# Patient Record
Sex: MALE | Race: BLACK OR AFRICAN AMERICAN | NOT HISPANIC OR LATINO | Employment: OTHER | URBAN - NONMETROPOLITAN AREA
[De-identification: names, ages, dates, MRNs, and addresses within clinical notes are randomized per-mention and may not be internally consistent; named-entity substitution may affect disease eponyms.]

---

## 2017-05-15 ENCOUNTER — OFFICE VISIT (OUTPATIENT)
Dept: CARDIAC SURGERY | Facility: CLINIC | Age: 42
End: 2017-05-15

## 2017-05-15 VITALS
HEIGHT: 73 IN | BODY MASS INDEX: 33.27 KG/M2 | WEIGHT: 251 LBS | SYSTOLIC BLOOD PRESSURE: 140 MMHG | DIASTOLIC BLOOD PRESSURE: 110 MMHG | TEMPERATURE: 98.2 F | HEART RATE: 88 BPM | OXYGEN SATURATION: 97 %

## 2017-05-15 DIAGNOSIS — J43.1 PANLOBULAR EMPHYSEMA (HCC): ICD-10-CM

## 2017-05-15 DIAGNOSIS — I73.9 PVD (PERIPHERAL VASCULAR DISEASE) (HCC): ICD-10-CM

## 2017-05-15 DIAGNOSIS — F17.218 NICOTINE DEPENDENCE, CIGARETTES, WITH OTHER NICOTINE-INDUCED DISORDERS: ICD-10-CM

## 2017-05-15 DIAGNOSIS — I71.40 ABDOMINAL AORTIC ANEURYSM (AAA) WITHOUT RUPTURE (HCC): Primary | ICD-10-CM

## 2017-05-15 DIAGNOSIS — E78.2 MIXED HYPERLIPIDEMIA: ICD-10-CM

## 2017-05-15 DIAGNOSIS — I10 BENIGN ESSENTIAL HTN: ICD-10-CM

## 2017-05-15 PROCEDURE — 99406 BEHAV CHNG SMOKING 3-10 MIN: CPT | Performed by: THORACIC SURGERY (CARDIOTHORACIC VASCULAR SURGERY)

## 2017-05-15 PROCEDURE — 99205 OFFICE O/P NEW HI 60 MIN: CPT | Performed by: THORACIC SURGERY (CARDIOTHORACIC VASCULAR SURGERY)

## 2017-05-15 RX ORDER — CYCLOBENZAPRINE HCL 5 MG
5 TABLET ORAL NIGHTLY
COMMUNITY
End: 2020-02-13

## 2017-05-15 RX ORDER — MINOXIDIL 2.5 MG/1
2.5 TABLET ORAL DAILY
COMMUNITY
End: 2017-08-07 | Stop reason: DRUGHIGH

## 2017-05-15 RX ORDER — CHLORTHALIDONE 25 MG/1
25 TABLET ORAL DAILY
COMMUNITY
End: 2020-02-13

## 2017-05-15 RX ORDER — LOSARTAN POTASSIUM 25 MG/1
100 TABLET ORAL DAILY
COMMUNITY
End: 2020-02-24 | Stop reason: SDUPTHER

## 2017-05-15 RX ORDER — ATORVASTATIN CALCIUM 20 MG/1
20 TABLET, FILM COATED ORAL NIGHTLY
COMMUNITY
End: 2020-02-24 | Stop reason: SDUPTHER

## 2017-05-15 RX ORDER — GUANFACINE 1 MG/1
1 TABLET ORAL NIGHTLY
COMMUNITY
End: 2020-05-12 | Stop reason: SDUPTHER

## 2017-05-15 RX ORDER — WARFARIN SODIUM 5 MG/1
5 TABLET ORAL
COMMUNITY
End: 2017-07-17 | Stop reason: ALTCHOICE

## 2017-05-15 RX ORDER — GABAPENTIN 600 MG/1
600 TABLET ORAL 3 TIMES DAILY
COMMUNITY
End: 2020-02-27 | Stop reason: SDUPTHER

## 2017-05-18 LAB
BH CV LOWER ARTERIAL LEFT ABI RATIO: 1.06
BH CV LOWER ARTERIAL LEFT DORSALIS PEDIS SYS MAX: 165 MMHG
BH CV LOWER ARTERIAL LEFT POST TIBIAL SYS MAX: 160 MMHG
BH CV LOWER ARTERIAL RIGHT ABI RATIO: 1.15
BH CV LOWER ARTERIAL RIGHT DORSALIS PEDIS SYS MAX: 174 MMHG
BH CV LOWER ARTERIAL RIGHT POST TIBIAL SYS MAX: 180 MMHG
UPPER ARTERIAL LEFT ARM BRACHIAL SYS MAX: 147 MMHG
UPPER ARTERIAL RIGHT ARM BRACHIAL SYS MAX: 156 MMHG

## 2017-05-31 DIAGNOSIS — I71.40 ABDOMINAL AORTIC ANEURYSM (AAA) WITHOUT RUPTURE (HCC): Primary | ICD-10-CM

## 2017-06-07 ENCOUNTER — HOSPITAL ENCOUNTER (OUTPATIENT)
Dept: CT IMAGING | Facility: HOSPITAL | Age: 42
Discharge: HOME | End: 2017-06-07
Admitting: NURSE PRACTITIONER

## 2017-06-07 DIAGNOSIS — I71.40 ABDOMINAL AORTIC ANEURYSM (AAA) WITHOUT RUPTURE (HCC): ICD-10-CM

## 2017-06-07 PROCEDURE — 74176 CT ABD & PELVIS W/O CONTRAST: CPT

## 2017-07-17 ENCOUNTER — OFFICE VISIT (OUTPATIENT)
Dept: CARDIAC SURGERY | Facility: CLINIC | Age: 42
End: 2017-07-17

## 2017-07-17 VITALS
OXYGEN SATURATION: 98 % | DIASTOLIC BLOOD PRESSURE: 82 MMHG | HEART RATE: 87 BPM | BODY MASS INDEX: 33.49 KG/M2 | HEIGHT: 73 IN | WEIGHT: 252.7 LBS | SYSTOLIC BLOOD PRESSURE: 122 MMHG

## 2017-07-17 DIAGNOSIS — E78.2 MIXED HYPERLIPIDEMIA: ICD-10-CM

## 2017-07-17 DIAGNOSIS — I73.9 PVD (PERIPHERAL VASCULAR DISEASE) (HCC): ICD-10-CM

## 2017-07-17 DIAGNOSIS — I25.10 CORONARY ARTERY DISEASE INVOLVING NATIVE CORONARY ARTERY OF NATIVE HEART WITHOUT ANGINA PECTORIS: ICD-10-CM

## 2017-07-17 DIAGNOSIS — I10 BENIGN ESSENTIAL HTN: ICD-10-CM

## 2017-07-17 DIAGNOSIS — J43.1 PANLOBULAR EMPHYSEMA (HCC): ICD-10-CM

## 2017-07-17 DIAGNOSIS — I71.40 ABDOMINAL AORTIC ANEURYSM (AAA) WITHOUT RUPTURE (HCC): Primary | ICD-10-CM

## 2017-07-17 DIAGNOSIS — F17.218 NICOTINE DEPENDENCE, CIGARETTES, WITH OTHER NICOTINE-INDUCED DISORDERS: ICD-10-CM

## 2017-07-17 PROCEDURE — 99214 OFFICE O/P EST MOD 30 MIN: CPT | Performed by: THORACIC SURGERY (CARDIOTHORACIC VASCULAR SURGERY)

## 2017-07-17 PROCEDURE — 99406 BEHAV CHNG SMOKING 3-10 MIN: CPT | Performed by: THORACIC SURGERY (CARDIOTHORACIC VASCULAR SURGERY)

## 2017-07-17 RX ORDER — CLONIDINE HYDROCHLORIDE 0.3 MG/1
TABLET ORAL
COMMUNITY
Start: 2017-06-01 | End: 2017-08-07 | Stop reason: ALTCHOICE

## 2017-07-17 RX ORDER — LABETALOL 200 MG/1
200 TABLET, FILM COATED ORAL 3 TIMES DAILY
COMMUNITY
Start: 2017-06-13 | End: 2020-04-23

## 2017-07-17 RX ORDER — SODIUM CHLORIDE 0.9 % (FLUSH) 0.9 %
1-10 SYRINGE (ML) INJECTION AS NEEDED
Status: CANCELLED | OUTPATIENT
Start: 2017-08-11

## 2017-07-17 RX ORDER — CALCITRIOL 0.25 UG/1
0.25 CAPSULE, LIQUID FILLED ORAL DAILY
COMMUNITY
Start: 2017-06-03 | End: 2020-02-17 | Stop reason: SDUPTHER

## 2017-07-17 RX ORDER — ALLOPURINOL 100 MG/1
100 TABLET ORAL DAILY
COMMUNITY
Start: 2017-06-03 | End: 2020-04-09 | Stop reason: SDUPTHER

## 2017-07-17 RX ORDER — SODIUM CHLORIDE 9 MG/ML
50 INJECTION, SOLUTION INTRAVENOUS CONTINUOUS
Status: CANCELLED | OUTPATIENT
Start: 2017-08-11

## 2017-07-17 NOTE — PROGRESS NOTES
7/17/2017    Gregg Randle  1975      Chief Complaint:    Chief Complaint   Patient presents with   • Aortic Aneurysm     ct results       HPI:      PCP:  Dr Jiménez  Nephrology:  Dr Jones    42 y.o. male with HTN(stable), hyperlipidemia(stable), stroke(stable), COPD(stable), AAA.  smokes 1 PPD.  Mild to moderate numbness tingling LEFT arm, leg.  LEFT leg occasionally gives out.  Abdominal ultrasound done for kidney evaluation shows AAA.  Was on coumadin until recently, has been stopped.  No other associated signs, symptoms or modifying factors.    3/21/2017 US Retroperitoneal, Renal  ([facility]):  Medical renal disease.  52mm infrarenal AAA, iliac R 27mm, L 19mm.  5/15/2017 VIKASH:  RIGHT 1.1 triphasic.  LEFT 1.1 triphasic.  6/7/2017 CT Abdomen Pelvis without contrast:  Descending thoracic aorta 27mm, renals 23mm, infrarenal 52mm, iliacs R 29mm, L 21mm, femorals R 12mm, L 13mm.    The following portions of the patient's history were reviewed and updated as appropriate: allergies, current medications, past family history, past medical history, past social history, past surgical history and problem list.  Recent images independently reviewed.  Available laboratory values reviewed.    PMH:  Past Medical History:   Diagnosis Date   • AAA (abdominal aortic aneurysm)    • CAD (coronary artery disease)    • COPD (chronic obstructive pulmonary disease)    • HTN (hypertension)    • Hyperlipidemia    • Stroke        ALLERGIES:  No Known Allergies      MEDICATIONS:    Current Outpatient Prescriptions:   •  atorvastatin (LIPITOR) 20 MG tablet, Take 20 mg by mouth Daily., Disp: , Rfl:   •  chlorthalidone (HYGROTON) 25 MG tablet, Take 25 mg by mouth Daily., Disp: , Rfl:   •  cyclobenzaprine (FLEXERIL) 5 MG tablet, Take 5 mg by mouth 3 (Three) Times a Day As Needed for Muscle Spasms., Disp: , Rfl:   •  gabapentin (NEURONTIN) 600 MG tablet, Take 600 mg by mouth 3 (Three) Times a Day., Disp: , Rfl:   •  guanFACINE (TENEX) 1 MG  tablet, Take 1 mg by mouth Every Night., Disp: , Rfl:   •  losartan (COZAAR) 25 MG tablet, Take 25 mg by mouth Daily., Disp: , Rfl:   •  minoxidil (LONITEN) 2.5 MG tablet, Take 2.5 mg by mouth Daily., Disp: , Rfl:   •  allopurinol (ZYLOPRIM) 100 MG tablet, , Disp: , Rfl:   •  calcitriol (ROCALTROL) 0.25 MCG capsule, , Disp: , Rfl:   •  CloNIDine (CATAPRES) 0.3 MG tablet, , Disp: , Rfl:   •  labetalol (NORMODYNE) 200 MG tablet, , Disp: , Rfl:     Review of Systems   Review of Systems   Constitution: Positive for malaise/fatigue, night sweats and weight gain. Negative for weight loss.   HENT: Positive for hearing loss. Negative for hoarse voice and stridor.    Eyes: Positive for blurred vision. Negative for vision loss in left eye, vision loss in right eye and visual disturbance.   Cardiovascular: Negative for chest pain, leg swelling and palpitations.   Respiratory: Negative for cough, hemoptysis and shortness of breath.    Hematologic/Lymphatic: Negative for adenopathy and bleeding problem. Does not bruise/bleed easily.   Skin: Negative for color change, poor wound healing and rash.   Musculoskeletal: Positive for arthritis, back pain and neck pain. Negative for muscle weakness.   Gastrointestinal: Negative for abdominal pain, dysphagia and heartburn.   Neurological: Positive for dizziness, focal weakness, numbness and paresthesias. Negative for seizures.   Psychiatric/Behavioral: Negative for altered mental status, depression and memory loss. The patient is not nervous/anxious.        Physical Exam   Physical Exam   Constitutional: He is oriented to person, place, and time. He is active and cooperative. He does not appear ill. No distress.   HENT:   Head: Atraumatic.   Right Ear: Hearing normal.   Left Ear: Hearing normal.   Nose: No nasal deformity. No epistaxis.   Mouth/Throat: He does not have dentures. Normal dentition.   Eyes: Conjunctivae and lids are normal. Right pupil is round and reactive. Left pupil is  round and reactive.   Neck: No JVD present. Carotid bruit is not present. No tracheal deviation present. No thyroid mass and no thyromegaly present.   Cardiovascular: Normal rate and regular rhythm.    No murmur heard.  Pulses:       Carotid pulses are 2+ on the right side, and 2+ on the left side.       Radial pulses are 2+ on the right side, and 2+ on the left side.        Femoral pulses are 2+ on the right side, and 2+ on the left side.       Dorsalis pedis pulses are 1+ on the right side, and 1+ on the left side.        Posterior tibial pulses are 2+ on the right side, and 2+ on the left side.   Pulmonary/Chest: Effort normal and breath sounds normal.   Abdominal: Soft. He exhibits no distension, no pulsatile midline mass and no mass. There is no splenomegaly or hepatomegaly. There is no tenderness.   Musculoskeletal: He exhibits no deformity.   Gait normal.    Lymphadenopathy:     He has no cervical adenopathy.        Right: No supraclavicular adenopathy present.        Left: No supraclavicular adenopathy present.   Neurological: He is alert and oriented to person, place, and time. He has normal strength.   Skin: Skin is warm and dry. No cyanosis or erythema. No pallor.   No venous staining   Psychiatric: He has a normal mood and affect. His speech is normal. Judgment and thought content normal.      BUN 25 Creat 2.1    ASSESSMENT:  Gregg was seen today for aortic aneurysm.    Diagnoses and all orders for this visit:    Abdominal aortic aneurysm (AAA) without rupture  -     Case Request; Standing  -     sodium chloride 0.9 % flush 1-10 mL; Infuse 1-10 mL into a venous catheter As Needed for Line Care.  -     ceFAZolin (ANCEF) 2 g in sodium chloride 0.9 % 100 mL IVPB; Infuse 2 g into a venous catheter 1 (One) Time.  -     mupirocin (BACTROBAN) 2 % nasal ointment 1 application; 1 application by Each Nare route 1 (One) Time.  -     sterile water (preservative free) injection 1,000 mL; Inject 1,000 mL as directed  1 (One) Time.  -     sodium chloride 0.9 % infusion; Infuse 50 mL/hr into a venous catheter Continuous.  -     Case Request    Benign essential HTN    Mixed hyperlipidemia    PVD (peripheral vascular disease)    Panlobular emphysema    Nicotine dependence, cigarettes, with other nicotine-induced disorders    Coronary artery disease involving native coronary artery of native heart without angina pectoris    Other orders  -     Inpatient Admission; Standing  -     Provide NPO Instructions to Patient  -     Clorhexidine Skin Prep  -     Obtain informed consent; Standing  -     Chlorhexidine 4%/Hibiclens Skin Prep; Standing  -     Clip bilateral groins; Standing  -     Insert Arterial Line; Standing  -     Basic Metabolic Panel; Standing  -     Prepare RBC, 2 Units; Standing  -     Insert Peripheral IV; Standing  -     Saline Lock & Maintain IV Access; Standing    PLAN:  Detailed discussion with Mr Randle regarding situation, options and plans.  AAA, 54mm by US, 52mm by CT with 29mm RIGHT iliac artery aneurysm, asymptomatic.  Repair of Abdominal Aortic Aneurysm is advisable.      Risks including but not limited to Mortality, Major Morbidity 2-3%, bleeding, transfusion, infection, pulmonary (prolonged mechanical ventilation, tracheostomy), renal dysfunction (dialysis), blood vessel injury, nerve injury, possible open procedure.  Understands and wishes to proceed.    Endovascular repair Abdominal Aortic Aneurysm, RIGHT iliac artery aneurysm, possible open, radial arterial line, C-Arm fluoroscopy, C-Arm table, Ancef, visipaque, limited contrast.  GEN.  SDS. 8/11/2017    Smoking cessation advised and assistance options offered including behavioral counseling ([facility]), Nicotine replacement therapy (patches or gum), pharmacologic therapy (Chantix, Wellbutrin). patient understands that continued use of tobacco products increases her risk of MI, CVA, PAD, cancer; counseling for  3-5min.  Recommended regular physical activity, progressive walking program.  Continue current medications as directed.  Will Obtain relevant old records.    Thank you for the opportunity to participate in this patient's care.    Copy to primary care provider.    EMR Dragon/Transcription disclaimer:   Much of this encounter note is an electronic transcription/translation of spoken language to printed text. The electronic translation of spoken language may permit erroneous, or at times, nonsensical words or phrases to be inadvertently transcribed; Although I have reviewed the note for such errors, some may still exist.

## 2017-08-07 ENCOUNTER — APPOINTMENT (OUTPATIENT)
Dept: PREADMISSION TESTING | Facility: HOSPITAL | Age: 42
End: 2017-08-07

## 2017-08-07 VITALS
BODY MASS INDEX: 33.13 KG/M2 | DIASTOLIC BLOOD PRESSURE: 70 MMHG | HEIGHT: 73 IN | OXYGEN SATURATION: 96 % | RESPIRATION RATE: 18 BRPM | WEIGHT: 250 LBS | SYSTOLIC BLOOD PRESSURE: 130 MMHG | HEART RATE: 86 BPM

## 2017-08-07 LAB
ABO GROUP BLD: NORMAL
ANION GAP SERPL CALCULATED.3IONS-SCNC: 11 MMOL/L (ref 5–15)
BLD GP AB SCN SERPL QL: NEGATIVE
BUN BLD-MCNC: 40 MG/DL (ref 7–21)
BUN/CREAT SERPL: 14.7 (ref 7–25)
CALCIUM SPEC-SCNC: 9.8 MG/DL (ref 8.4–10.2)
CHLORIDE SERPL-SCNC: 97 MMOL/L (ref 95–110)
CO2 SERPL-SCNC: 30 MMOL/L (ref 22–31)
CREAT BLD-MCNC: 2.73 MG/DL (ref 0.7–1.3)
DEPRECATED RDW RBC AUTO: 42.2 FL (ref 35.1–43.9)
ERYTHROCYTE [DISTWIDTH] IN BLOOD BY AUTOMATED COUNT: 13.8 % (ref 11.5–14.5)
GFR SERPL CREATININE-BSD FRML MDRD: 31 ML/MIN/1.73 (ref 63–147)
GLUCOSE BLD-MCNC: 85 MG/DL (ref 60–100)
HCT VFR BLD AUTO: 45.7 % (ref 39–49)
HGB BLD-MCNC: 15.3 G/DL (ref 13.7–17.3)
Lab: NORMAL
MCH RBC QN AUTO: 27.9 PG (ref 26.5–34)
MCHC RBC AUTO-ENTMCNC: 33.5 G/DL (ref 31.5–36.3)
MCV RBC AUTO: 83.4 FL (ref 80–98)
PLATELET # BLD AUTO: 240 10*3/MM3 (ref 150–450)
PMV BLD AUTO: 10.7 FL (ref 8–12)
POTASSIUM BLD-SCNC: 4.4 MMOL/L (ref 3.5–5.1)
RBC # BLD AUTO: 5.48 10*6/MM3 (ref 4.37–5.74)
RH BLD: POSITIVE
SODIUM BLD-SCNC: 138 MMOL/L (ref 137–145)
WBC NRBC COR # BLD: 5.9 10*3/MM3 (ref 3.2–9.8)

## 2017-08-07 PROCEDURE — 86850 RBC ANTIBODY SCREEN: CPT | Performed by: ANESTHESIOLOGY

## 2017-08-07 PROCEDURE — 93010 ELECTROCARDIOGRAM REPORT: CPT | Performed by: INTERNAL MEDICINE

## 2017-08-07 PROCEDURE — 85027 COMPLETE CBC AUTOMATED: CPT | Performed by: ANESTHESIOLOGY

## 2017-08-07 PROCEDURE — 93005 ELECTROCARDIOGRAM TRACING: CPT

## 2017-08-07 PROCEDURE — 86901 BLOOD TYPING SEROLOGIC RH(D): CPT | Performed by: ANESTHESIOLOGY

## 2017-08-07 PROCEDURE — 80048 BASIC METABOLIC PNL TOTAL CA: CPT | Performed by: ANESTHESIOLOGY

## 2017-08-07 PROCEDURE — 36415 COLL VENOUS BLD VENIPUNCTURE: CPT

## 2017-08-07 PROCEDURE — 86900 BLOOD TYPING SEROLOGIC ABO: CPT | Performed by: ANESTHESIOLOGY

## 2017-08-07 RX ORDER — WARFARIN SODIUM 1 MG/1
1 TABLET ORAL
COMMUNITY
End: 2017-08-07

## 2017-08-07 RX ORDER — MINOXIDIL 10 MG/1
10 TABLET ORAL 2 TIMES DAILY
COMMUNITY
End: 2019-10-07

## 2017-08-07 RX ORDER — WARFARIN SODIUM 1 MG/1
1.5 TABLET ORAL
COMMUNITY
End: 2020-01-15

## 2017-08-07 RX ORDER — WARFARIN SODIUM 5 MG/1
5 TABLET ORAL
COMMUNITY
End: 2020-01-15

## 2017-08-07 RX ORDER — ALBUTEROL SULFATE 90 UG/1
2 AEROSOL, METERED RESPIRATORY (INHALATION) EVERY 4 HOURS PRN
COMMUNITY

## 2017-08-07 RX ORDER — ESCITALOPRAM OXALATE 20 MG/1
20 TABLET ORAL DAILY
COMMUNITY
End: 2020-02-13

## 2017-08-07 NOTE — DISCHARGE INSTRUCTIONS
[facility]  Pre-op Information and Guidelines    You will be called after 2 p.m. the day before your surgery (Friday for Monday surgery) and notified of your time for arrival and approximate surgery time.  If you have not received a call by 4P.M., please contact Same Day Surgery at [phone] of if outside [facility] call [phone].    Please Follow these Important Safety Guidelines:    • The morning of your procedure, take only the medications listed below with   A sip of water:____INHALER, LEXAPRO, GABAPENTIN,_________________       ___LABETALOL_____________________________________    • DO NOT eat or drink anything after 12:00 midnight the night before surgery  Specific instructions concerning drinking clear liquids will be discussed during  the pre-surgery instruction call the day before your surgery.    • If you take a blood thinner (ex. Plavix, Coumadin, aspirin), ask your doctor when to stop it before surgery  STOP DATE: _________________    • Only 2 visitors are allowed in patient rooms at a time  Your visitors will be asked to wait in the lobby until the admission process is complete with the exception of a parent with a child and patients in need of special assistance.    • YOU CANNOT DRIVE YOURSELF HOME  You must be accompanied by someone who will be responsible for driving you home after surgery and for your care at home.    • DO NOT chew gum, use breath mints, hard candy, or smoke the day of surgery  • DO NOT drink alcohol for at least 24 hours before your surgery  • DO NOT wear any jewelry and remove all body piercing before coming to the hospital  • DO NOT wear make-up to the hospital  • If you are having surgery on an extremity (arm/leg/foot) remove nail polish/artificial nails on the surgical side  • Clothing, glasses, contacts, dentures, and hairpieces must be removed before surgery  • Bathe the night before or the morning of your surgery and do not use  powders/lotions on skin.

## 2017-08-07 NOTE — PAT
Spoke with Roel , with Dr. Arshad, r/t coumadin, states for patient to hold until after surgery. Patient voiced understanding.

## 2017-08-11 ENCOUNTER — ANESTHESIA EVENT (OUTPATIENT)
Dept: PERIOP | Facility: HOSPITAL | Age: 42
End: 2017-08-11

## 2017-08-11 ENCOUNTER — ANESTHESIA (OUTPATIENT)
Dept: PERIOP | Facility: HOSPITAL | Age: 42
End: 2017-08-11

## 2017-08-11 ENCOUNTER — APPOINTMENT (OUTPATIENT)
Dept: GENERAL RADIOLOGY | Facility: HOSPITAL | Age: 42
End: 2017-08-11

## 2017-08-11 ENCOUNTER — HOSPITAL ENCOUNTER (INPATIENT)
Facility: HOSPITAL | Age: 42
LOS: 2 days | Discharge: HOME | End: 2017-08-13
Attending: THORACIC SURGERY (CARDIOTHORACIC VASCULAR SURGERY) | Admitting: THORACIC SURGERY (CARDIOTHORACIC VASCULAR SURGERY)

## 2017-08-11 DIAGNOSIS — I71.40 ABDOMINAL AORTIC ANEURYSM (AAA) WITHOUT RUPTURE (HCC): ICD-10-CM

## 2017-08-11 LAB
ABO GROUP BLD: NORMAL
ANION GAP SERPL CALCULATED.3IONS-SCNC: 12 MMOL/L (ref 5–15)
BLD GP AB SCN SERPL QL: NEGATIVE
BUN BLD-MCNC: 37 MG/DL (ref 7–21)
BUN/CREAT SERPL: 13.5 (ref 7–25)
CALCIUM SPEC-SCNC: 9.1 MG/DL (ref 8.4–10.2)
CHLORIDE SERPL-SCNC: 101 MMOL/L (ref 95–110)
CO2 SERPL-SCNC: 27 MMOL/L (ref 22–31)
CREAT BLD-MCNC: 2.74 MG/DL (ref 0.7–1.3)
GFR SERPL CREATININE-BSD FRML MDRD: 31 ML/MIN/1.73 (ref 63–147)
GLUCOSE BLD-MCNC: 97 MG/DL (ref 60–100)
HCT VFR BLD AUTO: 44.4 % (ref 39–49)
HGB BLD-MCNC: 14.5 G/DL (ref 13.7–17.3)
INR PPP: 1.18 (ref 0.8–1.2)
Lab: NORMAL
POTASSIUM BLD-SCNC: 4.3 MMOL/L (ref 3.5–5.1)
PROTHROMBIN TIME: 15 SECONDS (ref 11.1–15.3)
RH BLD: POSITIVE
SODIUM BLD-SCNC: 140 MMOL/L (ref 137–145)

## 2017-08-11 PROCEDURE — 86923 COMPATIBILITY TEST ELECTRIC: CPT

## 2017-08-11 PROCEDURE — 25010000002 SUCCINYLCHOLINE PER 20 MG: Performed by: NURSE ANESTHETIST, CERTIFIED REGISTERED

## 2017-08-11 PROCEDURE — 86901 BLOOD TYPING SEROLOGIC RH(D): CPT | Performed by: THORACIC SURGERY (CARDIOTHORACIC VASCULAR SURGERY)

## 2017-08-11 PROCEDURE — 94799 UNLISTED PULMONARY SVC/PX: CPT

## 2017-08-11 PROCEDURE — 85610 PROTHROMBIN TIME: CPT | Performed by: ANESTHESIOLOGY

## 2017-08-11 PROCEDURE — 34826: CPT | Performed by: THORACIC SURGERY (CARDIOTHORACIC VASCULAR SURGERY)

## 2017-08-11 PROCEDURE — C1769 GUIDE WIRE: HCPCS | Performed by: THORACIC SURGERY (CARDIOTHORACIC VASCULAR SURGERY)

## 2017-08-11 PROCEDURE — C1894 INTRO/SHEATH, NON-LASER: HCPCS | Performed by: THORACIC SURGERY (CARDIOTHORACIC VASCULAR SURGERY)

## 2017-08-11 PROCEDURE — 36200 PLACE CATHETER IN AORTA: CPT | Performed by: THORACIC SURGERY (CARDIOTHORACIC VASCULAR SURGERY)

## 2017-08-11 PROCEDURE — 75952: CPT | Performed by: THORACIC SURGERY (CARDIOTHORACIC VASCULAR SURGERY)

## 2017-08-11 PROCEDURE — 25010000002 ONDANSETRON PER 1 MG: Performed by: NURSE ANESTHETIST, CERTIFIED REGISTERED

## 2017-08-11 PROCEDURE — 25010000002 HEPARIN (PORCINE) PER 1000 UNITS: Performed by: THORACIC SURGERY (CARDIOTHORACIC VASCULAR SURGERY)

## 2017-08-11 PROCEDURE — 25010000002 NALOXONE PER 1 MG: Performed by: THORACIC SURGERY (CARDIOTHORACIC VASCULAR SURGERY)

## 2017-08-11 PROCEDURE — 04V03DZ RESTRICTION OF ABDOMINAL AORTA WITH INTRALUMINAL DEVICE, PERCUTANEOUS APPROACH: ICD-10-PCS | Performed by: THORACIC SURGERY (CARDIOTHORACIC VASCULAR SURGERY)

## 2017-08-11 PROCEDURE — C2628 CATHETER, OCCLUSION: HCPCS | Performed by: THORACIC SURGERY (CARDIOTHORACIC VASCULAR SURGERY)

## 2017-08-11 PROCEDURE — 25010000002 HEPARIN (PORCINE) PER 1000 UNITS: Performed by: NURSE ANESTHETIST, CERTIFIED REGISTERED

## 2017-08-11 PROCEDURE — 76000 FLUOROSCOPY <1 HR PHYS/QHP: CPT

## 2017-08-11 PROCEDURE — 85018 HEMOGLOBIN: CPT | Performed by: THORACIC SURGERY (CARDIOTHORACIC VASCULAR SURGERY)

## 2017-08-11 PROCEDURE — C1773 RET DEV, INSERTABLE: HCPCS | Performed by: THORACIC SURGERY (CARDIOTHORACIC VASCULAR SURGERY)

## 2017-08-11 PROCEDURE — 25010000002 PROTAMINE SULFATE PER 10 MG: Performed by: NURSE ANESTHETIST, CERTIFIED REGISTERED

## 2017-08-11 PROCEDURE — 25010000002 ONDANSETRON PER 1 MG: Performed by: THORACIC SURGERY (CARDIOTHORACIC VASCULAR SURGERY)

## 2017-08-11 PROCEDURE — 34825 PR AAA REPR,1ST VESSEL,EXTENSION PROSTH: CPT | Performed by: THORACIC SURGERY (CARDIOTHORACIC VASCULAR SURGERY)

## 2017-08-11 PROCEDURE — 25010000003 CEFAZOLIN PER 500 MG: Performed by: THORACIC SURGERY (CARDIOTHORACIC VASCULAR SURGERY)

## 2017-08-11 PROCEDURE — 25010000002 DEXAMETHASONE PER 1 MG: Performed by: NURSE ANESTHETIST, CERTIFIED REGISTERED

## 2017-08-11 PROCEDURE — C1768 GRAFT, VASCULAR: HCPCS | Performed by: THORACIC SURGERY (CARDIOTHORACIC VASCULAR SURGERY)

## 2017-08-11 PROCEDURE — 75953: CPT

## 2017-08-11 PROCEDURE — 85014 HEMATOCRIT: CPT | Performed by: THORACIC SURGERY (CARDIOTHORACIC VASCULAR SURGERY)

## 2017-08-11 PROCEDURE — 34804: CPT | Performed by: THORACIC SURGERY (CARDIOTHORACIC VASCULAR SURGERY)

## 2017-08-11 PROCEDURE — 94760 N-INVAS EAR/PLS OXIMETRY 1: CPT

## 2017-08-11 PROCEDURE — 80048 BASIC METABOLIC PNL TOTAL CA: CPT | Performed by: THORACIC SURGERY (CARDIOTHORACIC VASCULAR SURGERY)

## 2017-08-11 PROCEDURE — 86850 RBC ANTIBODY SCREEN: CPT | Performed by: THORACIC SURGERY (CARDIOTHORACIC VASCULAR SURGERY)

## 2017-08-11 PROCEDURE — 0 IOPAMIDOL 61 % SOLUTION: Performed by: THORACIC SURGERY (CARDIOTHORACIC VASCULAR SURGERY)

## 2017-08-11 PROCEDURE — 86900 BLOOD TYPING SEROLOGIC ABO: CPT | Performed by: THORACIC SURGERY (CARDIOTHORACIC VASCULAR SURGERY)

## 2017-08-11 PROCEDURE — 75953: CPT | Performed by: THORACIC SURGERY (CARDIOTHORACIC VASCULAR SURGERY)

## 2017-08-11 PROCEDURE — 25010000002 MIDAZOLAM PER 1 MG: Performed by: NURSE ANESTHETIST, CERTIFIED REGISTERED

## 2017-08-11 PROCEDURE — 25010000002 NEOSTIGMINE 10 MG/10ML SOLUTION

## 2017-08-11 PROCEDURE — 75952 HC ENDOVASC REPAIR INFRARENAL AAA S AND I: CPT

## 2017-08-11 PROCEDURE — C1760 CLOSURE DEV, VASC: HCPCS | Performed by: THORACIC SURGERY (CARDIOTHORACIC VASCULAR SURGERY)

## 2017-08-11 PROCEDURE — C1887 CATHETER, GUIDING: HCPCS | Performed by: THORACIC SURGERY (CARDIOTHORACIC VASCULAR SURGERY)

## 2017-08-11 PROCEDURE — 94640 AIRWAY INHALATION TREATMENT: CPT

## 2017-08-11 PROCEDURE — 25010000002 PROPOFOL 10 MG/ML EMULSION: Performed by: NURSE ANESTHETIST, CERTIFIED REGISTERED

## 2017-08-11 PROCEDURE — 25010000002 FENTANYL CITRATE (PF) 100 MCG/2ML SOLUTION: Performed by: NURSE ANESTHETIST, CERTIFIED REGISTERED

## 2017-08-11 PROCEDURE — 94002 VENT MGMT INPAT INIT DAY: CPT

## 2017-08-11 DEVICE — GRFT AAA AFX EXT VELA SUPR 17F 28X28X95X20: Type: IMPLANTABLE DEVICE | Site: AORTA | Status: FUNCTIONAL

## 2017-08-11 DEVICE — STENTGR 3 OR MORE DEV ENDOLOGIX: Type: IMPLANTABLE DEVICE | Status: FUNCTIONAL

## 2017-08-11 DEVICE — IMPLANTABLE DEVICE: Type: IMPLANTABLE DEVICE | Site: AORTA | Status: FUNCTIONAL

## 2017-08-11 DEVICE — IMPLANTABLE DEVICE: Type: IMPLANTABLE DEVICE | Status: FUNCTIONAL

## 2017-08-11 RX ORDER — ALLOPURINOL 100 MG/1
100 TABLET ORAL DAILY
Status: DISCONTINUED | OUTPATIENT
Start: 2017-08-11 | End: 2017-08-13 | Stop reason: HOSPADM

## 2017-08-11 RX ORDER — FLUMAZENIL 0.1 MG/ML
0.2 INJECTION INTRAVENOUS AS NEEDED
Status: DISCONTINUED | OUTPATIENT
Start: 2017-08-11 | End: 2017-08-11 | Stop reason: HOSPADM

## 2017-08-11 RX ORDER — CHLORTHALIDONE 25 MG/1
25 TABLET ORAL DAILY
Status: DISCONTINUED | OUTPATIENT
Start: 2017-08-11 | End: 2017-08-13 | Stop reason: HOSPADM

## 2017-08-11 RX ORDER — ALBUTEROL SULFATE 2.5 MG/3ML
2.5 SOLUTION RESPIRATORY (INHALATION)
Status: COMPLETED | OUTPATIENT
Start: 2017-08-11 | End: 2017-08-13

## 2017-08-11 RX ORDER — DEXAMETHASONE SODIUM PHOSPHATE 4 MG/ML
INJECTION, SOLUTION INTRA-ARTICULAR; INTRALESIONAL; INTRAMUSCULAR; INTRAVENOUS; SOFT TISSUE AS NEEDED
Status: DISCONTINUED | OUTPATIENT
Start: 2017-08-11 | End: 2017-08-11 | Stop reason: SURG

## 2017-08-11 RX ORDER — LIDOCAINE HYDROCHLORIDE 20 MG/ML
INJECTION, SOLUTION INFILTRATION; PERINEURAL AS NEEDED
Status: DISCONTINUED | OUTPATIENT
Start: 2017-08-11 | End: 2017-08-11 | Stop reason: SURG

## 2017-08-11 RX ORDER — CALCITRIOL 0.25 UG/1
0.25 CAPSULE, LIQUID FILLED ORAL DAILY
Status: DISCONTINUED | OUTPATIENT
Start: 2017-08-11 | End: 2017-08-13 | Stop reason: HOSPADM

## 2017-08-11 RX ORDER — PROPOFOL 10 MG/ML
VIAL (ML) INTRAVENOUS AS NEEDED
Status: DISCONTINUED | OUTPATIENT
Start: 2017-08-11 | End: 2017-08-11 | Stop reason: SURG

## 2017-08-11 RX ORDER — BISACODYL 10 MG
10 SUPPOSITORY, RECTAL RECTAL DAILY PRN
Status: DISCONTINUED | OUTPATIENT
Start: 2017-08-11 | End: 2017-08-13 | Stop reason: HOSPADM

## 2017-08-11 RX ORDER — ONDANSETRON 2 MG/ML
4 INJECTION INTRAMUSCULAR; INTRAVENOUS EVERY 6 HOURS PRN
Status: DISCONTINUED | OUTPATIENT
Start: 2017-08-11 | End: 2017-08-13 | Stop reason: HOSPADM

## 2017-08-11 RX ORDER — NITROGLYCERIN 40 MG/100ML
5-200 INJECTION INTRAVENOUS
Status: DISCONTINUED | OUTPATIENT
Start: 2017-08-11 | End: 2017-08-12

## 2017-08-11 RX ORDER — ONDANSETRON 4 MG/1
4 TABLET, FILM COATED ORAL EVERY 6 HOURS PRN
Status: DISCONTINUED | OUTPATIENT
Start: 2017-08-11 | End: 2017-08-13 | Stop reason: HOSPADM

## 2017-08-11 RX ORDER — FAMOTIDINE 10 MG/ML
10 INJECTION, SOLUTION INTRAVENOUS EVERY 12 HOURS SCHEDULED
Status: DISCONTINUED | OUTPATIENT
Start: 2017-08-11 | End: 2017-08-13 | Stop reason: HOSPADM

## 2017-08-11 RX ORDER — SODIUM CHLORIDE 0.9 % (FLUSH) 0.9 %
SYRINGE (ML) INJECTION
Status: COMPLETED
Start: 2017-08-11 | End: 2017-08-11

## 2017-08-11 RX ORDER — ACETAMINOPHEN 325 MG/1
650 TABLET ORAL ONCE AS NEEDED
Status: DISCONTINUED | OUTPATIENT
Start: 2017-08-11 | End: 2017-08-11 | Stop reason: HOSPADM

## 2017-08-11 RX ORDER — HYDROCODONE BITARTRATE AND ACETAMINOPHEN 5; 325 MG/1; MG/1
1 TABLET ORAL EVERY 4 HOURS PRN
Status: DISCONTINUED | OUTPATIENT
Start: 2017-08-11 | End: 2017-08-13 | Stop reason: HOSPADM

## 2017-08-11 RX ORDER — NALOXONE HCL 0.4 MG/ML
0.2 VIAL (ML) INJECTION AS NEEDED
Status: DISCONTINUED | OUTPATIENT
Start: 2017-08-11 | End: 2017-08-11 | Stop reason: HOSPADM

## 2017-08-11 RX ORDER — SUCCINYLCHOLINE CHLORIDE 20 MG/ML
INJECTION INTRAMUSCULAR; INTRAVENOUS AS NEEDED
Status: DISCONTINUED | OUTPATIENT
Start: 2017-08-11 | End: 2017-08-11 | Stop reason: SURG

## 2017-08-11 RX ORDER — ONDANSETRON 2 MG/ML
4 INJECTION INTRAMUSCULAR; INTRAVENOUS ONCE AS NEEDED
Status: DISCONTINUED | OUTPATIENT
Start: 2017-08-11 | End: 2017-08-11 | Stop reason: HOSPADM

## 2017-08-11 RX ORDER — FAMOTIDINE 20 MG/1
20 TABLET, FILM COATED ORAL EVERY 12 HOURS SCHEDULED
Status: DISCONTINUED | OUTPATIENT
Start: 2017-08-11 | End: 2017-08-13 | Stop reason: HOSPADM

## 2017-08-11 RX ORDER — LOSARTAN POTASSIUM 25 MG/1
25 TABLET ORAL DAILY
Status: DISCONTINUED | OUTPATIENT
Start: 2017-08-11 | End: 2017-08-13 | Stop reason: HOSPADM

## 2017-08-11 RX ORDER — HEPARIN SODIUM 5000 [USP'U]/ML
INJECTION, SOLUTION INTRAVENOUS; SUBCUTANEOUS AS NEEDED
Status: DISCONTINUED | OUTPATIENT
Start: 2017-08-11 | End: 2017-08-13 | Stop reason: HOSPADM

## 2017-08-11 RX ORDER — LABETALOL HYDROCHLORIDE 5 MG/ML
5 INJECTION, SOLUTION INTRAVENOUS
Status: DISCONTINUED | OUTPATIENT
Start: 2017-08-11 | End: 2017-08-11 | Stop reason: HOSPADM

## 2017-08-11 RX ORDER — ATORVASTATIN CALCIUM 20 MG/1
20 TABLET, FILM COATED ORAL NIGHTLY
Status: DISCONTINUED | OUTPATIENT
Start: 2017-08-11 | End: 2017-08-13 | Stop reason: HOSPADM

## 2017-08-11 RX ORDER — SODIUM CHLORIDE, SODIUM GLUCONATE, SODIUM ACETATE, POTASSIUM CHLORIDE, AND MAGNESIUM CHLORIDE 526; 502; 368; 37; 30 MG/100ML; MG/100ML; MG/100ML; MG/100ML; MG/100ML
INJECTION, SOLUTION INTRAVENOUS CONTINUOUS PRN
Status: DISCONTINUED | OUTPATIENT
Start: 2017-08-11 | End: 2017-08-11 | Stop reason: SURG

## 2017-08-11 RX ORDER — ESCITALOPRAM OXALATE 10 MG/1
20 TABLET ORAL DAILY
Status: DISCONTINUED | OUTPATIENT
Start: 2017-08-11 | End: 2017-08-13 | Stop reason: HOSPADM

## 2017-08-11 RX ORDER — DIPHENHYDRAMINE HYDROCHLORIDE 50 MG/ML
12.5 INJECTION INTRAMUSCULAR; INTRAVENOUS
Status: DISCONTINUED | OUTPATIENT
Start: 2017-08-11 | End: 2017-08-11 | Stop reason: HOSPADM

## 2017-08-11 RX ORDER — SODIUM CHLORIDE 9 MG/ML
50 INJECTION, SOLUTION INTRAVENOUS CONTINUOUS
Status: DISCONTINUED | OUTPATIENT
Start: 2017-08-11 | End: 2017-08-12

## 2017-08-11 RX ORDER — GABAPENTIN 300 MG/1
600 CAPSULE ORAL EVERY 8 HOURS SCHEDULED
Status: DISCONTINUED | OUTPATIENT
Start: 2017-08-11 | End: 2017-08-13 | Stop reason: HOSPADM

## 2017-08-11 RX ORDER — ALBUTEROL SULFATE 90 UG/1
2 AEROSOL, METERED RESPIRATORY (INHALATION) EVERY 4 HOURS PRN
Status: DISCONTINUED | OUTPATIENT
Start: 2017-08-11 | End: 2017-08-13 | Stop reason: HOSPADM

## 2017-08-11 RX ORDER — EPHEDRINE SULFATE 50 MG/ML
5 INJECTION, SOLUTION INTRAVENOUS ONCE AS NEEDED
Status: DISCONTINUED | OUTPATIENT
Start: 2017-08-11 | End: 2017-08-11 | Stop reason: HOSPADM

## 2017-08-11 RX ORDER — ASPIRIN 81 MG/1
81 TABLET, CHEWABLE ORAL DAILY
Status: DISCONTINUED | OUTPATIENT
Start: 2017-08-11 | End: 2017-08-13 | Stop reason: HOSPADM

## 2017-08-11 RX ORDER — PROTAMINE SULFATE 10 MG/ML
INJECTION, SOLUTION INTRAVENOUS AS NEEDED
Status: DISCONTINUED | OUTPATIENT
Start: 2017-08-11 | End: 2017-08-11 | Stop reason: SURG

## 2017-08-11 RX ORDER — SODIUM CHLORIDE 0.9 % (FLUSH) 0.9 %
1-10 SYRINGE (ML) INJECTION AS NEEDED
Status: DISCONTINUED | OUTPATIENT
Start: 2017-08-11 | End: 2017-08-11 | Stop reason: HOSPADM

## 2017-08-11 RX ORDER — ROCURONIUM BROMIDE 10 MG/ML
INJECTION, SOLUTION INTRAVENOUS AS NEEDED
Status: DISCONTINUED | OUTPATIENT
Start: 2017-08-11 | End: 2017-08-11 | Stop reason: SURG

## 2017-08-11 RX ORDER — ONDANSETRON 4 MG/1
4 TABLET, ORALLY DISINTEGRATING ORAL EVERY 6 HOURS PRN
Status: DISCONTINUED | OUTPATIENT
Start: 2017-08-11 | End: 2017-08-13 | Stop reason: HOSPADM

## 2017-08-11 RX ORDER — LABETALOL 200 MG/1
200 TABLET, FILM COATED ORAL 3 TIMES DAILY
Status: DISCONTINUED | OUTPATIENT
Start: 2017-08-11 | End: 2017-08-13 | Stop reason: HOSPADM

## 2017-08-11 RX ORDER — GLYCOPYRROLATE 0.2 MG/ML
INJECTION INTRAMUSCULAR; INTRAVENOUS AS NEEDED
Status: DISCONTINUED | OUTPATIENT
Start: 2017-08-11 | End: 2017-08-11 | Stop reason: SURG

## 2017-08-11 RX ORDER — MORPHINE SULFATE 2 MG/ML
2 INJECTION, SOLUTION INTRAMUSCULAR; INTRAVENOUS EVERY 4 HOURS PRN
Status: DISCONTINUED | OUTPATIENT
Start: 2017-08-11 | End: 2017-08-13 | Stop reason: HOSPADM

## 2017-08-11 RX ORDER — MINOXIDIL 10 MG/1
10 TABLET ORAL 2 TIMES DAILY
Status: DISCONTINUED | OUTPATIENT
Start: 2017-08-11 | End: 2017-08-13 | Stop reason: HOSPADM

## 2017-08-11 RX ORDER — ACETAMINOPHEN 650 MG/1
650 SUPPOSITORY RECTAL ONCE AS NEEDED
Status: DISCONTINUED | OUTPATIENT
Start: 2017-08-11 | End: 2017-08-11 | Stop reason: HOSPADM

## 2017-08-11 RX ORDER — ONDANSETRON 2 MG/ML
INJECTION INTRAMUSCULAR; INTRAVENOUS AS NEEDED
Status: DISCONTINUED | OUTPATIENT
Start: 2017-08-11 | End: 2017-08-11 | Stop reason: SURG

## 2017-08-11 RX ORDER — SENNA AND DOCUSATE SODIUM 50; 8.6 MG/1; MG/1
2 TABLET, FILM COATED ORAL 2 TIMES DAILY PRN
Status: DISCONTINUED | OUTPATIENT
Start: 2017-08-11 | End: 2017-08-13 | Stop reason: HOSPADM

## 2017-08-11 RX ORDER — MIDAZOLAM HYDROCHLORIDE 1 MG/ML
INJECTION INTRAMUSCULAR; INTRAVENOUS AS NEEDED
Status: DISCONTINUED | OUTPATIENT
Start: 2017-08-11 | End: 2017-08-11 | Stop reason: SURG

## 2017-08-11 RX ORDER — NITROGLYCERIN 20 MG/100ML
5-200 INJECTION INTRAVENOUS
Status: DISCONTINUED | OUTPATIENT
Start: 2017-08-11 | End: 2017-08-11 | Stop reason: HOSPADM

## 2017-08-11 RX ORDER — ACETAMINOPHEN 325 MG/1
650 TABLET ORAL EVERY 4 HOURS PRN
Status: DISCONTINUED | OUTPATIENT
Start: 2017-08-11 | End: 2017-08-13 | Stop reason: HOSPADM

## 2017-08-11 RX ORDER — NALOXONE HCL 0.4 MG/ML
0.4 VIAL (ML) INJECTION
Status: DISCONTINUED | OUTPATIENT
Start: 2017-08-11 | End: 2017-08-13 | Stop reason: HOSPADM

## 2017-08-11 RX ORDER — FENTANYL CITRATE 50 UG/ML
INJECTION, SOLUTION INTRAMUSCULAR; INTRAVENOUS AS NEEDED
Status: DISCONTINUED | OUTPATIENT
Start: 2017-08-11 | End: 2017-08-11 | Stop reason: SURG

## 2017-08-11 RX ORDER — SODIUM CHLORIDE 9 MG/ML
50 INJECTION, SOLUTION INTRAVENOUS CONTINUOUS
Status: DISCONTINUED | OUTPATIENT
Start: 2017-08-11 | End: 2017-08-11 | Stop reason: HOSPADM

## 2017-08-11 RX ORDER — HEPARIN SODIUM 1000 [USP'U]/ML
INJECTION, SOLUTION INTRAVENOUS; SUBCUTANEOUS AS NEEDED
Status: DISCONTINUED | OUTPATIENT
Start: 2017-08-11 | End: 2017-08-11 | Stop reason: SURG

## 2017-08-11 RX ORDER — CYCLOBENZAPRINE HCL 5 MG
5 TABLET ORAL NIGHTLY
Status: DISCONTINUED | OUTPATIENT
Start: 2017-08-11 | End: 2017-08-13 | Stop reason: HOSPADM

## 2017-08-11 RX ADMIN — ASPIRIN 81 MG 81 MG: 81 TABLET ORAL at 15:24

## 2017-08-11 RX ADMIN — ROCURONIUM BROMIDE 20 MG: 10 INJECTION INTRAVENOUS at 09:41

## 2017-08-11 RX ADMIN — CYCLOBENZAPRINE HYDROCHLORIDE 5 MG: 5 TABLET, FILM COATED ORAL at 21:30

## 2017-08-11 RX ADMIN — LABETALOL HYDROCHLORIDE 200 MG: 200 TABLET, FILM COATED ORAL at 17:04

## 2017-08-11 RX ADMIN — SODIUM BICARBONATE 100 ML/HR: 84 INJECTION, SOLUTION INTRAVENOUS at 13:05

## 2017-08-11 RX ADMIN — CEFAZOLIN SODIUM 2 G: 1 INJECTION, POWDER, FOR SOLUTION INTRAMUSCULAR; INTRAVENOUS at 20:45

## 2017-08-11 RX ADMIN — PROPOFOL 175 MG: 10 INJECTION, EMULSION INTRAVENOUS at 07:41

## 2017-08-11 RX ADMIN — HYDROCODONE BITARTRATE AND ACETAMINOPHEN 1 TABLET: 5; 325 TABLET ORAL at 15:34

## 2017-08-11 RX ADMIN — MINOXIDIL 10 MG: 10 TABLET ORAL at 15:29

## 2017-08-11 RX ADMIN — ROCURONIUM BROMIDE 5 MG: 10 INJECTION INTRAVENOUS at 07:40

## 2017-08-11 RX ADMIN — CEFAZOLIN SODIUM 2 G: 1 INJECTION, POWDER, FOR SOLUTION INTRAMUSCULAR; INTRAVENOUS at 13:05

## 2017-08-11 RX ADMIN — ATORVASTATIN CALCIUM 20 MG: 20 TABLET, FILM COATED ORAL at 21:30

## 2017-08-11 RX ADMIN — NALOXONE HYDROCHLORIDE 0.4 MG: 0.4 INJECTION, SOLUTION INTRAMUSCULAR; INTRAVENOUS; SUBCUTANEOUS at 12:49

## 2017-08-11 RX ADMIN — ONDANSETRON 4 MG: 2 INJECTION INTRAMUSCULAR; INTRAVENOUS at 09:49

## 2017-08-11 RX ADMIN — CALCITRIOL 0.25 MCG: 0.25 CAPSULE, LIQUID FILLED ORAL at 15:24

## 2017-08-11 RX ADMIN — PROTAMINE SULFATE 20 MG: 10 INJECTION, SOLUTION INTRAVENOUS at 10:03

## 2017-08-11 RX ADMIN — ROCURONIUM BROMIDE 20 MG: 10 INJECTION INTRAVENOUS at 08:23

## 2017-08-11 RX ADMIN — MAGNESIUM HYDROXIDE 10 ML: 2400 SUSPENSION ORAL at 15:24

## 2017-08-11 RX ADMIN — FAMOTIDINE 20 MG: 20 TABLET ORAL at 21:31

## 2017-08-11 RX ADMIN — PROPOFOL 100 MG: 10 INJECTION, EMULSION INTRAVENOUS at 10:26

## 2017-08-11 RX ADMIN — ESCITALOPRAM OXALATE 20 MG: 10 TABLET ORAL at 15:23

## 2017-08-11 RX ADMIN — DEXAMETHASONE SODIUM PHOSPHATE 4 MG: 4 INJECTION, SOLUTION INTRAMUSCULAR; INTRAVENOUS at 09:44

## 2017-08-11 RX ADMIN — SUCCINYLCHOLINE CHLORIDE 100 MG: 20 INJECTION, SOLUTION INTRAMUSCULAR; INTRAVENOUS at 10:26

## 2017-08-11 RX ADMIN — ALLOPURINOL 100 MG: 100 TABLET ORAL at 15:24

## 2017-08-11 RX ADMIN — GABAPENTIN 600 MG: 300 CAPSULE ORAL at 15:23

## 2017-08-11 RX ADMIN — GABAPENTIN 600 MG: 300 CAPSULE ORAL at 21:30

## 2017-08-11 RX ADMIN — CEFAZOLIN SODIUM 2 G: 1 INJECTION, POWDER, FOR SOLUTION INTRAMUSCULAR; INTRAVENOUS at 08:06

## 2017-08-11 RX ADMIN — FENTANYL CITRATE 100 MCG: 50 INJECTION, SOLUTION INTRAMUSCULAR; INTRAVENOUS at 07:41

## 2017-08-11 RX ADMIN — HEPARIN SODIUM 2000 UNITS: 1000 INJECTION, SOLUTION INTRAVENOUS; SUBCUTANEOUS at 09:05

## 2017-08-11 RX ADMIN — SODIUM CHLORIDE 50 ML/HR: 900 INJECTION, SOLUTION INTRAVENOUS at 18:00

## 2017-08-11 RX ADMIN — ALBUTEROL SULFATE 2.5 MG: 2.5 SOLUTION RESPIRATORY (INHALATION) at 17:14

## 2017-08-11 RX ADMIN — ONDANSETRON 4 MG: 2 INJECTION INTRAMUSCULAR; INTRAVENOUS at 14:18

## 2017-08-11 RX ADMIN — PROTAMINE SULFATE 20 MG: 10 INJECTION, SOLUTION INTRAVENOUS at 09:51

## 2017-08-11 RX ADMIN — FAMOTIDINE 10 MG: 10 INJECTION, SOLUTION INTRAVENOUS at 15:23

## 2017-08-11 RX ADMIN — MIDAZOLAM 1 MG: 1 INJECTION INTRAMUSCULAR; INTRAVENOUS at 07:34

## 2017-08-11 RX ADMIN — HEPARIN SODIUM 10000 UNITS: 1000 INJECTION, SOLUTION INTRAVENOUS; SUBCUTANEOUS at 08:55

## 2017-08-11 RX ADMIN — LOSARTAN POTASSIUM 25 MG: 25 TABLET ORAL at 15:26

## 2017-08-11 RX ADMIN — SODIUM CHLORIDE, SODIUM GLUCONATE, SODIUM ACETATE, POTASSIUM CHLORIDE, AND MAGNESIUM CHLORIDE: 526; 502; 368; 37; 30 INJECTION, SOLUTION INTRAVENOUS at 07:51

## 2017-08-11 RX ADMIN — CHLORTHALIDONE 25 MG: 25 TABLET ORAL at 15:26

## 2017-08-11 RX ADMIN — SUCCINYLCHOLINE CHLORIDE 140 MG: 20 INJECTION, SOLUTION INTRAMUSCULAR; INTRAVENOUS at 07:41

## 2017-08-11 RX ADMIN — SODIUM CHLORIDE 50 ML/HR: 9 INJECTION, SOLUTION INTRAVENOUS at 06:12

## 2017-08-11 RX ADMIN — Medication 10 ML: at 18:45

## 2017-08-11 RX ADMIN — ROCURONIUM BROMIDE 45 MG: 10 INJECTION INTRAVENOUS at 07:51

## 2017-08-11 RX ADMIN — LIDOCAINE HYDROCHLORIDE 50 MG: 20 INJECTION, SOLUTION INFILTRATION; PERINEURAL at 07:41

## 2017-08-11 RX ADMIN — MUPIROCIN: 20 OINTMENT TOPICAL at 18:44

## 2017-08-11 RX ADMIN — GLYCOPYRROLATE 0.6 MG: 0.2 INJECTION, SOLUTION INTRAMUSCULAR; INTRAVENOUS at 10:04

## 2017-08-11 NOTE — OP NOTE
OPERATIVE NOTE    Gregg Randle  1975 8/11/2017    PREOP DIAGNOSES:  Abdominal aortic aneurysm (AAA) without rupture [I71.4]    POSTOP DIAGNOSES: same    PROCEDURE:   1. Percutaneous cannulation of the LEFT common femoral artery with a 7Fr sheath and placement of catheter abdominal aorta.  2. Percutaneous cannulation of the RIGHT common femoral artery with a 17Fr sheath and placement of catheter abdominal aorta.  3. Endovascular repair of the abdominal aortic aneurysm, unibody bifurcating prosthesis (Endologix AFX 25x120 Body, [id number] limbs)  4. Placement of proximal extension prosthesis for endovascular repair of abdominal aortic aneurysm (Endologix Best 28mm suprarenal, 95/20)  5. Placement of proximal extension prosthesis for endovascular repair of abdominal aortic aneurysm (Endologix Best 28mm infrarenal)  6. Abdominal aortogram with a bilateral iliofemoral arteriograms  7. Completion aortofemoral arteriogram  8. Repair of LEFT common femoral artery (preclose technique with perclose device)  9. Repair of RIGHT common femorall artery (preclose technique with perclose device x2)  10. Vascular ultrasound guidance (LEFT and RIGHT femoral artery cannulations)  11. Complete radiologic supervision and interpretation.    SURGEON: RENEE Arshad MD FACS RPVI    ASSISTANT: Roel Mejia CFA    ANESTHESIA: GEN                                             ESTIMATED BLOOD LOSS: minimal    COMPLICATIONS: none    CONTRAST:   60ml isovue     FLUORO TIME:  13.7min    DESCRIPTION OF OPERATION: Patient taken to the operating room, placed in supine position. Radial arterial line placed by anesthesia.  General endotracheal anesthesia was induced.  Prepped and draped in sterile fashion. Vascular ultrasound was used to identify the right common femoral artery which was 10mm in diameter with mild posterior plaque.  Cannulated via modified seldinger technique with mini-stick under ultrasound guidance. Wire and  catheter exchanged for 6Fr sheath. perclose devices with pre-close technique at the 10 and 2 o'clock positions were deployed and left untied.  Vascular ultrasound was used to identify the LEFT common femoral artery which was 10mm in diameter with mild posterior plaque.  Cannulated via modified seldinger technique with mini-stick under ultrasound guidance. Wire and catheter exchanged for 7Fr sheath. perclose device with pre-close technique at the 12 o'clock position was deployed and left untied.     .035 Glidewire was placed up the right side to the ascending thoracic aorta. 4Fr glidecath placed over the wire and exchanged for .035 lunderquist guidewire with tip placed in the acending thoracic aorta. RIGHT sheath exchanged for a 17Fr sheath, advanced up the iliac artery without difficulty just above the aortic bifurcation.  The yellow wirecath was advanced to the end of the sheath. Snare brought up through the LEFT sheath.  Yellow wirecath captured and brought out through LEFT sheath.  Endoluminal graft was advanced completely out of the sheath with no wire wrap and brought down snug against the bifurcation and deployed. Yellow cath removed.  omniflush catheter fed over wire, wire disconnected from graft and removed.  omniflush advanced above the renals.  Proximal cuff brought up through the RIGHT sheath into the suprarenal aorta.  Adjusted for parallax.  Abdominal aortogram performed, renal arteries identified and marked.  Proximal cuff deployed with uncovered portion deployed in the suprarenal position, covered portion deployed just below the lowest renal artery.  Balloon dilatation of the iliac limb with [id number] conquest balloon.  aortofemoral arteriogram performed demonstrating good flow through the graft no evidence of endoleak dissection or extravasation.  Good distal flow beyond the graft.  There was limited overlap between proximal and distal segment and additional proximal extension device was brought up and  deployed to add additional support to the midportion of the graft.      Left common femoral artery was repaired using preclosed technique with Perclose device with good hemostasis.  The right common femoral artery was repaired using Precose technique with Perclose device with good hemostasis and good distal flow beyond the repair. Triphasic flow in the feet at the completion of the procedure.  20 mg protamine was given.  Radiologic supervision and interpretation provided throughout the case.  Patient tolerated procedure well transferred to PACU stable condition               Aren Arshad MD     Date: 8/11/2017  Time: 10:36 AM

## 2017-08-11 NOTE — ANESTHESIA POSTPROCEDURE EVALUATION
Patient: Gregg Randle    Procedure Summary     Date Anesthesia Start Anesthesia Stop Room / Location    08/11/17 0736 1045  [facility] OR 05 / [facility]       Procedure Diagnosis Surgeon Provider    ABDOMINAL AORTIC ANEURYSM REPAIR WITH ENDOGRAFT, REPAIR ILIAC ARTERY ANEURYSM, POSSIBLE OPEN ABDOMINAL AORTOGRAM    (CELL SAVER STAND-BY) (N/A Abdomen) Abdominal aortic aneurysm (AAA) without rupture  (Abdominal aortic aneurysm (AAA) without rupture [I71.4]) MD Pro Mckeon MD          Anesthesia Type: general  Last vitals  BP   111/63 (08/11/17 1122)    Temp   97.2 °F (36.2 °C) (08/11/17 1122)    Pulse   72 (08/11/17 1122)   Resp   14 (08/11/17 1122)    SpO2   95 % (08/11/17 1122)      Post Anesthesia Care and Evaluation    Patient location during evaluation: PACU  Patient participation: complete - patient participated  Level of consciousness: awake  Pain score: 2  Pain management: adequate  Airway patency: patent  Anesthetic complications: No anesthetic complications  PONV Status: none  Cardiovascular status: acceptable  Respiratory status: acceptable  Hydration status: acceptable

## 2017-08-11 NOTE — ANESTHESIA PROCEDURE NOTES
Peripheral IV    Patient location during procedure: OR  Line placed for Fluids/Medication Admin.  Performed By   CRNA: RANI SCHNEIDER  Peripheral IV Prep   Patient position: supine   Prep: alcohol swabs  Patient monitoring: heart rate, cardiac monitor and continuous pulse ox  Peripheral IV Procedure   Laterality:left  Location:  Arm  Catheter size: 16 G         Post Assessment   Dressing Type: transparent.    IV Dressing/Site: clean, dry and intact

## 2017-08-11 NOTE — NURSING NOTE
Medication marked and sent to pharmacy valuables to security request not to give code since opt out

## 2017-08-11 NOTE — PLAN OF CARE
Problem: Patient Care Overview (Adult)  Goal: Plan of Care Review  Outcome: Ongoing (interventions implemented as appropriate)    08/11/17 1154   Coping/Psychosocial Response Interventions   Plan Of Care Reviewed With patient   Patient Care Overview   Progress improving   Outcome Evaluation   Outcome Summary/Follow up Plan VSS, pt meets pacu d/c criteria       Goal: Adult Individualization and Mutuality  Outcome: Ongoing (interventions implemented as appropriate)    Problem: Perioperative Period (Adult)  Goal: Signs and Symptoms of Listed Potential Problems Will be Absent or Manageable (Perioperative Period)  Outcome: Ongoing (interventions implemented as appropriate)

## 2017-08-11 NOTE — ANESTHESIA PROCEDURE NOTES
Arterial Line    Patient location during procedure: OR   Line placed for hemodynamic monitoring.  Performed By   CRNA: RANI SCHNEIDER  Preanesthetic Checklist  Completed: patient identified, surgical consent, pre-op evaluation, IV checked, risks and benefits discussed and monitors and equipment checked  Arterial Line Prep   Sterile Tech: cap, gloves, mask and sterile barriers  Prep: ChloraPrep  Patient monitoring: blood pressure monitoring, continuous pulse oximetry and EKG  Arterial Line Procedure   Laterality:right  Location:  radial artery  Catheter size: 20 G   Guidance: ultrasound guided  PROCEDURE NOTE/ULTRASOUND INTERPRETATION.  Using ultrasound guidance the potential vascular sites for insertion of the catheter were visualized to determine the patency of the vessel to be used for vascular access.  After selecting the appropriate site for insertion, the needle was visualized under ultrasound being inserted into the radial artery, followed by ultrasound confirmation of wire and catheter placement. There were no abnormalities seen on ultrasound; an image was taken; and the patient tolerated the procedure with no complications.   Number of attempts: 1  Successful placement: yes          Post Assessment   Dressing Type: occlusive dressing applied and secured with tape.   Complications no  Circ/Move/Sens Assessment: unchanged.   Patient Tolerance: patient tolerated the procedure well with no apparent complications

## 2017-08-11 NOTE — ANESTHESIA POSTPROCEDURE EVALUATION
Patient: Gregg Randle    Procedure Summary     Date Anesthesia Start Anesthesia Stop Room / Location    08/11/17 0736 1045  [facility] OR 05 / [facility]       Procedure Diagnosis Surgeon Provider    ABDOMINAL AORTIC ANEURYSM REPAIR WITH ENDOGRAFT, REPAIR ILIAC ARTERY ANEURYSM, POSSIBLE OPEN ABDOMINAL AORTOGRAM    (CELL SAVER STAND-BY) (N/A Abdomen) Abdominal aortic aneurysm (AAA) without rupture  (Abdominal aortic aneurysm (AAA) without rupture [I71.4]) MD Pro Mckeon MD          Anesthesia Type: general  Last vitals  BP        Temp        Pulse       Resp        SpO2          Post Anesthesia Care and Evaluation    Patient location during evaluation: PACU  Patient participation: complete - patient participated  Level of consciousness: awake and alert  Pain management: adequate  Airway patency: patent  Anesthetic complications: No anesthetic complications    Cardiovascular status: acceptable  Respiratory status: acceptable  Hydration status: acceptable

## 2017-08-11 NOTE — ANESTHESIA PROCEDURE NOTES
Airway  Airway not difficult    General Information and Staff    Patient location during procedure: OR  CRNA: RANI SCHNEIDER    Indications and Patient Condition  Indications for airway management: airway protection    Preoxygenated: yes  Mask difficulty assessment: 2 - vent by mask + OA or adjuvant +/- NMBA    Final Airway Details  Final airway type: endotracheal airway      Successful airway: ETT  Cuffed: yes   Successful intubation technique: video laryngoscopy  Facilitating devices/methods: intubating stylet  Endotracheal tube insertion site: oral  Blade: Segun  Blade size: #3  ETT size: 7.5 mm  Cormack-Lehane Classification: grade I - full view of glottis  Placement verified by: chest auscultation and capnometry   Cuff volume (mL): 7  Measured from: lips  Number of attempts at approach: 1

## 2017-08-11 NOTE — ANESTHESIA PREPROCEDURE EVALUATION
Anesthesia Evaluation     NPO Solid Status: > 8 hours       Airway   Mallampati: II  TM distance: >3 FB  no difficulty expected  Dental    (+) poor dentition    Pulmonary    (+) a smoker Current, COPD mild, sleep apnea, decreased breath sounds,   Cardiovascular     ECG reviewed  Rhythm: regular  Rate: normal    (+) hypertension poorly controlled, past MI  >12 months, CAD, PVD, hyperlipidemia    ROS comment: EKG reveals NSR with q waves.    Neuro/Psych  (+) CVA residual symptoms, psychiatric history Anxiety,    GI/Hepatic/Renal/Endo      Musculoskeletal     Abdominal     Abdomen: soft.   Substance History      OB/GYN          Other   (+) arthritis                                     Anesthesia Plan    ASA 4     general     intravenous induction   Anesthetic plan and risks discussed with patient.

## 2017-08-11 NOTE — NURSING NOTE
Dr Gallardo at bedside to evaluate pt for extubation.  Pt meets extubation criteria.  Pt placed on simple mask et O2 sats 99% post-extubation. Pt tolerated well.

## 2017-08-12 PROCEDURE — 99024 POSTOP FOLLOW-UP VISIT: CPT | Performed by: NURSE PRACTITIONER

## 2017-08-12 PROCEDURE — 94799 UNLISTED PULMONARY SVC/PX: CPT

## 2017-08-12 PROCEDURE — 94760 N-INVAS EAR/PLS OXIMETRY 1: CPT

## 2017-08-12 PROCEDURE — 94660 CPAP INITIATION&MGMT: CPT

## 2017-08-12 RX ORDER — HYDROCODONE BITARTRATE AND ACETAMINOPHEN 5; 325 MG/1; MG/1
1 TABLET ORAL EVERY 4 HOURS PRN
Qty: 40 TABLET | Refills: 0 | Status: SHIPPED | OUTPATIENT
Start: 2017-08-12 | End: 2017-08-21

## 2017-08-12 RX ORDER — ASPIRIN 81 MG/1
81 TABLET, CHEWABLE ORAL DAILY
Start: 2017-08-12 | End: 2020-02-13

## 2017-08-12 RX ADMIN — SODIUM CHLORIDE 125 ML/HR: 900 INJECTION, SOLUTION INTRAVENOUS at 06:16

## 2017-08-12 RX ADMIN — ALBUTEROL SULFATE 2.5 MG: 2.5 SOLUTION RESPIRATORY (INHALATION) at 00:07

## 2017-08-12 RX ADMIN — CALCITRIOL 0.25 MCG: 0.25 CAPSULE, LIQUID FILLED ORAL at 08:43

## 2017-08-12 RX ADMIN — ASPIRIN 81 MG 81 MG: 81 TABLET ORAL at 08:42

## 2017-08-12 RX ADMIN — GABAPENTIN 600 MG: 300 CAPSULE ORAL at 20:43

## 2017-08-12 RX ADMIN — GABAPENTIN 600 MG: 300 CAPSULE ORAL at 16:34

## 2017-08-12 RX ADMIN — GABAPENTIN 600 MG: 300 CAPSULE ORAL at 22:00

## 2017-08-12 RX ADMIN — FAMOTIDINE 20 MG: 20 TABLET ORAL at 20:43

## 2017-08-12 RX ADMIN — ALBUTEROL SULFATE 2.5 MG: 2.5 SOLUTION RESPIRATORY (INHALATION) at 23:05

## 2017-08-12 RX ADMIN — GABAPENTIN 600 MG: 300 CAPSULE ORAL at 05:52

## 2017-08-12 RX ADMIN — MINOXIDIL 10 MG: 10 TABLET ORAL at 17:42

## 2017-08-12 RX ADMIN — LABETALOL HYDROCHLORIDE 200 MG: 200 TABLET, FILM COATED ORAL at 08:43

## 2017-08-12 RX ADMIN — LABETALOL HYDROCHLORIDE 200 MG: 200 TABLET, FILM COATED ORAL at 20:43

## 2017-08-12 RX ADMIN — ALBUTEROL SULFATE 2.5 MG: 2.5 SOLUTION RESPIRATORY (INHALATION) at 07:34

## 2017-08-12 RX ADMIN — LOSARTAN POTASSIUM 25 MG: 25 TABLET ORAL at 08:42

## 2017-08-12 RX ADMIN — MINOXIDIL 10 MG: 10 TABLET ORAL at 05:52

## 2017-08-12 RX ADMIN — CYCLOBENZAPRINE HYDROCHLORIDE 5 MG: 5 TABLET, FILM COATED ORAL at 20:43

## 2017-08-12 RX ADMIN — ALLOPURINOL 100 MG: 100 TABLET ORAL at 08:42

## 2017-08-12 RX ADMIN — FAMOTIDINE 20 MG: 20 TABLET ORAL at 08:43

## 2017-08-12 RX ADMIN — CHLORTHALIDONE 25 MG: 25 TABLET ORAL at 08:43

## 2017-08-12 RX ADMIN — ALBUTEROL SULFATE 2.5 MG: 2.5 SOLUTION RESPIRATORY (INHALATION) at 14:15

## 2017-08-12 RX ADMIN — ATORVASTATIN CALCIUM 20 MG: 20 TABLET, FILM COATED ORAL at 20:43

## 2017-08-12 RX ADMIN — ESCITALOPRAM OXALATE 20 MG: 10 TABLET ORAL at 08:42

## 2017-08-12 NOTE — PLAN OF CARE
Problem: Patient Care Overview (Adult)  Goal: Plan of Care Review  Outcome: Ongoing (interventions implemented as appropriate)    08/12/17 1828   Coping/Psychosocial Response Interventions   Plan Of Care Reviewed With patient   Patient Care Overview   Progress no change   Outcome Evaluation   Outcome Summary/Follow up Plan pt arrived from ICU this afternoon, pt had no complaints       Goal: Adult Individualization and Mutuality  Outcome: Ongoing (interventions implemented as appropriate)  Goal: Discharge Needs Assessment  Outcome: Ongoing (interventions implemented as appropriate)    Problem: Perioperative Period (Adult)  Goal: Signs and Symptoms of Listed Potential Problems Will be Absent or Manageable (Perioperative Period)  Outcome: Ongoing (interventions implemented as appropriate)

## 2017-08-12 NOTE — DISCHARGE SUMMARY
CVTS DISCHARGE SUMMARY  Date of Admission: 8/11/2017  5:07 AM  Date of Discharge:  8/12/2017    Admission Diagnosis:   Abdominal aortic aneurysm (AAA) without rupture [I71.4]    Discharge Diagnosis:   Post-Op Diagnosis Codes:     * Abdominal aortic aneurysm (AAA) without rupture [I71.4]    Consults:   Consults     No orders found for last 30 day(s).          Procedures Performed  Procedure(s):  ABDOMINAL AORTIC ANEURYSM REPAIR WITH ENDOGRAFT, REPAIR ILIAC ARTERY ANEURYSM, POSSIBLE OPEN ABDOMINAL AORTOGRAM    (CELL SAVER STAND-BY)       Discharge Medications   Gregg Randle   Home Medication Instructions WILLIS:[id number]    Printed on:08/12/17 0881   Medication Information                      albuterol (PROVENTIL HFA;VENTOLIN HFA) 108 (90 BASE) MCG/ACT inhaler  Inhale 2 puffs Every 4 (Four) Hours As Needed for Wheezing.             allopurinol (ZYLOPRIM) 100 MG tablet  Take 100 mg by mouth Daily.             aspirin 81 MG chewable tablet  Chew 1 tablet Daily.             atorvastatin (LIPITOR) 20 MG tablet  Take 20 mg by mouth Every Night.             calcitriol (ROCALTROL) 0.25 MCG capsule  Take 0.25 mcg by mouth Daily.             chlorthalidone (HYGROTON) 25 MG tablet  Take 25 mg by mouth Daily.             cyclobenzaprine (FLEXERIL) 5 MG tablet  Take 5 mg by mouth Every Night.             escitalopram (LEXAPRO) 20 MG tablet  Take 20 mg by mouth Daily.             gabapentin (NEURONTIN) 600 MG tablet  Take 600 mg by mouth 3 (Three) Times a Day.             guanFACINE (TENEX) 1 MG tablet  Take 1 mg by mouth Every Night.             HYDROcodone-acetaminophen (NORCO) 5-325 MG per tablet  Take 1 tablet by mouth Every 4 (Four) Hours As Needed for Moderate Pain (4-6) (Surgical Pain) for up to 9 days.             labetalol (NORMODYNE) 200 MG tablet  Take 200 mg by mouth 3 (Three) Times a Day.             losartan (COZAAR) 25 MG tablet  Take 25 mg by mouth Daily.             minoxidil (LONITEN) 10 MG  tablet  Take 10 mg by mouth 2 (Two) Times a Day.             warfarin (COUMADIN) 1 MG tablet  Take 1.5 mg by mouth Daily.             warfarin (COUMADIN) 5 MG tablet  Take 5 mg by mouth Daily.               Medical Management: ASA,STATIN Reviewed risks, benefits, and habit forming potential and weaning from narcotic medication. Patient understands and wishes to receive prescription.  Prescription written for NORCO #40 for post-surgical pain after Wiley placed on file.    Discharge Diet:   Diet Instructions     Diet: Cardiac; Thin       Discharge Diet:  Cardiac   Fluid Consistency:  Thin                 Discharge Disposition: Home in stable condition with follow up appointments with CVTS Nurse Practitioner 1 week, Dr. Arshad/Linda 6 weeks, Cardiology/PCP as scheduled.     History of Present Illness/Hospital Course:   42 y.o. male with HTN(stable), hyperlipidemia(stable), stroke(stable), COPD(stable), AAA.  smokes 1 PPD.  Mild to moderate numbness tingling LEFT arm, leg.  LEFT leg occasionally gives out.  Abdominal ultrasound done for kidney evaluation shows AAA.  Was on coumadin until recently, has been stopped.  No other associated signs, symptoms or modifying factors.     3/21/2017 US Retroperitoneal, Renal  ([facility]):  Medical renal disease.  52mm infrarenal AAA, iliac R 27mm, L 19mm.  5/15/2017 VIKASH:  RIGHT 1.1 triphasic.  LEFT 1.1 triphasic.  6/7/2017 CT Abdomen Pelvis without contrast:  Descending thoracic aorta 27mm, renals 23mm, infrarenal 52mm, iliacs R 29mm, L 21mm, femorals R 12mm, L 13mm.    Adequate preop studies were completed and the patient was scheduled electively. Operative day Gregg Randle admitted through [facility], taken to operating suite and placed under general anesthesia.  Underwent said procedure without complications or difficulty. They were weaned easily from mechanical ventilation and extubated in the recovery room placed on oxygen per nasal cannula and further transferred to CCU for  "further care and monitoring. Gregg Randle remained hemodynamically and neurovascularly stable immediately postop.  POD1 they were out of the bed to the chair tolerating breakfast pain controlled with NORCO and stable for transfer to 3W telemetry.  Gregg Randle  Ambulated progressing well.  Operative incisions clean, dry, intact. Neurovascular status remained stable for dc home on POD 1.       Vital Signs  Temp:  [96.8 °F (36 °C)-98.2 °F (36.8 °C)] 98.2 °F (36.8 °C)  Heart Rate:  [63-86] 84  Resp:  [6-20] 12  BP: ()/() 131/73  Arterial Line BP: ()/() 138/66  Last 3 weights    08/11/17  0603 08/11/17  1150 08/12/17  0600   Weight: 169 lb 12.1 oz (77 kg) 256 lb 9.9 oz (116 kg) 258 lb 13.1 oz (117 kg)       Pertinent Test Results:  Lab Results   Component Value Date    WBC 5.90 08/07/2017    HGB 14.5 08/11/2017    HCT 44.4 08/11/2017    MCV 83.4 08/07/2017     08/07/2017     Lab Results   Component Value Date    GLUCOSE 97 08/11/2017    BUN 37 (H) 08/11/2017    CREATININE 2.74 (H) 08/11/2017    EGFRIFAFRI 31 (L) 08/11/2017    BCR 13.5 08/11/2017    K 4.3 08/11/2017    CO2 27.0 08/11/2017    CALCIUM 9.1 08/11/2017       Physical Exam:  Physical Exam   General Appearance:  Comfortable and well-appearing.    Vital signs: (most recent): Blood pressure 131/73, pulse 84, temperature 98.2 °F (36.8 °C), temperature source Oral, resp. rate 12, height 73\" (185.4 cm), weight 258 lb 13.1 oz (117 kg), SpO2 100 %.  Vital signs are normal.  No fever.    Output: Producing urine and no stool output.    HEENT: Normal HEENT exam.    Lungs:  Normal respiratory rate and normal effort.  Breath sounds clear to auscultation.    Heart: Normal rate.    Abdomen: Abdomen is soft and non-distended.  Hypoactive bowel sounds.   There is no abdominal tenderness.   There is no mass.   Extremities: Normal range of motion.    Pulses: Distal pulses are intact.    Neurological: Patient is alert and oriented " to person, place and time.  Normal strength.    Pupils:  Pupils are equal, round, and reactive to light.    Skin:  Warm and dry.  (R/L Groin INC: CDI, Steri strips. Soft, no hematoma)        Additional Instructions for the Follow-ups that You Need to Schedule     Discharge Follow-up with Specialty    As directed    Specialty:  Vascular Surgery   Follow Up:  1 Week   Follow Up Details:  Sophie AGUILAR Thursday 8/17/17 1:20                 Discharge Instructions: Discharge instructions include no heavy lifting anything greater than 10lbs for approximately 4 weeks.  No sex or driving for 2-4 weeks. Printed information given to the patient with advancement of activities weekly.  Risks and benefits of narcotic medications and weaning postoperatively have been discussed. Clean operative site with antibacterial soap/water, pat dry. Keep open to air unless draining, then may apply dry dressing.  No ointments or creams unless prescribed by provider. Signs and symptoms of infection including drainage from operative site, redness, swelling, with associated fever and/or chills notify Heart and Vascular center immediately for wound check. If signs and symptoms of ischemia should occur including but not limited to pale/blue discoloration of limb, increasing pain with ambulation or at rest, or a non-healing wound. Patient is to notify Heart and Vascular center for immediate evaluation.  Patient verbalizes understanding of discharge instructions, all questions are answered, follow up appointments have been made, they are discharged home in stable condition.       Follow-up Appointments  No future appointments.    Test Results Pending at Discharge             This document has been electronically signed by JEFF Elmore on August 12, 2017 8:41 AM      Time: Discharge 60 min

## 2017-08-12 NOTE — PROGRESS NOTES
"  Cardiothoracic - Vascular Surgery Daily Note        LOS: 1 day   Patient Care Team:  Stephy Jiménez MD as PCP - General (Internal Medicine)    POD1 Endo AAA    Chief Complaint: LEFT groin pain      Subjective         Subjective:  Symptoms:  Stable.    Diet:  Adequate intake.  No nausea.    Activity level: Normal.    Pain:  He complains of pain that is moderate.  He reports pain is unchanged.          Objective     Vital Signs  Temp:  [96.8 °F (36 °C)-98.2 °F (36.8 °C)] 98.2 °F (36.8 °C)  Heart Rate:  [63-86] 84  Resp:  [6-20] 12  BP: ()/() 131/73  Arterial Line BP: ()/() 138/66  Body mass index is 34.15 kg/(m^2).    Intake/Output Summary (Last 24 hours) at 08/12/17 0828  Last data filed at 08/12/17 0557   Gross per 24 hour   Intake             2549 ml   Output             1915 ml   Net              634 ml          Wt Readings from Last 3 Encounters:   08/12/17 258 lb 13.1 oz (117 kg)   08/07/17 250 lb (113 kg)   07/17/17 252 lb 11.2 oz (115 kg)         Physical Exam   Objective:  General Appearance:  Comfortable and well-appearing.    Vital signs: (most recent): Blood pressure 131/73, pulse 84, temperature 98.2 °F (36.8 °C), temperature source Oral, resp. rate 12, height 73\" (185.4 cm), weight 258 lb 13.1 oz (117 kg), SpO2 100 %.  Vital signs are normal.  No fever.    Output: Producing urine and no stool output.    HEENT: Normal HEENT exam.    Lungs:  Normal respiratory rate and normal effort.  Breath sounds clear to auscultation.    Heart: Normal rate.    Abdomen: Abdomen is soft and non-distended.  Hypoactive bowel sounds.   There is no abdominal tenderness.   There is no mass.   Extremities: Normal range of motion.    Pulses: Distal pulses are intact.    Neurological: Patient is alert and oriented to person, place and time.  Normal strength.    Pupils:  Pupils are equal, round, and reactive to light.    Skin:  Warm and dry.  (R/L Groin INC: CDI, Steri strips. Soft, no " hematoma)              Results Review:      Results from last 7 days  Lab Units 08/11/17  0547 08/07/17  1135   SODIUM mmol/L 140 138   POTASSIUM mmol/L 4.3 4.4   CHLORIDE mmol/L 101 97   CO2 mmol/L 27.0 30.0   BUN mg/dL 37* 40*   CREATININE mg/dL 2.74* 2.73*   CALCIUM mg/dL 9.1 9.8   GLUCOSE mg/dL 97 85       Estimated Creatinine Clearance: 46.6 mL/min (by C-G formula based on Cr of 2.74).                  Results from last 7 days  Lab Units 08/11/17  1815 08/07/17  1135   WBC 10*3/mm3  --  5.90   HEMOGLOBIN g/dL 14.5 15.3   PLATELETS 10*3/mm3  --  240         Results from last 7 days  Lab Units 08/11/17  0547   INR  1.18       Imaging Results (last 24 hours)     Procedure Component Value Units Date/Time    FL C Arm During Surgery [[id number]] Resulted:  08/11/17 1523     Updated:  08/11/17 1523                                albuterol 2.5 mg Nebulization Q8H - RT   allopurinol 100 mg Oral Daily   aspirin 81 mg Oral Daily   atorvastatin 20 mg Oral Nightly   calcitriol 0.25 mcg Oral Daily   chlorthalidone 25 mg Oral Daily   cyclobenzaprine 5 mg Oral Nightly   escitalopram 20 mg Oral Daily   famotidine 10 mg Intravenous Q12H   Or      famotidine 20 mg Oral Q12H   gabapentin 600 mg Oral Q8H   labetalol 200 mg Oral TID   losartan 25 mg Oral Daily   minoxidil 10 mg Oral BID       nitroglycerin 5-200 mcg/min    sodium chloride 50 mL/hr Last Rate: 125 mL/hr (08/12/17 0616)             ASSESSMENT/PLAN     Active Problems:    AAA (abdominal aortic aneurysm)      Assessment:    Condition: In stable condition.   (Stable Post Op AAA: Progressing well. Palpable pulses.  ASA/STATIN    Resp: Incentive. Wean o2    Pain: [place]    Rehab: Ambulate.    ).     Plan:   Discharge home.  Encourage ambulation.  Discontinue oxygen.  Regular diet.  (Stable dc home later this am after ambulation.).                 This document has been electronically signed by JEFF Elmore on August 12, 2017 8:28 AM

## 2017-08-12 NOTE — NURSING NOTE
While patient is receiving discharge instructions, pt began to complain of dizziness, blurry vision, and left groin pain. Pt verbalized concerns about returning home in this condition. RN relayed concerns to Dr. Arshad. Dr. Arshad agreed to observe patient overnight on medical/surgical floor to ensure no complications related to procedure. Transfer orders to be placed per Dr. Arshad.

## 2017-08-12 NOTE — PLAN OF CARE
Problem: Patient Care Overview (Adult)  Goal: Plan of Care Review    08/12/17 0634   Coping/Psychosocial Response Interventions   Plan Of Care Reviewed With patient   Patient Care Overview   Progress improving       Goal: Discharge Needs Assessment    08/12/17 0634   Discharge Needs Assessment   Concerns To Be Addressed other (see comments)  (non-compliance with Bipap)   Concerns Comments non-compliance with bipap   Readmission Within The Last 30 Days no previous admission in last 30 days         Problem: Perioperative Period (Adult)  Goal: Signs and Symptoms of Listed Potential Problems Will be Absent or Manageable (Perioperative Period)    08/12/17 0634   Perioperative Period   Problems Assessed (Perioperative Period) all   Problems Present (Perioperative Period) hypoxia/hypoxemia

## 2017-08-13 VITALS
SYSTOLIC BLOOD PRESSURE: 143 MMHG | BODY MASS INDEX: 34.06 KG/M2 | RESPIRATION RATE: 18 BRPM | WEIGHT: 257 LBS | DIASTOLIC BLOOD PRESSURE: 90 MMHG | HEART RATE: 101 BPM | OXYGEN SATURATION: 92 % | TEMPERATURE: 98.1 F | HEIGHT: 73 IN

## 2017-08-13 PROCEDURE — 94799 UNLISTED PULMONARY SVC/PX: CPT

## 2017-08-13 PROCEDURE — 94760 N-INVAS EAR/PLS OXIMETRY 1: CPT

## 2017-08-13 PROCEDURE — 94660 CPAP INITIATION&MGMT: CPT

## 2017-08-13 RX ADMIN — ASPIRIN 81 MG 81 MG: 81 TABLET ORAL at 09:22

## 2017-08-13 RX ADMIN — CHLORTHALIDONE 25 MG: 25 TABLET ORAL at 09:20

## 2017-08-13 RX ADMIN — ALBUTEROL SULFATE 2.5 MG: 2.5 SOLUTION RESPIRATORY (INHALATION) at 06:47

## 2017-08-13 RX ADMIN — MINOXIDIL 10 MG: 10 TABLET ORAL at 05:14

## 2017-08-13 RX ADMIN — FAMOTIDINE 20 MG: 20 TABLET ORAL at 09:20

## 2017-08-13 RX ADMIN — GABAPENTIN 600 MG: 300 CAPSULE ORAL at 05:14

## 2017-08-13 RX ADMIN — ALLOPURINOL 100 MG: 100 TABLET ORAL at 09:20

## 2017-08-13 RX ADMIN — LABETALOL HYDROCHLORIDE 200 MG: 200 TABLET, FILM COATED ORAL at 09:20

## 2017-08-13 RX ADMIN — LOSARTAN POTASSIUM 25 MG: 25 TABLET ORAL at 09:20

## 2017-08-13 RX ADMIN — ESCITALOPRAM OXALATE 20 MG: 10 TABLET ORAL at 09:20

## 2017-08-13 RX ADMIN — CALCITRIOL 0.25 MCG: 0.25 CAPSULE, LIQUID FILLED ORAL at 09:20

## 2017-08-13 NOTE — PLAN OF CARE
Problem: Perioperative Period (Adult)  Goal: Signs and Symptoms of Listed Potential Problems Will be Absent or Manageable (Perioperative Period)  Outcome: Ongoing (interventions implemented as appropriate)    08/12/17 4129   Perioperative Period   Problems Assessed (Perioperative Period) all   Problems Present (Perioperative Period) none

## 2017-08-13 NOTE — PLAN OF CARE
Problem: Patient Care Overview (Adult)  Goal: Plan of Care Review  Outcome: Ongoing (interventions implemented as appropriate)  Goal: Adult Individualization and Mutuality  Outcome: Ongoing (interventions implemented as appropriate)    08/13/17 0542   Individualization   Patient Specific Goals avaps at night to aid in oxygenation and co2 reduction.   Mutuality/Individual Preferences   What Anxieties, Fears or Concerns Do You Have About Your Health or Care? Can i take mask off to wipe my nose or get a drink, help to put back on?         Problem: NPPV/CPAP (Adult)  Goal: Signs and Symptoms of Listed Potential Problems Will be Absent or Manageable (NPPV/CPAP)  Outcome: Ongoing (interventions implemented as appropriate)    08/13/17 0542   NPPV/CPAP   Problems Assessed (NPPV/CPAP) conjunctivitis;dry mucous membranes;hypoxia/hypoxemia;skin breakdown   Problems Present (NPPV/CPAP) dry mucous membranes;hypoxia/hypoxemia

## 2017-08-16 LAB
ABO + RH BLD: NORMAL
ABO + RH BLD: NORMAL
BH BB BLOOD EXPIRATION DATE: NORMAL
BH BB BLOOD EXPIRATION DATE: NORMAL
BH BB BLOOD TYPE BARCODE: 6200
BH BB BLOOD TYPE BARCODE: 6200
BH BB DISPENSE STATUS: NORMAL
BH BB DISPENSE STATUS: NORMAL
BH BB PRODUCT CODE: NORMAL
BH BB PRODUCT CODE: NORMAL
BH BB UNIT NUMBER: NORMAL
BH BB UNIT NUMBER: NORMAL
UNIT  ABO: NORMAL
UNIT  ABO: NORMAL
UNIT  RH: NORMAL
UNIT  RH: NORMAL

## 2017-08-17 ENCOUNTER — OFFICE VISIT (OUTPATIENT)
Dept: CARDIAC SURGERY | Facility: CLINIC | Age: 42
End: 2017-08-17

## 2017-08-17 VITALS
OXYGEN SATURATION: 97 % | TEMPERATURE: 97.6 F | HEART RATE: 86 BPM | SYSTOLIC BLOOD PRESSURE: 90 MMHG | WEIGHT: 245 LBS | HEIGHT: 73 IN | DIASTOLIC BLOOD PRESSURE: 68 MMHG | BODY MASS INDEX: 32.47 KG/M2

## 2017-08-17 DIAGNOSIS — I71.40 ABDOMINAL AORTIC ANEURYSM (AAA) WITHOUT RUPTURE (HCC): ICD-10-CM

## 2017-08-17 DIAGNOSIS — Z09 FOLLOW-UP SURGERY CARE: Primary | ICD-10-CM

## 2017-08-17 PROCEDURE — 99024 POSTOP FOLLOW-UP VISIT: CPT | Performed by: NURSE PRACTITIONER

## 2017-08-17 NOTE — PATIENT INSTRUCTIONS
If symptoms of uncontrolled abdominal pain or sudden onset of abdominal, back pain occur present to the nearest Emergency Department for evaluation.       Return 6 weeks - CT Abdomen

## 2017-08-19 NOTE — PROGRESS NOTES
Gregg Randle  1975      Chief Complaint:    Chief Complaint   Patient presents with   • Aortic Aneurysm     follow up (8/11/17) AAA repair       HPI:      PCP:  Dr Jiménez  Nephrology:  Dr Jones    42 y.o. male with HTN(stable), hyperlipidemia(stable), stroke(stable), COPD(stable), AAA.  smokes 1 PPD.  Mild to moderate numbness tingling LEFT arm, leg.  LEFT leg occasionally gives out.  Abdominal ultrasound done for kidney evaluation shows AAA.  On Coumadin   No other associated signs, symptoms or modifying factors. INR 1.4    3/21/2017 US Retroperitoneal, Renal  ([facility]):  Medical renal disease.  52mm infrarenal AAA, iliac R 27mm, L 19mm.  5/15/2017 VIKASH:  RIGHT 1.1 triphasic.  LEFT 1.1 triphasic.  6/7/2017 CT Abdomen Pelvis without contrast:  Descending thoracic aorta 27mm, renals 23mm, infrarenal 52mm, iliacs R 29mm, L 21mm, femorals R 12mm, L 13mm.  8/11/17: Endo AAA    The following portions of the patient's history were reviewed and updated as appropriate: allergies, current medications, past family history, past medical history, past social history, past surgical history and problem list.  Recent images independently reviewed.  Available laboratory values reviewed.    PMH:  Past Medical History:   Diagnosis Date   • AAA (abdominal aortic aneurysm)    • Arthritis    • CAD (coronary artery disease)    • COPD (chronic obstructive pulmonary disease)    • Depression    • Depression    • HTN (hypertension)    • Hyperlipidemia    • MI (myocardial infarction)     x 2   • Renal insufficiency    • Sleep apnea     using c-pap   • Sleep apnea    • Stroke     no residual except some intermittent vision changes   • Vision changes     post stroke, takes a minute to focus       ALLERGIES:  No Known Allergies      MEDICATIONS:    Current Outpatient Prescriptions:   •  albuterol (PROVENTIL HFA;VENTOLIN HFA) 108 (90 BASE) MCG/ACT inhaler, Inhale 2 puffs Every 4 (Four) Hours As Needed for Wheezing., Disp: , Rfl:    •  allopurinol (ZYLOPRIM) 100 MG tablet, Take 100 mg by mouth Daily., Disp: , Rfl:   •  aspirin 81 MG chewable tablet, Chew 1 tablet Daily., Disp: , Rfl:   •  atorvastatin (LIPITOR) 20 MG tablet, Take 20 mg by mouth Every Night., Disp: , Rfl:   •  calcitriol (ROCALTROL) 0.25 MCG capsule, Take 0.25 mcg by mouth Daily., Disp: , Rfl:   •  chlorthalidone (HYGROTON) 25 MG tablet, Take 25 mg by mouth Daily., Disp: , Rfl:   •  cyclobenzaprine (FLEXERIL) 5 MG tablet, Take 5 mg by mouth Every Night., Disp: , Rfl:   •  escitalopram (LEXAPRO) 20 MG tablet, Take 20 mg by mouth Daily., Disp: , Rfl:   •  gabapentin (NEURONTIN) 600 MG tablet, Take 600 mg by mouth 3 (Three) Times a Day., Disp: , Rfl:   •  guanFACINE (TENEX) 1 MG tablet, Take 1 mg by mouth Every Night., Disp: , Rfl:   •  HYDROcodone-acetaminophen (NORCO) 5-325 MG per tablet, Take 1 tablet by mouth Every 4 (Four) Hours As Needed for Moderate Pain (4-6) (Surgical Pain) for up to 9 days., Disp: 40 tablet, Rfl: 0  •  labetalol (NORMODYNE) 200 MG tablet, Take 200 mg by mouth 3 (Three) Times a Day., Disp: , Rfl:   •  losartan (COZAAR) 25 MG tablet, Take 25 mg by mouth Daily., Disp: , Rfl:   •  minoxidil (LONITEN) 10 MG tablet, Take 10 mg by mouth 2 (Two) Times a Day., Disp: , Rfl:   •  warfarin (COUMADIN) 1 MG tablet, Take 1.5 mg by mouth Daily., Disp: , Rfl:   •  warfarin (COUMADIN) 5 MG tablet, Take 5 mg by mouth Daily., Disp: , Rfl:     Review of Systems   Review of Systems   Constitution: Positive for malaise/fatigue, night sweats and weight gain. Negative for weight loss.   HENT: Positive for hearing loss. Negative for hoarse voice and stridor.    Eyes: Positive for blurred vision. Negative for vision loss in left eye, vision loss in right eye and visual disturbance.   Cardiovascular: Negative for chest pain, leg swelling and palpitations.   Respiratory: Negative for cough, hemoptysis and shortness of breath.    Hematologic/Lymphatic: Negative for adenopathy and  bleeding problem. Does not bruise/bleed easily.   Skin: Negative for color change, poor wound healing and rash.   Musculoskeletal: Positive for arthritis, back pain and neck pain. Negative for muscle weakness.   Gastrointestinal: Negative for abdominal pain, dysphagia and heartburn.   Neurological: Positive for dizziness, focal weakness, numbness and paresthesias. Negative for seizures.   Psychiatric/Behavioral: Negative for altered mental status, depression and memory loss. The patient is not nervous/anxious.        Physical Exam   Physical Exam   Constitutional: He is oriented to person, place, and time. He is active and cooperative. He does not appear ill. No distress.   HENT:   Head: Atraumatic.   Right Ear: Hearing normal.   Left Ear: Hearing normal.   Nose: No nasal deformity. No epistaxis.   Mouth/Throat: He does not have dentures. Normal dentition.   Eyes: Conjunctivae and lids are normal. Right pupil is round and reactive. Left pupil is round and reactive.   Neck: No JVD present. Carotid bruit is not present. No tracheal deviation present. No thyroid mass and no thyromegaly present.   Cardiovascular: Normal rate and regular rhythm.    No murmur heard.  Pulses:       Carotid pulses are 2+ on the right side, and 2+ on the left side.       Radial pulses are 2+ on the right side, and 2+ on the left side.        Femoral pulses are 2+ on the right side, and 2+ on the left side.       Dorsalis pedis pulses are 1+ on the right side, and 1+ on the left side.        Posterior tibial pulses are 2+ on the right side, and 2+ on the left side.   Pulmonary/Chest: Effort normal and breath sounds normal.   Abdominal: Soft. He exhibits no distension, no pulsatile midline mass and no mass. There is no splenomegaly or hepatomegaly. There is no tenderness.   Musculoskeletal: He exhibits no deformity.   Gait normal.    Lymphadenopathy:     He has no cervical adenopathy.        Right: No supraclavicular adenopathy present.         Left: No supraclavicular adenopathy present.   Neurological: He is alert and oriented to person, place, and time. He has normal strength.   Skin: Skin is warm and dry. No cyanosis or erythema. No pallor.   Bilateral groins soft : CDI   Psychiatric: He has a normal mood and affect. His speech is normal. Judgment and thought content normal.      Lab Results   Component Value Date    WBC 5.90 08/07/2017    HGB 14.5 08/11/2017    HCT 44.4 08/11/2017    MCV 83.4 08/07/2017     08/07/2017     Lab Results   Component Value Date    GLUCOSE 97 08/11/2017    BUN 37 (H) 08/11/2017    CREATININE 2.74 (H) 08/11/2017    EGFRIFAFRI 31 (L) 08/11/2017    BCR 13.5 08/11/2017    K 4.3 08/11/2017    CO2 27.0 08/11/2017    CALCIUM 9.1 08/11/2017         ASSESSMENT:JOSEPH Escobedo was seen today for aortic aneurysm.    Diagnoses and all orders for this visit:    Follow-up surgery care  If symptoms of uncontrolled abdominal pain or sudden onset of abdominal, back pain occur present to the nearest Emergency Department for evaluation.  Clean operative site with antibacterial soap/water, pat dry. Keep open to air unless draining, then may apply dry dressing.  No ointments or creams unless prescribed by provider.  Released to drive    Abdominal aortic aneurysm (AAA) without rupture  Medical Management: ASA,STATIN   -     CT Abdomen Pelvis With & Without Contrast; Future (6 wks)

## 2017-08-22 DIAGNOSIS — R79.89 ELEVATED SERUM CREATININE: Primary | ICD-10-CM

## 2017-08-23 ENCOUNTER — TELEPHONE (OUTPATIENT)
Dept: CARDIAC SURGERY | Facility: CLINIC | Age: 42
End: 2017-08-23

## 2017-08-23 NOTE — TELEPHONE ENCOUNTER
Attempted to contact pt multiple times to inform him of his CT appointment and f/u apt with Dr Arshad. I called all numbers listed in his chart and confirmed his phone number with his relative Miss Bonilla. Due to being unable to contact him, I mailed him his appointment information.

## 2017-09-26 ENCOUNTER — HOSPITAL ENCOUNTER (OUTPATIENT)
Dept: CT IMAGING | Facility: HOSPITAL | Age: 42
Discharge: HOME | End: 2017-09-26

## 2017-09-28 ENCOUNTER — APPOINTMENT (OUTPATIENT)
Dept: CT IMAGING | Facility: HOSPITAL | Age: 42
End: 2017-09-28

## 2018-07-30 DIAGNOSIS — I71.40 ABDOMINAL AORTIC ANEURYSM (AAA) WITHOUT RUPTURE (HCC): Primary | ICD-10-CM

## 2018-08-27 ENCOUNTER — HOSPITAL ENCOUNTER (OUTPATIENT)
Dept: CT IMAGING | Facility: HOSPITAL | Age: 43
Discharge: HOME | End: 2018-08-27
Attending: THORACIC SURGERY (CARDIOTHORACIC VASCULAR SURGERY) | Admitting: THORACIC SURGERY (CARDIOTHORACIC VASCULAR SURGERY)

## 2018-08-27 DIAGNOSIS — I71.40 ABDOMINAL AORTIC ANEURYSM (AAA) WITHOUT RUPTURE (HCC): ICD-10-CM

## 2018-08-27 PROCEDURE — 74176 CT ABD & PELVIS W/O CONTRAST: CPT

## 2018-10-30 DIAGNOSIS — I71.40 ABDOMINAL AORTIC ANEURYSM (AAA) WITHOUT RUPTURE (HCC): Primary | ICD-10-CM

## 2018-10-30 NOTE — PROGRESS NOTES
Called results CT abdomen pelvis.  endoAAA stent graft in place, no endoleak, dissection, obstruction.  Aneurysm size stable.    Return 8/2019 with CT Abdomen Pelvis without contrast (GFR 31)          This document has been electronically signed by Aren Arshad MD on October 30, 2018 4:13 PM

## 2019-08-06 ENCOUNTER — TELEPHONE (OUTPATIENT)
Dept: GENERAL RADIOLOGY | Facility: HOSPITAL | Age: 44
End: 2019-08-06

## 2019-08-19 ENCOUNTER — APPOINTMENT (OUTPATIENT)
Dept: CT IMAGING | Facility: HOSPITAL | Age: 44
End: 2019-08-19

## 2019-09-10 ENCOUNTER — HOSPITAL ENCOUNTER (OUTPATIENT)
Dept: CT IMAGING | Facility: HOSPITAL | Age: 44
Discharge: HOME | End: 2019-09-10
Admitting: THORACIC SURGERY (CARDIOTHORACIC VASCULAR SURGERY)

## 2019-09-10 DIAGNOSIS — I71.40 ABDOMINAL AORTIC ANEURYSM (AAA) WITHOUT RUPTURE (HCC): ICD-10-CM

## 2019-09-10 PROCEDURE — 74176 CT ABD & PELVIS W/O CONTRAST: CPT

## 2019-10-07 ENCOUNTER — OFFICE VISIT (OUTPATIENT)
Dept: CARDIAC SURGERY | Facility: CLINIC | Age: 44
End: 2019-10-07

## 2019-10-07 VITALS
OXYGEN SATURATION: 98 % | BODY MASS INDEX: 31.89 KG/M2 | WEIGHT: 240.6 LBS | HEART RATE: 78 BPM | TEMPERATURE: 97.8 F | HEIGHT: 73 IN | DIASTOLIC BLOOD PRESSURE: 84 MMHG | SYSTOLIC BLOOD PRESSURE: 122 MMHG

## 2019-10-07 DIAGNOSIS — I73.9 PVD (PERIPHERAL VASCULAR DISEASE) (HCC): ICD-10-CM

## 2019-10-07 DIAGNOSIS — F17.218 NICOTINE DEPENDENCE, CIGARETTES, WITH OTHER NICOTINE-INDUCED DISORDERS: ICD-10-CM

## 2019-10-07 DIAGNOSIS — E78.2 MIXED HYPERLIPIDEMIA: ICD-10-CM

## 2019-10-07 DIAGNOSIS — I71.40 ABDOMINAL AORTIC ANEURYSM (AAA) WITHOUT RUPTURE (HCC): Primary | ICD-10-CM

## 2019-10-07 DIAGNOSIS — J43.1 PANLOBULAR EMPHYSEMA (HCC): ICD-10-CM

## 2019-10-07 DIAGNOSIS — I25.10 CORONARY ARTERY DISEASE INVOLVING NATIVE CORONARY ARTERY OF NATIVE HEART WITHOUT ANGINA PECTORIS: ICD-10-CM

## 2019-10-07 DIAGNOSIS — I10 BENIGN ESSENTIAL HTN: ICD-10-CM

## 2019-10-07 PROCEDURE — 99214 OFFICE O/P EST MOD 30 MIN: CPT | Performed by: THORACIC SURGERY (CARDIOTHORACIC VASCULAR SURGERY)

## 2019-10-07 RX ORDER — DILTIAZEM HYDROCHLORIDE 180 MG/1
180 CAPSULE, COATED, EXTENDED RELEASE ORAL DAILY
COMMUNITY
End: 2020-04-09

## 2019-10-07 RX ORDER — CLOPIDOGREL BISULFATE 75 MG/1
75 TABLET ORAL DAILY
COMMUNITY
End: 2020-05-21 | Stop reason: SDUPTHER

## 2019-10-07 RX ORDER — BRIMONIDINE TARTRATE 2 MG/ML
1 SOLUTION/ DROPS OPHTHALMIC 3 TIMES DAILY
COMMUNITY
End: 2023-02-15

## 2019-10-07 RX ORDER — CLONIDINE HYDROCHLORIDE 0.1 MG/1
0.1 TABLET ORAL 3 TIMES DAILY
COMMUNITY
End: 2020-02-13

## 2019-10-13 NOTE — PROGRESS NOTES
10/7/2019    Gregg Randle  1975    Chief Complaint:  AAA    HPI:    PCP:  Dr Jiménez  Nephrology:  Dr Jones    44 y.o. male with HTN(stable, increased risk stroke, rupture), Hyperlipidemia(stable, increased risk cardiovascular events), Obesity(uncontrolled, increased risk cardiovascular events), COPD(stable, increased risk pulmonary complications) and Chronic Kidney Disease(stable, increased risk renal failure) AAA(stable, increase risk rupture),   former smoker.  Asymptomatic AAA.  endoAAA repair 2017.Mild to moderate numbness tingling LEFT arm, leg.  LEFT leg occasionally gives out.  Abdominal ultrasound done for kidney evaluation shows AAA.  On Coumadin  .  No other associated signs, symptoms or modifying factors.     3/2017 US Retroperitoneal, Renal  ([facility]):  Medical renal disease.  52mm infrarenal AAA, iliac R 27mm, L 19mm.  6/2017 CT Abdomen Pelvis without contrast:  Descending thoracic aorta 27mm, renals 23mm, infrarenal 52mm, iliacs R 29mm, L 21mm, femorals R 12mm, L 13mm.  8/2017: Endo AAA  9/2019 CT Abdomen Pelvis:  Descending thoracic aorta 25mm, abdominal 45mm, iliac R 33mm, L 20mm, femoral R 11mm, L 10mm.  endoAAA stent graft in place, no endoleak, dissection, obstruction.    5/2017 VIKASH:  RIGHT 1.1 triphasic.  LEFT 1.1 triphasic.    The following portions of the patient's history were reviewed and updated as appropriate: allergies, current medications, past family history, past medical history, past social history, past surgical history and problem list.  Recent images independently reviewed.  Available laboratory values reviewed.    PMH:  Past Medical History:   Diagnosis Date   • AAA (abdominal aortic aneurysm) (CMS/[facility])    • Arthritis    • CAD (coronary artery disease)    • COPD (chronic obstructive pulmonary disease) (CMS/[facility])    • Depression    • Depression    • HTN (hypertension)    • Hyperlipidemia    • MI (myocardial infarction) (CMS/[facility])     x 2   • Renal insufficiency    •  Sleep apnea     using c-pap   • Sleep apnea    • Stroke (CMS/HCC)     no residual except some intermittent vision changes   • Vision changes     post stroke, takes a minute to focus     Past Surgical History:   Procedure Laterality Date   • CORONARY ANGIOPLASTY WITH STENT PLACEMENT     • EAR TUBES Left    • CT AAA REPAIR,AORTO-AORTIC TUBE PROSTH N/A 8/11/2017    Procedure: ABDOMINAL AORTIC ANEURYSM REPAIR WITH ENDOGRAFT, REPAIR ILIAC ARTERY ANEURYSM, POSSIBLE OPEN ABDOMINAL AORTOGRAM    (CELL SAVER STAND-BY);  Surgeon: Aren Arshad MD;  Location: [facility] OR;  Service: Vascular     Family History   Problem Relation Age of Onset   • Stroke Mother    • Diabetes Mother    • Heart disease Father      Social History     Tobacco Use   • Smoking status: Former Smoker     Packs/day: 1.00     Years: 20.00     Pack years: 20.00   • Smokeless tobacco: Never Used   Substance Use Topics   • Alcohol use: No   • Drug use: No       ALLERGIES:  No Known Allergies      MEDICATIONS:    Current Outpatient Medications:   •  albuterol (PROVENTIL HFA;VENTOLIN HFA) 108 (90 BASE) MCG/ACT inhaler, Inhale 2 puffs Every 4 (Four) Hours As Needed for Wheezing., Disp: , Rfl:   •  allopurinol (ZYLOPRIM) 100 MG tablet, Take 100 mg by mouth Daily., Disp: , Rfl:   •  aspirin 81 MG chewable tablet, Chew 1 tablet Daily., Disp: , Rfl:   •  atorvastatin (LIPITOR) 20 MG tablet, Take 20 mg by mouth Every Night., Disp: , Rfl:   •  brimonidine (ALPHAGAN) 0.2 % ophthalmic solution, Administer 1 drop to the right eye 3 (Three) Times a Day., Disp: , Rfl:   •  calcitriol (ROCALTROL) 0.25 MCG capsule, Take 0.25 mcg by mouth Daily., Disp: , Rfl:   •  chlorthalidone (HYGROTON) 25 MG tablet, Take 25 mg by mouth Daily., Disp: , Rfl:   •  cloNIDine (CATAPRES) 0.1 MG tablet, Take 0.1 mg by mouth 3 (Three) Times a Day., Disp: , Rfl:   •  clopidogrel (PLAVIX) 75 MG tablet, Take 75 mg by mouth Daily., Disp: , Rfl:   •  cyclobenzaprine (FLEXERIL) 5 MG tablet, Take  "5 mg by mouth Every Night., Disp: , Rfl:   •  dilTIAZem CD (CARDIZEM CD) 180 MG 24 hr capsule, Take 180 mg by mouth Daily., Disp: , Rfl:   •  escitalopram (LEXAPRO) 20 MG tablet, Take 20 mg by mouth Daily., Disp: , Rfl:   •  gabapentin (NEURONTIN) 600 MG tablet, Take 600 mg by mouth 3 (Three) Times a Day., Disp: , Rfl:   •  guanFACINE (TENEX) 1 MG tablet, Take 1 mg by mouth Every Night., Disp: , Rfl:   •  labetalol (NORMODYNE) 200 MG tablet, Take 200 mg by mouth 3 (Three) Times a Day., Disp: , Rfl:   •  losartan (COZAAR) 25 MG tablet, Take 100 mg by mouth Daily., Disp: , Rfl:   •  warfarin (COUMADIN) 1 MG tablet, Take 1.5 mg by mouth Daily., Disp: , Rfl:   •  warfarin (COUMADIN) 5 MG tablet, Take 5 mg by mouth Daily., Disp: , Rfl:     Review of Systems   Review of Systems   Constitution: Positive for malaise/fatigue, night sweats and weight gain. Negative for weakness and weight loss.   HENT: Positive for hearing loss.    Eyes: Positive for blurred vision.   Cardiovascular: Negative for chest pain, claudication and dyspnea on exertion.   Respiratory: Negative for cough and shortness of breath.    Skin: Negative for color change and poor wound healing.   Musculoskeletal: Positive for arthritis, back pain and neck pain.   Neurological: Positive for dizziness, focal weakness, numbness and paresthesias.       Physical Exam   Vitals:    10/07/19 0803   BP: 122/84   BP Location: Left arm   Pulse: 78   Temp: 97.8 °F (36.6 °C)   TempSrc: Temporal   SpO2: 98%   Weight: 109 kg (240 lb 9.6 oz)   Height: 185.4 cm (73\")     Physical Exam   Constitutional: He is oriented to person, place, and time. He is active and cooperative. He does not appear ill. No distress.   HENT:   Right Ear: Hearing normal.   Left Ear: Hearing normal.   Nose: No nasal deformity. No epistaxis.   Mouth/Throat: He does not have dentures. Normal dentition.   Cardiovascular: Normal rate and regular rhythm.   No murmur heard.  Pulses:       Carotid pulses are " 2+ on the right side, and 2+ on the left side.       Radial pulses are 2+ on the right side, and 2+ on the left side.        Dorsalis pedis pulses are 1+ on the right side, and 1+ on the left side.        Posterior tibial pulses are 2+ on the right side, and 2+ on the left side.   Pulmonary/Chest: Effort normal and breath sounds normal.   Abdominal: Soft. He exhibits no distension and no mass. There is no tenderness.   Musculoskeletal: He exhibits no deformity.   Gait normal.    Neurological: He is alert and oriented to person, place, and time. He has normal strength.   Skin: Skin is warm and dry. No cyanosis or erythema. No pallor.   No venous staining   Psychiatric: He has a normal mood and affect. His speech is normal. Judgment and thought content normal.     Results for GREGG RANDLE (MRN [id number]) as of 10/13/2019 12:16  GFR 31 Ref. Range 8/11/2017 05:47   Creatinine Latest Ref Range: 0.70 - 1.30 mg/dL 2.74 (H)   BUN Latest Ref Range: 7 - 21 mg/dL 37 (H)     ASSESSMENT:  Gregg was seen today for aortic aneurysm.    Diagnoses and all orders for this visit:    Abdominal aortic aneurysm (AAA) without rupture (CMS/HCC)  -     CT Abdomen Pelvis Without Contrast; Future    PVD (peripheral vascular disease) (CMS/HCC)    Mixed hyperlipidemia    Benign essential HTN    Coronary artery disease involving native coronary artery of native heart without angina pectoris    Panlobular emphysema (CMS/HCC)    Nicotine dependence, cigarettes, with other nicotine-induced disorders    PLAN:  Detailed discussion with Gregg Randle regarding situation and options.  Aneurysm abdominal aorta.  Surgical intervention not indicated at this time.  Will plan to follow with interval imaging.  Smoking cessation, lipid management, BP control in 120 range advised.  Discussed symptoms of rupture, details of surgical repair including endovascular and open repair.  Risks, benefits discussed.  Understands and wishes to proceed  with plan    Return in 1 year with US Aorta and CT Chest without contrast    Return after above studies complete  Obesity Class  1. Increased risk cardiovascular events, sleep and breathing disorders, joint issues, type 2 diabetes mellitus. Options for weight management, heart healthy diet, exercise programs, and associated health risks of obesity discussed.  Recommended regular physical activity, progressive walking program.  Continue current medications as directed.    Thank you for the opportunity to participate in this patient's care.    Copy to primary care provider.    EMR Dragon/Transcription disclaimer:   Much of this encounter note is an electronic transcription/translation of spoken language to printed text. The electronic translation of spoken language may permit erroneous, or at times, nonsensical words or phrases to be inadvertently transcribed; Although I have reviewed the note for such errors, some may still exist.

## 2019-11-01 ENCOUNTER — HOSPITAL ENCOUNTER (OUTPATIENT)
Dept: CT IMAGING | Facility: HOSPITAL | Age: 44
Discharge: HOME | End: 2019-11-01
Admitting: THORACIC SURGERY (CARDIOTHORACIC VASCULAR SURGERY)

## 2019-11-01 DIAGNOSIS — I71.40 ABDOMINAL AORTIC ANEURYSM (AAA) WITHOUT RUPTURE (HCC): ICD-10-CM

## 2019-11-01 PROCEDURE — 74176 CT ABD & PELVIS W/O CONTRAST: CPT

## 2019-11-25 DIAGNOSIS — I25.10 CORONARY ARTERY DISEASE INVOLVING NATIVE CORONARY ARTERY OF NATIVE HEART WITHOUT ANGINA PECTORIS: Primary | ICD-10-CM

## 2019-11-26 ENCOUNTER — OFFICE VISIT (OUTPATIENT)
Dept: CARDIOLOGY | Facility: CLINIC | Age: 44
End: 2019-11-26

## 2019-11-26 VITALS
HEIGHT: 73 IN | DIASTOLIC BLOOD PRESSURE: 82 MMHG | HEART RATE: 78 BPM | OXYGEN SATURATION: 99 % | SYSTOLIC BLOOD PRESSURE: 130 MMHG | BODY MASS INDEX: 31.85 KG/M2 | WEIGHT: 240.3 LBS

## 2019-11-26 DIAGNOSIS — I25.10 CORONARY ARTERY DISEASE INVOLVING NATIVE CORONARY ARTERY OF NATIVE HEART WITHOUT ANGINA PECTORIS: Primary | ICD-10-CM

## 2019-11-26 PROCEDURE — 93000 ELECTROCARDIOGRAM COMPLETE: CPT | Performed by: INTERNAL MEDICINE

## 2019-11-26 PROCEDURE — 99204 OFFICE O/P NEW MOD 45 MIN: CPT | Performed by: INTERNAL MEDICINE

## 2019-11-26 RX ORDER — MULTIPLE VITAMINS W/ MINERALS TAB 9MG-400MCG
1 TAB ORAL DAILY
COMMUNITY
End: 2020-02-13

## 2019-11-26 RX ORDER — MECLIZINE HYDROCHLORIDE 25 MG/1
25 TABLET ORAL 3 TIMES DAILY PRN
COMMUNITY
End: 2021-10-28 | Stop reason: SDUPTHER

## 2019-11-26 RX ORDER — CHLORAL HYDRATE 500 MG
1000 CAPSULE ORAL
COMMUNITY
End: 2020-02-13

## 2019-11-26 NOTE — PROGRESS NOTES
[facility] Cardiology  OFFICE NOTE    Cardiovascular Medicine  Elsi Adams M.D., RPVI         No referring provider defined for this encounter.    Thank you for asking me to see Gregg Randle for CAD.    History of Present Illness  This is a 44 y.o. male with:    1.  Coronary artery disease status post PCI at [place], PCI of mid LAD in the setting of an NSTEMI in 2005.  2.  Hypertension  3.  Hyperlipidemia  4.  Peripheral vascular disease  5.  Abdominal aortic aneurysm.  6.  CKD    Gregg Randle is a 44 y.o. male who presents for consultation today.  Patient is here for establishment of care.  He is complaining of shortness of breath on exertion however denying any chest pain.  Complaining of occasional numbness on the left side of his body.  Patient is denying any claudications.  Patient is not aware why he was on warfarin at some point however he stopped taking that.  Follow with nephrology for CKD.  Dr. Arshad for his AAA status post repair.  No other acute problems.    Review of Systems - ROS  Constitution: Negative for weakness, weight gain and weight loss.   HENT: Negative for congestion.    Eyes: Negative for blurred vision.   Cardiovascular: As mentioned above  Respiratory: Negative for cough and hemoptysis.    Endocrine: Negative for polydipsia and polyuria.   Hematologic/Lymphatic: Negative for bleeding problem. Does not bruise/bleed easily.   Skin: Negative for flushing.   Musculoskeletal: Negative for neck pain and stiffness.   Gastrointestinal: Negative for abdominal pain, diarrhea, jaundice, melena, nausea and vomiting.   Genitourinary: Negative for dysuria and hematuria.   Neurological: Negative for dizziness, focal weakness and numbness.   Psychiatric/Behavioral: Negative for altered mental status and depression.          All other systems were reviewed and were negative.    family history includes Diabetes in his mother; Heart disease in his father; Stroke  in his mother.     reports that he has quit smoking. He has a 20.00 pack-year smoking history. He has never used smokeless tobacco. He reports that he does not drink alcohol or use drugs.    No Known Allergies      Current Outpatient Medications:   •  albuterol (PROVENTIL HFA;VENTOLIN HFA) 108 (90 BASE) MCG/ACT inhaler, Inhale 2 puffs Every 4 (Four) Hours As Needed for Wheezing., Disp: , Rfl:   •  allopurinol (ZYLOPRIM) 100 MG tablet, Take 100 mg by mouth Daily., Disp: , Rfl:   •  atorvastatin (LIPITOR) 20 MG tablet, Take 20 mg by mouth Every Night., Disp: , Rfl:   •  brimonidine (ALPHAGAN) 0.2 % ophthalmic solution, Administer 1 drop to the right eye 3 (Three) Times a Day., Disp: , Rfl:   •  calcitriol (ROCALTROL) 0.25 MCG capsule, Take 0.25 mcg by mouth Daily., Disp: , Rfl:   •  clopidogrel (PLAVIX) 75 MG tablet, Take 75 mg by mouth Daily., Disp: , Rfl:   •  dilTIAZem CD (CARDIZEM CD) 180 MG 24 hr capsule, Take 180 mg by mouth Daily., Disp: , Rfl:   •  gabapentin (NEURONTIN) 600 MG tablet, Take 600 mg by mouth 3 (Three) Times a Day., Disp: , Rfl:   •  guanFACINE (TENEX) 1 MG tablet, Take 1 mg by mouth Every Night., Disp: , Rfl:   •  labetalol (NORMODYNE) 200 MG tablet, Take 200 mg by mouth 3 (Three) Times a Day., Disp: , Rfl:   •  losartan (COZAAR) 25 MG tablet, Take 100 mg by mouth Daily., Disp: , Rfl:   •  meclizine (ANTIVERT) 25 MG tablet, Take 25 mg by mouth 3 (Three) Times a Day As Needed for Dizziness., Disp: , Rfl:   •  Multiple Vitamins-Minerals (MULTIVITAMIN WITH MINERALS) tablet tablet, Take 1 tablet by mouth Daily., Disp: , Rfl:   •  Omega-3 Fatty Acids (FISH OIL) 1000 MG capsule capsule, Take 1,000 mg by mouth Daily With Breakfast., Disp: , Rfl:   •  aspirin 81 MG chewable tablet, Chew 1 tablet Daily., Disp: , Rfl:   •  chlorthalidone (HYGROTON) 25 MG tablet, Take 25 mg by mouth Daily., Disp: , Rfl:   •  cloNIDine (CATAPRES) 0.1 MG tablet, Take 0.1 mg by mouth 3 (Three) Times a Day., Disp: , Rfl:   •   "cyclobenzaprine (FLEXERIL) 5 MG tablet, Take 5 mg by mouth Every Night., Disp: , Rfl:   •  escitalopram (LEXAPRO) 20 MG tablet, Take 20 mg by mouth Daily., Disp: , Rfl:   •  warfarin (COUMADIN) 1 MG tablet, Take 1.5 mg by mouth Daily., Disp: , Rfl:   •  warfarin (COUMADIN) 5 MG tablet, Take 5 mg by mouth Daily., Disp: , Rfl:     Physical Exam:  Vitals:    11/26/19 1052 11/26/19 1057   BP: 128/64 130/82   BP Location: Left arm Right arm   Patient Position: Sitting Sitting   Cuff Size: Adult Adult   Pulse: 78    SpO2: 99%    Weight: 109 kg (240 lb 4.8 oz)    Height: 185.4 cm (72.99\")    PainSc:   5    PainLoc: Arm  Comment: left arm      Current Pain Level: none  Pulse Ox: Normal  on room air  General: alert, appears stated age and cooperative     Body Habitus: well-nourished    HEENT: Head: Normocephalic, no lesions, without obvious abnormality. No arcus senilis, xanthelasma or xanthomas.    Neuro: alert, oriented x3  Pulses: 2+ and symmetric  JVP: Volume/Pulsation: Normal.  Normal waveforms.   Appropriate inspiratory decrease.  No Kussmaul's. No Renae's.   Carotid Exam: no bruit normal pulsation bilaterally   Carotid Volume: normal.     Respirations: no increased work of breathing   Chest:  Normal    Pulmonary:Normal   Precordium: Normal impulses. P2 is not palpable.  RV Heave: absent  LV Heave: absent  [place]:  normal size and placement  Palpable S4: absent.  Heart rate: normal    Heart Rhythm: regular     Heart Sounds: S1: normal  S2: normal  S3: absent   S4: absent  Opening Snap: absent    Pericardial Rub:  Absent: .    Abdomen:   Appearance: normal .  Palpation: Soft, non-tender to palpation, bowel sounds positive in all four quadrants; no guarding or rebound tenderness  Extremity: no edema.   LE Skin: no rashes  LE Hair:  normal  LE Pulses: well perfused with normal pulses in the distal extremities  Pallor on elevation: Absent. Rubor on dependency: None      DATA REVIEWED: "       --------------------------------------------------------------------------------------------------  LABS:     The CVD Risk score (Amber et al., 2008) failed to calculate for the following reasons:    The patient has a prior MI, stroke, CHF, or peripheral vascular disease diagnosis         Lab Results   Component Value Date    GLUCOSE 97 08/11/2017    BUN 37 (H) 08/11/2017    CREATININE 2.74 (H) 08/11/2017    EGFRIFAFRI 31 (L) 08/11/2017    BCR 13.5 08/11/2017    K 4.3 08/11/2017    CO2 27.0 08/11/2017    CALCIUM 9.1 08/11/2017     Lab Results   Component Value Date    WBC 5.90 08/07/2017    HGB 14.5 08/11/2017    HCT 44.4 08/11/2017    MCV 83.4 08/07/2017     08/07/2017     No results found for: CHOL, CHLPL, TRIG, HDL, LDL, LDLDIRECT  No results found for: TSH, X5WSTST, J5FQVAT, THYROIDAB  No results found for: CKTOTAL, CKMB, CKMBINDEX, TROPONINI, TROPONINT  No results found for: HGBA1C  No results found for: DDIMER  No results found for: ALT  No results found for: HGBA1C  Lab Results   Component Value Date    CREATININE 2.74 (H) 08/11/2017     No results found for: IRON, TIBC, FERRITIN  Lab Results   Component Value Date    INR 1.18 08/11/2017    PROTIME 15.0 08/11/2017       Assessment/Plan     1. Coronary artery disease involving native coronary artery of native heart without angina pectoris  Was able to find records from [place] in 2005, he had presented with anterior STEMI and underwent PCI.  We will try to get more records from his primary cardiologist  Continue aspirin Plavix  Continue high intensity statin.  We will get an echocardiogram to assess his LV function.  Currently denying any chest pain we will hold off on ischemic evaluation.    2.  Hypertension:  Blood pressure is well controlled on current regimen of losartan 25 mg, Cardizem 180 mg, he is on labetalol 200 mg as well.  On clonidine and chlorthalidone.  He is following with nephrology for CKD.    3.   Hyperlipidemia:  Continue Lipitor 20 mg  We will check lipid panel    4.  Peripheral vascular disease:  Status post abdominal aortic aneurysm repair, follows with vascular.        Prevention:  Patient's Body mass index is 31.71 kg/m². BMI is above normal parameters. Recommendations include: exercise counseling and nutrition counseling.      Gregg Randle is a former smoker      Return in about 3 months (around 2/26/2020).          This document has been electronically signed by Elsi Adams MD on November 26, 2019 4:26 PM

## 2020-01-09 LAB
BH CV ECHO MEAS - ACS: 2.7 CM
BH CV ECHO MEAS - AO ISTHMUS: 3.6 CM
BH CV ECHO MEAS - AO MAX PG (FULL): 0.8 MMHG
BH CV ECHO MEAS - AO MAX PG: 4.7 MMHG
BH CV ECHO MEAS - AO MEAN PG (FULL): 0 MMHG
BH CV ECHO MEAS - AO MEAN PG: 2 MMHG
BH CV ECHO MEAS - AO ROOT AREA (BSA CORRECTED): 1.6
BH CV ECHO MEAS - AO ROOT AREA: 10.8 CM^2
BH CV ECHO MEAS - AO ROOT DIAM: 3.7 CM
BH CV ECHO MEAS - AO V2 MAX: 108 CM/SEC
BH CV ECHO MEAS - AO V2 MEAN: 69.8 CM/SEC
BH CV ECHO MEAS - AO V2 VTI: 20.3 CM
BH CV ECHO MEAS - ASC AORTA: 3.7 CM
BH CV ECHO MEAS - AVA(I,A): 4.9 CM^2
BH CV ECHO MEAS - AVA(I,D): 4.9 CM^2
BH CV ECHO MEAS - AVA(V,A): 4.5 CM^2
BH CV ECHO MEAS - AVA(V,D): 4.5 CM^2
BH CV ECHO MEAS - BSA(HAYCOCK): 2.4 M^2
BH CV ECHO MEAS - BSA: 2.3 M^2
BH CV ECHO MEAS - BZI_BMI: 32.6 KILOGRAMS/M^2
BH CV ECHO MEAS - BZI_METRIC_HEIGHT: 182.9 CM
BH CV ECHO MEAS - BZI_METRIC_WEIGHT: 108.9 KG
BH CV ECHO MEAS - EDV(CUBED): 96.7 ML
BH CV ECHO MEAS - EDV(MOD-SP2): 75.9 ML
BH CV ECHO MEAS - EDV(MOD-SP4): 86.9 ML
BH CV ECHO MEAS - EDV(TEICH): 96.8 ML
BH CV ECHO MEAS - EF(CUBED): 80.8 %
BH CV ECHO MEAS - EF(MOD-SP2): 46.6 %
BH CV ECHO MEAS - EF(MOD-SP4): 46.7 %
BH CV ECHO MEAS - EF(TEICH): 73.4 %
BH CV ECHO MEAS - EPSS: 0.6 CM
BH CV ECHO MEAS - ESV(CUBED): 18.6 ML
BH CV ECHO MEAS - ESV(MOD-SP2): 40.5 ML
BH CV ECHO MEAS - ESV(MOD-SP4): 46.3 ML
BH CV ECHO MEAS - ESV(TEICH): 25.8 ML
BH CV ECHO MEAS - FS: 42.3 %
BH CV ECHO MEAS - IVS/LVPW: 0.89
BH CV ECHO MEAS - IVSD: 1.6 CM
BH CV ECHO MEAS - LA DIMENSION: 3.8 CM
BH CV ECHO MEAS - LA/AO: 1
BH CV ECHO MEAS - LV DIASTOLIC VOL/BSA (35-75): 37.7 ML/M^2
BH CV ECHO MEAS - LV MASS(C)D: 327.2 GRAMS
BH CV ECHO MEAS - LV MASS(C)DI: 142.2 GRAMS/M^2
BH CV ECHO MEAS - LV MAX PG: 3.9 MMHG
BH CV ECHO MEAS - LV MEAN PG: 2 MMHG
BH CV ECHO MEAS - LV SYSTOLIC VOL/BSA (12-30): 20.1 ML/M^2
BH CV ECHO MEAS - LV V1 MAX: 98.3 CM/SEC
BH CV ECHO MEAS - LV V1 MEAN: 71.4 CM/SEC
BH CV ECHO MEAS - LV V1 VTI: 20.3 CM
BH CV ECHO MEAS - LVIDD: 4.6 CM
BH CV ECHO MEAS - LVIDS: 2.7 CM
BH CV ECHO MEAS - LVLD AP2: 8.1 CM
BH CV ECHO MEAS - LVLD AP4: 8.1 CM
BH CV ECHO MEAS - LVLS AP2: 7.2 CM
BH CV ECHO MEAS - LVLS AP4: 7.4 CM
BH CV ECHO MEAS - LVOT AREA (M): 4.9 CM^2
BH CV ECHO MEAS - LVOT AREA: 4.9 CM^2
BH CV ECHO MEAS - LVOT DIAM: 2.5 CM
BH CV ECHO MEAS - LVPWD: 1.7 CM
BH CV ECHO MEAS - MV A MAX VEL: 58.3 CM/SEC
BH CV ECHO MEAS - MV DEC SLOPE: 304 CM/SEC^2
BH CV ECHO MEAS - MV E MAX VEL: 49.7 CM/SEC
BH CV ECHO MEAS - MV E/A: 0.85
BH CV ECHO MEAS - MV MAX PG: 1.1 MMHG
BH CV ECHO MEAS - MV MEAN PG: 1 MMHG
BH CV ECHO MEAS - MV P1/2T MAX VEL: 56 CM/SEC
BH CV ECHO MEAS - MV P1/2T: 54 MSEC
BH CV ECHO MEAS - MV V2 MAX: 53.4 CM/SEC
BH CV ECHO MEAS - MV V2 MEAN: 35.6 CM/SEC
BH CV ECHO MEAS - MV V2 VTI: 14.3 CM
BH CV ECHO MEAS - MVA P1/2T LCG: 3.9 CM^2
BH CV ECHO MEAS - MVA(P1/2T): 4.1 CM^2
BH CV ECHO MEAS - MVA(VTI): 7 CM^2
BH CV ECHO MEAS - PA MAX PG: 3.7 MMHG
BH CV ECHO MEAS - PA MEAN PG: 2 MMHG
BH CV ECHO MEAS - PA V2 MAX: 96.1 CM/SEC
BH CV ECHO MEAS - PA V2 MEAN: 63 CM/SEC
BH CV ECHO MEAS - PA V2 VTI: 19.5 CM
BH CV ECHO MEAS - RAP SYSTOLE: 3 MMHG
BH CV ECHO MEAS - RVDD: 3.6 CM
BH CV ECHO MEAS - RVSP: 15.8 MMHG
BH CV ECHO MEAS - SI(AO): 94.8 ML/M^2
BH CV ECHO MEAS - SI(CUBED): 33.9 ML/M^2
BH CV ECHO MEAS - SI(LVOT): 43.3 ML/M^2
BH CV ECHO MEAS - SI(MOD-SP2): 15.4 ML/M^2
BH CV ECHO MEAS - SI(MOD-SP4): 17.6 ML/M^2
BH CV ECHO MEAS - SI(TEICH): 30.9 ML/M^2
BH CV ECHO MEAS - SV(AO): 218.3 ML
BH CV ECHO MEAS - SV(CUBED): 78.1 ML
BH CV ECHO MEAS - SV(LVOT): 99.6 ML
BH CV ECHO MEAS - SV(MOD-SP2): 35.4 ML
BH CV ECHO MEAS - SV(MOD-SP4): 40.6 ML
BH CV ECHO MEAS - SV(TEICH): 71 ML
BH CV ECHO MEAS - TR MAX VEL: 179 CM/SEC
BH CV VAS BP RIGHT ARM: NORMAL MMHG

## 2020-01-14 NOTE — PROGRESS NOTES
Subjective:  Gregg Randle is a 44 y.o. male who presents for       Patient Active Problem List   Diagnosis   • Abdominal aortic aneurysm (AAA) without rupture (CMS/HCC)   • PVD (peripheral vascular disease) (CMS/HCC)   • Nicotine dependence, cigarettes, with other nicotine-induced disorders   • Mixed hyperlipidemia   • Panlobular emphysema (CMS/HCC)   • Benign essential HTN   • CAD (coronary artery disease)   • Class 1 obesity with body mass index (BMI) of 31.0 to 31.9 in adult   • Tobacco user   • Vitamin D deficiency, unspecified    • History of MI (myocardial infarction)   • History of CVA (cerebrovascular accident)   • Abnormal finding of blood chemistry, unspecified    • Stage 3 chronic kidney disease (CMS/HCC)   • Former smoker   • Left hemiparesis (CMS/HCC)           Current Outpatient Medications:   •  albuterol (PROVENTIL HFA;VENTOLIN HFA) 108 (90 BASE) MCG/ACT inhaler, Inhale 2 puffs Every 4 (Four) Hours As Needed for Wheezing., Disp: , Rfl:   •  allopurinol (ZYLOPRIM) 100 MG tablet, Take 100 mg by mouth Daily., Disp: , Rfl:   •  atorvastatin (LIPITOR) 20 MG tablet, Take 20 mg by mouth Every Night., Disp: , Rfl:   •  calcitriol (ROCALTROL) 0.25 MCG capsule, Take 0.25 mcg by mouth Daily., Disp: , Rfl:   •  clopidogrel (PLAVIX) 75 MG tablet, Take 75 mg by mouth Daily., Disp: , Rfl:   •  dilTIAZem CD (CARDIZEM CD) 180 MG 24 hr capsule, Take 180 mg by mouth Daily., Disp: , Rfl:   •  gabapentin (NEURONTIN) 600 MG tablet, Take 600 mg by mouth 3 (Three) Times a Day., Disp: , Rfl:   •  guanFACINE (TENEX) 1 MG tablet, Take 1 mg by mouth Every Night., Disp: , Rfl:   •  labetalol (NORMODYNE) 200 MG tablet, Take 200 mg by mouth 3 (Three) Times a Day., Disp: , Rfl:   •  losartan (COZAAR) 25 MG tablet, Take 100 mg by mouth Daily., Disp: , Rfl:   •  meclizine (ANTIVERT) 25 MG tablet, Take 25 mg by mouth 3 (Three) Times a Day As Needed for Dizziness., Disp: , Rfl:   •  aspirin 81 MG chewable tablet, Chew 1  tablet Daily., Disp: , Rfl:   •  brimonidine (ALPHAGAN) 0.2 % ophthalmic solution, Administer 1 drop to the right eye 3 (Three) Times a Day., Disp: , Rfl:   •  chlorthalidone (HYGROTON) 25 MG tablet, Take 25 mg by mouth Daily., Disp: , Rfl:   •  cloNIDine (CATAPRES) 0.1 MG tablet, Take 0.1 mg by mouth 3 (Three) Times a Day., Disp: , Rfl:   •  cyclobenzaprine (FLEXERIL) 5 MG tablet, Take 5 mg by mouth Every Night., Disp: , Rfl:   •  escitalopram (LEXAPRO) 20 MG tablet, Take 20 mg by mouth Daily., Disp: , Rfl:   •  Multiple Vitamins-Minerals (MULTIVITAMIN WITH MINERALS) tablet tablet, Take 1 tablet by mouth Daily., Disp: , Rfl:   •  Omega-3 Fatty Acids (FISH OIL) 1000 MG capsule capsule, Take 1,000 mg by mouth Daily With Breakfast., Disp: , Rfl:     Pt is 45 yo female with management of obesity, CAD sp PCI , HTN, HLP, AAA  Sp repair , PVD, COPD (panlobar emphysema) tobacco user, dizziness, CKD, claudication. Major depression, cholelithiasis, abnormal echocardiogram ( LVH grade 1 diastolic dysfunction)  History of CVA, former tobacco user , history of MI, left hemiparesis, DDD in lower back.     1/15/20 pt is here to establish.  He is from [place] and was seeing  NP in [place] since 2011 after his CVA.  He has CMH of CAD sp PCI at [place] in 2005. He has history of MI.  , LVH and grade 1 diastolic dysfunction.  He sees Cardiology Dr. Adams. He is currenlty taking chlortrhalidone 25 mg PO q daily, cardizem 180 mg PO q daily, labetalol 200 mg PO TID, losartan 25 mg daily. For HTN. For HLP pt is taking lipitor 20 mg PO qhs. For PVD pt is seeing Vascular Surgery and takes aspirin 81 mg daily, lipitor 20 mg PO qhs and plavix 75 mg PO q daily  he had AAA repair in 2017. . For dizziness pt takes meclizine. He also has history of aortic aneurysm repair surgery. He sees Nephrology for CKD.  He also takes lexapro 20 mg daily.  for depression. Pt is also taking gabapentin 600 mg PO TID.   He sees a Nephrologist   Jackie.  His Pulmonologist was Dr. Mar And his last appt was 2 years ago. For COPD he takes albuterol PRN. He does get short of breath easily   He quit smoking about 2-3 months ago.  He used to smoke 1 ppd He smoked for >25 years.  He does not drink alcohol currently. No illicit drugs. He is single with 2 children. He is currently disabled  And worked in factory.  Major surgeries include Sp AAA repair. He also has chronic back pain. He is with pain Management. He also has intermittent dizziness and takes meclizine as needed. He was seeing another Cardiologist Dr. Lai and has a loop recorder that was inserted about a year ago. He has not had this removed. HE also used to see a Neurologist about 2 months ago with Dr. Zamora            Coronary Artery Disease   Presents for initial visit. Symptoms include chest pain, chest tightness and shortness of breath. Pertinent negatives include no chest pressure, dizziness, leg swelling, muscle weakness, palpitations or weight gain. Risk factors include hyperlipidemia, hypertension, obesity and tobacco use. Risk factors do not include stress. Past treatments include beta blockers, ACE inhibitors and antiplatelet agents. The treatment provided no relief. Compliance with prior treatments has been variable. Past surgical history does not include angioplasty, CABG or cardiac stents.   Obesity   This is a chronic problem. The current episode started more than 1 year ago. The problem occurs constantly. The problem has been unchanged. Associated symptoms include arthralgias, chest pain, coughing, fatigue, numbness and weakness. Pertinent negatives include no abdominal pain, anorexia, change in bowel habit, chills, congestion, diaphoresis, fever, headaches, joint swelling, myalgias, nausea, neck pain, rash, sore throat, swollen glands, urinary symptoms, vertigo, visual change or vomiting. Nothing aggravates the symptoms. He has tried nothing for the symptoms. The treatment  provided no relief.   Leg Pain    The incident occurred more than 1 week ago. The injury mechanism is unknown. The pain is at a severity of 4/10. The pain is mild. The pain has been constant since onset. Associated symptoms include numbness. Pertinent negatives include no inability to bear weight, loss of motion, loss of sensation, muscle weakness or tingling. Nothing aggravates the symptoms. He has tried nothing for the symptoms. The treatment provided no relief.   COPD   He complains of cough and shortness of breath. There is no chest tightness, difficulty breathing, frequent throat clearing, hemoptysis, hoarse voice, sputum production or wheezing. This is a new problem. The current episode started today. The problem occurs constantly. The problem has been unchanged. Associated symptoms include chest pain. Pertinent negatives include no appetite change, fever, headaches, myalgias, postnasal drip, rhinorrhea, sneezing, sore throat or trouble swallowing. His symptoms are aggravated by nothing. His symptoms are alleviated by nothing. He reports no improvement on treatment. His symptoms are not alleviated by beta-agonist. His past medical history is significant for COPD and emphysema. There is no history of asthma, bronchiectasis, bronchitis or pneumonia.          Review of Systems  Review of Systems   Constitutional: Positive for activity change and fatigue. Negative for appetite change, chills, diaphoresis, fever and weight gain.   HENT: Negative for congestion, hoarse voice, postnasal drip, rhinorrhea, sinus pressure, sinus pain, sneezing, sore throat, trouble swallowing and voice change.    Respiratory: Positive for cough, chest tightness and shortness of breath. Negative for hemoptysis, sputum production, choking, wheezing and stridor.    Cardiovascular: Positive for chest pain. Negative for palpitations and leg swelling.   Gastrointestinal: Negative for abdominal pain, anorexia, change in bowel habit, diarrhea,  nausea and vomiting.   Musculoskeletal: Positive for arthralgias, back pain and gait problem. Negative for joint swelling, myalgias, muscle weakness and neck pain.   Skin: Negative for rash.   Neurological: Positive for weakness and numbness. Negative for dizziness, vertigo, tingling and headaches.       Patient Active Problem List   Diagnosis   • Abdominal aortic aneurysm (AAA) without rupture (CMS/HCC)   • PVD (peripheral vascular disease) (CMS/HCC)   • Nicotine dependence, cigarettes, with other nicotine-induced disorders   • Mixed hyperlipidemia   • Panlobular emphysema (CMS/HCC)   • Benign essential HTN   • CAD (coronary artery disease)   • Class 1 obesity with body mass index (BMI) of 31.0 to 31.9 in adult   • Tobacco user   • Vitamin D deficiency, unspecified    • History of MI (myocardial infarction)   • History of CVA (cerebrovascular accident)   • Abnormal finding of blood chemistry, unspecified    • Stage 3 chronic kidney disease (CMS/HCC)   • Former smoker   • Left hemiparesis (CMS/HCC)     Past Surgical History:   Procedure Laterality Date   • CORONARY ANGIOPLASTY WITH STENT PLACEMENT     • EAR TUBES Left    • VT AAA REPAIR,AORTO-AORTIC TUBE PROSTH N/A 8/11/2017    Procedure: ABDOMINAL AORTIC ANEURYSM REPAIR WITH ENDOGRAFT, REPAIR ILIAC ARTERY ANEURYSM, POSSIBLE OPEN ABDOMINAL AORTOGRAM    (CELL SAVER STAND-BY);  Surgeon: Arne Arshad MD;  Location: [facility];  Service: Vascular     Social History     Socioeconomic History   • Marital status: Single     Spouse name: Not on file   • Number of children: Not on file   • Years of education: Not on file   • Highest education level: Not on file   Tobacco Use   • Smoking status: Former Smoker     Packs/day: 1.00     Years: 20.00     Pack years: 20.00   • Smokeless tobacco: Never Used   Substance and Sexual Activity   • Alcohol use: No   • Drug use: No   • Sexual activity: Defer     Family History   Problem Relation Age of Onset   • Stroke Mother    •  Diabetes Mother    • Heart disease Father      Office Visit on 11/26/2019   Component Date Value Ref Range Status   • BSA 01/08/2020 2.3  m^2 Final   • BH CV ECHO CHARLEY - RVDD 01/08/2020 3.6  cm Final   • IVSd 01/08/2020 1.6  cm Final   • LVIDd 01/08/2020 4.6  cm Final   • LVIDs 01/08/2020 2.7  cm Final   • LVPWd 01/08/2020 1.7  cm Final   • IVS/LVPW 01/08/2020 0.89   Final   • FS 01/08/2020 42.3  % Final   • EDV(Teich) 01/08/2020 96.8  ml Final   • ESV(Teich) 01/08/2020 25.8  ml Final   • EF(Teich) 01/08/2020 73.4  % Final   • EDV(cubed) 01/08/2020 96.7  ml Final   • ESV(cubed) 01/08/2020 18.6  ml Final   • EF(cubed) 01/08/2020 80.8  % Final   • LV mass(C)d 01/08/2020 327.2  grams Final   • LV mass(C)dI 01/08/2020 142.2  grams/m^2 Final   • SV(Teich) 01/08/2020 71.0  ml Final   • SI(Teich) 01/08/2020 30.9  ml/m^2 Final   • SV(cubed) 01/08/2020 78.1  ml Final   • SI(cubed) 01/08/2020 33.9  ml/m^2 Final   • EPSS 01/08/2020 0.6  cm Final   • Ao root diam 01/08/2020 3.7  cm Final   • Ao root area 01/08/2020 10.8  cm^2 Final   • ACS 01/08/2020 2.7  cm Final   • LA dimension 01/08/2020 3.8  cm Final   • asc Aorta Diam 01/08/2020 3.7  cm Final   • Ao Isth Diam 01/08/2020 3.6  cm Final   • LA/Ao 01/08/2020 1.0   Final   • LVOT diam 01/08/2020 2.5  cm Final   • LVOT area 01/08/2020 4.9  cm^2 Final   • LVOT area(traced) 01/08/2020 4.9  cm^2 Final   • LVLd ap4 01/08/2020 8.1  cm Final   • EDV(MOD-sp4) 01/08/2020 86.9  ml Final   • LVLs ap4 01/08/2020 7.4  cm Final   • ESV(MOD-sp4) 01/08/2020 46.3  ml Final   • EF(MOD-sp4) 01/08/2020 46.7  % Final   • LVLd ap2 01/08/2020 8.1  cm Final   • EDV(MOD-sp2) 01/08/2020 75.9  ml Final   • LVLs ap2 01/08/2020 7.2  cm Final   • ESV(MOD-sp2) 01/08/2020 40.5  ml Final   • EF(MOD-sp2) 01/08/2020 46.6  % Final   • SV(MOD-sp4) 01/08/2020 40.6  ml Final   • SI(MOD-sp4) 01/08/2020 17.6  ml/m^2 Final   • SV(MOD-sp2) 01/08/2020 35.4  ml Final   • SI(MOD-sp2) 01/08/2020 15.4  ml/m^2 Final   • Ao  root area (BSA corrected) 01/08/2020 1.6   Final   • LV [place] Vol (BSA corrected) 01/08/2020 37.7  ml/m^2 Final   • LV Sys Vol (BSA corrected) 01/08/2020 20.1  ml/m^2 Final   • MV E max dena 01/08/2020 49.7  cm/sec Final   • MV A max dena 01/08/2020 58.3  cm/sec Final   • MV E/A 01/08/2020 0.85   Final   • MV V2 max 01/08/2020 53.4  cm/sec Final   • MV max PG 01/08/2020 1.1  mmHg Final   • MV V2 mean 01/08/2020 35.6  cm/sec Final   • MV mean PG 01/08/2020 1.0  mmHg Final   • MV V2 VTI 01/08/2020 14.3  cm Final   • MVA(VTI) 01/08/2020 7.0  cm^2 Final   • MV P1/2t max dena 01/08/2020 56.0  cm/sec Final   • MV P1/2t 01/08/2020 54.0  msec Final   • MVA(P1/2t) 01/08/2020 4.1  cm^2 Final   • MV dec slope 01/08/2020 304.0  cm/sec^2 Final   • Ao pk dena 01/08/2020 108.0  cm/sec Final   • Ao max PG 01/08/2020 4.7  mmHg Final   • Ao max PG (full) 01/08/2020 0.8  mmHg Final   • Ao V2 mean 01/08/2020 69.8  cm/sec Final   • Ao mean PG 01/08/2020 2.0  mmHg Final   • Ao mean PG (full) 01/08/2020 0  mmHg Final   • Ao V2 VTI 01/08/2020 20.3  cm Final   • PEARL(I,A) 01/08/2020 4.9  cm^2 Final   • PEARL(I,D) 01/08/2020 4.9  cm^2 Final   • PEARL(V,A) 01/08/2020 4.5  cm^2 Final   • PEARL(V,D) 01/08/2020 4.5  cm^2 Final   • LV V1 max PG 01/08/2020 3.9  mmHg Final   • LV V1 mean PG 01/08/2020 2.0  mmHg Final   • LV V1 max 01/08/2020 98.3  cm/sec Final   • LV V1 mean 01/08/2020 71.4  cm/sec Final   • LV V1 VTI 01/08/2020 20.3  cm Final   • SV(Ao) 01/08/2020 218.3  ml Final   • SI(Ao) 01/08/2020 94.8  ml/m^2 Final   • SV(LVOT) 01/08/2020 99.6  ml Final   • SI(LVOT) 01/08/2020 43.3  ml/m^2 Final   • PA V2 max 01/08/2020 96.1  cm/sec Final   • PA max PG 01/08/2020 3.7  mmHg Final   • PA V2 mean 01/08/2020 63.0  cm/sec Final   • PA mean PG 01/08/2020 2.0  mmHg Final   • PA V2 VTI 01/08/2020 19.5  cm Final   • TR max dena 01/08/2020 179.0  cm/sec Final   • RVSP(TR) 01/08/2020 15.8  mmHg Final   • RAP systole 01/08/2020 3.0  mmHg Final   • MVA P1/2T LCG  "01/08/2020 3.9  cm^2 Final   • BH CV ECHO CHARLEY - BZI_BMI 01/08/2020 32.6  kilograms/m^2 Final   • BH CV ECHO CHARLEY - BSA([place]) 01/08/2020 2.4  m^2 Final   • BH CV ECHO CHARLEY - BZI_METRIC_WEIGHT 01/08/2020 108.9  kg Final   • BH CV ECHO CHARLEY - BZI_METRIC_HEIGHT 01/08/2020 182.9  cm Final   • BH CV VAS BP RIGHT ARM 01/08/2020 122/82  mmHg Final      Adult Transthoracic Echo Complete W/ Cont if Necessary Per Protocol  · Estimated EF appears to be in the range of 46 - 50%.  · Left ventricular systolic function is mildly decreased.  · Left ventricular wall thickness is consistent with moderate concentric   hypertrophy.  · Left ventricular diastolic dysfunction (grade I a) consistent with   impaired relaxation.       [unfilled]    There is no immunization history on file for this patient.    The following portions of the patient's history were reviewed and updated as appropriate: allergies, current medications, past family history, past medical history, past social history, past surgical history and problem list.        Physical Exam  /84 (BP Location: Right arm, Patient Position: Sitting, Cuff Size: Adult)   Pulse 70   Temp 98.7 °F (37.1 °C) (Oral)   Ht 185.4 cm (73\")   Wt 106 kg (232 lb 12.8 oz)   SpO2 98%   BMI 30.71 kg/m²     Physical Exam   Constitutional: He is oriented to person, place, and time. He appears well-developed and well-nourished.   HENT:   Head: Normocephalic and atraumatic.   Right Ear: External ear normal.   Eyes: Pupils are equal, round, and reactive to light. Conjunctivae and EOM are normal.   Neck: Normal range of motion. Neck supple.   Cardiovascular: Normal rate, regular rhythm and normal heart sounds.   No murmur heard.  Pulmonary/Chest: Effort normal and breath sounds normal. No respiratory distress.   Decreased breath sounds    Abdominal: Soft. Bowel sounds are normal. He exhibits no distension. There is no tenderness.   Obese abdomen    Musculoskeletal: He exhibits tenderness. " He exhibits no edema or deformity.        Lumbar back: He exhibits decreased range of motion, tenderness, bony tenderness, pain and spasm.   Neurological: He is alert and oriented to person, place, and time. A cranial nerve deficit is present.   CN 2-12 intact    Skin: Skin is warm. No rash noted. He is not diaphoretic. No erythema. No pallor.   Psychiatric: He has a normal mood and affect. His behavior is normal.   Nursing note and vitals reviewed.      Assessment/Plan    Diagnosis Plan   1. Class 1 obesity with body mass index (BMI) of 31.0 to 31.9 in adult, unspecified obesity type, unspecified whether serious comorbidity present  CBC Auto Differential    Comprehensive Metabolic Panel    Hemoglobin A1c    Lipid Panel    TSH    T4, Free    T3, Free    Vitamin D 25 Hydroxy   2. Panlobular emphysema (CMS/HCC)     3. Mixed hyperlipidemia     4. Coronary artery disease involving native coronary artery of native heart without angina pectoris     5. Benign essential HTN  CBC Auto Differential    Comprehensive Metabolic Panel    Hemoglobin A1c    Lipid Panel    TSH    T4, Free    T3, Free    Vitamin D 25 Hydroxy   6. Abdominal aortic aneurysm (AAA) without rupture (CMS/HCC)     7. PVD (peripheral vascular disease) (CMS/HCC)     8. Abnormal finding of blood chemistry, unspecified   Hemoglobin A1c   9. Vitamin D deficiency, unspecified   Vitamin D 25 Hydroxy   10. History of MI (myocardial infarction)     11. History of CVA (cerebrovascular accident)     12. Stage 3 chronic kidney disease (CMS/HCC)     13. Former smoker     14. Left hemiparesis (CMS/HCC)          -recommend labwork  -recommend influenza vaccination  -COPD/emphysema. -referral to Dr. Medina with Pulmonlogy with PFTs. He was seeing Dr. Mar. He would like to see a new Pulmonologist   -annual physical exam today  -HTN - losartan 100 mg daily. Labetalol 200 mg PO TID Clonidine 0.1 mg PO BID cartdizem 180 mg PO q daily, chlorthalidone 25 mg PO q daily. BP  at goal   -major depression- lexapro 20 mg daily.  -CAD -Cardiology following   -PVD - aspirin plavix, lipitor - Vascular Surgery following  -obesity - counseled weight loss >5 minutes BMI at 30.71   -AAA - Vascular Surgery following   -advised pt to be safe and call with questions and concerns  -advised pt to go to ER or call 911 if symptoms worrisome or severe  -advised pt to followup with specialist and referrals  -recheck in 1 month         This document has been electronically signed by Dread Duvall MD on January 15, 2020 3:26 PM

## 2020-01-15 ENCOUNTER — OFFICE VISIT (OUTPATIENT)
Dept: FAMILY MEDICINE CLINIC | Facility: CLINIC | Age: 45
End: 2020-01-15

## 2020-01-15 ENCOUNTER — LAB (OUTPATIENT)
Dept: LAB | Facility: HOSPITAL | Age: 45
End: 2020-01-15

## 2020-01-15 VITALS
OXYGEN SATURATION: 98 % | WEIGHT: 232.8 LBS | HEART RATE: 70 BPM | HEIGHT: 73 IN | TEMPERATURE: 98.7 F | SYSTOLIC BLOOD PRESSURE: 124 MMHG | DIASTOLIC BLOOD PRESSURE: 84 MMHG | BODY MASS INDEX: 30.85 KG/M2

## 2020-01-15 DIAGNOSIS — I10 BENIGN ESSENTIAL HTN: ICD-10-CM

## 2020-01-15 DIAGNOSIS — R79.9 ABNORMAL FINDING OF BLOOD CHEMISTRY, UNSPECIFIED: ICD-10-CM

## 2020-01-15 DIAGNOSIS — E66.9 CLASS 1 OBESITY WITH BODY MASS INDEX (BMI) OF 31.0 TO 31.9 IN ADULT, UNSPECIFIED OBESITY TYPE, UNSPECIFIED WHETHER SERIOUS COMORBIDITY PRESENT: ICD-10-CM

## 2020-01-15 DIAGNOSIS — J44.9 CHRONIC OBSTRUCTIVE PULMONARY DISEASE, UNSPECIFIED COPD TYPE (HCC): ICD-10-CM

## 2020-01-15 DIAGNOSIS — G81.94 LEFT HEMIPARESIS (HCC): ICD-10-CM

## 2020-01-15 DIAGNOSIS — Z86.73 HISTORY OF CVA (CEREBROVASCULAR ACCIDENT): ICD-10-CM

## 2020-01-15 DIAGNOSIS — Z23 NEED FOR IMMUNIZATION AGAINST INFLUENZA: ICD-10-CM

## 2020-01-15 DIAGNOSIS — J43.1 PANLOBULAR EMPHYSEMA (HCC): Primary | ICD-10-CM

## 2020-01-15 DIAGNOSIS — E55.9 VITAMIN D DEFICIENCY, UNSPECIFIED: ICD-10-CM

## 2020-01-15 DIAGNOSIS — N18.30 STAGE 3 CHRONIC KIDNEY DISEASE (HCC): ICD-10-CM

## 2020-01-15 DIAGNOSIS — I25.10 CORONARY ARTERY DISEASE INVOLVING NATIVE CORONARY ARTERY OF NATIVE HEART WITHOUT ANGINA PECTORIS: ICD-10-CM

## 2020-01-15 DIAGNOSIS — E78.2 MIXED HYPERLIPIDEMIA: ICD-10-CM

## 2020-01-15 DIAGNOSIS — I71.40 ABDOMINAL AORTIC ANEURYSM (AAA) WITHOUT RUPTURE (HCC): ICD-10-CM

## 2020-01-15 DIAGNOSIS — I73.9 PVD (PERIPHERAL VASCULAR DISEASE) (HCC): ICD-10-CM

## 2020-01-15 DIAGNOSIS — I25.2 HISTORY OF MI (MYOCARDIAL INFARCTION): ICD-10-CM

## 2020-01-15 DIAGNOSIS — Z87.891 FORMER SMOKER: ICD-10-CM

## 2020-01-15 PROBLEM — Z72.0 TOBACCO USER: Status: ACTIVE | Noted: 2020-01-15

## 2020-01-15 PROCEDURE — 82306 VITAMIN D 25 HYDROXY: CPT

## 2020-01-15 PROCEDURE — 84481 FREE ASSAY (FT-3): CPT

## 2020-01-15 PROCEDURE — 90686 IIV4 VACC NO PRSV 0.5 ML IM: CPT | Performed by: FAMILY MEDICINE

## 2020-01-15 PROCEDURE — 84443 ASSAY THYROID STIM HORMONE: CPT

## 2020-01-15 PROCEDURE — 99204 OFFICE O/P NEW MOD 45 MIN: CPT | Performed by: FAMILY MEDICINE

## 2020-01-15 PROCEDURE — 83036 HEMOGLOBIN GLYCOSYLATED A1C: CPT

## 2020-01-15 PROCEDURE — 85025 COMPLETE CBC W/AUTO DIFF WBC: CPT

## 2020-01-15 PROCEDURE — 84439 ASSAY OF FREE THYROXINE: CPT

## 2020-01-15 PROCEDURE — 80053 COMPREHEN METABOLIC PANEL: CPT

## 2020-01-15 PROCEDURE — 80061 LIPID PANEL: CPT

## 2020-01-15 PROCEDURE — 90471 IMMUNIZATION ADMIN: CPT | Performed by: FAMILY MEDICINE

## 2020-01-16 LAB
25(OH)D3 SERPL-MCNC: 39.4 NG/ML (ref 30–100)
ALBUMIN SERPL-MCNC: 4.6 G/DL (ref 3.5–5.2)
ALBUMIN/GLOB SERPL: 1.3 G/DL
ALP SERPL-CCNC: 128 U/L (ref 39–117)
ALT SERPL W P-5'-P-CCNC: 62 U/L (ref 1–41)
ANION GAP SERPL CALCULATED.3IONS-SCNC: 15.5 MMOL/L (ref 5–15)
AST SERPL-CCNC: 100 U/L (ref 1–40)
BASOPHILS # BLD AUTO: 0.06 10*3/MM3 (ref 0–0.2)
BASOPHILS NFR BLD AUTO: 0.9 % (ref 0–1.5)
BILIRUB SERPL-MCNC: 0.4 MG/DL (ref 0.2–1.2)
BUN BLD-MCNC: 30 MG/DL (ref 6–20)
BUN/CREAT SERPL: 11 (ref 7–25)
CALCIUM SPEC-SCNC: 10.4 MG/DL (ref 8.6–10.5)
CHLORIDE SERPL-SCNC: 102 MMOL/L (ref 98–107)
CHOLEST SERPL-MCNC: 133 MG/DL (ref 0–200)
CO2 SERPL-SCNC: 25.5 MMOL/L (ref 22–29)
CREAT BLD-MCNC: 2.72 MG/DL (ref 0.76–1.27)
DEPRECATED RDW RBC AUTO: 35.4 FL (ref 37–54)
EOSINOPHIL # BLD AUTO: 0.26 10*3/MM3 (ref 0–0.4)
EOSINOPHIL NFR BLD AUTO: 4 % (ref 0.3–6.2)
ERYTHROCYTE [DISTWIDTH] IN BLOOD BY AUTOMATED COUNT: 12.7 % (ref 12.3–15.4)
GFR SERPL CREATININE-BSD FRML MDRD: 31 ML/MIN/1.73
GLOBULIN UR ELPH-MCNC: 3.6 GM/DL
GLUCOSE BLD-MCNC: 80 MG/DL (ref 65–99)
HBA1C MFR BLD: 5.1 % (ref 4.8–5.6)
HCT VFR BLD AUTO: 43.6 % (ref 37.5–51)
HDLC SERPL-MCNC: 37 MG/DL (ref 40–60)
HGB BLD-MCNC: 15 G/DL (ref 13–17.7)
IMM GRANULOCYTES # BLD AUTO: 0.01 10*3/MM3 (ref 0–0.05)
IMM GRANULOCYTES NFR BLD AUTO: 0.2 % (ref 0–0.5)
LDLC SERPL CALC-MCNC: 72 MG/DL (ref 0–100)
LDLC/HDLC SERPL: 1.94 {RATIO}
LYMPHOCYTES # BLD AUTO: 1.97 10*3/MM3 (ref 0.7–3.1)
LYMPHOCYTES NFR BLD AUTO: 30.1 % (ref 19.6–45.3)
MCH RBC QN AUTO: 27.4 PG (ref 26.6–33)
MCHC RBC AUTO-ENTMCNC: 34.4 G/DL (ref 31.5–35.7)
MCV RBC AUTO: 79.7 FL (ref 79–97)
MONOCYTES # BLD AUTO: 0.74 10*3/MM3 (ref 0.1–0.9)
MONOCYTES NFR BLD AUTO: 11.3 % (ref 5–12)
NEUTROPHILS # BLD AUTO: 3.51 10*3/MM3 (ref 1.7–7)
NEUTROPHILS NFR BLD AUTO: 53.5 % (ref 42.7–76)
NRBC BLD AUTO-RTO: 0 /100 WBC (ref 0–0.2)
PLATELET # BLD AUTO: 277 10*3/MM3 (ref 140–450)
PMV BLD AUTO: 11 FL (ref 6–12)
POTASSIUM BLD-SCNC: 5.5 MMOL/L (ref 3.5–5.2)
PROT SERPL-MCNC: 8.2 G/DL (ref 6–8.5)
RBC # BLD AUTO: 5.47 10*6/MM3 (ref 4.14–5.8)
SODIUM BLD-SCNC: 143 MMOL/L (ref 136–145)
T3FREE SERPL-MCNC: 3.52 PG/ML (ref 2–4.4)
T4 FREE SERPL-MCNC: 1.25 NG/DL (ref 0.93–1.7)
TRIGL SERPL-MCNC: 122 MG/DL (ref 0–150)
TSH SERPL DL<=0.05 MIU/L-ACNC: 1.56 UIU/ML (ref 0.27–4.2)
VLDLC SERPL-MCNC: 24.4 MG/DL (ref 5–40)
WBC NRBC COR # BLD: 6.55 10*3/MM3 (ref 3.4–10.8)

## 2020-01-17 ENCOUNTER — TELEPHONE (OUTPATIENT)
Dept: CARDIOLOGY | Facility: CLINIC | Age: 45
End: 2020-01-17

## 2020-01-20 ENCOUNTER — TELEPHONE (OUTPATIENT)
Dept: FAMILY MEDICINE CLINIC | Facility: CLINIC | Age: 45
End: 2020-01-20

## 2020-01-20 DIAGNOSIS — R74.8 ELEVATED LIVER ENZYMES: Primary | ICD-10-CM

## 2020-02-05 ENCOUNTER — TELEPHONE (OUTPATIENT)
Dept: FAMILY MEDICINE CLINIC | Facility: CLINIC | Age: 45
End: 2020-02-05

## 2020-02-05 NOTE — TELEPHONE ENCOUNTER
Patient called in stating that he missed his appointment for his MRI at [facility] and did not want to call them to reschedule. He would like to reschedule it for this week.     Please call at [phone]

## 2020-02-06 NOTE — TELEPHONE ENCOUNTER
Returned call but pt didn't answer and no voicemail. Pt will need to call Khadijah Carreno and schedule MRI so that he gets a appointment that will work out for him. Phone number is [phone]

## 2020-02-13 ENCOUNTER — LAB (OUTPATIENT)
Dept: LAB | Facility: HOSPITAL | Age: 45
End: 2020-02-13

## 2020-02-13 ENCOUNTER — TELEPHONE (OUTPATIENT)
Dept: FAMILY MEDICINE CLINIC | Facility: CLINIC | Age: 45
End: 2020-02-13

## 2020-02-13 ENCOUNTER — OFFICE VISIT (OUTPATIENT)
Dept: FAMILY MEDICINE CLINIC | Facility: CLINIC | Age: 45
End: 2020-02-13

## 2020-02-13 VITALS
HEART RATE: 84 BPM | TEMPERATURE: 98.9 F | BODY MASS INDEX: 30.85 KG/M2 | SYSTOLIC BLOOD PRESSURE: 130 MMHG | HEIGHT: 73 IN | WEIGHT: 232.8 LBS | DIASTOLIC BLOOD PRESSURE: 84 MMHG | OXYGEN SATURATION: 98 %

## 2020-02-13 DIAGNOSIS — R74.8 ELEVATED LIVER ENZYMES: ICD-10-CM

## 2020-02-13 DIAGNOSIS — I25.10 CORONARY ARTERY DISEASE INVOLVING NATIVE CORONARY ARTERY OF NATIVE HEART WITHOUT ANGINA PECTORIS: ICD-10-CM

## 2020-02-13 DIAGNOSIS — J43.1 PANLOBULAR EMPHYSEMA (HCC): ICD-10-CM

## 2020-02-13 DIAGNOSIS — M54.42 LEFT-SIDED LOW BACK PAIN WITH LEFT-SIDED SCIATICA, UNSPECIFIED CHRONICITY: ICD-10-CM

## 2020-02-13 DIAGNOSIS — R10.9 ABDOMINAL PAIN, UNSPECIFIED ABDOMINAL LOCATION: Primary | ICD-10-CM

## 2020-02-13 DIAGNOSIS — E78.2 MIXED HYPERLIPIDEMIA: ICD-10-CM

## 2020-02-13 DIAGNOSIS — Z87.891 FORMER SMOKER: ICD-10-CM

## 2020-02-13 DIAGNOSIS — E66.9 CLASS 1 OBESITY WITH BODY MASS INDEX (BMI) OF 31.0 TO 31.9 IN ADULT, UNSPECIFIED OBESITY TYPE, UNSPECIFIED WHETHER SERIOUS COMORBIDITY PRESENT: ICD-10-CM

## 2020-02-13 DIAGNOSIS — E55.9 VITAMIN D DEFICIENCY, UNSPECIFIED: ICD-10-CM

## 2020-02-13 DIAGNOSIS — E87.5 HYPERKALEMIA: ICD-10-CM

## 2020-02-13 DIAGNOSIS — R93.5 ABNORMAL X-RAY OF ABDOMEN: ICD-10-CM

## 2020-02-13 DIAGNOSIS — I25.2 HISTORY OF MI (MYOCARDIAL INFARCTION): ICD-10-CM

## 2020-02-13 DIAGNOSIS — I73.9 PVD (PERIPHERAL VASCULAR DISEASE) (HCC): ICD-10-CM

## 2020-02-13 DIAGNOSIS — I71.40 ABDOMINAL AORTIC ANEURYSM (AAA) WITHOUT RUPTURE (HCC): ICD-10-CM

## 2020-02-13 DIAGNOSIS — R94.5 ABNORMAL RESULTS OF LIVER FUNCTION STUDIES: ICD-10-CM

## 2020-02-13 DIAGNOSIS — Z86.73 HISTORY OF CVA (CEREBROVASCULAR ACCIDENT): ICD-10-CM

## 2020-02-13 DIAGNOSIS — G81.94 LEFT HEMIPARESIS (HCC): ICD-10-CM

## 2020-02-13 DIAGNOSIS — Z72.0 TOBACCO USER: ICD-10-CM

## 2020-02-13 DIAGNOSIS — N18.30 STAGE 3 CHRONIC KIDNEY DISEASE (HCC): ICD-10-CM

## 2020-02-13 LAB
ALBUMIN SERPL-MCNC: 4.5 G/DL (ref 3.5–5.2)
ALP SERPL-CCNC: 101 U/L (ref 39–117)
ALT SERPL W P-5'-P-CCNC: 19 U/L (ref 1–41)
AST SERPL-CCNC: 28 U/L (ref 1–40)
BILIRUB CONJ SERPL-MCNC: <0.2 MG/DL (ref 0.2–0.3)
BILIRUB INDIRECT SERPL-MCNC: ABNORMAL MG/DL
BILIRUB SERPL-MCNC: 0.2 MG/DL (ref 0.2–1.2)
HAV IGM SERPL QL IA: NORMAL
HBV CORE IGM SERPL QL IA: NORMAL
HBV SURFACE AG SERPL QL IA: NORMAL
HCV AB SER DONR QL: NORMAL
HOLD SPECIMEN: NORMAL
POTASSIUM BLD-SCNC: 4.7 MMOL/L (ref 3.5–5.2)
PROT SERPL-MCNC: 7.8 G/DL (ref 6–8.5)

## 2020-02-13 PROCEDURE — 80076 HEPATIC FUNCTION PANEL: CPT

## 2020-02-13 PROCEDURE — 84132 ASSAY OF SERUM POTASSIUM: CPT

## 2020-02-13 PROCEDURE — 99214 OFFICE O/P EST MOD 30 MIN: CPT | Performed by: FAMILY MEDICINE

## 2020-02-13 PROCEDURE — 80074 ACUTE HEPATITIS PANEL: CPT

## 2020-02-13 RX ORDER — CYCLOBENZAPRINE HYDROCHLORIDE 7.5 MG/1
7.5 TABLET, FILM COATED ORAL NIGHTLY PRN
Qty: 30 TABLET | Refills: 3 | Status: SHIPPED | OUTPATIENT
Start: 2020-02-13 | End: 2020-02-18 | Stop reason: DRUGHIGH

## 2020-02-13 NOTE — PATIENT INSTRUCTIONS
Go Monday to get US of liver     Get blood work today recheck liver enzymes      Hyperkalemia  Hyperkalemia occurs when the level of potassium in your blood is too high. Potassium is an important nutrient that helps the muscles and nerves function normally. It affects how the heart works, and it helps keep fluids and minerals balanced in the body. If there is too much potassium in your blood, it can affect your heart's ability to function normally.  Potassium is normally removed (excreted) from the body by the kidneys. Hyperkalemia can result from various conditions. It can range from mild to severe.  What are the causes?  This condition may be caused by:  · Taking in too much potassium. You can do this by:  ? Using salt substitutes. They contain large amounts of potassium.  ? Taking potassium supplements.  ? Eating foods that are high in potassium.  · Excreting too little potassium. This can happen if:  ? Your kidneys are not working properly. Kidney (renal) disease, including short-term or long-term renal failure, is a common cause of hyperkalemia.  ? You are taking medicines that lower your excretion of potassium.  ? You have [place]'s disease.  ? You have a urinary tract blockage, such as kidney stones.  ? You are on treatment to mechanically clean your blood (dialysis) and you skip a treatment.  · Releasing a high amount of potassium from your cells into your blood. This can happen with:  ? Injury to muscles (rhabdomyolysis) or other tissues. Most potassium is stored in your muscles.  ? Severe burns or infections.  ? Acidic blood plasma (acidosis). Acidosis can result from many diseases, such as uncontrolled diabetes.  What increases the risk?  The following factors may make you more likely to develop this condition:  · Kidney disease. This puts you at the highest risk.  · Andraes's disease. This is a condition where the adrenal glands do not produce enough hormones.  · Alcoholism or heavy drug use.  · Using  certain blood pressure medicines, such as ACE inhibitors, angiotensin II receptor blockers (ARBs), or potassium-sparing diuretics such as spironolactone.  · Severe injury or burn.  What are the signs or symptoms?  In many cases, there are no symptoms. However, when your potassium level becomes high enough, you may have symptoms such as:  · An irregular or very slow heartbeat.  · Nausea.  · Tiredness (fatigue).  · Confusion.  · Tingling of your skin or numbness of your hands or feet.  · Muscle cramps.  · Muscle weakness.  · Not being able to move (paralysis).  How is this diagnosed?  This condition may be diagnosed based on:  · Your symptoms and medical history. Your health care provider will ask about your use of prescription and non-prescription drugs.  · A physical exam.  · Blood tests.  · An electrocardiogram (ECG).  How is this treated?  Treatment depends on the cause and severity of your condition. Treatment may need to be done in the hospital setting. Treatment may include:  · IV glucose (sugar) along with insulin to shift potassium out of your blood and into your cells.  · A medicine called albuterol to shift potassium out of your blood and into your cells.  · Medicines to remove the potassium from your body.  · Dialysis to remove the potassium from your body.  · Calcium to protect your heart from the effects of high potassium, such as irregular rhythms (arrhythmias).  Follow these instructions at home:    · Take over-the-counter and prescription medicines only as told by your health care provider.  · Do not take any supplements, natural products, herbs, or vitamins without reviewing them with your health care provider. Certain supplements and natural food products contain high amounts of potassium.  · Limit your alcohol intake as told by your health care provider.  · Do not use drugs. If you need help quitting, ask your health care provider.  · If you have kidney disease, you may need to follow a  low-potassium diet. A dietitian can help you learn which foods have high or low amounts of potassium.  · Keep all follow-up visits as told by your health care provider. This is important.  Contact a health care provider if you:  · Have an irregular or very slow heartbeat.  · Feel light-headed.  · Feel weak.  · Are nauseous.  · Have tingling or numbness in your hands or feet.  Get help right away if you:  · Have shortness of breath.  · Have chest pain or discomfort.  · Pass out.  · Have muscle paralysis.  Summary  · Hyperkalemia occurs when the level of potassium in your blood is too high.  · This condition may be caused by taking in too much potassium, excreting too little potassium, or releasing a high amount of potassium from your cells into your blood.  · Hyperkalemia can result from many underlying conditions, especially chronic kidney disease, or from taking certain medicines.  · Treatment of hyperkalemia may include medicine to shift potassium out of your blood and into your cells or to remove the potassium from your body.  · If you have kidney disease, you may need to follow a low-potassium diet. A dietitian can help you learn which foods have high or low amounts of potassium.  This information is not intended to replace advice given to you by your health care provider. Make sure you discuss any questions you have with your health care provider.  Document Released: 12/08/2003 Document Revised: 12/03/2018 Document Reviewed: 12/03/2018  [employer] Patient Education © 2019 [employer]

## 2020-02-13 NOTE — TELEPHONE ENCOUNTER
I spoke with Radiologist on phone about pt possibly having sigmoid volvulus. I called pt today on 2/13/20 to discuss findings. He reported abdominal pain but mostly lower back pain. On left lower back. I highly recommended pt go proceed to [facility] ER for possible sigmoid volvulus which may require emergent surgical intervention.  Pt states he cannot go until tomorrow. I highly recommended also [facility] ER but pt declined going. I did recommend to get a KUB of abdomen today to see if sigmoid volvulus is still present based on back x-ray.  Pt voiced understanding and will do that today. All questions answered and will notify pt once I have results of KUB         This document has been electronically signed by Dread Duvall MD on February 13, 2020 12:54 PM

## 2020-02-14 ENCOUNTER — TELEPHONE (OUTPATIENT)
Dept: FAMILY MEDICINE CLINIC | Facility: CLINIC | Age: 45
End: 2020-02-14

## 2020-02-17 RX ORDER — CALCITRIOL 0.25 UG/1
0.25 CAPSULE, LIQUID FILLED ORAL DAILY
Qty: 30 CAPSULE | Refills: 3 | Status: SHIPPED | OUTPATIENT
Start: 2020-02-17 | End: 2020-05-21 | Stop reason: SDUPTHER

## 2020-02-18 ENCOUNTER — TELEPHONE (OUTPATIENT)
Dept: FAMILY MEDICINE CLINIC | Facility: CLINIC | Age: 45
End: 2020-02-18

## 2020-02-18 RX ORDER — CYCLOBENZAPRINE HCL 10 MG
10 TABLET ORAL
Qty: 30 TABLET | Refills: 3 | Status: SHIPPED | OUTPATIENT
Start: 2020-02-18 | End: 2020-02-18 | Stop reason: SDUPTHER

## 2020-02-18 RX ORDER — CYCLOBENZAPRINE HCL 10 MG
10 TABLET ORAL
Qty: 30 TABLET | Refills: 3 | Status: SHIPPED | OUTPATIENT
Start: 2020-02-18 | End: 2020-04-09 | Stop reason: SDUPTHER

## 2020-02-18 NOTE — TELEPHONE ENCOUNTER
Gave results. Pt states he has no pain in abdomen. Pt did not have a follow up appointment so I transferred to front to make one.

## 2020-02-18 NOTE — PROGRESS NOTES
Subjective:  Gregg Randle is a 44 y.o. male who presents for       Patient Active Problem List   Diagnosis   • Abdominal aortic aneurysm (AAA) without rupture (CMS/HCC)   • PVD (peripheral vascular disease) (CMS/HCC)   • Nicotine dependence, cigarettes, with other nicotine-induced disorders   • Mixed hyperlipidemia   • Panlobular emphysema (CMS/HCC)   • Benign essential HTN   • CAD (coronary artery disease)   • Class 1 obesity with body mass index (BMI) of 31.0 to 31.9 in adult   • Tobacco user   • Vitamin D deficiency, unspecified    • History of MI (myocardial infarction)   • History of CVA (cerebrovascular accident)   • Abnormal finding of blood chemistry, unspecified    • Stage 3 chronic kidney disease (CMS/HCC)   • Former smoker   • Left hemiparesis (CMS/HCC)   • Elevated liver enzymes   • Hyperkalemia   • Chronic idiopathic constipation           Current Outpatient Medications:   •  albuterol (PROVENTIL HFA;VENTOLIN HFA) 108 (90 BASE) MCG/ACT inhaler, Inhale 2 puffs Every 4 (Four) Hours As Needed for Wheezing., Disp: , Rfl:   •  allopurinol (ZYLOPRIM) 100 MG tablet, Take 100 mg by mouth Daily., Disp: , Rfl:   •  atorvastatin (LIPITOR) 20 MG tablet, Take 1 tablet by mouth Every Night., Disp: 90 tablet, Rfl: 3  •  brimonidine (ALPHAGAN) 0.2 % ophthalmic solution, Administer 1 drop to the right eye 3 (Three) Times a Day., Disp: , Rfl:   •  calcitriol (ROCALTROL) 0.25 MCG capsule, Take 1 capsule by mouth Daily., Disp: 30 capsule, Rfl: 3  •  clopidogrel (PLAVIX) 75 MG tablet, Take 75 mg by mouth Daily., Disp: , Rfl:   •  cyclobenzaprine (FLEXERIL) 10 MG tablet, Take 1 tablet by mouth every night at bedtime., Disp: 30 tablet, Rfl: 3  •  dilTIAZem CD (CARDIZEM CD) 180 MG 24 hr capsule, Take 180 mg by mouth Daily., Disp: , Rfl:   •  gabapentin (NEURONTIN) 600 MG tablet, Take 1 tablet by mouth 3 (Three) Times a Day., Disp: 90 tablet, Rfl: 3  •  guanFACINE (TENEX) 1 MG tablet, Take 1 mg by mouth Every  Night., Disp: , Rfl:   •  labetalol (NORMODYNE) 200 MG tablet, Take 200 mg by mouth 3 (Three) Times a Day., Disp: , Rfl:   •  losartan (COZAAR) 100 MG tablet, Take 1 tablet by mouth Daily., Disp: 90 tablet, Rfl: 1  •  meclizine (ANTIVERT) 25 MG tablet, Take 25 mg by mouth 3 (Three) Times a Day As Needed for Dizziness., Disp: , Rfl:     Pt is 43 yo female with management of obesity, CAD sp PCI , HTN, HLP, AAA  Sp repair , PVD with bilateral iliac stent , COPD (panlobar emphysema) tobacco user, dizziness, CKD stage 3 , claudication. Major depression, cholelithiasis, abnormal echocardiogram ( LVH grade 1 diastolic dysfunction)  History of CVA, former tobacco user , history of MI, left hemiparesis, DDD in lower back. elevated liver enzymes      1/15/20 pt is here to establish.  He is from [place] and was seeing  NP in [place] since 2011 after his CVA.  He has CMH of CAD sp PCI at [facility] in 2005. He has history of MI.  , LVH and grade 1 diastolic dysfunction.  He sees Cardiology Dr. Adams. He is currenlty taking chlortrhalidone 25 mg PO q daily, cardizem 180 mg PO q daily, labetalol 200 mg PO TID, losartan 25 mg daily. For HTN. For HLP pt is taking lipitor 20 mg PO qhs. For PVD pt is seeing Vascular Surgery and takes aspirin 81 mg daily, lipitor 20 mg PO qhs and plavix 75 mg PO q daily  he had AAA repair in 2017. . For dizziness pt takes meclizine. He also has history of aortic aneurysm repair surgery. He sees Nephrology for CKD.  He also takes lexapro 20 mg daily.  for depression. Pt is also taking gabapentin 600 mg PO TID.   He sees a Nephrologist Dr. Mejia.  His Pulmonologist was Dr. Mar And his last appt was 2 years ago. For COPD he takes albuterol PRN. He does get short of breath easily   He quit smoking about 2-3 months ago.  He used to smoke 1 ppd He smoked for >25 years.  He does not drink alcohol currently. No illicit drugs. He is single with 2 children. He is currently disabled  And worked  in factory.  Major surgeries include Sp AAA repair. He also has chronic back pain. He is with pain Management. He also has intermittent dizziness and takes meclizine as needed. He was seeing another Cardiologist Dr. Lai and has a loop recorder that was inserted about a year ago. He has not had this removed. HE also used to see a Neurologist about 2 months ago with Dr. Zamora     2/13/20 pt is here for recheck and followup. Had labwork done on 1/15/20 that showed vitamin D normal thyroid studies normal lipid panel  Showed LDL at goal but HDL at 37.  hga1c normal. CMP showed elevated liver enzymes along with CR at 2.72 with CR at 31.  Potassium at 5.5. No RUQ pain. His main concern is his lower back. He states it has been hurting since last week. No trauma. He does not work currently no problems with bowel or bladder function.  He used to work in factory and construction. He has not taken anything for pain other than neurontin 600 mg PO TID. He states he is with Pain Managmeent in [place] but has yet to make appt. He states he also has pain in abdomen that started last week as well.      2/27/20 pt is here for recheck on last visit pt was having lower back pain and Xr-ay of lumbar spine on 2/13/20  Showed mild disc space narrowing at L5-S1. Radiology did contact me that there was also a dilated loop of colon in mid abdomen measuring 9.5 cm in diameter. There was concern for possible volvulus. PT was notified about results and advised to get KUB of abdomen in clinic  A folllowup KUB of abdomen done on  2/13/20 that showed no acute abdominal findings and there is gas within the nondistended rectosigmoid colon and redudant sigmoid colon.there was moderate fecal material in the right transverse and descending colon. There was also stable appearance of endovascular bilateral iliac stent. No abdominal pain. He is constipated most of time.  No fever. No blood in stool he does exercise daily.           Constipation    This is a new problem. The current episode started 1 to 4 weeks ago. The problem is unchanged. The patient is on a high fiber diet. He exercises regularly. There has been adequate water intake. Associated symptoms include abdominal pain. Pertinent negatives include no anorexia, back pain, bloating, diarrhea, difficulty urinating, fecal incontinence, fever, flatus, hematochezia, hemorrhoids, melena, nausea, rectal pain, vomiting or weight loss. Treatments tried: miralax  The treatment provided no relief. There is no history of abdominal surgery, endocrine disease, inflammatory bowel disease, irritable bowel syndrome, metabolic disease, neurologic disease, neuromuscular disease, psychiatric history or radiation treatment.    GI Problem   The primary symptoms include fatigue, abdominal pain and arthralgias. Primary symptoms do not include fever, weight loss, nausea, vomiting, diarrhea, melena, hematemesis, jaundice, hematochezia, dysuria, myalgias or rash. The onset was gradual.   The illness is also significant for back pain. The illness does not include chills, anorexia, dysphagia, odynophagia, bloating, constipation, tenesmus or itching. Associated medical issues do not include inflammatory bowel disease, GERD, gallstones, liver disease, alcohol abuse, PUD, gastric bypass, bowel resection, irritable bowel syndrome, hemorrhoids or diverticulitis.   Back Pain   This is a recurrent problem. The current episode started more than 1 month ago. The problem occurs constantly. The problem has been gradually worsening since onset. The pain is present in the gluteal and lumbar spine. The quality of the pain is described as aching. The pain is at a severity of 5/10. The patient is experiencing no pain. The symptoms are aggravated by bending, position, lying down, sitting and standing. Stiffness is present all day. Associated symptoms include abdominal pain, numbness, paresis and weakness. Pertinent negatives include no  bladder incontinence, bowel incontinence, chest pain, dysuria, fever, headaches, leg pain, paresthesias, pelvic pain, perianal numbness, tingling or weight loss.   Abdominal Pain   This is a new problem. The current episode started 1 to 4 weeks ago. The onset quality is sudden. The problem has been unchanged. The pain is located in the generalized abdominal region. The pain is at a severity of 8/10. The pain is moderate. The quality of the pain is aching. The abdominal pain does not radiate. Associated symptoms include arthralgias. Pertinent negatives include no anorexia, belching, constipation, diarrhea, dysuria, fever, flatus, frequency, headaches, hematochezia, hematuria, melena, myalgias, nausea, vomiting or weight loss. Nothing aggravates the pain. The pain is relieved by nothing. He has tried nothing for the symptoms. The treatment provided no relief. There is no history of abdominal surgery, colon cancer, Crohn's disease, gallstones, GERD, irritable bowel syndrome, pancreatitis, PUD or ulcerative colitis.   Coronary Artery Disease   Presents for initial visit. Symptoms include chest pain, chest tightness and shortness of breath. Pertinent negatives include no chest pressure, dizziness, leg swelling, muscle weakness, palpitations or weight gain. Risk factors include hyperlipidemia, hypertension, obesity and tobacco use. Risk factors do not include stress. Past treatments include beta blockers, ACE inhibitors and antiplatelet agents. The treatment provided no relief. Compliance with prior treatments has been variable. Past surgical history does not include angioplasty, CABG or cardiac stents.   Obesity   This is a chronic problem. The current episode started more than 1 year ago. The problem occurs constantly. The problem has been unchanged. Associated symptoms include arthralgias, chest pain, coughing, fatigue, numbness and weakness. Pertinent negatives include no abdominal pain, anorexia, change in bowel  habit, chills, congestion, diaphoresis, fever, headaches, joint swelling, myalgias, nausea, neck pain, rash, sore throat, swollen glands, urinary symptoms, vertigo, visual change or vomiting. Nothing aggravates the symptoms. He has tried nothing for the symptoms. The treatment provided no relief.   Leg Pain    The incident occurred more than 1 week ago. The injury mechanism is unknown. The pain is at a severity of 4/10. The pain is mild. The pain has been constant since onset. Associated symptoms include numbness. Pertinent negatives include no inability to bear weight, loss of motion, loss of sensation, muscle weakness or tingling. Nothing aggravates the symptoms. He has tried nothing for the symptoms. The treatment provided no relief.   COPD   He complains of cough and shortness of breath. There is no chest tightness, difficulty breathing, frequent throat clearing, hemoptysis, hoarse voice, sputum production or wheezing. This is a new problem. The current episode started today. The problem occurs constantly. The problem has been unchanged. Associated symptoms include chest pain. Pertinent negatives include no appetite change, fever, headaches, myalgias, postnasal drip, rhinorrhea, sneezing, sore throat or trouble swallowing. His symptoms are aggravated by nothing. His symptoms are alleviated by nothing. He reports no improvement on treatment. His symptoms are not alleviated by beta-agonist. His past medical history is significant for COPD and emphysema. There is no history of asthma, bronchiectasis, bronchitis or pneumonia.     Review of Systems  Review of Systems   Constitutional: Positive for activity change and fatigue. Negative for appetite change, chills, diaphoresis, fever and weight loss.   HENT: Negative for congestion, postnasal drip, rhinorrhea, sinus pressure, sinus pain, sneezing, sore throat, trouble swallowing and voice change.    Respiratory: Positive for cough and shortness of breath. Negative for  choking, chest tightness, wheezing and stridor.    Cardiovascular: Negative for chest pain.   Gastrointestinal: Positive for abdominal pain and constipation. Negative for anorexia, bloating, diarrhea, flatus, hematochezia, hemorrhoids, melena, nausea, rectal pain and vomiting.   Genitourinary: Negative for difficulty urinating.   Musculoskeletal: Positive for arthralgias. Negative for back pain.   Neurological: Positive for weakness and numbness. Negative for headaches.       Patient Active Problem List   Diagnosis   • Abdominal aortic aneurysm (AAA) without rupture (CMS/HCC)   • PVD (peripheral vascular disease) (CMS/HCC)   • Nicotine dependence, cigarettes, with other nicotine-induced disorders   • Mixed hyperlipidemia   • Panlobular emphysema (CMS/HCC)   • Benign essential HTN   • CAD (coronary artery disease)   • Class 1 obesity with body mass index (BMI) of 31.0 to 31.9 in adult   • Tobacco user   • Vitamin D deficiency, unspecified    • History of MI (myocardial infarction)   • History of CVA (cerebrovascular accident)   • Abnormal finding of blood chemistry, unspecified    • Stage 3 chronic kidney disease (CMS/HCC)   • Former smoker   • Left hemiparesis (CMS/HCC)   • Elevated liver enzymes   • Hyperkalemia   • Chronic idiopathic constipation     Past Surgical History:   Procedure Laterality Date   • CORONARY ANGIOPLASTY WITH STENT PLACEMENT     • EAR TUBES Left    • MO AAA REPAIR,AORTO-AORTIC TUBE PROSTH N/A 8/11/2017    Procedure: ABDOMINAL AORTIC ANEURYSM REPAIR WITH ENDOGRAFT, REPAIR ILIAC ARTERY ANEURYSM, POSSIBLE OPEN ABDOMINAL AORTOGRAM    (CELL SAVER STAND-BY);  Surgeon: Aren Arshad MD;  Location: [facility] OR;  Service: Vascular     Social History     Socioeconomic History   • Marital status: Single     Spouse name: Not on file   • Number of children: Not on file   • Years of education: Not on file   • Highest education level: Not on file   Tobacco Use   • Smoking status: Former Smoker      Packs/day: 1.00     Years: 20.00     Pack years: 20.00   • Smokeless tobacco: Never Used   Substance and Sexual Activity   • Alcohol use: No   • Drug use: No   • Sexual activity: Defer     Family History   Problem Relation Age of Onset   • Stroke Mother    • Diabetes Mother    • Heart disease Father      Lab on 02/13/2020   Component Date Value Ref Range Status   • Potassium 02/13/2020 4.7  3.5 - 5.2 mmol/L Final   • Total Protein 02/13/2020 7.8  6.0 - 8.5 g/dL Final   • Albumin 02/13/2020 4.50  3.50 - 5.20 g/dL Final   • ALT (SGPT) 02/13/2020 19  1 - 41 U/L Final   • AST (SGOT) 02/13/2020 28  1 - 40 U/L Final   • Alkaline Phosphatase 02/13/2020 101  39 - 117 U/L Final   • Total Bilirubin 02/13/2020 0.2  0.2 - 1.2 mg/dL Final   • Bilirubin, Direct 02/13/2020 <0.2* 0.2 - 0.3 mg/dL Final   • Bilirubin, Indirect 02/13/2020    Final    Unable to calculate   • Hepatitis B Surface Ag 02/13/2020 Non-Reactive  Non-Reactive Final   • Hep A IgM 02/13/2020 Non-Reactive  Non-Reactive Final   • Hep B C IgM 02/13/2020 Non-Reactive  Non-Reactive Final   • Hepatitis C Ab 02/13/2020 Non-Reactive  Non-Reactive Final   • Extra Tube 02/13/2020 Hold for add-ons.   Final    Auto resulted.   Lab on 01/15/2020   Component Date Value Ref Range Status   • WBC 01/15/2020 6.55  3.40 - 10.80 10*3/mm3 Final   • RBC 01/15/2020 5.47  4.14 - 5.80 10*6/mm3 Final   • Hemoglobin 01/15/2020 15.0  13.0 - 17.7 g/dL Final   • Hematocrit 01/15/2020 43.6  37.5 - 51.0 % Final   • MCV 01/15/2020 79.7  79.0 - 97.0 fL Final   • MCH 01/15/2020 27.4  26.6 - 33.0 pg Final   • MCHC 01/15/2020 34.4  31.5 - 35.7 g/dL Final   • RDW 01/15/2020 12.7  12.3 - 15.4 % Final   • RDW-SD 01/15/2020 35.4* 37.0 - 54.0 fl Final   • MPV 01/15/2020 11.0  6.0 - 12.0 fL Final   • Platelets 01/15/2020 277  140 - 450 10*3/mm3 Final   • Neutrophil % 01/15/2020 53.5  42.7 - 76.0 % Final   • Lymphocyte % 01/15/2020 30.1  19.6 - 45.3 % Final   • Monocyte % 01/15/2020 11.3  5.0 - 12.0 %  Final   • Eosinophil % 01/15/2020 4.0  0.3 - 6.2 % Final   • Basophil % 01/15/2020 0.9  0.0 - 1.5 % Final   • Immature Grans % 01/15/2020 0.2  0.0 - 0.5 % Final   • Neutrophils, Absolute 01/15/2020 3.51  1.70 - 7.00 10*3/mm3 Final   • Lymphocytes, Absolute 01/15/2020 1.97  0.70 - 3.10 10*3/mm3 Final   • Monocytes, Absolute 01/15/2020 0.74  0.10 - 0.90 10*3/mm3 Final   • Eosinophils, Absolute 01/15/2020 0.26  0.00 - 0.40 10*3/mm3 Final   • Basophils, Absolute 01/15/2020 0.06  0.00 - 0.20 10*3/mm3 Final   • Immature Grans, Absolute 01/15/2020 0.01  0.00 - 0.05 10*3/mm3 Final   • nRBC 01/15/2020 0.0  0.0 - 0.2 /100 WBC Final   • Glucose 01/15/2020 80  65 - 99 mg/dL Final   • BUN 01/15/2020 30* 6 - 20 mg/dL Final   • Creatinine 01/15/2020 2.72* 0.76 - 1.27 mg/dL Final   • Sodium 01/15/2020 143  136 - 145 mmol/L Final   • Potassium 01/15/2020 5.5* 3.5 - 5.2 mmol/L Final   • Chloride 01/15/2020 102  98 - 107 mmol/L Final   • CO2 01/15/2020 25.5  22.0 - 29.0 mmol/L Final   • Calcium 01/15/2020 10.4  8.6 - 10.5 mg/dL Final   • Total Protein 01/15/2020 8.2  6.0 - 8.5 g/dL Final   • Albumin 01/15/2020 4.60  3.50 - 5.20 g/dL Final   • ALT (SGPT) 01/15/2020 62* 1 - 41 U/L Final   • AST (SGOT) 01/15/2020 100* 1 - 40 U/L Final   • Alkaline Phosphatase 01/15/2020 128* 39 - 117 U/L Final   • Total Bilirubin 01/15/2020 0.4  0.2 - 1.2 mg/dL Final   • eGFR   Amer 01/15/2020 31* >60 mL/min/1.73 Final   • Globulin 01/15/2020 3.6  gm/dL Final   • A/G Ratio 01/15/2020 1.3  g/dL Final   • BUN/Creatinine Ratio 01/15/2020 11.0  7.0 - 25.0 Final   • Anion Gap 01/15/2020 15.5* 5.0 - 15.0 mmol/L Final   • Hemoglobin A1C 01/15/2020 5.10  4.80 - 5.60 % Final   • Total Cholesterol 01/15/2020 133  0 - 200 mg/dL Final   • Triglycerides 01/15/2020 122  0 - 150 mg/dL Final   • HDL Cholesterol 01/15/2020 37* 40 - 60 mg/dL Final   • LDL Cholesterol  01/15/2020 72  0 - 100 mg/dL Final   • VLDL Cholesterol 01/15/2020 24.4  5 - 40 mg/dL Final   •  LDL/HDL Ratio 01/15/2020 1.94   Final   • TSH 01/15/2020 1.560  0.270 - 4.200 uIU/mL Final   • Free T4 01/15/2020 1.25  0.93 - 1.70 ng/dL Final   • T3, Free 01/15/2020 3.52  2.00 - 4.40 pg/mL Final   • 25 Hydroxy, Vitamin D 01/15/2020 39.4  30.0 - 100.0 ng/ml Final   Office Visit on 11/26/2019   Component Date Value Ref Range Status   • BSA 01/08/2020 2.3  m^2 Final   • BH CV ECHO CHARLEY - RVDD 01/08/2020 3.6  cm Final   • IVSd 01/08/2020 1.6  cm Final   • LVIDd 01/08/2020 4.6  cm Final   • LVIDs 01/08/2020 2.7  cm Final   • LVPWd 01/08/2020 1.7  cm Final   • IVS/LVPW 01/08/2020 0.89   Final   • FS 01/08/2020 42.3  % Final   • EDV(Teich) 01/08/2020 96.8  ml Final   • ESV(Teich) 01/08/2020 25.8  ml Final   • EF(Teich) 01/08/2020 73.4  % Final   • EDV(cubed) 01/08/2020 96.7  ml Final   • ESV(cubed) 01/08/2020 18.6  ml Final   • EF(cubed) 01/08/2020 80.8  % Final   • LV mass(C)d 01/08/2020 327.2  grams Final   • LV mass(C)dI 01/08/2020 142.2  grams/m^2 Final   • SV(Teich) 01/08/2020 71.0  ml Final   • SI(Teich) 01/08/2020 30.9  ml/m^2 Final   • SV(cubed) 01/08/2020 78.1  ml Final   • SI(cubed) 01/08/2020 33.9  ml/m^2 Final   • EPSS 01/08/2020 0.6  cm Final   • Ao root diam 01/08/2020 3.7  cm Final   • Ao root area 01/08/2020 10.8  cm^2 Final   • ACS 01/08/2020 2.7  cm Final   • LA dimension 01/08/2020 3.8  cm Final   • asc Aorta Diam 01/08/2020 3.7  cm Final   • Ao Isth Diam 01/08/2020 3.6  cm Final   • LA/Ao 01/08/2020 1.0   Final   • LVOT diam 01/08/2020 2.5  cm Final   • LVOT area 01/08/2020 4.9  cm^2 Final   • LVOT area(traced) 01/08/2020 4.9  cm^2 Final   • LVLd ap4 01/08/2020 8.1  cm Final   • EDV(MOD-sp4) 01/08/2020 86.9  ml Final   • LVLs ap4 01/08/2020 7.4  cm Final   • ESV(MOD-sp4) 01/08/2020 46.3  ml Final   • EF(MOD-sp4) 01/08/2020 46.7  % Final   • LVLd ap2 01/08/2020 8.1  cm Final   • EDV(MOD-sp2) 01/08/2020 75.9  ml Final   • LVLs ap2 01/08/2020 7.2  cm Final   • ESV(MOD-sp2) 01/08/2020 40.5  ml Final   •  EF(MOD-sp2) 01/08/2020 46.6  % Final   • SV(MOD-sp4) 01/08/2020 40.6  ml Final   • SI(MOD-sp4) 01/08/2020 17.6  ml/m^2 Final   • SV(MOD-sp2) 01/08/2020 35.4  ml Final   • SI(MOD-sp2) 01/08/2020 15.4  ml/m^2 Final   • Ao root area (BSA corrected) 01/08/2020 1.6   Final   • LV [place] Vol (BSA corrected) 01/08/2020 37.7  ml/m^2 Final   • LV Sys Vol (BSA corrected) 01/08/2020 20.1  ml/m^2 Final   • MV E max dena 01/08/2020 49.7  cm/sec Final   • MV A max dena 01/08/2020 58.3  cm/sec Final   • MV E/A 01/08/2020 0.85   Final   • MV V2 max 01/08/2020 53.4  cm/sec Final   • MV max PG 01/08/2020 1.1  mmHg Final   • MV V2 mean 01/08/2020 35.6  cm/sec Final   • MV mean PG 01/08/2020 1.0  mmHg Final   • MV V2 VTI 01/08/2020 14.3  cm Final   • MVA(VTI) 01/08/2020 7.0  cm^2 Final   • MV P1/2t max dena 01/08/2020 56.0  cm/sec Final   • MV P1/2t 01/08/2020 54.0  msec Final   • MVA(P1/2t) 01/08/2020 4.1  cm^2 Final   • MV dec slope 01/08/2020 304.0  cm/sec^2 Final   • Ao pk dena 01/08/2020 108.0  cm/sec Final   • Ao max PG 01/08/2020 4.7  mmHg Final   • Ao max PG (full) 01/08/2020 0.8  mmHg Final   • Ao V2 mean 01/08/2020 69.8  cm/sec Final   • Ao mean PG 01/08/2020 2.0  mmHg Final   • Ao mean PG (full) 01/08/2020 0  mmHg Final   • Ao V2 VTI 01/08/2020 20.3  cm Final   • PEARL(I,A) 01/08/2020 4.9  cm^2 Final   • PEARL(I,D) 01/08/2020 4.9  cm^2 Final   • PEARL(V,A) 01/08/2020 4.5  cm^2 Final   • PEARL(V,D) 01/08/2020 4.5  cm^2 Final   • LV V1 max PG 01/08/2020 3.9  mmHg Final   • LV V1 mean PG 01/08/2020 2.0  mmHg Final   • LV V1 max 01/08/2020 98.3  cm/sec Final   • LV V1 mean 01/08/2020 71.4  cm/sec Final   • LV V1 VTI 01/08/2020 20.3  cm Final   • SV(Ao) 01/08/2020 218.3  ml Final   • SI(Ao) 01/08/2020 94.8  ml/m^2 Final   • SV(LVOT) 01/08/2020 99.6  ml Final   • SI(LVOT) 01/08/2020 43.3  ml/m^2 Final   • PA V2 max 01/08/2020 96.1  cm/sec Final   • PA max PG 01/08/2020 3.7  mmHg Final   • PA V2 mean 01/08/2020 63.0  cm/sec Final   • PA mean PG  01/08/2020 2.0  mmHg Final   • PA V2 VTI 01/08/2020 19.5  cm Final   • TR max dena 01/08/2020 179.0  cm/sec Final   • RVSP(TR) 01/08/2020 15.8  mmHg Final   • RAP systole 01/08/2020 3.0  mmHg Final   • MVA P1/2T LCG 01/08/2020 3.9  cm^2 Final   • BH CV ECHO CHARLEY - BZI_BMI 01/08/2020 32.6  kilograms/m^2 Final   • BH CV ECHO CHARLEY - BSA([place]) 01/08/2020 2.4  m^2 Final   • BH CV ECHO CHARLEY - BZI_METRIC_WEIGHT 01/08/2020 108.9  kg Final   • BH CV ECHO CHARLEY - BZI_METRIC_HEIGHT 01/08/2020 182.9  cm Final   • BH CV VAS BP RIGHT ARM 01/08/2020 122/82  mmHg Final      XR Abdomen KUB  Narrative: PROCEDURE: AP supine abdomen.    INDICATION: abdominal pain possible volvulus, R10.9 Unspecified  abdominal pain R93.5 Abnormal findings on diagnostic imaging of  other abdominal regions, including retroperitoneum    COMPARISON: 11/1/2019 CT of the abdomen and pelvis.    Normal abdominal bowel gas pattern with no abnormally distended  bowel loops. There is gas in the colon including what appears to  be redundant sigmoid colon with no evidence of abnormal  distention or wall thickening. Moderate fecal material in the  right, transverse and descending colon.    Stable endovascular stent in the abdominal aorta extending into  the iliac arteries.    No suspicious calcifications or soft tissue densities.  Impression: No acute abdominal findings. There is gas within  nondistended rectosigmoid and redundant sigmoid colon.  Moderate fecal material in the right, transverse and descending  colon.    Stable appearance of the endovascular bilateral iliac stent.    [id number]    Electronically signed by:  Davi So MD  2/13/2020 3:41  PM CST Workstation: [id number]  XR Chest PA & Lateral  Narrative: PROCEDURE: XR CHEST PA AND LATERAL    VIEWS: PA/Lat    INDICATION: Dyspnea on exertion    COMPARISON: none    FINDINGS:       - lines/tubes: none    - cardiac: size within normal limits.    - mediastinum: contour within normal limits.     - lungs: no  evidence of a focal air space process, pulmonary  interstitial edema, nodule(s)/mass.     - pleura: no evidence of  fluid.      - osseous: unremarkable for age.    - Misc. A cardiac loop recorder projects over the left upper  chest.  Impression: No acute cardiopulmonary process identified.    Electronically signed by:  Glory Gamez MD  2/13/2020 12:40 PM  CST Workstation: [id number]  XR Spine Lumbar Complete 4+VW  Narrative: PROCEDURE: XR SPINE LUMBAR COMPLETE 4+VW    VIEWS:   5      INDICATION:  Pain    COMPARISON:  None    FINDINGS:       - Fracture(s): None    - Alignment:  Within normal limits      - Disc space heights: Mildly narrowed L5-S1    - Focal osteolytic/blastic: None      - Bone density:  Grossly within normal limits for age    - Misc.:  Minimal lower lumbar degenerative facet changes  Bifurcated aortic endograft is present  There is a dilated loop of colon in the mid abdomen, measuring up  to 9.5 cm in diameter. There is gas and stool in what appears to  be the ascending colon, and within the majority of the descending  colon. Sigmoid volvulus cannot be excluded as etiology for this  large gas-filled loop of colon  Impression: 1. No acute osseous abnormality.   2. Gas-filled loop of distended colon in the mid left abdomen,  similar spondylosis cannot be entirely excluded.    Findings were discussed with Dr. Duvall at 1:25 PM EST on  2/13/2020    If pain or symptoms persist beyond reasonable expectations,   an MRI examination is suggested as is deemed clinically  appropriate.    Electronically signed by:  Glory Gamez MD  2/13/2020 12:29 PM  CST Workstation: [id number]    [unfilled]  Immunization History   Administered Date(s) Administered   • FLUARIX/FLUZONE/AFLURIA/FLULAVAL QUAD 01/15/2020       The following portions of the patient's history were reviewed and updated as appropriate: allergies, current medications, past family history, past medical history, past social history, past surgical history  "and problem list.        Physical Exam  /62 (BP Location: Right arm, Patient Position: Sitting, Cuff Size: Large Adult)   Pulse 78   Temp 98.6 °F (37 °C) (Oral)   Ht 185.4 cm (73\")   Wt 106 kg (233 lb 9.6 oz)   SpO2 98%   BMI 30.82 kg/m²     Physical Exam   Constitutional: He is oriented to person, place, and time. He appears well-developed and well-nourished.   HENT:   Head: Normocephalic and atraumatic.   Right Ear: External ear normal.   Eyes: Pupils are equal, round, and reactive to light. Conjunctivae and EOM are normal.   Neck: Normal range of motion. Neck supple.   Cardiovascular: Normal rate, regular rhythm and normal heart sounds.   No murmur heard.  Pulmonary/Chest: No respiratory distress.   Decreased breath sounds    Abdominal: Soft. Bowel sounds are normal. He exhibits no distension. There is tenderness.   Obese abdomen   Hypoactive bowel sounds    Musculoskeletal: He exhibits tenderness. He exhibits no edema or deformity.        Lumbar back: He exhibits decreased range of motion, tenderness, bony tenderness, pain and spasm.   Neurological: He is alert and oriented to person, place, and time. No cranial nerve deficit.   Skin: Skin is warm. No rash noted. He is not diaphoretic. No erythema. No pallor.   Psychiatric: He has a normal mood and affect. His behavior is normal.   Nursing note and vitals reviewed.      Assessment/Plan    Diagnosis Plan   1. Encounter for drug screening  gabapentin (NEURONTIN) 600 MG tablet    Urine Drug Screen - Urine, Clean Catch   2. Abdominal aortic aneurysm (AAA) without rupture (CMS/HCC)     3. PVD (peripheral vascular disease) (CMS/HCC)     4. Mixed hyperlipidemia     5. Coronary artery disease involving native coronary artery of native heart without angina pectoris     6. Panlobular emphysema (CMS/HCC)     7. Class 1 obesity with body mass index (BMI) of 31.0 to 31.9 in adult, unspecified obesity type, unspecified whether serious comorbidity present     8. " Vitamin D deficiency, unspecified      9. Left hemiparesis (CMS/HCC)     10. Stage 3 chronic kidney disease (CMS/HCC)     11. History of MI (myocardial infarction)     12. History of CVA (cerebrovascular accident)     13. Former smoker     14. Other chronic pain   Urine Drug Screen - Urine, Clean Catch   15. Chronic idiopathic constipation          -went over labwork  -elevated liver enzymes -get US of liver along with hepatitis panel  -back pain -x-ray of lower back . recommendd PT/OT but pt declined. Recommend Tylenol PRN no NSAIDs.  Will refill Neurontin 600 mg PO TID until pt sees Pain Management  Advised pt to make  appt with Pain Management. And next appt is in June 2020.   on flexeril 7.5 mg at bedtime . Will get UDS. Refill neurontin will refill medication until  Seen by Pain Management.  He will sign drug contract today. [facility] ref number [id number]  -CIC - start on linzess. Pt has failed Miralax.drug information provided   -hyperkalemia - recommend limit high potassium foods. Recheck in 1 [place]  -elevated liver enzymes. Recommend hepatitis panel along with US of liver  -CKD stage 3 - Nephrology following. BP control   -recommend influenza vaccination  -COPD/emphysema. -referral to Dr. Medina with Pulmonlogy with PFTs. He was seeing Dr. Mar. He would like to see a new Pulmonologist   -annual physical exam today  -HTN - losartan 100 mg daily. Labetalol 200 mg PO TID Clonidine 0.1 mg PO BID cartdizem 180 mg PO q daily, chlorthalidone 25 mg PO q daily. BP at goal   -major depression- lexapro 20 mg daily.  -CAD -Cardiology following   -PVD - aspirin plavix, lipitor - Vascular Surgery following  -obesity - counseled weight loss >5 minutes BMI at 30.71   -AAA - Vascular Surgery following   -advised pt to be safe and call with questions and concerns  -advised pt to go to ER or call 911 if symptoms worrisome or severe  -advised pt to followup with specialist and referrals        This document has been  electronically signed by Dread Duvall MD on February 27, 2020 10:35 AM

## 2020-02-18 NOTE — TELEPHONE ENCOUNTER
----- Message from Dread Duvall MD sent at 2/18/2020  7:33 AM CST -----  Regarding: US of abdomen   Please call pt    On US of abdomen done on 2/17/20 pt has normal liver ultrasound. His pancreas was obscured by bowel gas    Pt does have gallstones and will discuss options on next visit . It pt continues to have pain in his Right upper quadrant of abdomen he may need to see General Surgeon and have possible cholecystectomy    Recheck on next visit. Thanks

## 2020-02-20 DIAGNOSIS — R74.8 ELEVATED LIVER ENZYMES: ICD-10-CM

## 2020-02-24 RX ORDER — LOSARTAN POTASSIUM 25 MG/1
100 TABLET ORAL DAILY
Qty: 90 TABLET | Refills: 3 | Status: SHIPPED | OUTPATIENT
Start: 2020-02-24 | End: 2020-02-25 | Stop reason: DRUGHIGH

## 2020-02-24 RX ORDER — ATORVASTATIN CALCIUM 20 MG/1
20 TABLET, FILM COATED ORAL NIGHTLY
Qty: 90 TABLET | Refills: 3 | Status: SHIPPED | OUTPATIENT
Start: 2020-02-24 | End: 2020-11-02 | Stop reason: SDUPTHER

## 2020-02-24 NOTE — TELEPHONE ENCOUNTER
Patient called in requesting a refill on his losartan (COZAAR) 25 MG tablet and atorvastatin (LIPITOR) 20 MG tablet medications and would like them sent to the [facility]'s pharmacy at the earliest convenience. Patient states she will be out before his appointment on 2/27/20. For any questions or issues, please call patient back at [phone]

## 2020-02-25 ENCOUNTER — TELEPHONE (OUTPATIENT)
Dept: CARDIOLOGY | Facility: CLINIC | Age: 45
End: 2020-02-25

## 2020-02-25 RX ORDER — LOSARTAN POTASSIUM 100 MG/1
100 TABLET ORAL DAILY
Qty: 90 TABLET | Refills: 1 | Status: SHIPPED | OUTPATIENT
Start: 2020-02-25 | End: 2020-04-09

## 2020-02-25 NOTE — TELEPHONE ENCOUNTER
Called patient about moving his appointment to June 8 2020 at 2:45, there was no answer, and no voice mail box set up at this time.

## 2020-02-27 ENCOUNTER — LAB (OUTPATIENT)
Dept: LAB | Facility: HOSPITAL | Age: 45
End: 2020-02-27

## 2020-02-27 ENCOUNTER — OFFICE VISIT (OUTPATIENT)
Dept: FAMILY MEDICINE CLINIC | Facility: CLINIC | Age: 45
End: 2020-02-27

## 2020-02-27 VITALS
TEMPERATURE: 98.6 F | WEIGHT: 233.6 LBS | HEART RATE: 78 BPM | OXYGEN SATURATION: 98 % | BODY MASS INDEX: 30.96 KG/M2 | SYSTOLIC BLOOD PRESSURE: 108 MMHG | HEIGHT: 73 IN | DIASTOLIC BLOOD PRESSURE: 62 MMHG

## 2020-02-27 DIAGNOSIS — N18.30 STAGE 3 CHRONIC KIDNEY DISEASE (HCC): ICD-10-CM

## 2020-02-27 DIAGNOSIS — G89.29 OTHER CHRONIC PAIN: ICD-10-CM

## 2020-02-27 DIAGNOSIS — E66.9 CLASS 1 OBESITY WITH BODY MASS INDEX (BMI) OF 31.0 TO 31.9 IN ADULT, UNSPECIFIED OBESITY TYPE, UNSPECIFIED WHETHER SERIOUS COMORBIDITY PRESENT: ICD-10-CM

## 2020-02-27 DIAGNOSIS — Z02.83 ENCOUNTER FOR DRUG SCREENING: ICD-10-CM

## 2020-02-27 DIAGNOSIS — Z02.83 ENCOUNTER FOR DRUG SCREENING: Primary | ICD-10-CM

## 2020-02-27 DIAGNOSIS — Z87.891 FORMER SMOKER: ICD-10-CM

## 2020-02-27 DIAGNOSIS — E55.9 VITAMIN D DEFICIENCY, UNSPECIFIED: ICD-10-CM

## 2020-02-27 DIAGNOSIS — I25.10 CORONARY ARTERY DISEASE INVOLVING NATIVE CORONARY ARTERY OF NATIVE HEART WITHOUT ANGINA PECTORIS: ICD-10-CM

## 2020-02-27 DIAGNOSIS — E78.2 MIXED HYPERLIPIDEMIA: ICD-10-CM

## 2020-02-27 DIAGNOSIS — I25.2 HISTORY OF MI (MYOCARDIAL INFARCTION): ICD-10-CM

## 2020-02-27 DIAGNOSIS — I71.40 ABDOMINAL AORTIC ANEURYSM (AAA) WITHOUT RUPTURE (HCC): ICD-10-CM

## 2020-02-27 DIAGNOSIS — Z86.73 HISTORY OF CVA (CEREBROVASCULAR ACCIDENT): ICD-10-CM

## 2020-02-27 DIAGNOSIS — J43.1 PANLOBULAR EMPHYSEMA (HCC): ICD-10-CM

## 2020-02-27 DIAGNOSIS — G81.94 LEFT HEMIPARESIS (HCC): ICD-10-CM

## 2020-02-27 DIAGNOSIS — K59.04 CHRONIC IDIOPATHIC CONSTIPATION: ICD-10-CM

## 2020-02-27 DIAGNOSIS — I73.9 PVD (PERIPHERAL VASCULAR DISEASE) (HCC): ICD-10-CM

## 2020-02-27 LAB
AMPHET+METHAMPHET UR QL: NEGATIVE
AMPHETAMINES UR QL: NEGATIVE
BARBITURATES UR QL SCN: NEGATIVE
BENZODIAZ UR QL SCN: NEGATIVE
BUPRENORPHINE SERPL-MCNC: NEGATIVE NG/ML
CANNABINOIDS SERPL QL: NEGATIVE
COCAINE UR QL: NEGATIVE
METHADONE UR QL SCN: NEGATIVE
OPIATES UR QL: NEGATIVE
OXYCODONE UR QL SCN: NEGATIVE
PCP UR QL SCN: NEGATIVE
PROPOXYPH UR QL: NEGATIVE
TRICYCLICS UR QL SCN: NEGATIVE

## 2020-02-27 PROCEDURE — 99214 OFFICE O/P EST MOD 30 MIN: CPT | Performed by: FAMILY MEDICINE

## 2020-02-27 PROCEDURE — 80306 DRUG TEST PRSMV INSTRMNT: CPT

## 2020-02-27 RX ORDER — GABAPENTIN 600 MG/1
600 TABLET ORAL 3 TIMES DAILY
Qty: 90 TABLET | Refills: 3 | Status: SHIPPED | OUTPATIENT
Start: 2020-02-27 | End: 2020-04-09 | Stop reason: SDUPTHER

## 2020-02-27 NOTE — PATIENT INSTRUCTIONS
Linaclotide oral capsules  What is this medicine?  LINACLOTIDE (jenny a KLOE tide) is used to treat irritable bowel syndrome (IBS) with constipation as the main problem. It may also be used for relief of chronic constipation.  This medicine may be used for other purposes; ask your health care provider or pharmacist if you have questions.  COMMON BRAND NAME(S): Linzess  What should I tell my health care provider before I take this medicine?  They need to know if you have any of these conditions:  -history of stool (fecal) impaction  -now have diarrhea or have diarrhea often  -other medical condition  -stomach or intestinal disease, including bowel obstruction or abdominal adhesions  -an unusual or allergic reaction to linaclotide, other medicines, foods, dyes, or preservatives  -pregnant or trying to get pregnant  -breast-feeding  How should I use this medicine?  Take this medicine by mouth with a glass of water. Follow the directions on the prescription label. Do not cut, crush or chew this medicine. Take on an empty stomach, at least 30 minutes before your first meal of the day. Take your medicine at regular intervals. Do not take your medicine more often than directed. Do not stop taking except on your doctor's advice.  A special MedGuide will be given to you by the pharmacist with each prescription and refill. Be sure to read this information carefully each time.  Talk to your pediatrician regarding the use of this medicine in children. This medicine is not approved for use in children.  Overdosage: If you think you have taken too much of this medicine contact a poison control center or emergency room at once.  NOTE: This medicine is only for you. Do not share this medicine with others.  What if I miss a dose?  If you miss a dose, just skip that dose. Wait until your next dose, and take only that dose. Do not take double or extra doses.  What may interact with this medicine?  -certain medicines for bowel problems  or bladder incontinence (these can cause constipation)  This list may not describe all possible interactions. Give your health care provider a list of all the medicines, herbs, non-prescription drugs, or dietary supplements you use. Also tell them if you smoke, drink alcohol, or use illegal drugs. Some items may interact with your medicine.  What should I watch for while using this medicine?  Visit your doctor for regular check ups. Tell your doctor if your symptoms do not get better or if they get worse.  Diarrhea is a common side effect of this medicine. It often begins within 2 weeks of starting this medicine. Stop taking this medicine and call your doctor if you get severe diarrhea.  Stop taking this medicine and call your doctor or go to the nearest hospital emergency room right away if you develop unusual or severe stomach-area (abdominal) pain, especially if you also have bright red, bloody stools or black stools that look like tar.  What side effects may I notice from receiving this medicine?  Side effects that you should report to your doctor or health care professional as soon as possible:  -allergic reactions like skin rash, itching or hives, swelling of the face, lips, or tongue  -black, tarry stools  -bloody or watery diarrhea  -new or worsening stomach pain  -severe or prolonged diarrhea  Side effects that usually do not require medical attention (report to your doctor or health care professional if they continue or are bothersome):  -bloating  -gas  -loose stools  This list may not describe all possible side effects. Call your doctor for medical advice about side effects. You may report side effects to FDA at 7-974-FDA-6687.  Where should I keep my medicine?  Keep out of the reach of children.  Store at room temperature between 20 and 25 degrees C (68 and 77 degrees F). Keep this medicine in the original container. Keep tightly closed in a dry place. Do not remove the desiccant packet from the bottle,  it helps to protect your medicine from moisture. Throw away any unused medicine after the expiration date.  NOTE: This sheet is a summary. It may not cover all possible information. If you have questions about this medicine, talk to your doctor, pharmacist, or health care provider.  © 2020 [employer] (2017-01-19 12:17:04)

## 2020-03-10 ENCOUNTER — TELEPHONE (OUTPATIENT)
Dept: FAMILY MEDICINE CLINIC | Facility: CLINIC | Age: 45
End: 2020-03-10

## 2020-03-10 NOTE — TELEPHONE ENCOUNTER
Spoke to pt and he is wanting his urine checked to see if he is diabetic. Made aware of A1C and that he is not diabetic or prediabetic so there was no need to have his urine tested for sugar. Pt voiced understanding.

## 2020-04-09 ENCOUNTER — OFFICE VISIT (OUTPATIENT)
Dept: FAMILY MEDICINE CLINIC | Facility: CLINIC | Age: 45
End: 2020-04-09

## 2020-04-09 VITALS
DIASTOLIC BLOOD PRESSURE: 71 MMHG | HEIGHT: 73 IN | SYSTOLIC BLOOD PRESSURE: 106 MMHG | HEART RATE: 74 BPM | BODY MASS INDEX: 30.82 KG/M2

## 2020-04-09 DIAGNOSIS — E55.9 VITAMIN D DEFICIENCY, UNSPECIFIED: ICD-10-CM

## 2020-04-09 DIAGNOSIS — Z87.891 FORMER SMOKER: ICD-10-CM

## 2020-04-09 DIAGNOSIS — E66.9 CLASS 1 OBESITY WITH SERIOUS COMORBIDITY AND BODY MASS INDEX (BMI) OF 30.0 TO 30.9 IN ADULT, UNSPECIFIED OBESITY TYPE: ICD-10-CM

## 2020-04-09 DIAGNOSIS — M54.41 CHRONIC BILATERAL LOW BACK PAIN WITH BILATERAL SCIATICA: ICD-10-CM

## 2020-04-09 DIAGNOSIS — Z86.73 HISTORY OF CVA (CEREBROVASCULAR ACCIDENT): ICD-10-CM

## 2020-04-09 DIAGNOSIS — I95.9 HYPOTENSION, UNSPECIFIED HYPOTENSION TYPE: ICD-10-CM

## 2020-04-09 DIAGNOSIS — I25.2 HISTORY OF MI (MYOCARDIAL INFARCTION): ICD-10-CM

## 2020-04-09 DIAGNOSIS — J43.1 PANLOBULAR EMPHYSEMA (HCC): ICD-10-CM

## 2020-04-09 DIAGNOSIS — Z02.83 ENCOUNTER FOR DRUG SCREENING: ICD-10-CM

## 2020-04-09 DIAGNOSIS — K59.04 CHRONIC IDIOPATHIC CONSTIPATION: Primary | ICD-10-CM

## 2020-04-09 DIAGNOSIS — R42 DIZZINESS: ICD-10-CM

## 2020-04-09 DIAGNOSIS — I25.10 CORONARY ARTERY DISEASE INVOLVING NATIVE CORONARY ARTERY OF NATIVE HEART WITHOUT ANGINA PECTORIS: ICD-10-CM

## 2020-04-09 DIAGNOSIS — N18.30 STAGE 3 CHRONIC KIDNEY DISEASE (HCC): ICD-10-CM

## 2020-04-09 DIAGNOSIS — M54.42 CHRONIC BILATERAL LOW BACK PAIN WITH BILATERAL SCIATICA: ICD-10-CM

## 2020-04-09 DIAGNOSIS — I73.9 PVD (PERIPHERAL VASCULAR DISEASE) (HCC): ICD-10-CM

## 2020-04-09 DIAGNOSIS — Z72.0 TOBACCO USER: ICD-10-CM

## 2020-04-09 DIAGNOSIS — E78.2 MIXED HYPERLIPIDEMIA: ICD-10-CM

## 2020-04-09 DIAGNOSIS — G89.29 CHRONIC BILATERAL LOW BACK PAIN WITH BILATERAL SCIATICA: ICD-10-CM

## 2020-04-09 PROCEDURE — G2025 DIS SITE TELE SVCS RHC/FQHC: HCPCS | Performed by: FAMILY MEDICINE

## 2020-04-09 RX ORDER — GABAPENTIN 600 MG/1
600 TABLET ORAL 3 TIMES DAILY
Qty: 90 TABLET | Refills: 3 | Status: SHIPPED | OUTPATIENT
Start: 2020-04-09 | End: 2020-06-29 | Stop reason: SDUPTHER

## 2020-04-09 RX ORDER — ALLOPURINOL 100 MG/1
100 TABLET ORAL DAILY
Qty: 30 TABLET | Refills: 3 | Status: SHIPPED | OUTPATIENT
Start: 2020-04-09 | End: 2020-11-02 | Stop reason: SDUPTHER

## 2020-04-09 RX ORDER — DILTIAZEM HYDROCHLORIDE 180 MG/1
CAPSULE, EXTENDED RELEASE ORAL
COMMUNITY
Start: 2020-03-23 | End: 2020-04-09

## 2020-04-09 RX ORDER — DILTIAZEM HYDROCHLORIDE 120 MG/1
120 CAPSULE, COATED, EXTENDED RELEASE ORAL DAILY
Qty: 30 CAPSULE | Refills: 3 | Status: SHIPPED | OUTPATIENT
Start: 2020-04-09 | End: 2020-05-06

## 2020-04-09 RX ORDER — LOSARTAN POTASSIUM 50 MG/1
50 TABLET ORAL DAILY
Qty: 30 TABLET | Refills: 3 | Status: SHIPPED | OUTPATIENT
Start: 2020-04-09 | End: 2020-04-23 | Stop reason: SDUPTHER

## 2020-04-09 RX ORDER — CYCLOBENZAPRINE HCL 10 MG
10 TABLET ORAL
Qty: 30 TABLET | Refills: 3 | Status: SHIPPED | OUTPATIENT
Start: 2020-04-09 | End: 2020-05-21 | Stop reason: SDUPTHER

## 2020-04-20 NOTE — PROGRESS NOTES
Subjective:  Gregg Randle is a 44 y.o. male who presents for       Patient Active Problem List   Diagnosis   • Abdominal aortic aneurysm (AAA) without rupture (CMS/HCC)   • PVD (peripheral vascular disease) (CMS/HCC)   • Nicotine dependence, cigarettes, with other nicotine-induced disorders   • Mixed hyperlipidemia   • Panlobular emphysema (CMS/HCC)   • Benign essential HTN   • CAD (coronary artery disease)   • Class 1 obesity with body mass index (BMI) of 31.0 to 31.9 in adult   • Tobacco user   • Vitamin D deficiency, unspecified    • History of MI (myocardial infarction)   • History of CVA (cerebrovascular accident)   • Abnormal finding of blood chemistry, unspecified    • Stage 3 chronic kidney disease (CMS/HCC)   • Former smoker   • Left hemiparesis (CMS/HCC)   • Elevated liver enzymes   • Hyperkalemia   • Chronic idiopathic constipation   • Class 1 obesity with serious comorbidity and body mass index (BMI) of 30.0 to 30.9 in adult   • Dizziness   • Chronic bilateral low back pain with bilateral sciatica   • Encounter for drug screening   • Hypotension           Current Outpatient Medications:   •  albuterol (PROVENTIL HFA;VENTOLIN HFA) 108 (90 BASE) MCG/ACT inhaler, Inhale 2 puffs Every 4 (Four) Hours As Needed for Wheezing., Disp: , Rfl:   •  allopurinol (ZYLOPRIM) 100 MG tablet, Take 1 tablet by mouth Daily., Disp: 30 tablet, Rfl: 3  •  atorvastatin (LIPITOR) 20 MG tablet, Take 1 tablet by mouth Every Night., Disp: 90 tablet, Rfl: 3  •  brimonidine (ALPHAGAN) 0.2 % ophthalmic solution, Administer 1 drop to the right eye 3 (Three) Times a Day., Disp: , Rfl:   •  calcitriol (ROCALTROL) 0.25 MCG capsule, Take 1 capsule by mouth Daily., Disp: 30 capsule, Rfl: 3  •  clopidogrel (PLAVIX) 75 MG tablet, Take 75 mg by mouth Daily., Disp: , Rfl:   •  cyclobenzaprine (FLEXERIL) 10 MG tablet, Take 1 tablet by mouth every night at bedtime., Disp: 30 tablet, Rfl: 3  •  dilTIAZem CD (Cardizem CD) 120 MG 24 hr  capsule, Take 1 capsule by mouth Daily., Disp: 30 capsule, Rfl: 3  •  gabapentin (NEURONTIN) 600 MG tablet, Take 1 tablet by mouth 3 (Three) Times a Day., Disp: 90 tablet, Rfl: 3  •  guanFACINE (TENEX) 1 MG tablet, Take 1 mg by mouth Every Night., Disp: , Rfl:   •  labetalol (NORMODYNE) 200 MG tablet, Take 200 mg by mouth 3 (Three) Times a Day., Disp: , Rfl:   •  linaclotide (LINZESS) 72 MCG capsule capsule, Take 1 capsule by mouth Every Morning Before Breakfast., Disp: 90 capsule, Rfl: 3  •  losartan (Cozaar) 50 MG tablet, Take 1 tablet by mouth Daily., Disp: 30 tablet, Rfl: 3  •  meclizine (ANTIVERT) 25 MG tablet, Take 25 mg by mouth 3 (Three) Times a Day As Needed for Dizziness., Disp: , Rfl:     HPI     Pt is 44 yomale with management of obesity, CAD sp PCI , HTN, HLP, AAA  Sp repair , PVD with bilateral iliac stent , COPD (panlobar emphysema) tobacco user, dizziness, CKD stage 3 , claudication. Major depression, cholelithiasis, abnormal echocardiogram ( LVH grade 1 diastolic dysfunction)  History of CVA, former tobacco user , history of MI, left hemiparesis, DDD in lower back. elevated liver enzymes        4/9/20 Telemedicine Visit . Pt on last visit was started on linzess for constpation and CIC.  BP is on lower side today at 106/71.  He has been feeling dizziness. He states he was doing housework and felt dizzy. Checked BP and was 90. No chest pain no syncopal episodes. No fever no cough. He does state linzess helps with his bowel movements and abdominal pain improevd    4/23/20 Telemedicine Visit today. On last visit pt was having dizziness and hypotension. His losartan dosage was cut back from 100 to 50 mg and cardizem was cut back form 180 to 120 mg daily. He still has some dizziness.  He has been taking meclizine 25 mg PO TID PRN.  He takes currently losartan 50 mg PO q daily. cardizem 120 mg daily and labetaolol 200 mg PO BID.  No chest pain. He now has BP >130/90.  He states when he is inactive his BP  is on lower side but when he is out and doing activities it gets high.          Dizziness   This is a recurrent problem. The current episode started 1 to 4 weeks ago. The problem occurs constantly. The problem has been waxing and waning. Associated symptoms include arthralgias, coughing, fatigue, myalgias, numbness and weakness. Pertinent negatives include no abdominal pain, anorexia, change in bowel habit, chest pain, chills, congestion, diaphoresis, fever, headaches, joint swelling, nausea, neck pain, rash, sore throat, swollen glands, urinary symptoms, vertigo, visual change or vomiting. Nothing aggravates the symptoms. He has tried nothing for the symptoms. The treatment provided no relief.   Constipation   This is a new problem. The current episode started 1 to 4 weeks ago. The problem is unchanged. The patient is on a high fiber diet. He exercises regularly. There has been adequate water intake. Associated symptoms include abdominal pain. Pertinent negatives include no anorexia, back pain, bloating, diarrhea, difficulty urinating, fecal incontinence, fever, flatus, hematochezia, hemorrhoids, melena, nausea, rectal pain, vomiting or weight loss. Treatments tried: miralax  The treatment provided no relief. There is no history of abdominal surgery, endocrine disease, inflammatory bowel disease, irritable bowel syndrome, metabolic disease, neurologic disease, neuromuscular disease, psychiatric history or radiation treatment.    GI Problem   The primary symptoms include fatigue, abdominal pain and arthralgias. Primary symptoms do not include fever, weight loss, nausea, vomiting, diarrhea, melena, hematemesis, jaundice, hematochezia, dysuria, myalgias or rash. The onset was gradual.   The illness is also significant for back pain. The illness does not include chills, anorexia, dysphagia, odynophagia, bloating, constipation, tenesmus or itching. Associated medical issues do not include inflammatory bowel  disease, GERD, gallstones, liver disease, alcohol abuse, PUD, gastric bypass, bowel resection, irritable bowel syndrome, hemorrhoids or diverticulitis.   Back Pain   This is a recurrent problem. The current episode started more than 1 month ago. The problem occurs constantly. The problem has been gradually worsening since onset. The pain is present in the gluteal and lumbar spine. The quality of the pain is described as aching. The pain is at a severity of 5/10. The patient is experiencing no pain. The symptoms are aggravated by bending, position, lying down, sitting and standing. Stiffness is present all day. Associated symptoms include abdominal pain, numbness, paresis and weakness. Pertinent negatives include no bladder incontinence, bowel incontinence, chest pain, dysuria, fever, headaches, leg pain, paresthesias, pelvic pain, perianal numbness, tingling or weight loss.   Abdominal Pain   This is a new problem. The current episode started 1 to 4 weeks ago. The onset quality is sudden. The problem has been unchanged. The pain is located in the generalized abdominal region. The pain is at a severity of 8/10. The pain is moderate. The quality of the pain is aching. The abdominal pain does not radiate. Associated symptoms include arthralgias. Pertinent negatives include no anorexia, belching, constipation, diarrhea, dysuria, fever, flatus, frequency, headaches, hematochezia, hematuria, melena, myalgias, nausea, vomiting or weight loss. Nothing aggravates the pain. The pain is relieved by nothing. He has tried nothing for the symptoms. The treatment provided no relief. There is no history of abdominal surgery, colon cancer, Crohn's disease, gallstones, GERD, irritable bowel syndrome, pancreatitis, PUD or ulcerative colitis.   Coronary Artery Disease   Presents for initial visit. Symptoms include chest pain, chest tightness and shortness of breath. Pertinent negatives include no chest pressure, dizziness, leg  swelling, muscle weakness, palpitations or weight gain. Risk factors include hyperlipidemia, hypertension, obesity and tobacco use. Risk factors do not include stress. Past treatments include beta blockers, ACE inhibitors and antiplatelet agents. The treatment provided no relief. Compliance with prior treatments has been variable. Past surgical history does not include angioplasty, CABG or cardiac stents.   Obesity   This is a chronic problem. The current episode started more than 1 year ago. The problem occurs constantly. The problem has been unchanged. Associated symptoms include arthralgias, chest pain, coughing, fatigue, numbness and weakness. Pertinent negatives include no abdominal pain, anorexia, change in bowel habit, chills, congestion, diaphoresis, fever, headaches, joint swelling, myalgias, nausea, neck pain, rash, sore throat, swollen glands, urinary symptoms, vertigo, visual change or vomiting. Nothing aggravates the symptoms. He has tried nothing for the symptoms. The treatment provided no relief.   Leg Pain    The incident occurred more than 1 week ago. The injury mechanism is unknown. The pain is at a severity of 4/10. The pain is mild. The pain has been constant since onset. Associated symptoms include numbness. Pertinent negatives include no inability to bear weight, loss of motion, loss of sensation, muscle weakness or tingling. Nothing aggravates the symptoms. He has tried nothing for the symptoms. The treatment provided no relief.   COPD   He complains of cough and shortness of breath. There is no chest tightness, difficulty breathing, frequent throat clearing, hemoptysis, hoarse voice, sputum production or wheezing. This is a new problem. The current episode started today. The problem occurs constantly. The problem has been unchanged. Associated symptoms include chest pain. Pertinent negatives include no appetite change, fever, headaches, myalgias, postnasal drip, rhinorrhea, sneezing, sore  throat or trouble swallowing. His symptoms are aggravated by nothing. His symptoms are alleviated by nothing. He reports no improvement on treatment. His symptoms are not alleviated by beta-agonist. His past medical history is significant for COPD and emphysema. There is no history of asthma, bronchiectasis, bronchitis or pneumonia.     Review of Systems  Review of Systems   Constitutional: Positive for activity change and fatigue. Negative for appetite change, chills, diaphoresis and fever.   HENT: Negative for congestion, postnasal drip, rhinorrhea, sinus pressure, sinus pain, sneezing, sore throat, trouble swallowing and voice change.    Respiratory: Positive for cough and shortness of breath. Negative for choking, chest tightness, wheezing and stridor.    Cardiovascular: Negative for chest pain.   Gastrointestinal: Positive for abdominal pain and constipation. Negative for diarrhea, nausea and vomiting.   Musculoskeletal: Positive for arthralgias, back pain and gait problem.   Neurological: Positive for weakness and numbness. Negative for headaches.   Psychiatric/Behavioral: Positive for agitation and behavioral problems. The patient is nervous/anxious.        Patient Active Problem List   Diagnosis   • Abdominal aortic aneurysm (AAA) without rupture (CMS/HCC)   • PVD (peripheral vascular disease) (CMS/HCC)   • Nicotine dependence, cigarettes, with other nicotine-induced disorders   • Mixed hyperlipidemia   • Panlobular emphysema (CMS/HCC)   • Benign essential HTN   • CAD (coronary artery disease)   • Class 1 obesity with body mass index (BMI) of 31.0 to 31.9 in adult   • Tobacco user   • Vitamin D deficiency, unspecified    • History of MI (myocardial infarction)   • History of CVA (cerebrovascular accident)   • Abnormal finding of blood chemistry, unspecified    • Stage 3 chronic kidney disease (CMS/HCC)   • Former smoker   • Left hemiparesis (CMS/HCC)   • Elevated liver enzymes   • Hyperkalemia   • Chronic  idiopathic constipation   • Class 1 obesity with serious comorbidity and body mass index (BMI) of 30.0 to 30.9 in adult   • Dizziness   • Chronic bilateral low back pain with bilateral sciatica   • Encounter for drug screening   • Hypotension     Past Surgical History:   Procedure Laterality Date   • CORONARY ANGIOPLASTY WITH STENT PLACEMENT     • EAR TUBES Left    • CO AAA REPAIR,AORTO-AORTIC TUBE PROSTH N/A 8/11/2017    Procedure: ABDOMINAL AORTIC ANEURYSM REPAIR WITH ENDOGRAFT, REPAIR ILIAC ARTERY ANEURYSM, POSSIBLE OPEN ABDOMINAL AORTOGRAM    (CELL SAVER STAND-BY);  Surgeon: Aren Arshad MD;  Location: [facility];  Service: Vascular     Social History     Socioeconomic History   • Marital status: Single     Spouse name: Not on file   • Number of children: Not on file   • Years of education: Not on file   • Highest education level: Not on file   Tobacco Use   • Smoking status: Former Smoker     Packs/day: 1.00     Years: 20.00     Pack years: 20.00   • Smokeless tobacco: Never Used   Substance and Sexual Activity   • Alcohol use: No   • Drug use: No   • Sexual activity: Defer     Family History   Problem Relation Age of Onset   • Stroke Mother    • Diabetes Mother    • Heart disease Father      Lab on 02/27/2020   Component Date Value Ref Range Status   • THC, Screen, Urine 02/27/2020 Negative  Negative Final   • Phencyclidine (PCP), Urine 02/27/2020 Negative  Negative Final   • Cocaine Screen, Urine 02/27/2020 Negative  Negative Final   • Methamphetamine, Ur 02/27/2020 Negative  Negative Final   • Opiate Screen 02/27/2020 Negative  Negative Final   • Amphetamine Screen, Urine 02/27/2020 Negative  Negative Final   • Benzodiazepine Screen, Urine 02/27/2020 Negative  Negative Final   • Tricyclic Antidepressants Screen 02/27/2020 Negative  Negative Final   • Methadone Screen, Urine 02/27/2020 Negative  Negative Final   • Barbiturates Screen, Urine 02/27/2020 Negative  Negative Final   • Oxycodone Screen,  Urine 02/27/2020 Negative  Negative Final   • Propoxyphene Screen 02/27/2020 Negative  Negative Final   • Buprenorphine, Screen, Urine 02/27/2020 Negative  Negative Final   Lab on 02/13/2020   Component Date Value Ref Range Status   • Potassium 02/13/2020 4.7  3.5 - 5.2 mmol/L Final   • Total Protein 02/13/2020 7.8  6.0 - 8.5 g/dL Final   • Albumin 02/13/2020 4.50  3.50 - 5.20 g/dL Final   • ALT (SGPT) 02/13/2020 19  1 - 41 U/L Final   • AST (SGOT) 02/13/2020 28  1 - 40 U/L Final   • Alkaline Phosphatase 02/13/2020 101  39 - 117 U/L Final   • Total Bilirubin 02/13/2020 0.2  0.2 - 1.2 mg/dL Final   • Bilirubin, Direct 02/13/2020 <0.2* 0.2 - 0.3 mg/dL Final   • Bilirubin, Indirect 02/13/2020    Final    Unable to calculate   • Hepatitis B Surface Ag 02/13/2020 Non-Reactive  Non-Reactive Final   • Hep A IgM 02/13/2020 Non-Reactive  Non-Reactive Final   • Hep B C IgM 02/13/2020 Non-Reactive  Non-Reactive Final   • Hepatitis C Ab 02/13/2020 Non-Reactive  Non-Reactive Final   • Extra Tube 02/13/2020 Hold for add-ons.   Final    Auto resulted.   Lab on 01/15/2020   Component Date Value Ref Range Status   • WBC 01/15/2020 6.55  3.40 - 10.80 10*3/mm3 Final   • RBC 01/15/2020 5.47  4.14 - 5.80 10*6/mm3 Final   • Hemoglobin 01/15/2020 15.0  13.0 - 17.7 g/dL Final   • Hematocrit 01/15/2020 43.6  37.5 - 51.0 % Final   • MCV 01/15/2020 79.7  79.0 - 97.0 fL Final   • MCH 01/15/2020 27.4  26.6 - 33.0 pg Final   • MCHC 01/15/2020 34.4  31.5 - 35.7 g/dL Final   • RDW 01/15/2020 12.7  12.3 - 15.4 % Final   • RDW-SD 01/15/2020 35.4* 37.0 - 54.0 fl Final   • MPV 01/15/2020 11.0  6.0 - 12.0 fL Final   • Platelets 01/15/2020 277  140 - 450 10*3/mm3 Final   • Neutrophil % 01/15/2020 53.5  42.7 - 76.0 % Final   • Lymphocyte % 01/15/2020 30.1  19.6 - 45.3 % Final   • Monocyte % 01/15/2020 11.3  5.0 - 12.0 % Final   • Eosinophil % 01/15/2020 4.0  0.3 - 6.2 % Final   • Basophil % 01/15/2020 0.9  0.0 - 1.5 % Final   • Immature Grans %  01/15/2020 0.2  0.0 - 0.5 % Final   • Neutrophils, Absolute 01/15/2020 3.51  1.70 - 7.00 10*3/mm3 Final   • Lymphocytes, Absolute 01/15/2020 1.97  0.70 - 3.10 10*3/mm3 Final   • Monocytes, Absolute 01/15/2020 0.74  0.10 - 0.90 10*3/mm3 Final   • Eosinophils, Absolute 01/15/2020 0.26  0.00 - 0.40 10*3/mm3 Final   • Basophils, Absolute 01/15/2020 0.06  0.00 - 0.20 10*3/mm3 Final   • Immature Grans, Absolute 01/15/2020 0.01  0.00 - 0.05 10*3/mm3 Final   • nRBC 01/15/2020 0.0  0.0 - 0.2 /100 WBC Final   • Glucose 01/15/2020 80  65 - 99 mg/dL Final   • BUN 01/15/2020 30* 6 - 20 mg/dL Final   • Creatinine 01/15/2020 2.72* 0.76 - 1.27 mg/dL Final   • Sodium 01/15/2020 143  136 - 145 mmol/L Final   • Potassium 01/15/2020 5.5* 3.5 - 5.2 mmol/L Final   • Chloride 01/15/2020 102  98 - 107 mmol/L Final   • CO2 01/15/2020 25.5  22.0 - 29.0 mmol/L Final   • Calcium 01/15/2020 10.4  8.6 - 10.5 mg/dL Final   • Total Protein 01/15/2020 8.2  6.0 - 8.5 g/dL Final   • Albumin 01/15/2020 4.60  3.50 - 5.20 g/dL Final   • ALT (SGPT) 01/15/2020 62* 1 - 41 U/L Final   • AST (SGOT) 01/15/2020 100* 1 - 40 U/L Final   • Alkaline Phosphatase 01/15/2020 128* 39 - 117 U/L Final   • Total Bilirubin 01/15/2020 0.4  0.2 - 1.2 mg/dL Final   • eGFR   Amer 01/15/2020 31* >60 mL/min/1.73 Final   • Globulin 01/15/2020 3.6  gm/dL Final   • A/G Ratio 01/15/2020 1.3  g/dL Final   • BUN/Creatinine Ratio 01/15/2020 11.0  7.0 - 25.0 Final   • Anion Gap 01/15/2020 15.5* 5.0 - 15.0 mmol/L Final   • Hemoglobin A1C 01/15/2020 5.10  4.80 - 5.60 % Final   • Total Cholesterol 01/15/2020 133  0 - 200 mg/dL Final   • Triglycerides 01/15/2020 122  0 - 150 mg/dL Final   • HDL Cholesterol 01/15/2020 37* 40 - 60 mg/dL Final   • LDL Cholesterol  01/15/2020 72  0 - 100 mg/dL Final   • VLDL Cholesterol 01/15/2020 24.4  5 - 40 mg/dL Final   • LDL/HDL Ratio 01/15/2020 1.94   Final   • TSH 01/15/2020 1.560  0.270 - 4.200 uIU/mL Final   • Free T4 01/15/2020 1.25  0.93 -  1.70 ng/dL Final   • T3, Free 01/15/2020 3.52  2.00 - 4.40 pg/mL Final   • 25 Hydroxy, Vitamin D 01/15/2020 39.4  30.0 - 100.0 ng/ml Final   Office Visit on 11/26/2019   Component Date Value Ref Range Status   • BSA 01/08/2020 2.3  m^2 Final   • RVIDd 01/08/2020 3.6  cm Final   • IVSd 01/08/2020 1.6  cm Final   • LVIDd 01/08/2020 4.6  cm Final   • LVIDs 01/08/2020 2.7  cm Final   • LVPWd 01/08/2020 1.7  cm Final   • IVS/LVPW 01/08/2020 0.89   Final   • FS 01/08/2020 42.3  % Final   • EDV(Teich) 01/08/2020 96.8  ml Final   • ESV(Teich) 01/08/2020 25.8  ml Final   • EF(Teich) 01/08/2020 73.4  % Final   • EDV(cubed) 01/08/2020 96.7  ml Final   • ESV(cubed) 01/08/2020 18.6  ml Final   • EF(cubed) 01/08/2020 80.8  % Final   • LV mass(C)d 01/08/2020 327.2  grams Final   • LV mass(C)dI 01/08/2020 142.2  grams/m^2 Final   • SV(Teich) 01/08/2020 71.0  ml Final   • SI(Teich) 01/08/2020 30.9  ml/m^2 Final   • SV(cubed) 01/08/2020 78.1  ml Final   • SI(cubed) 01/08/2020 33.9  ml/m^2 Final   • EPSS 01/08/2020 0.6  cm Final   • Ao root diam 01/08/2020 3.7  cm Final   • Ao root area 01/08/2020 10.8  cm^2 Final   • ACS 01/08/2020 2.7  cm Final   • LA dimension 01/08/2020 3.8  cm Final   • asc Aorta Diam 01/08/2020 3.7  cm Final   • Ao Isth Diam 01/08/2020 3.6  cm Final   • LA/Ao 01/08/2020 1.0   Final   • LVOT diam 01/08/2020 2.5  cm Final   • LVOT area 01/08/2020 4.9  cm^2 Final   • LVOT area(traced) 01/08/2020 4.9  cm^2 Final   • LVLd ap4 01/08/2020 8.1  cm Final   • EDV(MOD-sp4) 01/08/2020 86.9  ml Final   • LVLs ap4 01/08/2020 7.4  cm Final   • ESV(MOD-sp4) 01/08/2020 46.3  ml Final   • EF(MOD-sp4) 01/08/2020 46.7  % Final   • LVLd ap2 01/08/2020 8.1  cm Final   • EDV(MOD-sp2) 01/08/2020 75.9  ml Final   • LVLs ap2 01/08/2020 7.2  cm Final   • ESV(MOD-sp2) 01/08/2020 40.5  ml Final   • EF(MOD-sp2) 01/08/2020 46.6  % Final   • SV(MOD-sp4) 01/08/2020 40.6  ml Final   • SI(MOD-sp4) 01/08/2020 17.6  ml/m^2 Final   • SV(MOD-sp2)  01/08/2020 35.4  ml Final   • SI(MOD-sp2) 01/08/2020 15.4  ml/m^2 Final   • Ao root area (BSA corrected) 01/08/2020 1.6   Final   • LV [place] Vol (BSA corrected) 01/08/2020 37.7  ml/m^2 Final   • LV Sys Vol (BSA corrected) 01/08/2020 20.1  ml/m^2 Final   • MV E max dena 01/08/2020 49.7  cm/sec Final   • MV A max dena 01/08/2020 58.3  cm/sec Final   • MV E/A 01/08/2020 0.85   Final   • MV V2 max 01/08/2020 53.4  cm/sec Final   • MV max PG 01/08/2020 1.1  mmHg Final   • MV V2 mean 01/08/2020 35.6  cm/sec Final   • MV mean PG 01/08/2020 1.0  mmHg Final   • MV V2 VTI 01/08/2020 14.3  cm Final   • MVA(VTI) 01/08/2020 7.0  cm^2 Final   • MV P1/2t max dena 01/08/2020 56.0  cm/sec Final   • MV P1/2t 01/08/2020 54.0  msec Final   • MVA(P1/2t) 01/08/2020 4.1  cm^2 Final   • MV dec slope 01/08/2020 304.0  cm/sec^2 Final   • Ao pk dena 01/08/2020 108.0  cm/sec Final   • Ao max PG 01/08/2020 4.7  mmHg Final   • Ao max PG (full) 01/08/2020 0.8  mmHg Final   • Ao V2 mean 01/08/2020 69.8  cm/sec Final   • Ao mean PG 01/08/2020 2.0  mmHg Final   • Ao mean PG (full) 01/08/2020 0  mmHg Final   • Ao V2 VTI 01/08/2020 20.3  cm Final   • PEARL(I,A) 01/08/2020 4.9  cm^2 Final   • PEARL(I,D) 01/08/2020 4.9  cm^2 Final   • PEARL(V,A) 01/08/2020 4.5  cm^2 Final   • PEARL(V,D) 01/08/2020 4.5  cm^2 Final   • LV V1 max PG 01/08/2020 3.9  mmHg Final   • LV V1 mean PG 01/08/2020 2.0  mmHg Final   • LV V1 max 01/08/2020 98.3  cm/sec Final   • LV V1 mean 01/08/2020 71.4  cm/sec Final   • LV V1 VTI 01/08/2020 20.3  cm Final   • SV(Ao) 01/08/2020 218.3  ml Final   • SI(Ao) 01/08/2020 94.8  ml/m^2 Final   • SV(LVOT) 01/08/2020 99.6  ml Final   • SI(LVOT) 01/08/2020 43.3  ml/m^2 Final   • PA V2 max 01/08/2020 96.1  cm/sec Final   • PA max PG 01/08/2020 3.7  mmHg Final   • PA V2 mean 01/08/2020 63.0  cm/sec Final   • PA mean PG 01/08/2020 2.0  mmHg Final   • PA V2 VTI 01/08/2020 19.5  cm Final   • TR max dena 01/08/2020 179.0  cm/sec Final   • RVSP(TR) 01/08/2020 15.8   mmHg Final   • RAP systole 01/08/2020 3.0  mmHg Final   • MVA P1/2T LCG 01/08/2020 3.9  cm^2 Final   • BH CV ECHO CHARLEY - BZI_BMI 01/08/2020 32.6  kilograms/m^2 Final   • BH CV ECHO CHARLEY - BSA([place]) 01/08/2020 2.4  m^2 Final   • BH CV ECHO CHARLEY - BZI_METRIC_WEIGHT 01/08/2020 108.9  kg Final   • BH CV ECHO CHARLEY - BZI_METRIC_HEIGHT 01/08/2020 182.9  cm Final   • BH CV VAS BP RIGHT ARM 01/08/2020 122/82  mmHg Final      XR Abdomen KUB  Narrative: PROCEDURE: AP supine abdomen.    INDICATION: abdominal pain possible volvulus, R10.9 Unspecified  abdominal pain R93.5 Abnormal findings on diagnostic imaging of  other abdominal regions, including retroperitoneum    COMPARISON: 11/1/2019 CT of the abdomen and pelvis.    Normal abdominal bowel gas pattern with no abnormally distended  bowel loops. There is gas in the colon including what appears to  be redundant sigmoid colon with no evidence of abnormal  distention or wall thickening. Moderate fecal material in the  right, transverse and descending colon.    Stable endovascular stent in the abdominal aorta extending into  the iliac arteries.    No suspicious calcifications or soft tissue densities.  Impression: No acute abdominal findings. There is gas within  nondistended rectosigmoid and redundant sigmoid colon.  Moderate fecal material in the right, transverse and descending  colon.    Stable appearance of the endovascular bilateral iliac stent.    [id number]    Electronically signed by:  Davi So MD  2/13/2020 3:41  PM CST Workstation: [id number]  XR Chest PA & Lateral  Narrative: PROCEDURE: XR CHEST PA AND LATERAL    VIEWS: PA/Lat    INDICATION: Dyspnea on exertion    COMPARISON: none    FINDINGS:       - lines/tubes: none    - cardiac: size within normal limits.    - mediastinum: contour within normal limits.     - lungs: no evidence of a focal air space process, pulmonary  interstitial edema, nodule(s)/mass.     - pleura: no evidence of  fluid.      - osseous:  "unremarkable for age.    - Misc. A cardiac loop recorder projects over the left upper  chest.  Impression: No acute cardiopulmonary process identified.    Electronically signed by:  Glory Gamez MD  2/13/2020 12:40 PM  CST Workstation: [id number]  XR Spine Lumbar Complete 4+VW  Narrative: PROCEDURE: XR SPINE LUMBAR COMPLETE 4+VW    VIEWS:   5      INDICATION:  Pain    COMPARISON:  None    FINDINGS:       - Fracture(s): None    - Alignment:  Within normal limits      - Disc space heights: Mildly narrowed L5-S1    - Focal osteolytic/blastic: None      - Bone density:  Grossly within normal limits for age    - Misc.:  Minimal lower lumbar degenerative facet changes  Bifurcated aortic endograft is present  There is a dilated loop of colon in the mid abdomen, measuring up  to 9.5 cm in diameter. There is gas and stool in what appears to  be the ascending colon, and within the majority of the descending  colon. Sigmoid volvulus cannot be excluded as etiology for this  large gas-filled loop of colon  Impression: 1. No acute osseous abnormality.   2. Gas-filled loop of distended colon in the mid left abdomen,  similar spondylosis cannot be entirely excluded.    Findings were discussed with Dr. Duvall at 1:25 PM EST on  2/13/2020    If pain or symptoms persist beyond reasonable expectations,   an MRI examination is suggested as is deemed clinically  appropriate.    Electronically signed by:  Glory Gamez MD  2/13/2020 12:29 PM  [facility] Workstation: [id number]    [unfilled]  Immunization History   Administered Date(s) Administered   • FLUARIX/FLUZONE/AFLURIA/FLULAVAL QUAD 01/15/2020       The following portions of the patient's history were reviewed and updated as appropriate: allergies, current medications, past family history, past medical history, past social history, past surgical history and problem list.        Physical Exam  Ht 185.4 cm (73\")   BMI 30.82 kg/m²     Physical Exam   Constitutional: He is oriented to person, " place, and time. He appears well-developed and well-nourished.   HENT:   Head: Normocephalic and atraumatic.   Right Ear: External ear normal.   Eyes: Pupils are equal, round, and reactive to light. Conjunctivae and EOM are normal.   Neck: Normal range of motion. Neck supple.   Cardiovascular: Normal rate, regular rhythm and normal heart sounds.   No murmur heard.  Pulmonary/Chest: Effort normal and breath sounds normal. No respiratory distress.   Abdominal: Soft. Bowel sounds are normal. He exhibits no distension. There is no tenderness.   Musculoskeletal: Normal range of motion. He exhibits no edema or deformity.   Neurological: He is alert and oriented to person, place, and time. No cranial nerve deficit.   Skin: Skin is warm. No rash noted. He is not diaphoretic. No erythema. No pallor.   Psychiatric: He has a normal mood and affect. His behavior is normal.   Nursing note and vitals reviewed.      Assessment/Plan    Diagnosis Plan   1. Vitamin D deficiency, unspecified      2. Tobacco user     3. Stage 3 chronic kidney disease (CMS/HCC)     4. PVD (peripheral vascular disease) (CMS/HCC)     5. Panlobular emphysema (CMS/HCC)     6. Mixed hyperlipidemia     7. Left hemiparesis (CMS/HCC)     8. History of MI (myocardial infarction)     9. History of CVA (cerebrovascular accident)     10. Former smoker     11. Class 1 obesity with serious comorbidity and body mass index (BMI) of 30.0 to 30.9 in adult, unspecified obesity type     12. Chronic bilateral low back pain with bilateral sciatica     13. Coronary artery disease involving native coronary artery of native heart without angina pectoris     14. Abdominal aortic aneurysm (AAA) without rupture (CMS/HCC)           -dizziness/hypotension - was cut back on losartan from 100 to 50 mg daily and cut back on cardizem CR from 180 to 120 mg daily. Pt 's BP now elevated >130/90. Meclizine every 8 hours as needed   -elevated liver enzymes -get US of liver along with  hepatitis panel  -back pain -x-ray of lower back . recommendd PT/OT but pt declined. Recommend Tylenol PRN no NSAIDs.  on  Neurontin 600 mg PO TID until pt sees Pain Management  Advised pt to make  appt with Pain Management. And next appt is in June 2020.   on flexeril 7.5 mg at bedtime . Will get UDS. Refill neurontin will refill medication until  Seen by Pain Management.  He will sign drug contract today. [facility] ref number [id number]  -CIC - start on linzess. Pt has failed Miralax.drug information provided   -hyperkalemia - recommend limit high potassium foods. Recheck in 1 Lummi  -CKD stage 3 - Nephrology following. BP control   -COPD/emphysema. -referral to Dr. Medina with Pulmonlogy with PFTs. He was seeing Dr. Mar. He would like to see a new Pulmonologist   -HTN - go back to  losartan 100 mg daily. Labetalol 200 mg PO BID Clonidine at bedtime  PO BID cartdizem 120 mg PO q daily, chlorthalidone 25 mg PO q daily. BP at goal    -major depression- lexapro 20 mg daily.  -CAD -Cardiology following, aspirin, lipitor, labetalol  200 mg TID, losartan , plavix   -PVD - aspirin plavix, lipitor - Vascular Surgery following  -obesity - counseled weight loss >5 minutes BMI at 30.71   -AAA - Vascular Surgery following   -advised pt to be safe and call with questions and concerns  -advised pt to go to ER or call 911 if symptoms worrisome or severe  -advised pt to followup with specialist and referrals  -advised pt be safe during COVID-19 pandemic   This visit has been rescheduled as a phone visit to comply with patient safety concerns in accordance with CDC recommendations. Total time of discussion was 25 minutes.  -recheck in 1 month for bp recheck and dizziness        This document has been electronically signed by Dread Duvall MD on April 23, 2020 07:46

## 2020-04-23 ENCOUNTER — OFFICE VISIT (OUTPATIENT)
Dept: FAMILY MEDICINE CLINIC | Facility: CLINIC | Age: 45
End: 2020-04-23

## 2020-04-23 VITALS — HEIGHT: 73 IN | BODY MASS INDEX: 30.82 KG/M2

## 2020-04-23 DIAGNOSIS — G89.29 CHRONIC BILATERAL LOW BACK PAIN WITH BILATERAL SCIATICA: ICD-10-CM

## 2020-04-23 DIAGNOSIS — Z86.73 HISTORY OF CVA (CEREBROVASCULAR ACCIDENT): ICD-10-CM

## 2020-04-23 DIAGNOSIS — I71.40 ABDOMINAL AORTIC ANEURYSM (AAA) WITHOUT RUPTURE (HCC): ICD-10-CM

## 2020-04-23 DIAGNOSIS — E78.2 MIXED HYPERLIPIDEMIA: ICD-10-CM

## 2020-04-23 DIAGNOSIS — G81.94 LEFT HEMIPARESIS (HCC): ICD-10-CM

## 2020-04-23 DIAGNOSIS — I25.10 CORONARY ARTERY DISEASE INVOLVING NATIVE CORONARY ARTERY OF NATIVE HEART WITHOUT ANGINA PECTORIS: ICD-10-CM

## 2020-04-23 DIAGNOSIS — M54.41 CHRONIC BILATERAL LOW BACK PAIN WITH BILATERAL SCIATICA: ICD-10-CM

## 2020-04-23 DIAGNOSIS — I25.2 HISTORY OF MI (MYOCARDIAL INFARCTION): ICD-10-CM

## 2020-04-23 DIAGNOSIS — M54.42 CHRONIC BILATERAL LOW BACK PAIN WITH BILATERAL SCIATICA: ICD-10-CM

## 2020-04-23 DIAGNOSIS — E55.9 VITAMIN D DEFICIENCY, UNSPECIFIED: Primary | ICD-10-CM

## 2020-04-23 DIAGNOSIS — I73.9 PVD (PERIPHERAL VASCULAR DISEASE) (HCC): ICD-10-CM

## 2020-04-23 DIAGNOSIS — E66.9 CLASS 1 OBESITY WITH SERIOUS COMORBIDITY AND BODY MASS INDEX (BMI) OF 30.0 TO 30.9 IN ADULT, UNSPECIFIED OBESITY TYPE: ICD-10-CM

## 2020-04-23 DIAGNOSIS — J43.1 PANLOBULAR EMPHYSEMA (HCC): ICD-10-CM

## 2020-04-23 DIAGNOSIS — Z72.0 TOBACCO USER: ICD-10-CM

## 2020-04-23 DIAGNOSIS — N18.30 STAGE 3 CHRONIC KIDNEY DISEASE (HCC): ICD-10-CM

## 2020-04-23 DIAGNOSIS — Z87.891 FORMER SMOKER: ICD-10-CM

## 2020-04-23 PROCEDURE — G2025 DIS SITE TELE SVCS RHC/FQHC: HCPCS | Performed by: FAMILY MEDICINE

## 2020-04-23 RX ORDER — LABETALOL 200 MG/1
200 TABLET, FILM COATED ORAL 2 TIMES DAILY
Qty: 60 TABLET | Refills: 3
Start: 2020-04-23 | End: 2021-09-17 | Stop reason: SDUPTHER

## 2020-04-23 RX ORDER — LOSARTAN POTASSIUM 50 MG/1
100 TABLET ORAL DAILY
Qty: 30 TABLET | Refills: 3 | Status: SHIPPED | OUTPATIENT
Start: 2020-04-23 | End: 2020-04-23

## 2020-04-23 RX ORDER — LOSARTAN POTASSIUM 100 MG/1
100 TABLET ORAL DAILY
Qty: 30 TABLET | Refills: 3 | Status: SHIPPED | OUTPATIENT
Start: 2020-04-23 | End: 2020-11-02 | Stop reason: SDUPTHER

## 2020-05-05 ENCOUNTER — TELEPHONE (OUTPATIENT)
Dept: FAMILY MEDICINE CLINIC | Facility: CLINIC | Age: 45
End: 2020-05-05

## 2020-05-05 NOTE — TELEPHONE ENCOUNTER
Patient called in to advise new medication for BP is not working and would like to speak with the Dr. In regards to medication changes.      Please contact and advise.

## 2020-05-05 NOTE — TELEPHONE ENCOUNTER
Made pt aware medication will take 4-6 to show a difference. Pt stated that one of his BP medications was lowered and he feels it needs to be increased again. Pt did not know the name of medication. He stated his BP was 136/103 this morning.

## 2020-05-05 NOTE — TELEPHONE ENCOUNTER
Pt states that he takes losartan 100, Cardizem 120, and labetalol 200 x 2 as prescribed. Pt states that bottom number (diastolic) is never under 100 and he checks BP 2-3 times daily. Pt feels BP was better controlled on higher dose of Cardizem.

## 2020-05-05 NOTE — TELEPHONE ENCOUNTER
Pt had medication readjusted but he is supposed to be taking the medications with dosages:    Losartan 100 mg daily  Cardizem  mg daily  And labetalol 200 mg PO BID    Make sure pt is taking this and let me know.   Thanks

## 2020-05-06 RX ORDER — DILTIAZEM HYDROCHLORIDE 180 MG/1
180 CAPSULE, COATED, EXTENDED RELEASE ORAL DAILY
Qty: 30 CAPSULE | Refills: 3 | Status: SHIPPED | OUTPATIENT
Start: 2020-05-06 | End: 2020-10-02 | Stop reason: SDUPTHER

## 2020-05-06 NOTE — TELEPHONE ENCOUNTER
Let pt know will increase cardizem medication and will send to pharmacy. Will go back to original dosage. Thanks

## 2020-05-12 RX ORDER — GUANFACINE 1 MG/1
1 TABLET ORAL NIGHTLY
Qty: 30 TABLET | Refills: 3 | Status: SHIPPED | OUTPATIENT
Start: 2020-05-12 | End: 2020-05-21 | Stop reason: SDUPTHER

## 2020-05-12 NOTE — PROGRESS NOTES
Subjective:  Gregg Randle is a 44 y.o. male who presents for       Patient Active Problem List   Diagnosis   • Abdominal aortic aneurysm (AAA) without rupture (CMS/HCC)   • PVD (peripheral vascular disease) (CMS/HCC)   • Nicotine dependence, cigarettes, with other nicotine-induced disorders   • Mixed hyperlipidemia   • Panlobular emphysema (CMS/HCC)   • Benign essential HTN   • CAD (coronary artery disease)   • Class 1 obesity with body mass index (BMI) of 31.0 to 31.9 in adult   • Tobacco user   • Vitamin D deficiency, unspecified    • History of MI (myocardial infarction)   • History of CVA (cerebrovascular accident)   • Abnormal finding of blood chemistry, unspecified    • Stage 3 chronic kidney disease (CMS/HCC)   • Former smoker   • Left hemiparesis (CMS/HCC)   • Elevated liver enzymes   • Hyperkalemia   • Chronic idiopathic constipation   • Class 1 obesity with serious comorbidity and body mass index (BMI) of 30.0 to 30.9 in adult   • Dizziness   • Chronic bilateral low back pain with bilateral sciatica   • Encounter for drug screening   • Hypotension   • Essential hypertension           Current Outpatient Medications:   •  albuterol (PROVENTIL HFA;VENTOLIN HFA) 108 (90 BASE) MCG/ACT inhaler, Inhale 2 puffs Every 4 (Four) Hours As Needed for Wheezing., Disp: , Rfl:   •  allopurinol (ZYLOPRIM) 100 MG tablet, Take 1 tablet by mouth Daily., Disp: 30 tablet, Rfl: 3  •  atorvastatin (LIPITOR) 20 MG tablet, Take 1 tablet by mouth Every Night., Disp: 90 tablet, Rfl: 3  •  brimonidine (ALPHAGAN) 0.2 % ophthalmic solution, Administer 1 drop to the right eye 3 (Three) Times a Day., Disp: , Rfl:   •  calcitriol (ROCALTROL) 0.25 MCG capsule, Take 1 capsule by mouth Daily., Disp: 30 capsule, Rfl: 3  •  clopidogrel (PLAVIX) 75 MG tablet, Take 1 tablet by mouth Daily., Disp: 30 tablet, Rfl: 3  •  cyclobenzaprine (FLEXERIL) 10 MG tablet, Take 1 tablet by mouth every night at bedtime., Disp: 30 tablet, Rfl: 3  •   dilTIAZem CD (Cardizem CD) 180 MG 24 hr capsule, Take 1 capsule by mouth Daily., Disp: 30 capsule, Rfl: 3  •  gabapentin (NEURONTIN) 600 MG tablet, Take 1 tablet by mouth 3 (Three) Times a Day., Disp: 90 tablet, Rfl: 3  •  guanFACINE (TENEX) 1 MG tablet, Take 1 tablet by mouth Every Night., Disp: 30 tablet, Rfl: 3  •  labetalol (NORMODYNE) 200 MG tablet, Take 1 tablet by mouth 2 (Two) Times a Day., Disp: 60 tablet, Rfl: 3  •  linaclotide (LINZESS) 72 MCG capsule capsule, Take 1 capsule by mouth Every Morning Before Breakfast., Disp: 90 capsule, Rfl: 3  •  losartan (COZAAR) 100 MG tablet, Take 1 tablet by mouth Daily., Disp: 30 tablet, Rfl: 3  •  meclizine (ANTIVERT) 25 MG tablet, Take 25 mg by mouth 3 (Three) Times a Day As Needed for Dizziness., Disp: , Rfl:          Pt is 44 yomale with management of obesity, CAD sp PCI , HTN, HLP, AAA  Sp repair , PVD with bilateral iliac stent , COPD (panlobar emphysema) tobacco user, dizziness, CKD stage 3 , claudication. Major depression, cholelithiasis, abnormal echocardiogram ( LVH grade 1 diastolic dysfunction)  History of CVA, former tobacco user , history of MI, left hemiparesis, DDD in lower back. elevated liver enzymes         4/23/20 Telemedicine Visit today. On last visit pt was having dizziness and hypotension. His losartan dosage was cut back from 100 to 50 mg and cardizem was cut back form 180 to 120 mg daily. He still has some dizziness.  He has been taking meclizine 25 mg PO TID PRN.  He takes currently losartan 50 mg PO q daily. cardizem 120 mg daily and labetaolol 200 mg PO BID.  No chest pain. He now has BP >130/90.  He states when he is inactive his BP is on lower side but when he is out and doing activities it gets high.     5/21/20 Telemedicine visits today for recheck on HTN,  History of MI, CVA, dizziness, CKD stage 3  curerntly now on labetalol 200 mg PO BId along with losartan 100 mg PO q daily. Also on cardizem 180 mg PO q daily.  He continues to  refrain from smoking. Pt continues to have weakness on his left upper and lower extermity. In regards to constipation it is stable on linzess 72 mcg PO q daily.   Has upcoming appt with Cardiology soon.  No chest pain no dizziness. Breathing stable. In regards to BP has readings over >140/90 despite taking all his medications.    This morning BP was running low and was <120/80        Hypertension   This is a chronic problem. The current episode started more than 1 year ago. The problem has been waxing and waning since onset. The problem is uncontrolled. Associated symptoms include shortness of breath. Pertinent negatives include no anxiety, blurred vision, chest pain, headaches, malaise/fatigue, neck pain, orthopnea, palpitations, peripheral edema, PND or sweats. Risk factors for coronary artery disease include dyslipidemia, obesity, male gender, sedentary lifestyle and smoking/tobacco exposure. Current antihypertension treatment includes calcium channel blockers, beta blockers and angiotensin blockers. The current treatment provides moderate improvement. There are no compliance problems.  Hypertensive end-organ damage includes kidney disease, CAD/MI and CVA. There is no history of angina, heart failure, left ventricular hypertrophy, PVD or retinopathy. There is no history of chronic renal disease, coarctation of the aorta, hyperaldosteronism, hypercortisolism, hyperparathyroidism, a hypertension causing med, pheochromocytoma, renovascular disease, sleep apnea or a thyroid problem.   Obesity   This is a chronic problem. The current episode started more than 1 year ago. The problem occurs constantly. The problem has been unchanged. Associated symptoms include arthralgias, coughing, fatigue, numbness and weakness. Pertinent negatives include no abdominal pain, anorexia, change in bowel habit, chest pain, chills, congestion, diaphoresis, fever, headaches, joint swelling, myalgias, nausea, neck pain, rash, sore throat,  swollen glands, urinary symptoms, vertigo, visual change or vomiting. Nothing aggravates the symptoms. He has tried nothing for the symptoms. The treatment provided no relief.               Review of Systems  Review of Systems   Constitutional: Positive for activity change and fatigue. Negative for appetite change, chills, diaphoresis, fever and malaise/fatigue.   HENT: Negative for congestion, postnasal drip, rhinorrhea, sinus pressure, sinus pain, sneezing, sore throat, trouble swallowing and voice change.    Eyes: Negative for blurred vision.   Respiratory: Positive for cough and shortness of breath. Negative for choking, chest tightness, wheezing and stridor.    Cardiovascular: Negative for chest pain, palpitations, orthopnea and PND.   Gastrointestinal: Positive for constipation. Negative for abdominal pain, anorexia, change in bowel habit, diarrhea, nausea and vomiting.   Musculoskeletal: Positive for arthralgias and back pain. Negative for joint swelling, myalgias and neck pain.   Skin: Negative for rash.   Neurological: Positive for weakness and numbness. Negative for vertigo and headaches.       Patient Active Problem List   Diagnosis   • Abdominal aortic aneurysm (AAA) without rupture (CMS/HCC)   • PVD (peripheral vascular disease) (CMS/HCC)   • Nicotine dependence, cigarettes, with other nicotine-induced disorders   • Mixed hyperlipidemia   • Panlobular emphysema (CMS/HCC)   • Benign essential HTN   • CAD (coronary artery disease)   • Class 1 obesity with body mass index (BMI) of 31.0 to 31.9 in adult   • Tobacco user   • Vitamin D deficiency, unspecified    • History of MI (myocardial infarction)   • History of CVA (cerebrovascular accident)   • Abnormal finding of blood chemistry, unspecified    • Stage 3 chronic kidney disease (CMS/HCC)   • Former smoker   • Left hemiparesis (CMS/HCC)   • Elevated liver enzymes   • Hyperkalemia   • Chronic idiopathic constipation   • Class 1 obesity with serious  comorbidity and body mass index (BMI) of 30.0 to 30.9 in adult   • Dizziness   • Chronic bilateral low back pain with bilateral sciatica   • Encounter for drug screening   • Hypotension   • Essential hypertension     Past Surgical History:   Procedure Laterality Date   • CORONARY ANGIOPLASTY WITH STENT PLACEMENT     • EAR TUBES Left    • IA AAA REPAIR,AORTO-AORTIC TUBE PROSTH N/A 8/11/2017    Procedure: ABDOMINAL AORTIC ANEURYSM REPAIR WITH ENDOGRAFT, REPAIR ILIAC ARTERY ANEURYSM, POSSIBLE OPEN ABDOMINAL AORTOGRAM    (CELL SAVER STAND-BY);  Surgeon: Aren Arshad MD;  Location: [facility];  Service: Vascular     Social History     Socioeconomic History   • Marital status: Single     Spouse name: Not on file   • Number of children: Not on file   • Years of education: Not on file   • Highest education level: Not on file   Tobacco Use   • Smoking status: Former Smoker     Packs/day: 1.00     Years: 20.00     Pack years: 20.00   • Smokeless tobacco: Never Used   Substance and Sexual Activity   • Alcohol use: No   • Drug use: No   • Sexual activity: Defer     Family History   Problem Relation Age of Onset   • Stroke Mother    • Diabetes Mother    • Heart disease Father      Lab on 02/27/2020   Component Date Value Ref Range Status   • THC, Screen, Urine 02/27/2020 Negative  Negative Final   • Phencyclidine (PCP), Urine 02/27/2020 Negative  Negative Final   • Cocaine Screen, Urine 02/27/2020 Negative  Negative Final   • Methamphetamine, Ur 02/27/2020 Negative  Negative Final   • Opiate Screen 02/27/2020 Negative  Negative Final   • Amphetamine Screen, Urine 02/27/2020 Negative  Negative Final   • Benzodiazepine Screen, Urine 02/27/2020 Negative  Negative Final   • Tricyclic Antidepressants Screen 02/27/2020 Negative  Negative Final   • Methadone Screen, Urine 02/27/2020 Negative  Negative Final   • Barbiturates Screen, Urine 02/27/2020 Negative  Negative Final   • Oxycodone Screen, Urine 02/27/2020 Negative   Negative Final   • Propoxyphene Screen 02/27/2020 Negative  Negative Final   • Buprenorphine, Screen, Urine 02/27/2020 Negative  Negative Final   Lab on 02/13/2020   Component Date Value Ref Range Status   • Potassium 02/13/2020 4.7  3.5 - 5.2 mmol/L Final   • Total Protein 02/13/2020 7.8  6.0 - 8.5 g/dL Final   • Albumin 02/13/2020 4.50  3.50 - 5.20 g/dL Final   • ALT (SGPT) 02/13/2020 19  1 - 41 U/L Final   • AST (SGOT) 02/13/2020 28  1 - 40 U/L Final   • Alkaline Phosphatase 02/13/2020 101  39 - 117 U/L Final   • Total Bilirubin 02/13/2020 0.2  0.2 - 1.2 mg/dL Final   • Bilirubin, Direct 02/13/2020 <0.2* 0.2 - 0.3 mg/dL Final   • Bilirubin, Indirect 02/13/2020    Final    Unable to calculate   • Hepatitis B Surface Ag 02/13/2020 Non-Reactive  Non-Reactive Final   • Hep A IgM 02/13/2020 Non-Reactive  Non-Reactive Final   • Hep B C IgM 02/13/2020 Non-Reactive  Non-Reactive Final   • Hepatitis C Ab 02/13/2020 Non-Reactive  Non-Reactive Final   • Extra Tube 02/13/2020 Hold for add-ons.   Final    Auto resulted.   Lab on 01/15/2020   Component Date Value Ref Range Status   • WBC 01/15/2020 6.55  3.40 - 10.80 10*3/mm3 Final   • RBC 01/15/2020 5.47  4.14 - 5.80 10*6/mm3 Final   • Hemoglobin 01/15/2020 15.0  13.0 - 17.7 g/dL Final   • Hematocrit 01/15/2020 43.6  37.5 - 51.0 % Final   • MCV 01/15/2020 79.7  79.0 - 97.0 fL Final   • MCH 01/15/2020 27.4  26.6 - 33.0 pg Final   • MCHC 01/15/2020 34.4  31.5 - 35.7 g/dL Final   • RDW 01/15/2020 12.7  12.3 - 15.4 % Final   • RDW-SD 01/15/2020 35.4* 37.0 - 54.0 fl Final   • MPV 01/15/2020 11.0  6.0 - 12.0 fL Final   • Platelets 01/15/2020 277  140 - 450 10*3/mm3 Final   • Neutrophil % 01/15/2020 53.5  42.7 - 76.0 % Final   • Lymphocyte % 01/15/2020 30.1  19.6 - 45.3 % Final   • Monocyte % 01/15/2020 11.3  5.0 - 12.0 % Final   • Eosinophil % 01/15/2020 4.0  0.3 - 6.2 % Final   • Basophil % 01/15/2020 0.9  0.0 - 1.5 % Final   • Immature Grans % 01/15/2020 0.2  0.0 - 0.5 % Final    • Neutrophils, Absolute 01/15/2020 3.51  1.70 - 7.00 10*3/mm3 Final   • Lymphocytes, Absolute 01/15/2020 1.97  0.70 - 3.10 10*3/mm3 Final   • Monocytes, Absolute 01/15/2020 0.74  0.10 - 0.90 10*3/mm3 Final   • Eosinophils, Absolute 01/15/2020 0.26  0.00 - 0.40 10*3/mm3 Final   • Basophils, Absolute 01/15/2020 0.06  0.00 - 0.20 10*3/mm3 Final   • Immature Grans, Absolute 01/15/2020 0.01  0.00 - 0.05 10*3/mm3 Final   • nRBC 01/15/2020 0.0  0.0 - 0.2 /100 WBC Final   • Glucose 01/15/2020 80  65 - 99 mg/dL Final   • BUN 01/15/2020 30* 6 - 20 mg/dL Final   • Creatinine 01/15/2020 2.72* 0.76 - 1.27 mg/dL Final   • Sodium 01/15/2020 143  136 - 145 mmol/L Final   • Potassium 01/15/2020 5.5* 3.5 - 5.2 mmol/L Final   • Chloride 01/15/2020 102  98 - 107 mmol/L Final   • CO2 01/15/2020 25.5  22.0 - 29.0 mmol/L Final   • Calcium 01/15/2020 10.4  8.6 - 10.5 mg/dL Final   • Total Protein 01/15/2020 8.2  6.0 - 8.5 g/dL Final   • Albumin 01/15/2020 4.60  3.50 - 5.20 g/dL Final   • ALT (SGPT) 01/15/2020 62* 1 - 41 U/L Final   • AST (SGOT) 01/15/2020 100* 1 - 40 U/L Final   • Alkaline Phosphatase 01/15/2020 128* 39 - 117 U/L Final   • Total Bilirubin 01/15/2020 0.4  0.2 - 1.2 mg/dL Final   • eGFR   Amer 01/15/2020 31* >60 mL/min/1.73 Final   • Globulin 01/15/2020 3.6  gm/dL Final   • A/G Ratio 01/15/2020 1.3  g/dL Final   • BUN/Creatinine Ratio 01/15/2020 11.0  7.0 - 25.0 Final   • Anion Gap 01/15/2020 15.5* 5.0 - 15.0 mmol/L Final   • Hemoglobin A1C 01/15/2020 5.10  4.80 - 5.60 % Final   • Total Cholesterol 01/15/2020 133  0 - 200 mg/dL Final   • Triglycerides 01/15/2020 122  0 - 150 mg/dL Final   • HDL Cholesterol 01/15/2020 37* 40 - 60 mg/dL Final   • LDL Cholesterol  01/15/2020 72  0 - 100 mg/dL Final   • VLDL Cholesterol 01/15/2020 24.4  5 - 40 mg/dL Final   • LDL/HDL Ratio 01/15/2020 1.94   Final   • TSH 01/15/2020 1.560  0.270 - 4.200 uIU/mL Final   • Free T4 01/15/2020 1.25  0.93 - 1.70 ng/dL Final   • T3, Free  01/15/2020 3.52  2.00 - 4.40 pg/mL Final   • 25 Hydroxy, Vitamin D 01/15/2020 39.4  30.0 - 100.0 ng/ml Final   Office Visit on 11/26/2019   Component Date Value Ref Range Status   • BSA 01/08/2020 2.3  m^2 Final   • RVIDd 01/08/2020 3.6  cm Final   • IVSd 01/08/2020 1.6  cm Final   • LVIDd 01/08/2020 4.6  cm Final   • LVIDs 01/08/2020 2.7  cm Final   • LVPWd 01/08/2020 1.7  cm Final   • IVS/LVPW 01/08/2020 0.89   Final   • FS 01/08/2020 42.3  % Final   • EDV(Teich) 01/08/2020 96.8  ml Final   • ESV(Teich) 01/08/2020 25.8  ml Final   • EF(Teich) 01/08/2020 73.4  % Final   • EDV(cubed) 01/08/2020 96.7  ml Final   • ESV(cubed) 01/08/2020 18.6  ml Final   • EF(cubed) 01/08/2020 80.8  % Final   • LV mass(C)d 01/08/2020 327.2  grams Final   • LV mass(C)dI 01/08/2020 142.2  grams/m^2 Final   • SV(Teich) 01/08/2020 71.0  ml Final   • SI(Teich) 01/08/2020 30.9  ml/m^2 Final   • SV(cubed) 01/08/2020 78.1  ml Final   • SI(cubed) 01/08/2020 33.9  ml/m^2 Final   • EPSS 01/08/2020 0.6  cm Final   • Ao root diam 01/08/2020 3.7  cm Final   • Ao root area 01/08/2020 10.8  cm^2 Final   • ACS 01/08/2020 2.7  cm Final   • LA dimension 01/08/2020 3.8  cm Final   • asc Aorta Diam 01/08/2020 3.7  cm Final   • Ao Isth Diam 01/08/2020 3.6  cm Final   • LA/Ao 01/08/2020 1.0   Final   • LVOT diam 01/08/2020 2.5  cm Final   • LVOT area 01/08/2020 4.9  cm^2 Final   • LVOT area(traced) 01/08/2020 4.9  cm^2 Final   • LVLd ap4 01/08/2020 8.1  cm Final   • EDV(MOD-sp4) 01/08/2020 86.9  ml Final   • LVLs ap4 01/08/2020 7.4  cm Final   • ESV(MOD-sp4) 01/08/2020 46.3  ml Final   • EF(MOD-sp4) 01/08/2020 46.7  % Final   • LVLd ap2 01/08/2020 8.1  cm Final   • EDV(MOD-sp2) 01/08/2020 75.9  ml Final   • LVLs ap2 01/08/2020 7.2  cm Final   • ESV(MOD-sp2) 01/08/2020 40.5  ml Final   • EF(MOD-sp2) 01/08/2020 46.6  % Final   • SV(MOD-sp4) 01/08/2020 40.6  ml Final   • SI(MOD-sp4) 01/08/2020 17.6  ml/m^2 Final   • SV(MOD-sp2) 01/08/2020 35.4  ml Final   •  SI(MOD-sp2) 01/08/2020 15.4  ml/m^2 Final   • Ao root area (BSA corrected) 01/08/2020 1.6   Final   • LV [place] Vol (BSA corrected) 01/08/2020 37.7  ml/m^2 Final   • LV Sys Vol (BSA corrected) 01/08/2020 20.1  ml/m^2 Final   • MV E max dena 01/08/2020 49.7  cm/sec Final   • MV A max dena 01/08/2020 58.3  cm/sec Final   • MV E/A 01/08/2020 0.85   Final   • MV V2 max 01/08/2020 53.4  cm/sec Final   • MV max PG 01/08/2020 1.1  mmHg Final   • MV V2 mean 01/08/2020 35.6  cm/sec Final   • MV mean PG 01/08/2020 1.0  mmHg Final   • MV V2 VTI 01/08/2020 14.3  cm Final   • MVA(VTI) 01/08/2020 7.0  cm^2 Final   • MV P1/2t max dena 01/08/2020 56.0  cm/sec Final   • MV P1/2t 01/08/2020 54.0  msec Final   • MVA(P1/2t) 01/08/2020 4.1  cm^2 Final   • MV dec slope 01/08/2020 304.0  cm/sec^2 Final   • Ao pk dena 01/08/2020 108.0  cm/sec Final   • Ao max PG 01/08/2020 4.7  mmHg Final   • Ao max PG (full) 01/08/2020 0.8  mmHg Final   • Ao V2 mean 01/08/2020 69.8  cm/sec Final   • Ao mean PG 01/08/2020 2.0  mmHg Final   • Ao mean PG (full) 01/08/2020 0  mmHg Final   • Ao V2 VTI 01/08/2020 20.3  cm Final   • PEARL(I,A) 01/08/2020 4.9  cm^2 Final   • PEARL(I,D) 01/08/2020 4.9  cm^2 Final   • PEARL(V,A) 01/08/2020 4.5  cm^2 Final   • PEARL(V,D) 01/08/2020 4.5  cm^2 Final   • LV V1 max PG 01/08/2020 3.9  mmHg Final   • LV V1 mean PG 01/08/2020 2.0  mmHg Final   • LV V1 max 01/08/2020 98.3  cm/sec Final   • LV V1 mean 01/08/2020 71.4  cm/sec Final   • LV V1 VTI 01/08/2020 20.3  cm Final   • SV(Ao) 01/08/2020 218.3  ml Final   • SI(Ao) 01/08/2020 94.8  ml/m^2 Final   • SV(LVOT) 01/08/2020 99.6  ml Final   • SI(LVOT) 01/08/2020 43.3  ml/m^2 Final   • PA V2 max 01/08/2020 96.1  cm/sec Final   • PA max PG 01/08/2020 3.7  mmHg Final   • PA V2 mean 01/08/2020 63.0  cm/sec Final   • PA mean PG 01/08/2020 2.0  mmHg Final   • PA V2 VTI 01/08/2020 19.5  cm Final   • TR max dena 01/08/2020 179.0  cm/sec Final   • RVSP(TR) 01/08/2020 15.8  mmHg Final   • RAP systole  01/08/2020 3.0  mmHg Final   • MVA P1/2T LCG 01/08/2020 3.9  cm^2 Final   • BH CV ECHO CHARLEY - BZI_BMI 01/08/2020 32.6  kilograms/m^2 Final   • BH CV ECHO CHARLEY - BSA([place]) 01/08/2020 2.4  m^2 Final   • BH CV ECHO CHARLEY - BZI_METRIC_WEIGHT 01/08/2020 108.9  kg Final   • BH CV ECHO CHARLEY - BZI_METRIC_HEIGHT 01/08/2020 182.9  cm Final   • BH CV VAS BP RIGHT ARM 01/08/2020 122/82  mmHg Final      XR Abdomen KUB  Narrative: PROCEDURE: AP supine abdomen.    INDICATION: abdominal pain possible volvulus, R10.9 Unspecified  abdominal pain R93.5 Abnormal findings on diagnostic imaging of  other abdominal regions, including retroperitoneum    COMPARISON: 11/1/2019 CT of the abdomen and pelvis.    Normal abdominal bowel gas pattern with no abnormally distended  bowel loops. There is gas in the colon including what appears to  be redundant sigmoid colon with no evidence of abnormal  distention or wall thickening. Moderate fecal material in the  right, transverse and descending colon.    Stable endovascular stent in the abdominal aorta extending into  the iliac arteries.    No suspicious calcifications or soft tissue densities.  Impression: No acute abdominal findings. There is gas within  nondistended rectosigmoid and redundant sigmoid colon.  Moderate fecal material in the right, transverse and descending  colon.    Stable appearance of the endovascular bilateral iliac stent.    [id number]    Electronically signed by:  Davi So MD  2/13/2020 3:41  PM CST Workstation: [id number]  XR Chest PA & Lateral  Narrative: PROCEDURE: XR CHEST PA AND LATERAL    VIEWS: PA/Lat    INDICATION: Dyspnea on exertion    COMPARISON: none    FINDINGS:       - lines/tubes: none    - cardiac: size within normal limits.    - mediastinum: contour within normal limits.     - lungs: no evidence of a focal air space process, pulmonary  interstitial edema, nodule(s)/mass.     - pleura: no evidence of  fluid.      - osseous: unremarkable for age.    -  Misc. A cardiac loop recorder projects over the left upper  chest.  Impression: No acute cardiopulmonary process identified.    Electronically signed by:  Glory Gamez MD  2/13/2020 12:40 PM  CST Workstation: [id number]  XR Spine Lumbar Complete 4+VW  Narrative: PROCEDURE: XR SPINE LUMBAR COMPLETE 4+VW    VIEWS:   5      INDICATION:  Pain    COMPARISON:  None    FINDINGS:       - Fracture(s): None    - Alignment:  Within normal limits      - Disc space heights: Mildly narrowed L5-S1    - Focal osteolytic/blastic: None      - Bone density:  Grossly within normal limits for age    - Misc.:  Minimal lower lumbar degenerative facet changes  Bifurcated aortic endograft is present  There is a dilated loop of colon in the mid abdomen, measuring up  to 9.5 cm in diameter. There is gas and stool in what appears to  be the ascending colon, and within the majority of the descending  colon. Sigmoid volvulus cannot be excluded as etiology for this  large gas-filled loop of colon  Impression: 1. No acute osseous abnormality.   2. Gas-filled loop of distended colon in the mid left abdomen,  similar spondylosis cannot be entirely excluded.    Findings were discussed with Dr. Duvall at 1:25 PM EST on  2/13/2020    If pain or symptoms persist beyond reasonable expectations,   an MRI examination is suggested as is deemed clinically  appropriate.    Electronically signed by:  Glory Gamez MD  2/13/2020 12:29 PM  [facility] Workstation: [id number]    [unfilled]  Immunization History   Administered Date(s) Administered   • FLUARIX/FLUZONE/AFLURIA/FLULAVAL QUAD 01/15/2020       The following portions of the patient's history were reviewed and updated as appropriate: allergies, current medications, past family history, past medical history, past social history, past surgical history and problem list.          Assessment/Plan    Diagnosis Plan   1. Class 1 obesity with serious comorbidity and body mass index (BMI) of 30.0 to 30.9 in adult,  unspecified obesity type     2. Stage 3 chronic kidney disease (CMS/HCC)     3. Left hemiparesis (CMS/HCC)     4. Chronic idiopathic constipation     5. Coronary artery disease involving native coronary artery of native heart without angina pectoris     6. Former smoker     7. History of CVA (cerebrovascular accident)     8. History of MI (myocardial infarction)     9. Essential hypertension           -dizziness/hypotension - stable   -elevated liver enzymes -get US of liver along with hepatitis panel  -back pain -x-ray of lower back . recommendd PT/OT but pt declined. Recommend Tylenol PRN no NSAIDs.  on  Neurontin 600 mg PO TID until pt sees Pain Management  Advised pt to make  appt with Pain Management. And next appt is in June 2020.   on flexeril 7.5 mg at bedtime . Will get UDS. Refill neurontin will refill medication until  Seen by Pain Management.  He will sign drug contract today. [facility] ref number [id number]  -CIC - start on linzess. Pt has failed Miralax.drug information provided   -hyperkalemia - recommend limit high potassium foods. Recheck in 1 [place]  -CKD stage 3 - Nephrology following. BP control   -COPD/emphysema. -referedl to Dr. Medina with Pulmonlogy with PFTs. He was seeing Dr. Mar. He would like to see a new Pulmonologist   -HTN - losartan 100 mg daily. Labetalol 200 mg PO BID  cardizem 180 mg PPo q daily.  Clonidine 1 mg PO qhs.   Advised pt to check blood pressure in 2 weeks and record it. If BP continues to be high consider adding chlorthalidone.  -major depression- lexapro 20 mg daily.  -CAD -Cardiology following, aspirin, lipitor, labetalol  200 mg TID, losartan , plavix   -PVD - aspirin plavix, lipitor - Vascular Surgery following  -obesity - counseled weight loss >5 minutes BMI at 30.71   -AAA - Vascular Surgery following   -advised pt to be safe and call with questions and concerns  -advised pt to go to ER or call 911 if symptoms worrisome or severe  -advised pt to followup with  specialist and referrals  -advised pt be safe during COVID-19 pandemic   This visit has been rescheduled as a phone visit to comply with patient safety concerns in accordance with CDC recommendations. Total time of discussion was 15minutes.  -recheck in 2 week for blood pressure recheck         This document has been electronically signed by Dread Duvall MD on May 21, 2020 08:40

## 2020-05-12 NOTE — TELEPHONE ENCOUNTER
Called in to request RX refill of    guanFACINE (TENEX) 1 MG tablet         Pharmacy confirmed - [facility]    Please Advise

## 2020-05-21 ENCOUNTER — OFFICE VISIT (OUTPATIENT)
Dept: FAMILY MEDICINE CLINIC | Facility: CLINIC | Age: 45
End: 2020-05-21

## 2020-05-21 VITALS
SYSTOLIC BLOOD PRESSURE: 108 MMHG | BODY MASS INDEX: 30.82 KG/M2 | HEART RATE: 90 BPM | HEIGHT: 73 IN | DIASTOLIC BLOOD PRESSURE: 77 MMHG

## 2020-05-21 DIAGNOSIS — E66.9 CLASS 1 OBESITY WITH SERIOUS COMORBIDITY AND BODY MASS INDEX (BMI) OF 30.0 TO 30.9 IN ADULT, UNSPECIFIED OBESITY TYPE: Primary | ICD-10-CM

## 2020-05-21 DIAGNOSIS — I10 ESSENTIAL HYPERTENSION: ICD-10-CM

## 2020-05-21 DIAGNOSIS — I25.10 CORONARY ARTERY DISEASE INVOLVING NATIVE CORONARY ARTERY OF NATIVE HEART WITHOUT ANGINA PECTORIS: ICD-10-CM

## 2020-05-21 DIAGNOSIS — Z87.891 FORMER SMOKER: ICD-10-CM

## 2020-05-21 DIAGNOSIS — G81.94 LEFT HEMIPARESIS (HCC): ICD-10-CM

## 2020-05-21 DIAGNOSIS — Z86.73 HISTORY OF CVA (CEREBROVASCULAR ACCIDENT): ICD-10-CM

## 2020-05-21 DIAGNOSIS — K59.04 CHRONIC IDIOPATHIC CONSTIPATION: ICD-10-CM

## 2020-05-21 DIAGNOSIS — I25.2 HISTORY OF MI (MYOCARDIAL INFARCTION): ICD-10-CM

## 2020-05-21 DIAGNOSIS — N18.30 STAGE 3 CHRONIC KIDNEY DISEASE (HCC): ICD-10-CM

## 2020-05-21 PROCEDURE — G2025 DIS SITE TELE SVCS RHC/FQHC: HCPCS | Performed by: FAMILY MEDICINE

## 2020-05-21 RX ORDER — CYCLOBENZAPRINE HCL 10 MG
10 TABLET ORAL
Qty: 30 TABLET | Refills: 3 | Status: SHIPPED | OUTPATIENT
Start: 2020-05-21 | End: 2020-11-30 | Stop reason: SDUPTHER

## 2020-05-21 RX ORDER — CALCITRIOL 0.25 UG/1
0.25 CAPSULE, LIQUID FILLED ORAL DAILY
Qty: 30 CAPSULE | Refills: 3 | Status: SHIPPED | OUTPATIENT
Start: 2020-05-21 | End: 2020-10-30 | Stop reason: SDUPTHER

## 2020-05-21 RX ORDER — GUANFACINE 1 MG/1
1 TABLET ORAL NIGHTLY
Qty: 30 TABLET | Refills: 3 | Status: SHIPPED | OUTPATIENT
Start: 2020-05-21 | End: 2020-08-14 | Stop reason: SDUPTHER

## 2020-05-21 RX ORDER — CLOPIDOGREL BISULFATE 75 MG/1
75 TABLET ORAL DAILY
Qty: 30 TABLET | Refills: 3 | Status: SHIPPED | OUTPATIENT
Start: 2020-05-21 | End: 2020-10-01 | Stop reason: SDUPTHER

## 2020-05-21 RX ORDER — CHLORTHALIDONE 25 MG/1
25 TABLET ORAL DAILY
Qty: 30 TABLET | Refills: 3 | Status: SHIPPED | OUTPATIENT
Start: 2020-05-21 | End: 2020-05-21

## 2020-06-05 ENCOUNTER — TELEPHONE (OUTPATIENT)
Dept: FAMILY MEDICINE CLINIC | Facility: CLINIC | Age: 45
End: 2020-06-05

## 2020-06-05 NOTE — TELEPHONE ENCOUNTER
Tried calling to connect for telephone visit.  Phone went straight to voicemail and it is not set up.  HUB to read

## 2020-06-05 NOTE — TELEPHONE ENCOUNTER
Patient was to have a telephone visit with Dr Duvall on 06/04/2020. I tried twice to get a hold of him for his visit. It kept going straight to voicemail which is not set up. He called that afternoon to see why we haven't called him for his appointment. Rescheduled it for 06/05/2020 @ 9:15. I tried three times to get him for the visit, [facility]. He called asking about his appointment. Cathleen took the call and told pt that I had tried three times to get him and gave him the times that I had called. He stated that he had not received any calls. Cathleen then confirmed his phone number and tried to call him when Dr. Duvall was ready for him. It went to [facility] which is not set up

## 2020-06-22 DIAGNOSIS — I25.10 CORONARY ARTERY DISEASE INVOLVING NATIVE CORONARY ARTERY OF NATIVE HEART WITHOUT ANGINA PECTORIS: Primary | ICD-10-CM

## 2020-06-29 DIAGNOSIS — Z02.83 ENCOUNTER FOR DRUG SCREENING: ICD-10-CM

## 2020-06-29 RX ORDER — GABAPENTIN 600 MG/1
600 TABLET ORAL 3 TIMES DAILY
Qty: 90 TABLET | Refills: 3 | Status: SHIPPED | OUTPATIENT
Start: 2020-06-29 | End: 2020-09-30 | Stop reason: SDUPTHER

## 2020-06-29 NOTE — TELEPHONE ENCOUNTER
Patient called and requested a refill on his gabapentin (NEURONTIN) 600 MG tablet Rx.    Verified [facility].    Patient can be contacted at [phone] to clarify and confirm any further needed information.

## 2020-07-19 NOTE — PROGRESS NOTES
Subjective:  Gregg Randle is a 45 y.o. male who presents for       Patient Active Problem List   Diagnosis   • Abdominal aortic aneurysm (AAA) without rupture (CMS/HCC)   • PVD (peripheral vascular disease) (CMS/HCC)   • Nicotine dependence, cigarettes, with other nicotine-induced disorders   • Mixed hyperlipidemia   • Panlobular emphysema (CMS/HCC)   • Benign essential HTN   • CAD (coronary artery disease)   • Class 1 obesity with body mass index (BMI) of 31.0 to 31.9 in adult   • Tobacco user   • Vitamin D deficiency, unspecified    • History of MI (myocardial infarction)   • History of CVA (cerebrovascular accident)   • Abnormal finding of blood chemistry, unspecified    • Stage 3 chronic kidney disease (CMS/HCC)   • Former smoker   • Left hemiparesis (CMS/HCC)   • Elevated liver enzymes   • Hyperkalemia   • Chronic idiopathic constipation   • Class 1 obesity with serious comorbidity and body mass index (BMI) of 30.0 to 30.9 in adult   • Dizziness   • Chronic bilateral low back pain with bilateral sciatica   • Encounter for drug screening   • Hypotension   • Essential hypertension           Current Outpatient Medications:   •  albuterol (PROVENTIL HFA;VENTOLIN HFA) 108 (90 BASE) MCG/ACT inhaler, Inhale 2 puffs Every 4 (Four) Hours As Needed for Wheezing., Disp: , Rfl:   •  allopurinol (ZYLOPRIM) 100 MG tablet, Take 1 tablet by mouth Daily., Disp: 30 tablet, Rfl: 3  •  atorvastatin (LIPITOR) 20 MG tablet, Take 1 tablet by mouth Every Night., Disp: 90 tablet, Rfl: 3  •  brimonidine (ALPHAGAN) 0.2 % ophthalmic solution, Administer 1 drop to the right eye 3 (Three) Times a Day., Disp: , Rfl:   •  calcitriol (ROCALTROL) 0.25 MCG capsule, Take 1 capsule by mouth Daily., Disp: 30 capsule, Rfl: 3  •  clopidogrel (PLAVIX) 75 MG tablet, Take 1 tablet by mouth Daily., Disp: 30 tablet, Rfl: 3  •  cyclobenzaprine (FLEXERIL) 10 MG tablet, Take 1 tablet by mouth every night at bedtime., Disp: 30 tablet, Rfl: 3  •   dilTIAZem CD (Cardizem CD) 180 MG 24 hr capsule, Take 1 capsule by mouth Daily., Disp: 30 capsule, Rfl: 3  •  gabapentin (NEURONTIN) 600 MG tablet, Take 1 tablet by mouth 3 (Three) Times a Day., Disp: 90 tablet, Rfl: 3  •  guanFACINE (TENEX) 1 MG tablet, Take 1 tablet by mouth Every Night., Disp: 30 tablet, Rfl: 3  •  labetalol (NORMODYNE) 200 MG tablet, Take 1 tablet by mouth 2 (Two) Times a Day., Disp: 60 tablet, Rfl: 3  •  linaclotide (LINZESS) 72 MCG capsule capsule, Take 1 capsule by mouth Every Morning Before Breakfast., Disp: 90 capsule, Rfl: 3  •  losartan (COZAAR) 100 MG tablet, Take 1 tablet by mouth Daily., Disp: 30 tablet, Rfl: 3  •  meclizine (ANTIVERT) 25 MG tablet, Take 25 mg by mouth 3 (Three) Times a Day As Needed for Dizziness., Disp: , Rfl:     HPI           Pt is 44 yomale with management of obesity, CAD sp PCI , HTN, HLP, AAA  Sp repair , PVD with bilateral iliac stent , COPD (panlobar emphysema) tobacco user, dizziness, CKD stage 3 , claudication. Major depression, cholelithiasis, abnormal echocardiogram ( LVH grade 1 diastolic dysfunction)  History of CVA, former tobacco user , history of MI, left hemiparesis, DDD in lower back. elevated liver enzymes            5/21/20 Telemedicine visits today for recheck on HTN,  History of MI, CVA, dizziness, CKD stage 3  curerntly now on labetalol 200 mg PO BId along with losartan 100 mg PO q daily. Also on cardizem 180 mg PO q daily.  He continues to refrain from smoking. Pt continues to have weakness on his left upper and lower extermity. In regards to constipation it is stable on linzess 72 mcg PO q daily.   Has upcoming appt with Cardiology soon.  No chest pain no dizziness. Breathing stable. In regards to BP has readings over >140/90 despite taking all his medications.    This morning BP was running low and was <120/80     7/30/20 in office visit for recheck on pt's above medical issues. Pt is due for new labwork  . His BP has been stable although it  is slightly low this morning  He is still taking cardizem  mgdaily, labetalol 200 mg PO BID, losartan  100 mg daily.. He is now on chlorathalidone 25 mg daily. But this is not on his medications list.  He states that he has been taking this for weeks. No dizzines no chest pain. He has been staying home .  Also seess Nephrologist          Hypertension   This is a chronic problem. The current episode started more than 1 year ago. The problem has been improving g since onset. The problem is controlled. Associated symptoms include shortness of breath. Pertinent negatives include no anxiety, blurred vision, chest pain, headaches, malaise/fatigue, neck pain, orthopnea, palpitations, peripheral edema, PND or sweats. Risk factors for coronary artery disease include dyslipidemia, obesity, male gender, sedentary lifestyle and smoking/tobacco exposure. Current antihypertension treatment includes calcium channel blockers, beta blockers and angiotensin blockers and diureitcs The current treatment provides moderate improvement. There are no compliance problems.  Hypertensive end-organ damage includes kidney disease, CAD/MI and CVA. There is no history of angina, heart failure, left ventricular hypertrophy, PVD or retinopathy. There is no history of chronic renal disease, coarctation of the aorta, hyperaldosteronism, hypercortisolism, hyperparathyroidism, a hypertension causing med, pheochromocytoma, renovascular disease, sleep apnea or a thyroid problem.   Obesity   This is a chronic problem. The current episode started more than 1 year ago. The problem occurs constantly. The problem has been unchanged. Associated symptoms include arthralgias, coughing, fatigue, numbness and weakness. Pertinent negatives include no abdominal pain, anorexia, change in bowel habit, chest pain, chills, congestion, diaphoresis, fever, headaches, joint swelling, myalgias, nausea, neck pain, rash, sore throat, swollen glands, urinary symptoms,  vertigo, visual change or vomiting. Nothing aggravates the symptoms. He has tried nothing for the symptoms. The treatment provided no relief.          Review of Systems  Review of Systems   Constitutional: Positive for activity change and fatigue. Negative for appetite change, chills, diaphoresis and fever.   HENT: Negative for congestion, postnasal drip, rhinorrhea, sinus pressure, sinus pain, sneezing, sore throat, trouble swallowing and voice change.    Respiratory: Positive for cough and shortness of breath. Negative for choking, chest tightness, wheezing and stridor.    Cardiovascular: Negative for chest pain.   Gastrointestinal: Positive for abdominal pain. Negative for diarrhea, nausea and vomiting.   Musculoskeletal: Positive for arthralgias.   Neurological: Positive for weakness. Negative for headaches.       Patient Active Problem List   Diagnosis   • Abdominal aortic aneurysm (AAA) without rupture (CMS/HCC)   • PVD (peripheral vascular disease) (CMS/HCC)   • Nicotine dependence, cigarettes, with other nicotine-induced disorders   • Mixed hyperlipidemia   • Panlobular emphysema (CMS/HCC)   • Benign essential HTN   • CAD (coronary artery disease)   • Class 1 obesity with body mass index (BMI) of 31.0 to 31.9 in adult   • Tobacco user   • Vitamin D deficiency, unspecified    • History of MI (myocardial infarction)   • History of CVA (cerebrovascular accident)   • Abnormal finding of blood chemistry, unspecified    • Stage 3 chronic kidney disease (CMS/HCC)   • Former smoker   • Left hemiparesis (CMS/HCC)   • Elevated liver enzymes   • Hyperkalemia   • Chronic idiopathic constipation   • Class 1 obesity with serious comorbidity and body mass index (BMI) of 30.0 to 30.9 in adult   • Dizziness   • Chronic bilateral low back pain with bilateral sciatica   • Encounter for drug screening   • Hypotension   • Essential hypertension     Past Surgical History:   Procedure Laterality Date   • CORONARY ANGIOPLASTY WITH  STENT PLACEMENT     • EAR TUBES Left    • SD AAA REPAIR,AORTO-AORTIC TUBE PROSTH N/A 8/11/2017    Procedure: ABDOMINAL AORTIC ANEURYSM REPAIR WITH ENDOGRAFT, REPAIR ILIAC ARTERY ANEURYSM, POSSIBLE OPEN ABDOMINAL AORTOGRAM    (CELL SAVER STAND-BY);  Surgeon: Aren Arshad MD;  [facility];  Service: Vascular     Social History     Socioeconomic History   • Marital status: Single     Spouse name: Not on file   • Number of children: Not on file   • Years of education: Not on file   • Highest education level: Not on file   Tobacco Use   • Smoking status: Former Smoker     Packs/day: 1.00     Years: 20.00     Pack years: 20.00   • Smokeless tobacco: Never Used   Substance and Sexual Activity   • Alcohol use: No   • Drug use: No   • Sexual activity: Defer     Family History   Problem Relation Age of Onset   • Stroke Mother    • Diabetes Mother    • Heart disease Father      Lab on 02/27/2020   Component Date Value Ref Range Status   • THC, Screen, Urine 02/27/2020 Negative  Negative Final   • Phencyclidine (PCP), Urine 02/27/2020 Negative  Negative Final   • Cocaine Screen, Urine 02/27/2020 Negative  Negative Final   • Methamphetamine, Ur 02/27/2020 Negative  Negative Final   • Opiate Screen 02/27/2020 Negative  Negative Final   • Amphetamine Screen, Urine 02/27/2020 Negative  Negative Final   • Benzodiazepine Screen, Urine 02/27/2020 Negative  Negative Final   • Tricyclic Antidepressants Screen 02/27/2020 Negative  Negative Final   • Methadone Screen, Urine 02/27/2020 Negative  Negative Final   • Barbiturates Screen, Urine 02/27/2020 Negative  Negative Final   • Oxycodone Screen, Urine 02/27/2020 Negative  Negative Final   • Propoxyphene Screen 02/27/2020 Negative  Negative Final   • Buprenorphine, Screen, Urine 02/27/2020 Negative  Negative Final   Lab on 02/13/2020   Component Date Value Ref Range Status   • Potassium 02/13/2020 4.7  3.5 - 5.2 mmol/L Final   • Total Protein 02/13/2020 7.8  6.0 - 8.5  g/dL Final   • Albumin 02/13/2020 4.50  3.50 - 5.20 g/dL Final   • ALT (SGPT) 02/13/2020 19  1 - 41 U/L Final   • AST (SGOT) 02/13/2020 28  1 - 40 U/L Final   • Alkaline Phosphatase 02/13/2020 101  39 - 117 U/L Final   • Total Bilirubin 02/13/2020 0.2  0.2 - 1.2 mg/dL Final   • Bilirubin, Direct 02/13/2020 <0.2* 0.2 - 0.3 mg/dL Final   • Bilirubin, Indirect 02/13/2020    Final    Unable to calculate   • Hepatitis B Surface Ag 02/13/2020 Non-Reactive  Non-Reactive Final   • Hep A IgM 02/13/2020 Non-Reactive  Non-Reactive Final   • Hep B C IgM 02/13/2020 Non-Reactive  Non-Reactive Final   • Hepatitis C Ab 02/13/2020 Non-Reactive  Non-Reactive Final   • Extra Tube 02/13/2020 Hold for add-ons.   Final    Auto resulted.      XR Abdomen KUB  Narrative: PROCEDURE: AP supine abdomen.    INDICATION: abdominal pain possible volvulus, R10.9 Unspecified  abdominal pain R93.5 Abnormal findings on diagnostic imaging of  other abdominal regions, including retroperitoneum    COMPARISON: 11/1/2019 CT of the abdomen and pelvis.    Normal abdominal bowel gas pattern with no abnormally distended  bowel loops. There is gas in the colon including what appears to  be redundant sigmoid colon with no evidence of abnormal  distention or wall thickening. Moderate fecal material in the  right, transverse and descending colon.    Stable endovascular stent in the abdominal aorta extending into  the iliac arteries.    No suspicious calcifications or soft tissue densities.  Impression: No acute abdominal findings. There is gas within  nondistended rectosigmoid and redundant sigmoid colon.  Moderate fecal material in the right, transverse and descending  colon.    Stable appearance of the endovascular bilateral iliac stent.    [id number]    Electronically signed by:  Davi So MD  2/13/2020 3:41  PM CST Workstation: [id number]  XR Chest PA & Lateral  Narrative: PROCEDURE: XR CHEST PA AND LATERAL    VIEWS: PA/Lat    INDICATION: Dyspnea on  exertion    COMPARISON: none    FINDINGS:       - lines/tubes: none    - cardiac: size within normal limits.    - mediastinum: contour within normal limits.     - lungs: no evidence of a focal air space process, pulmonary  interstitial edema, nodule(s)/mass.     - pleura: no evidence of  fluid.      - osseous: unremarkable for age.    - Misc. A cardiac loop recorder projects over the left upper  chest.  Impression: No acute cardiopulmonary process identified.    Electronically signed by:  Glory Gamez MD  2/13/2020 12:40 PM  CST Workstation: [id number]  XR Spine Lumbar Complete 4+VW  Narrative: PROCEDURE: XR SPINE LUMBAR COMPLETE 4+VW    VIEWS:   5      INDICATION:  Pain    COMPARISON:  None    FINDINGS:       - Fracture(s): None    - Alignment:  Within normal limits      - Disc space heights: Mildly narrowed L5-S1    - Focal osteolytic/blastic: None      - Bone density:  Grossly within normal limits for age    - Misc.:  Minimal lower lumbar degenerative facet changes  Bifurcated aortic endograft is present  There is a dilated loop of colon in the mid abdomen, measuring up  to 9.5 cm in diameter. There is gas and stool in what appears to  be the ascending colon, and within the majority of the descending  colon. Sigmoid volvulus cannot be excluded as etiology for this  large gas-filled loop of colon  Impression: 1. No acute osseous abnormality.   2. Gas-filled loop of distended colon in the mid left abdomen,  similar spondylosis cannot be entirely excluded.    Findings were discussed with Dr. Duvall at 1:25 PM EST on  2/13/2020    If pain or symptoms persist beyond reasonable expectations,   an MRI examination is suggested as is deemed clinically  appropriate.    Electronically signed by:  Glory Gamez MD  2/13/2020 12:29 PM  CST Workstation: [id number]    [unfilled]  Immunization History   Administered Date(s) Administered   • FLUARIX/FLUZONE/AFLURIA/FLULAVAL QUAD 01/15/2020       The following portions of the  "patient's history were reviewed and updated as appropriate: allergies, current medications, past family history, past medical history, past social history, past surgical history and problem list.        Physical Exam  /62 (BP Location: Right arm, Patient Position: Sitting, Cuff Size: Large Adult)   Pulse 80   Temp 97 °F (36.1 °C) Comment: per screener  Ht 185.4 cm (73\")   Wt 109 kg (239 lb 12.8 oz)   SpO2 96%   BMI 31.64 kg/m²     Physical Exam   Constitutional: He is oriented to person, place, and time. He appears well-developed and well-nourished.   HENT:   Head: Normocephalic and atraumatic.   Right Ear: External ear normal.   Eyes: Pupils are equal, round, and reactive to light. Conjunctivae and EOM are normal.   Neck: Normal range of motion. Neck supple.   Cardiovascular: Normal rate, regular rhythm and normal heart sounds.   No murmur heard.  Pulmonary/Chest: Effort normal and breath sounds normal. No respiratory distress.   Abdominal: Soft. Bowel sounds are normal. He exhibits no distension. There is no tenderness.   Obese abdomen    Musculoskeletal: Normal range of motion. He exhibits tenderness. He exhibits no edema or deformity.   Neurological: He is alert and oriented to person, place, and time. No cranial nerve deficit.   Skin: Skin is warm. No rash noted. He is not diaphoretic. No erythema. No pallor.   Psychiatric: He has a normal mood and affect. His behavior is normal.   Nursing note and vitals reviewed.      Assessment/Plan    Diagnosis Plan   1. Class 1 obesity with body mass index (BMI) of 31.0 to 31.9 in adult, unspecified obesity type, unspecified whether serious comorbidity present     2. Coronary artery disease involving native coronary artery of native heart without angina pectoris     3. Mixed hyperlipidemia     4. Stage 3 chronic kidney disease (CMS/HCC)     5. History of CVA (cerebrovascular accident)     6. Vitamin D deficiency, unspecified      7. Essential hypertension      "     -recommend labwork  -recommend colonoscopy  -will refer to GI  -dizziness/hypotension - stable   -elevated liver enzymes -get US of liver along with hepatitis panel  -back pain -x-ray of lower back . recommendd PT/OT but pt declined. Recommend Tylenol PRN no NSAIDs.  on  Neurontin 600 mg PO TID until pt sees Pain Management  Advised pt to make  appt with Pain Management  -CIC - start on linzess. Pt has failed Miralax.drug information provided   -hyperkalemia - recommend limit high potassium foods. Recheck in 1 [place]  -CKD stage 3 - Nephrology following. BP control   -COPD/emphysema. -referedl to Dr. Medina with Pulmonlogy with PFTs. He was seeing Dr. Mar. He would like to see a new Pulmonologist   -HTN - losartan 100 mg daily. Labetalol 200 mg PO BID  cardizem 180 mg PPo q daily.  Clonidine 1 mg PO qhs. Advised to stop chlorthalidone since BP on lower side today   Advised pt to check blood pressure in 2 weeks and record it. If BP continues to be high consider adding chlorthalidone.  -major depression- lexapro 20 mg daily.  -CAD -Cardiology following, aspirin, lipitor, labetalol  200 mg TID, losartan , plavix   -PVD - aspirin plavix, lipitor - Vascular Surgery following  -obesity - counseled weight loss >5 minutes BMI at 31.6   -AAA - Vascular Surgery following   -advised pt to be safe and call with questions and concerns  -advised pt to go to ER or call 911 if symptoms worrisome or severe  -advised pt to followup with specialist and referrals  -advised pt be safe during COVID-19 pandemic   -total time with pt >25 minutes   -recheck in 2 months         This document has been electronically signed by Dread Duvall MD on July 30, 2020 08:34

## 2020-07-30 ENCOUNTER — OFFICE VISIT (OUTPATIENT)
Dept: FAMILY MEDICINE CLINIC | Facility: CLINIC | Age: 45
End: 2020-07-30

## 2020-07-30 VITALS
TEMPERATURE: 97 F | DIASTOLIC BLOOD PRESSURE: 62 MMHG | WEIGHT: 239.8 LBS | SYSTOLIC BLOOD PRESSURE: 100 MMHG | HEART RATE: 80 BPM | HEIGHT: 73 IN | BODY MASS INDEX: 31.78 KG/M2 | OXYGEN SATURATION: 96 %

## 2020-07-30 DIAGNOSIS — Z12.11 ENCOUNTER FOR SCREENING COLONOSCOPY: Primary | ICD-10-CM

## 2020-07-30 DIAGNOSIS — E55.9 VITAMIN D DEFICIENCY, UNSPECIFIED: ICD-10-CM

## 2020-07-30 DIAGNOSIS — N18.30 STAGE 3 CHRONIC KIDNEY DISEASE (HCC): ICD-10-CM

## 2020-07-30 DIAGNOSIS — I25.10 CORONARY ARTERY DISEASE INVOLVING NATIVE CORONARY ARTERY OF NATIVE HEART WITHOUT ANGINA PECTORIS: ICD-10-CM

## 2020-07-30 DIAGNOSIS — E78.2 MIXED HYPERLIPIDEMIA: ICD-10-CM

## 2020-07-30 DIAGNOSIS — Z86.73 HISTORY OF CVA (CEREBROVASCULAR ACCIDENT): ICD-10-CM

## 2020-07-30 DIAGNOSIS — E66.9 CLASS 1 OBESITY WITH BODY MASS INDEX (BMI) OF 31.0 TO 31.9 IN ADULT, UNSPECIFIED OBESITY TYPE, UNSPECIFIED WHETHER SERIOUS COMORBIDITY PRESENT: ICD-10-CM

## 2020-07-30 DIAGNOSIS — I10 ESSENTIAL HYPERTENSION: ICD-10-CM

## 2020-07-30 PROCEDURE — 99214 OFFICE O/P EST MOD 30 MIN: CPT | Performed by: FAMILY MEDICINE

## 2020-07-30 RX ORDER — CHLORTHALIDONE 25 MG/1
25 TABLET ORAL DAILY
Qty: 30 TABLET | Refills: 3
Start: 2020-07-30 | End: 2020-07-30

## 2020-08-14 RX ORDER — GUANFACINE 1 MG/1
1 TABLET ORAL NIGHTLY
Qty: 30 TABLET | Refills: 2 | Status: SHIPPED | OUTPATIENT
Start: 2020-08-14 | End: 2020-09-01 | Stop reason: SDUPTHER

## 2020-09-01 RX ORDER — GUANFACINE 1 MG/1
1 TABLET ORAL NIGHTLY
Qty: 90 TABLET | Refills: 1 | Status: SHIPPED | OUTPATIENT
Start: 2020-09-01 | End: 2020-11-02 | Stop reason: SDUPTHER

## 2020-09-16 NOTE — PROGRESS NOTES
Subjective:  Gregg Randle is a 45 y.o. male who presents for       Patient Active Problem List   Diagnosis   • Abdominal aortic aneurysm (AAA) without rupture (CMS/HCC)   • PVD (peripheral vascular disease) (CMS/HCC)   • Nicotine dependence, cigarettes, with other nicotine-induced disorders   • Mixed hyperlipidemia   • Panlobular emphysema (CMS/HCC)   • Benign essential HTN   • CAD (coronary artery disease)   • Class 1 obesity with body mass index (BMI) of 31.0 to 31.9 in adult   • Tobacco user   • Vitamin D deficiency, unspecified    • History of MI (myocardial infarction)   • History of CVA (cerebrovascular accident)   • Abnormal finding of blood chemistry, unspecified    • Stage 3 chronic kidney disease (CMS/HCC)   • Former smoker   • Left hemiparesis (CMS/HCC)   • Elevated liver enzymes   • Hyperkalemia   • Chronic idiopathic constipation   • Class 1 obesity with serious comorbidity and body mass index (BMI) of 30.0 to 30.9 in adult   • Dizziness   • Chronic bilateral low back pain with bilateral sciatica   • Encounter for drug screening   • Hypotension   • Essential hypertension   • Left leg pain   • Muscle spasm           Current Outpatient Medications:   •  albuterol (PROVENTIL HFA;VENTOLIN HFA) 108 (90 BASE) MCG/ACT inhaler, Inhale 2 puffs Every 4 (Four) Hours As Needed for Wheezing., Disp: , Rfl:   •  allopurinol (ZYLOPRIM) 100 MG tablet, Take 1 tablet by mouth Daily., Disp: 30 tablet, Rfl: 3  •  atorvastatin (LIPITOR) 20 MG tablet, Take 1 tablet by mouth Every Night., Disp: 90 tablet, Rfl: 3  •  brimonidine (ALPHAGAN) 0.2 % ophthalmic solution, Administer 1 drop to the right eye 3 (Three) Times a Day., Disp: , Rfl:   •  calcitriol (ROCALTROL) 0.25 MCG capsule, Take 1 capsule by mouth Daily., Disp: 30 capsule, Rfl: 3  •  clopidogrel (PLAVIX) 75 MG tablet, Take 1 tablet by mouth Daily., Disp: 30 tablet, Rfl: 3  •  cyclobenzaprine (FLEXERIL) 10 MG tablet, Take 1 tablet by mouth every night at  bedtime., Disp: 30 tablet, Rfl: 3  •  dilTIAZem CD (Cardizem CD) 180 MG 24 hr capsule, Take 1 capsule by mouth Daily., Disp: 30 capsule, Rfl: 3  •  gabapentin (NEURONTIN) 600 MG tablet, Take 1 tablet by mouth 3 (Three) Times a Day., Disp: 90 tablet, Rfl: 3  •  guanFACINE (TENEX) 1 MG tablet, Take 1 tablet by mouth Every Night for 90 days., Disp: 90 tablet, Rfl: 1  •  labetalol (NORMODYNE) 200 MG tablet, Take 1 tablet by mouth 2 (Two) Times a Day., Disp: 60 tablet, Rfl: 3  •  linaclotide (LINZESS) 72 MCG capsule capsule, Take 1 capsule by mouth Every Morning Before Breakfast., Disp: 90 capsule, Rfl: 3  •  losartan (COZAAR) 100 MG tablet, Take 1 tablet by mouth Daily., Disp: 30 tablet, Rfl: 3  •  meclizine (ANTIVERT) 25 MG tablet, Take 25 mg by mouth 3 (Three) Times a Day As Needed for Dizziness., Disp: , Rfl:   •  Turmeric (QC Tumeric Complex) 500 MG capsule, Take 500 mg by mouth Daily., Disp: 30 capsule, Rfl: 3       Pt is 44 yomale with management of obesity, CAD sp PCI , HTN, HLP, AAA  Sp repair , PVD with bilateral iliac stent , COPD (panlobar emphysema) tobacco user, dizziness, CKD stage 3 , claudication. Major depression, cholelithiasis, abnormal echocardiogram ( LVH grade 1 diastolic dysfunction)  History of CVA, former tobacco user , history of MI, left hemiparesis, DDD in lower back. elevated liver enzymes         7/30/20 in office visit for recheck on pt's above medical issues. Pt is due for new labwork  . His BP has been stable although it is slightly low this morning  He is still taking cardizem  mgdaily, labetalol 200 mg PO BID, losartan  100 mg daily.. He is now on chlorathalidone 25 mg daily. But this is not on his medications list.  He states that he has been taking this for weeks. No dizzines no chest pain. He has been staying home .  Also seess Nephrologist      9/30/20 in office visit for recheck on pt's above medical issues. Pt has yet to get labwork ordered on 7/30/20 . A CT of  abdomen/pelvis was ordered on 9/25/20.  Pt does have history of AAA sp repair. His main concern is left leg pain on left thigh. No back pain and no hip pain. BP stable no chest pain no dizziness.           Leg Pain   The incident occurred more than 1 week ago. The incident occurred at home. There was no injury mechanism. The pain is present in the left leg. The pain is at a severity of 4/10. The pain is mild. Associated symptoms include an inability to bear weight, a loss of motion, a loss of sensation and numbness. The symptoms are aggravated by movement. He has tried nothing (neurontin ) for the symptoms. The treatment provided moderate relief.   Hypertension   This is a chronic problem. The current episode started more than 1 year ago. The problem has been improving g since onset. The problem is controlled. Associated symptoms include shortness of breath. Pertinent negatives include no anxiety, blurred vision, chest pain, headaches, malaise/fatigue, neck pain, orthopnea, palpitations, peripheral edema, PND or sweats. Risk factors for coronary artery disease include dyslipidemia, obesity, male gender, sedentary lifestyle and smoking/tobacco exposure. Current antihypertension treatment includes calcium channel blockers, beta blockers and angiotensin blockers and diureitcs The current treatment provides moderate improvement. There are no compliance problems.  Hypertensive end-organ damage includes kidney disease, CAD/MI and CVA. There is no history of angina, heart failure, left ventricular hypertrophy, PVD or retinopathy. There is no history of chronic renal disease, coarctation of the aorta, hyperaldosteronism, hypercortisolism, hyperparathyroidism, a hypertension causing med, pheochromocytoma, renovascular disease, sleep apnea or a thyroid problem.   Obesity   This is a chronic problem. The current episode started more than 1 year ago. The problem occurs constantly. The problem has been unchanged. Associated  symptoms include arthralgias, coughing, fatigue, numbness and weakness. Pertinent negatives include no abdominal pain, anorexia, change in bowel habit, chest pain, chills, congestion, diaphoresis, fever, headaches, joint swelling, myalgias, nausea, neck pain, rash, sore throat, swollen glands, urinary symptoms, vertigo, visual change or vomiting. Nothing aggravates the symptoms. He has tried nothing for the symptoms. The treatment provided no relief.     Review of Systems  Review of Systems   Constitutional: Positive for activity change and fatigue. Negative for appetite change, chills, diaphoresis and fever.   HENT: Negative for congestion, postnasal drip, rhinorrhea, sinus pressure, sinus pain, sneezing, sore throat, trouble swallowing and voice change.    Respiratory: Negative for cough, choking, chest tightness, shortness of breath, wheezing and stridor.    Cardiovascular: Negative for chest pain.   Gastrointestinal: Positive for abdominal pain. Negative for diarrhea, nausea and vomiting.   Musculoskeletal: Positive for arthralgias and gait problem.   Neurological: Positive for weakness and numbness. Negative for headaches.       Patient Active Problem List   Diagnosis   • Abdominal aortic aneurysm (AAA) without rupture (CMS/HCC)   • PVD (peripheral vascular disease) (CMS/HCC)   • Nicotine dependence, cigarettes, with other nicotine-induced disorders   • Mixed hyperlipidemia   • Panlobular emphysema (CMS/HCC)   • Benign essential HTN   • CAD (coronary artery disease)   • Class 1 obesity with body mass index (BMI) of 31.0 to 31.9 in adult   • Tobacco user   • Vitamin D deficiency, unspecified    • History of MI (myocardial infarction)   • History of CVA (cerebrovascular accident)   • Abnormal finding of blood chemistry, unspecified    • Stage 3 chronic kidney disease (CMS/HCC)   • Former smoker   • Left hemiparesis (CMS/HCC)   • Elevated liver enzymes   • Hyperkalemia   • Chronic idiopathic constipation   • Class  1 obesity with serious comorbidity and body mass index (BMI) of 30.0 to 30.9 in adult   • Dizziness   • Chronic bilateral low back pain with bilateral sciatica   • Encounter for drug screening   • Hypotension   • Essential hypertension   • Left leg pain   • Muscle spasm     Past Surgical History:   Procedure Laterality Date   • CORONARY ANGIOPLASTY WITH STENT PLACEMENT     • EAR TUBES Left    • [place] AAA REPAIR,AORTO-AORTIC TUBE PROSTH N/A 8/11/2017    Procedure: ABDOMINAL AORTIC ANEURYSM REPAIR WITH ENDOGRAFT, REPAIR ILIAC ARTERY ANEURYSM, POSSIBLE OPEN ABDOMINAL AORTOGRAM    (CELL SAVER STAND-BY);  Surgeon: Aren Arshad MD;  Location: [facility] OR;  Service: Vascular     Social History     Socioeconomic History   • Marital status: Single     Spouse name: Not on file   • Number of children: Not on file   • Years of education: Not on file   • Highest education level: Not on file   Tobacco Use   • Smoking status: Former Smoker     Packs/day: 1.00     Years: 20.00     Pack years: 20.00   • Smokeless tobacco: Never Used   Substance and Sexual Activity   • Alcohol use: No   • Drug use: No   • Sexual activity: Defer     Family History   Problem Relation Age of Onset   • Stroke Mother    • Diabetes Mother    • Heart disease Father      No visits with results within 6 Month(s) from this visit.   Latest known visit with results is:   Lab on 02/27/2020   Component Date Value Ref Range Status   • THC, Screen, Urine 02/27/2020 Negative  Negative Final   • Phencyclidine (PCP), Urine 02/27/2020 Negative  Negative Final   • Cocaine Screen, Urine 02/27/2020 Negative  Negative Final   • Methamphetamine, Ur 02/27/2020 Negative  Negative Final   • Opiate Screen 02/27/2020 Negative  Negative Final   • Amphetamine Screen, Urine 02/27/2020 Negative  Negative Final   • Benzodiazepine Screen, Urine 02/27/2020 Negative  Negative Final   • Tricyclic Antidepressants Screen 02/27/2020 Negative  Negative Final   • Methadone Screen, Urine  02/27/2020 Negative  Negative Final   • Barbiturates Screen, Urine 02/27/2020 Negative  Negative Final   • Oxycodone Screen, Urine 02/27/2020 Negative  Negative Final   • Propoxyphene Screen 02/27/2020 Negative  Negative Final   • Buprenorphine, Screen, Urine 02/27/2020 Negative  Negative Final      XR Abdomen KUB  Narrative: PROCEDURE: AP supine abdomen.    INDICATION: abdominal pain possible volvulus, R10.9 Unspecified  abdominal pain R93.5 Abnormal findings on diagnostic imaging of  other abdominal regions, including retroperitoneum    COMPARISON: 11/1/2019 CT of the abdomen and pelvis.    Normal abdominal bowel gas pattern with no abnormally distended  bowel loops. There is gas in the colon including what appears to  be redundant sigmoid colon with no evidence of abnormal  distention or wall thickening. Moderate fecal material in the  right, transverse and descending colon.    Stable endovascular stent in the abdominal aorta extending into  the iliac arteries.    No suspicious calcifications or soft tissue densities.  Impression: No acute abdominal findings. There is gas within  nondistended rectosigmoid and redundant sigmoid colon.  Moderate fecal material in the right, transverse and descending  colon.    Stable appearance of the endovascular bilateral iliac stent.    [id number]    Electronically signed by:  Davi So MD  2/13/2020 3:41  PM CST Workstation: [id number]  XR Chest PA & Lateral  Narrative: PROCEDURE: XR CHEST PA AND LATERAL    VIEWS: PA/Lat    INDICATION: Dyspnea on exertion    COMPARISON: none    FINDINGS:       - lines/tubes: none    - cardiac: size within normal limits.    - mediastinum: contour within normal limits.     - lungs: no evidence of a focal air space process, pulmonary  interstitial edema, nodule(s)/mass.     - pleura: no evidence of  fluid.      - osseous: unremarkable for age.    - Misc. A cardiac loop recorder projects over the left upper  chest.  Impression: No acute  "cardiopulmonary process identified.    Electronically signed by:  Glory Gamez MD  2/13/2020 12:40 PM  CST Workstation: [id number]  XR Spine Lumbar Complete 4+VW  Narrative: PROCEDURE: XR SPINE LUMBAR COMPLETE 4+VW    VIEWS:   5      INDICATION:  Pain    COMPARISON:  None    FINDINGS:       - Fracture(s): None    - Alignment:  Within normal limits      - Disc space heights: Mildly narrowed L5-S1    - Focal osteolytic/blastic: None      - Bone density:  Grossly within normal limits for age    - Misc.:  Minimal lower lumbar degenerative facet changes  Bifurcated aortic endograft is present  There is a dilated loop of colon in the mid abdomen, measuring up  to 9.5 cm in diameter. There is gas and stool in what appears to  be the ascending colon, and within the majority of the descending  colon. Sigmoid volvulus cannot be excluded as etiology for this  large gas-filled loop of colon  Impression: 1. No acute osseous abnormality.   2. Gas-filled loop of distended colon in the mid left abdomen,  similar spondylosis cannot be entirely excluded.    Findings were discussed with Dr. Duvall at 1:25 PM EST on  2/13/2020    If pain or symptoms persist beyond reasonable expectations,   an MRI examination is suggested as is deemed clinically  appropriate.    Electronically signed by:  Glory Gamez MD  2/13/2020 12:29 PM  CST Workstation: [id number]    @[email or web address]@  Immunization History   Administered Date(s) Administered   • Flulaval/Fluarix Quad 01/15/2020, 01/15/2020       The following portions of the patient's history were reviewed and updated as appropriate: allergies, current medications, past family history, past medical history, past social history, past surgical history and problem list.        Physical Exam  /78 (BP Location: Right arm, Patient Position: Sitting, Cuff Size: Large Adult)   Pulse 86   Temp 97.5 °F (36.4 °C)   Ht 185.4 cm (73\")   Wt 109 kg (240 lb 3.2 oz)   SpO2 95%   BMI 31.69 kg/m²     Physical " Exam  Vitals signs and nursing note reviewed.   Constitutional:       Appearance: He is well-developed. He is not diaphoretic.   HENT:      Head: Normocephalic and atraumatic.      Right Ear: External ear normal.   Eyes:      Conjunctiva/sclera: Conjunctivae normal.      Pupils: Pupils are equal, round, and reactive to light.   Neck:      Musculoskeletal: Normal range of motion and neck supple.   Cardiovascular:      Rate and Rhythm: Normal rate and regular rhythm.      Heart sounds: Normal heart sounds. No murmur.   Pulmonary:      Effort: Pulmonary effort is normal. No respiratory distress.      Breath sounds: Normal breath sounds.   Abdominal:      General: Bowel sounds are normal. There is no distension.      Palpations: Abdomen is soft.      Tenderness: There is no abdominal tenderness.      Comments: Obese abdomen    Musculoskeletal: Normal range of motion.         General: Tenderness present. No deformity.        Legs:    Skin:     General: Skin is warm.      Coloration: Skin is not pale.      Findings: No erythema or rash.   Neurological:      Mental Status: He is alert and oriented to person, place, and time.      Cranial Nerves: No cranial nerve deficit.   Psychiatric:         Behavior: Behavior normal.         Assessment/Plan    Diagnosis Plan   1. Essential hypertension     2. Mixed hyperlipidemia     3. Coronary artery disease involving native coronary artery of native heart without angina pectoris     4. Class 1 obesity with serious comorbidity and body mass index (BMI) of 31.0 to 31.9 in adult, unspecified obesity type     5. Encounter for screening colonoscopy  Ambulatory Referral to Gastroenterology   6. Encounter for drug screening  gabapentin (NEURONTIN) 600 MG tablet   7. Left leg pain     8. Muscle spasm             -recommend labwork  -recommend colonoscopy  -will refer to GI  -recommend influenza vaccination -given today   -recommend pneumonia vaccination, tdap vaccination -given today    -dizziness/hypotension - stable   - left leg pain - recommend PT/OT but pt declined.  Continue neurontin 600 mg PO TID. Also continue flexeril 10 mg at bedtime..  Start on tumeric 500 mg PO q daily.    -elevated liver enzymes -get US of liver along with hepatitis panel  -back pain -x-ray of lower back . recommendd PT/OT but pt declined. Recommend Tylenol PRN no NSAIDs.  on  Neurontin 600 mg PO TID until pt sees Pain Management  Advised pt to make  appt with Pain Management  -CIC - start on linzess. Pt has failed Miralax.drug information provided   -hyperkalemia - recommend limit high potassium foods. Recheck in 1 [place]  -CKD stage 3 - Nephrology following. BP control   -COPD/emphysema. -referedl to Dr. Medina with Pulmonlogy with PFTs. He was seeing Dr. Mar. He would like to see a new Pulmonologist   -HTN - losartan 100 mg daily. Labetalol 200 mg PO BID  cardizem 180 mg PPo q daily.  Clonidine 1 mg PO qhs. Advised to stop chlorthalidone since BP on lower side today   Advised pt to check blood pressure in 2 weeks and record it. If BP continues to be high consider adding chlorthalidone.  -major depression- lexapro 20 mg daily.  -CAD -Cardiology following, aspirin, lipitor, labetalol  200 mg TID, losartan , plavix   -PVD - aspirin plavix, lipitor - Vascular Surgery following  -obesity - counseled weight loss >5 minutes BMI at 31.6   -AAA sp repair  - Vascular Surgery following   -advised pt to be safe and call with questions and concerns  -advised pt to go to ER or call 911 if symptoms worrisome or severe  -advised pt to followup with specialist and referrals  -advised pt be safe during COVID-19 pandemic   -total time with pt >25 minutes   -recheck in 2 months         This document has been electronically signed by Dread Duvall MD on September 30, 2020 09:02 CDT

## 2020-09-25 DIAGNOSIS — I71.40 ABDOMINAL AORTIC ANEURYSM (AAA) WITHOUT RUPTURE (HCC): ICD-10-CM

## 2020-09-25 DIAGNOSIS — I73.9 PVD (PERIPHERAL VASCULAR DISEASE) (HCC): Primary | ICD-10-CM

## 2020-09-30 ENCOUNTER — OFFICE VISIT (OUTPATIENT)
Dept: FAMILY MEDICINE CLINIC | Facility: CLINIC | Age: 45
End: 2020-09-30

## 2020-09-30 VITALS
HEIGHT: 73 IN | BODY MASS INDEX: 31.83 KG/M2 | DIASTOLIC BLOOD PRESSURE: 78 MMHG | HEART RATE: 86 BPM | WEIGHT: 240.2 LBS | OXYGEN SATURATION: 95 % | SYSTOLIC BLOOD PRESSURE: 110 MMHG | TEMPERATURE: 97.5 F

## 2020-09-30 DIAGNOSIS — E78.2 MIXED HYPERLIPIDEMIA: ICD-10-CM

## 2020-09-30 DIAGNOSIS — M62.838 MUSCLE SPASM: ICD-10-CM

## 2020-09-30 DIAGNOSIS — Z23 NEED FOR IMMUNIZATION AGAINST INFLUENZA: ICD-10-CM

## 2020-09-30 DIAGNOSIS — E66.9 CLASS 1 OBESITY WITH SERIOUS COMORBIDITY AND BODY MASS INDEX (BMI) OF 31.0 TO 31.9 IN ADULT, UNSPECIFIED OBESITY TYPE: ICD-10-CM

## 2020-09-30 DIAGNOSIS — I10 ESSENTIAL HYPERTENSION: Primary | ICD-10-CM

## 2020-09-30 DIAGNOSIS — Z12.11 ENCOUNTER FOR SCREENING COLONOSCOPY: ICD-10-CM

## 2020-09-30 DIAGNOSIS — Z02.83 ENCOUNTER FOR DRUG SCREENING: ICD-10-CM

## 2020-09-30 DIAGNOSIS — I25.10 CORONARY ARTERY DISEASE INVOLVING NATIVE CORONARY ARTERY OF NATIVE HEART WITHOUT ANGINA PECTORIS: ICD-10-CM

## 2020-09-30 DIAGNOSIS — Z23 NEED FOR VACCINATION: ICD-10-CM

## 2020-09-30 DIAGNOSIS — M79.605 LEFT LEG PAIN: ICD-10-CM

## 2020-09-30 PROCEDURE — 90686 IIV4 VACC NO PRSV 0.5 ML IM: CPT | Performed by: FAMILY MEDICINE

## 2020-09-30 PROCEDURE — 90471 IMMUNIZATION ADMIN: CPT | Performed by: FAMILY MEDICINE

## 2020-09-30 PROCEDURE — G0008 ADMIN INFLUENZA VIRUS VAC: HCPCS | Performed by: FAMILY MEDICINE

## 2020-09-30 PROCEDURE — 90732 PPSV23 VACC 2 YRS+ SUBQ/IM: CPT | Performed by: FAMILY MEDICINE

## 2020-09-30 PROCEDURE — G0009 ADMIN PNEUMOCOCCAL VACCINE: HCPCS | Performed by: FAMILY MEDICINE

## 2020-09-30 PROCEDURE — 90715 TDAP VACCINE 7 YRS/> IM: CPT | Performed by: FAMILY MEDICINE

## 2020-09-30 PROCEDURE — 99214 OFFICE O/P EST MOD 30 MIN: CPT | Performed by: FAMILY MEDICINE

## 2020-09-30 RX ORDER — VIT C/B6/B5/MAGNESIUM/HERB 173 50-5-6-5MG
500 CAPSULE ORAL DAILY
Qty: 30 CAPSULE | Refills: 3 | Status: SHIPPED | OUTPATIENT
Start: 2020-09-30 | End: 2021-06-01 | Stop reason: SDUPTHER

## 2020-09-30 RX ORDER — GABAPENTIN 600 MG/1
600 TABLET ORAL 3 TIMES DAILY
Qty: 90 TABLET | Refills: 3 | Status: SHIPPED | OUTPATIENT
Start: 2020-09-30 | End: 2020-11-02 | Stop reason: SDUPTHER

## 2020-09-30 NOTE — PATIENT INSTRUCTIONS
Get labwork    Will refer to Gastroenterology for colonoscopy screening. Dr Miller       For leg pain.  Continue neurontin 600 mg three times a day also flexeril 10 mg at bedtime            Colonoscopy, Adult  A colonoscopy is an exam to look at the entire large intestine. During the exam, a lubricated, flexible tube that has a camera on the end of it is inserted into the anus and then passed into the rectum, colon, and other parts of the large intestine.  You may have a colonoscopy as a part of normal colorectal screening or if you have certain symptoms, such as:  · Lack of red blood cells (anemia).  · Diarrhea that does not go away.  · Abdominal pain.  · Blood in your stool (feces).  A colonoscopy can help screen for and diagnose medical problems, including:  · Tumors.  · Polyps.  · Inflammation.  · Areas of bleeding.  Tell a health care provider about:  · Any allergies you have.  · All medicines you are taking, including vitamins, herbs, eye drops, creams, and over-the-counter medicines.  · Any problems you or family members have had with anesthetic medicines.  · Any blood disorders you have.  · Any surgeries you have had.  · Any medical conditions you have.  · Any problems you have had passing stool.  What are the risks?  Generally, this is a safe procedure. However, problems may occur, including:  · Bleeding.  · A tear in the intestine.  · A reaction to medicines given during the exam.  · Infection (rare).  What happens before the procedure?  Eating and drinking restrictions  Follow instructions from your health care provider about eating and drinking, which may include:  · A few days before the procedure - follow a low-fiber diet. Avoid nuts, seeds, dried fruit, raw fruits, and vegetables.  · 1-3 days before the procedure - follow a clear liquid diet. Drink only clear liquids, such as clear broth or bouillon, black coffee or tea, clear juice, clear soft drinks or sports drinks, gelatin dessert, and popsicles.  Avoid any liquids that contain red or purple dye.  · On the day of the procedure - do not eat or drink anything starting 2 hours before the procedure, or within the time period that your health care provider recommends. Up to 2 hours before the procedure, you may continue to drink clear liquids, such as water or clear fruit juice.  Bowel prep  If you were prescribed an oral bowel prep to clean out your colon:  · Take it as told by your health care provider. Starting the day before your procedure, you will need to drink a large amount of medicated liquid. The liquid will cause you to have multiple loose stools until your stool is almost clear or light green.  · If your skin or anus gets irritated from diarrhea, you may use these to relieve the irritation:  ? Medicated wipes, such as adult wet wipes with aloe and vitamin E.  ? A skin-soothing product like petroleum jelly.  · If you vomit while drinking the bowel prep, take a break for up to 60 minutes and then begin the bowel prep again. If vomiting continues and you cannot take the bowel prep without vomiting, call your health care provider.  · To clean out your colon, you may also be given:  ? Laxative medicines.  ? Instructions about how to use an enema.  General instructions  · Ask your health care provider about:  ? Changing or stopping your regular medicines or supplements. This is especially important if you are taking iron supplements, diabetes medicines, or blood thinners.  ? Taking medicines such as aspirin and ibuprofen. These medicines can thin your blood. Do not take these medicines before the procedure if your health care provider tells you not to.  · Plan to have someone take you home from the hospital or clinic.  What happens during the procedure?    · An IV may be inserted into one of your veins.  · You will be given medicine to help you relax (sedative).  · To reduce your risk of infection:  ? Your health care team will wash or sanitize their  hands.  ? Your anal area will be washed with soap.  · You will be asked to lie on your side with your knees bent.  · Your health care provider will lubricate a long, thin, flexible tube. The tube will have a camera and a light on the end.  · The tube will be inserted into your anus.  · The tube will be gently eased through your rectum and colon.  · Air will be delivered into your colon to keep it open. You may feel some pressure or cramping.  · The camera will be used to take images during the procedure.  · A small tissue sample may be removed to be examined under a microscope (biopsy).  · If small polyps are found, your health care provider may remove them and have them checked for cancer cells.  · When the exam is done, the tube will be removed.  The procedure may vary among health care providers and hospitals.  What happens after the procedure?  · Your blood pressure, heart rate, breathing rate, and blood oxygen level will be monitored until the medicines you were given have worn off.  · Do not drive for 24 hours after the exam.  · You may have a small amount of blood in your stool.  · You may pass gas and have mild abdominal cramping or bloating due to the air that was used to inflate your colon during the exam.  · It is up to you to get the results of your procedure. Ask your health care provider, or the department performing the procedure, when your results will be ready.  Summary  · A colonoscopy is an exam to look at the entire large intestine.  · During a colonoscopy, a lubricated, flexible tube with a camera on the end of it is inserted into the anus and then passed into the colon and other parts of the large intestine.  · Follow instructions from your health care provider about eating and drinking before the procedure.  · If you were prescribed an oral bowel prep to clean out your colon, take it as told by your health care provider.  · After your procedure, your blood pressure, heart rate, breathing rate,  and blood oxygen level will be monitored until the medicines you were given have worn off.  This information is not intended to replace advice given to you by your health care provider. Make sure you discuss any questions you have with your health care provider.  Document Released: 12/15/2001 Document Revised: 10/10/2018 Document Reviewed: 02/28/2017  [employer] Patient Education © 2020 [employer]  Gabapentin capsules or tablets  What is this medicine?  GABAPENTIN (GA ba pen tin) is used to control seizures in certain types of epilepsy. It is also used to treat certain types of nerve pain.  This medicine may be used for other purposes; ask your health care provider or pharmacist if you have questions.  COMMON BRAND NAME(S): Active-PAC with Gabapentin, Gabarone, Neurontin  What should I tell my health care provider before I take this medicine?  They need to know if you have any of these conditions:  · history of drug abuse or alcohol abuse problem  · kidney disease  · lung or breathing disease  · suicidal thoughts, plans, or attempt; a previous suicide attempt by you or a family member  · an unusual or allergic reaction to gabapentin, other medicines, foods, dyes, or preservatives  · pregnant or trying to get pregnant  · breast-feeding  How should I use this medicine?  Take this medicine by mouth with a glass of water. Follow the directions on the prescription label. You can take it with or without food. If it upsets your stomach, take it with food. Take your medicine at regular intervals. Do not take it more often than directed. Do not stop taking except on your doctor's advice.  If you are directed to break the 600 or 800 mg tablets in half as part of your dose, the extra half tablet should be used for the next dose. If you have not used the extra half tablet within 28 days, it should be thrown away.  A special MedGuide will be given to you by the pharmacist with each prescription and refill. Be sure to read this  information carefully each time.  Talk to your pediatrician regarding the use of this medicine in children. While this drug may be prescribed for children as young as 3 years for selected conditions, precautions do apply.  Overdosage: If you think you have taken too much of this medicine contact a poison control center or emergency room at once.  NOTE: This medicine is only for you. Do not share this medicine with others.  What if I miss a dose?  If you miss a dose, take it as soon as you can. If it is almost time for your next dose, take only that dose. Do not take double or extra doses.  What may interact with this medicine?  This medicine may interact with the following medications:  · alcohol  · antihistamines for allergy, cough, and cold  · certain medicines for anxiety or sleep  · certain medicines for depression like amitriptyline, fluoxetine, sertraline  · certain medicines for seizures like phenobarbital, primidone  · certain medicines for stomach problems  · general anesthetics like halothane, isoflurane, methoxyflurane, propofol  · local anesthetics like lidocaine, pramoxine, tetracaine  · medicines that relax muscles for surgery  · narcotic medicines for pain  · phenothiazines like chlorpromazine, mesoridazine, prochlorperazine, thioridazine  This list may not describe all possible interactions. Give your health care provider a list of all the medicines, herbs, non-prescription drugs, or dietary supplements you use. Also tell them if you smoke, drink alcohol, or use illegal drugs. Some items may interact with your medicine.  What should I watch for while using this medicine?  Visit your doctor or health care provider for regular checks on your progress. You may want to keep a record at home of how you feel your condition is responding to treatment. You may want to share this information with your doctor or health care provider at each visit. You should contact your doctor or health care provider if your  seizures get worse or if you have any new types of seizures. Do not stop taking this medicine or any of your seizure medicines unless instructed by your doctor or health care provider. Stopping your medicine suddenly can increase your seizures or their severity.  This medicine may cause serious skin reactions. They can happen weeks to months after starting the medicine. Contact your health care provider right away if you notice fevers or flu-like symptoms with a rash. The rash may be red or purple and then turn into blisters or peeling of the skin. Or, you might notice a red rash with swelling of the face, lips or lymph nodes in your neck or under your arms.  Wear a medical identification bracelet or chain if you are taking this medicine for seizures, and carry a card that lists all your medications.  You may get drowsy, dizzy, or have blurred vision. Do not drive, use machinery, or do anything that needs mental alertness until you know how this medicine affects you. To reduce dizzy or fainting spells, do not sit or stand up quickly, especially if you are an older patient. Alcohol can increase drowsiness and dizziness. Avoid alcoholic drinks.  Your mouth may get dry. Chewing sugarless gum or sucking hard candy, and drinking plenty of water will help.  The use of this medicine may increase the chance of suicidal thoughts or actions. Pay special attention to how you are responding while on this medicine. Any worsening of mood, or thoughts of suicide or dying should be reported to your health care provider right away.  Women who become pregnant while using this medicine may enroll in the [place] Antiepileptic Drug Pregnancy Registry by calling 1-952.198.3788. This registry collects information about the safety of antiepileptic drug use during pregnancy.  What side effects may I notice from receiving this medicine?  Side effects that you should report to your doctor or health care professional as soon as  possible:  · allergic reactions like skin rash, itching or hives, swelling of the face, lips, or tongue  · breathing problems  · rash, fever, and swollen lymph nodes  · redness, blistering, peeling or loosening of the skin, including inside the mouth  · suicidal thoughts, mood changes  Side effects that usually do not require medical attention (report to your doctor or health care professional if they continue or are bothersome):  · dizziness  · drowsiness  · headache  · nausea, vomiting  · swelling of ankles, feet, hands  · tiredness  This list may not describe all possible side effects. Call your doctor for medical advice about side effects. You may report side effects to FDA at 4-957-WRH-0932.  Where should I keep my medicine?  Keep out of reach of children.  This medicine may cause accidental overdose and death if it taken by other adults, children, or pets. Mix any unused medicine with a substance like cat litter or coffee grounds. Then throw the medicine away in a sealed container like a sealed bag or a coffee can with a lid. Do not use the medicine after the expiration date.  Store at room temperature between 15 and 30 degrees C (59 and 86 degrees F).  NOTE: This sheet is a summary. It may not cover all possible information. If you have questions about this medicine, talk to your doctor, pharmacist, or health care provider.  © 2020 [employer] (2020-03-20 14:16:43)

## 2020-10-01 RX ORDER — CLOPIDOGREL BISULFATE 75 MG/1
75 TABLET ORAL DAILY
Qty: 30 TABLET | Refills: 3 | Status: SHIPPED | OUTPATIENT
Start: 2020-10-01 | End: 2021-02-01 | Stop reason: SDUPTHER

## 2020-10-02 RX ORDER — DILTIAZEM HYDROCHLORIDE 180 MG/1
180 CAPSULE, COATED, EXTENDED RELEASE ORAL DAILY
Qty: 30 CAPSULE | Refills: 3 | Status: SHIPPED | OUTPATIENT
Start: 2020-10-02 | End: 2021-02-22 | Stop reason: SDUPTHER

## 2020-10-02 NOTE — TELEPHONE ENCOUNTER
Caller: Gregg Randle    Relationship: Self    Best call back number:[phone]     Medication needed:   Requested Prescriptions     Pending Prescriptions Disp Refills   • dilTIAZem CD (Cardizem CD) 180 MG 24 hr capsule 30 capsule 3     Sig: Take 1 capsule by mouth Daily.       When do you need the refill by: 10/2/2020    What details did the patient provide when requesting the medication: PATIENT ALSO HAS A PRESCRIPTION FOR ACETAMINOPHEN 300MG    Does the patient have less than a 3 day supply:  [x] Yes  [] No    What is the patient's preferred pharmacy: [id number] [address] [facility] AT SEC OF [address] [facility] - [phone]  - [phone] FX

## 2020-10-15 ENCOUNTER — HOSPITAL ENCOUNTER (OUTPATIENT)
Dept: CT IMAGING | Facility: HOSPITAL | Age: 45
Discharge: HOME | End: 2020-10-15
Admitting: THORACIC SURGERY (CARDIOTHORACIC VASCULAR SURGERY)

## 2020-10-15 DIAGNOSIS — I71.40 ABDOMINAL AORTIC ANEURYSM (AAA) WITHOUT RUPTURE (HCC): ICD-10-CM

## 2020-10-15 PROCEDURE — 74176 CT ABD & PELVIS W/O CONTRAST: CPT

## 2020-10-23 ENCOUNTER — OFFICE VISIT (OUTPATIENT)
Dept: GASTROENTEROLOGY | Facility: CLINIC | Age: 45
End: 2020-10-23

## 2020-10-23 VITALS
HEIGHT: 73 IN | WEIGHT: 247.2 LBS | SYSTOLIC BLOOD PRESSURE: 103 MMHG | DIASTOLIC BLOOD PRESSURE: 66 MMHG | BODY MASS INDEX: 32.76 KG/M2 | HEART RATE: 77 BPM

## 2020-10-23 DIAGNOSIS — Z12.11 ENCOUNTER FOR SCREENING FOR MALIGNANT NEOPLASM OF COLON: Primary | ICD-10-CM

## 2020-10-23 PROCEDURE — S0260 H&P FOR SURGERY: HCPCS | Performed by: INTERNAL MEDICINE

## 2020-10-23 RX ORDER — DEXTROSE AND SODIUM CHLORIDE 5; .45 G/100ML; G/100ML
30 INJECTION, SOLUTION INTRAVENOUS CONTINUOUS PRN
Status: CANCELLED | OUTPATIENT
Start: 2020-11-17

## 2020-10-23 NOTE — PATIENT INSTRUCTIONS
"BMI for Adults  What is BMI?  Body mass index (BMI) is a number that is calculated from a person's weight and height. BMI can help estimate how much of a person's weight is composed of fat. BMI does not measure body fat directly. Rather, it is an alternative to procedures that directly measure body fat, which can be difficult and expensive.  BMI can help identify people who may be at higher risk for certain medical problems.  What are BMI measurements used for?  BMI is used as a screening tool to identify possible weight problems. It helps determine whether a person is obese, overweight, a healthy weight, or underweight.  BMI is useful for:  · Identifying a weight problem that may be related to a medical condition or may increase the risk for medical problems.  · Promoting changes, such as changes in diet and exercise, to help reach a healthy weight. BMI screening can be repeated to see if these changes are working.  How is BMI calculated?  BMI involves measuring your weight in relation to your height. Both height and weight are measured, and the BMI is calculated from those numbers. This can be done either in English ([place] or metric measurements. Note that charts and online BMI calculators are available to help you find your BMI quickly and easily without having to do these calculations yourself.  To calculate your BMI in English ([place] measurements:    1. Measure your weight in pounds (lb).  2. Multiply the number of pounds by 703.  ? For example, for a person who weighs 180 lb, multiply that number by 703, which equals 126,540.  3. Measure your height in inches. Then multiply that number by itself to get a measurement called \"inches squared.\"  ? For example, for a person who is 70 inches tall, the \"inches squared\" measurement is 70 inches x 70 inches, which equals 4,900 inches squared.  4. Divide the total from step 2 (number of lb x 703) by the total from step 3 (inches squared): 126,540 ÷ 4,900 = 25.8. This is " "your BMI.  To calculate your BMI in metric measurements:  1. Measure your weight in kilograms (kg).  2. Measure your height in meters (m). Then multiply that number by itself to get a measurement called \"meters squared.\"  ? For example, for a person who is 1.75 m tall, the \"meters squared\" measurement is 1.75 m x 1.75 m, which is equal to 3.1 meters squared.  3. Divide the number of kilograms (your weight) by the meters squared number. In this example: 70 ÷ 3.1 = 22.6. This is your BMI.  What do the results mean?  BMI charts are used to identify whether you are underweight, normal weight, overweight, or obese. The following guidelines will be used:  · Underweight: BMI less than 18.5.  · Normal weight: BMI between 18.5 and 24.9.  · Overweight: BMI between 25 and 29.9.  · Obese: BMI of 30 or above.  Keep these notes in mind:  · Weight includes both fat and muscle, so someone with a muscular build, such as an athlete, may have a BMI that is higher than 24.9. In cases like these, BMI is not an accurate measure of body fat.  · To determine if excess body fat is the cause of a BMI of 25 or higher, further assessments may need to be done by a health care provider.  · BMI is usually interpreted in the same way for men and women.  Where to find more information  For more information about BMI, including tools to quickly calculate your BMI, go to these websites:  · Centers for Disease Control and Prevention: www.cdc.gov  · American Heart Association: www.heart.org  · National Heart, Lung, and Blood [place]: www.nhlbi.nih.gov  Summary  · Body mass index (BMI) is a number that is calculated from a person's weight and height.  · BMI may help estimate how much of a person's weight is composed of fat. BMI can help identify those who may be at higher risk for certain medical problems.  · BMI can be measured using English measurements or metric measurements.  · BMI charts are used to identify whether you are underweight, normal " weight, overweight, or obese.  This information is not intended to replace advice given to you by your health care provider. Make sure you discuss any questions you have with your health care provider.  Document Released: 08/29/2005 Document Revised: 09/09/2020 Document Reviewed: 07/17/2020  [employer] Patient Education © 2020 [employer]

## 2020-10-24 NOTE — PROGRESS NOTES
[facility]      Chief Complaint:   Chief Complaint   Patient presents with   • Colon Cancer Screening       Subjective     HPI:   Mr. Randle is a 45-year-old -American male with past medical history of abdominal aortic aneurysm, arthritis, coronary artery disease, COPD, depression, hypertension, hyperlipidemia, status post MI, renal insufficiency, sleep apnea, CVA presenting for colorectal cancer screening.  He does not have any GI complaints at this time including abdominal pain, nausea, vomiting, diarrhea, constipation, rectal bleeding or weight loss.  Family history is unremarkable for colon cancer and polyps.    Past Medical History:   Past Medical History:   Diagnosis Date   • AAA (abdominal aortic aneurysm) (CMS/HCC)    • Arthritis    • CAD (coronary artery disease)    • COPD (chronic obstructive pulmonary disease) (CMS/[facility])    • Depression    • Depression    • HTN (hypertension)    • Hyperlipidemia    • MI (myocardial infarction) (CMS/[facility])     x 2   • Renal insufficiency    • Sleep apnea     using c-pap   • Sleep apnea    • Stroke (CMS/[facility])     no residual except some intermittent vision changes   • Vision changes     post stroke, takes a minute to focus       Past Surgical History:  Past Surgical History:   Procedure Laterality Date   • CORONARY ANGIOPLASTY WITH STENT PLACEMENT     • EAR TUBES Left    • ME AAA REPAIR,AORTO-AORTIC TUBE PROSTH N/A 8/11/2017    Procedure: ABDOMINAL AORTIC ANEURYSM REPAIR WITH ENDOGRAFT, REPAIR ILIAC ARTERY ANEURYSM, POSSIBLE OPEN ABDOMINAL AORTOGRAM    (CELL SAVER STAND-BY);  Surgeon: Aren Arshad MD;  Location: [facility];  Service: Vascular       Family History:  Family History   Problem Relation Age of Onset   • Stroke Mother    • Diabetes Mother    • Heart disease Father        Social History:   reports that he has quit smoking. He has a 20.00 pack-year smoking history. He has never used smokeless tobacco. He reports that he does  not drink alcohol or use drugs.    Medications:   Prior to Admission medications    Medication Sig Start Date End Date Taking? Authorizing Provider   albuterol (PROVENTIL HFA;VENTOLIN HFA) 108 (90 BASE) MCG/ACT inhaler Inhale 2 puffs Every 4 (Four) Hours As Needed for Wheezing.   Yes Harry Oakes MD   allopurinol (ZYLOPRIM) 100 MG tablet Take 1 tablet by mouth Daily. 4/9/20  Yes Dread Duvall MD   atorvastatin (LIPITOR) 20 MG tablet Take 1 tablet by mouth Every Night. 2/24/20  Yes Dread Duvall MD   brimonidine (ALPHAGAN) 0.2 % ophthalmic solution Administer 1 drop to the right eye 3 (Three) Times a Day.   Yes Harry Oakes MD   calcitriol (ROCALTROL) 0.25 MCG capsule Take 1 capsule by mouth Daily. 5/21/20  Yes Dread Duvall MD   clopidogrel (PLAVIX) 75 MG tablet Take 1 tablet by mouth Daily. 10/1/20  Yes Dread Duvall MD   cyclobenzaprine (FLEXERIL) 10 MG tablet Take 1 tablet by mouth every night at bedtime. 5/21/20  Yes Dread Duvall MD   dilTIAZem CD (Cardizem CD) 180 MG 24 hr capsule Take 1 capsule by mouth Daily.  Patient taking differently: Take 240 mg by mouth Daily. 10/2/20  Yes Dread Duvall MD   gabapentin (NEURONTIN) 600 MG tablet Take 1 tablet by mouth 3 (Three) Times a Day. 9/30/20  Yes Dread Duvall MD   guanFACINE (TENEX) 1 MG tablet Take 1 tablet by mouth Every Night for 90 days. 9/1/20 11/30/20 Yes Dread Duvall MD   labetalol (NORMODYNE) 200 MG tablet Take 1 tablet by mouth 2 (Two) Times a Day. 4/23/20  Yes Dread Duvall MD   linaclotide (LINZESS) 72 MCG capsule capsule Take 1 capsule by mouth Every Morning Before Breakfast. 2/27/20  Yes Dread Duvall MD   losartan (COZAAR) 100 MG tablet Take 1 tablet by mouth Daily. 4/23/20  Yes Dread Duvall MD   meclizine (ANTIVERT) 25 MG tablet Take 25 mg by mouth 3 (Three) Times a Day As Needed for Dizziness.   Yes Provider, MD Harry   Turmeric (QC Tumeric Complex) 500 MG capsule Take 500 mg by mouth  "Daily. 9/30/20  Yes Dread Duvall MD       Allergies:  Patient has no known allergies.    ROS:    Review of Systems   Constitutional: Negative for chills, fatigue, fever and unexpected weight change.   HENT: Negative for congestion, ear discharge, hearing loss, nosebleeds and sore throat.    Eyes: Negative for pain, discharge and redness.   Respiratory: Negative for cough, chest tightness, shortness of breath and wheezing.    Cardiovascular: Negative for chest pain and palpitations.   Gastrointestinal: Negative for abdominal distention, abdominal pain, blood in stool, constipation, diarrhea, nausea and vomiting.   Endocrine: Negative for cold intolerance, polydipsia, polyphagia and polyuria.   Genitourinary: Negative for dysuria, flank pain, frequency, hematuria and urgency.   Musculoskeletal: Negative for arthralgias, back pain, joint swelling and myalgias.   Skin: Negative for color change, pallor and rash.   Neurological: Negative for tremors, seizures, syncope, weakness and headaches.   Hematological: Negative for adenopathy. Does not bruise/bleed easily.   Psychiatric/Behavioral: Negative for behavioral problems, confusion, dysphoric mood, hallucinations and suicidal ideas. The patient is not nervous/anxious.      Objective     Blood pressure 103/66, pulse 77, height 185.4 cm (73\"), weight 112 kg (247 lb 3.2 oz).    Physical Exam  Constitutional:       Appearance: He is well-developed.   HENT:      Head: Normocephalic and atraumatic.   Eyes:      Conjunctiva/sclera: Conjunctivae normal.      Pupils: Pupils are equal, round, and reactive to light.   Neck:      Musculoskeletal: Normal range of motion and neck supple.      Thyroid: No thyromegaly.   Cardiovascular:      Rate and Rhythm: Normal rate and regular rhythm.      Heart sounds: Normal heart sounds. No murmur.   Pulmonary:      Effort: Pulmonary effort is normal.      Breath sounds: Normal breath sounds. No wheezing.   Abdominal:      General: Bowel " sounds are normal. There is no distension.      Palpations: Abdomen is soft. There is no mass.      Tenderness: There is no abdominal tenderness.      Hernia: No hernia is present.   Genitourinary:     Comments: No lesions noted  Musculoskeletal: Normal range of motion.         General: No tenderness.   Lymphadenopathy:      Cervical: No cervical adenopathy.   Skin:     General: Skin is warm and dry.      Findings: No rash.   Neurological:      Mental Status: He is alert and oriented to person, place, and time.      Cranial Nerves: No cranial nerve deficit.   Psychiatric:         Thought Content: Thought content normal.        Extremities: No edema, cyanosis or clubbing.    Assessment/Plan    1.  Colorectal cancer screening, average risk.  Schedule colonoscopy.  2.  Obesity, recommend exercise and diet control.  Diagnoses and all orders for this visit:    1. Encounter for screening for malignant neoplasm of colon (Primary)  -     Case Request; Standing  -     dextrose 5 % and sodium chloride 0.45 % infusion  -     Case Request    Other orders  -     Follow Anesthesia Guidelines / Standing Orders; Future  -     Obtain Informed Consent; Future  -     Implement Anesthesia Orders Day of Procedure; Standing  -     Obtain Informed Consent; Standing  -     POC Glucose Once; Standing        COLONOSCOPY (N/A)     Diagnosis Plan   1. Encounter for screening for malignant neoplasm of colon  Case Request    dextrose 5 % and sodium chloride 0.45 % infusion    Case Request       Anticipated Surgical Procedure:  Orders Placed This Encounter   Procedures   • Follow Anesthesia Guidelines / Standing Orders     Standing Status:   Future   • Obtain Informed Consent     Standing Status:   Future     Order Specific Question:   Informed Consent Given For     Answer:   colonoscopy       The risks, benefits, and alternatives of this procedure have been discussed with the patient or the responsible party- the patient understands and agrees  to proceed.            This document has been electronically signed by Abi Miller MD on October 23, 2020 21:21 CDT

## 2020-10-26 ENCOUNTER — OFFICE VISIT (OUTPATIENT)
Dept: CARDIAC SURGERY | Facility: CLINIC | Age: 45
End: 2020-10-26

## 2020-10-26 DIAGNOSIS — I10 BENIGN ESSENTIAL HTN: ICD-10-CM

## 2020-10-26 DIAGNOSIS — I73.9 PVD (PERIPHERAL VASCULAR DISEASE) (HCC): ICD-10-CM

## 2020-10-26 DIAGNOSIS — N18.32 STAGE 3B CHRONIC KIDNEY DISEASE (HCC): ICD-10-CM

## 2020-10-26 DIAGNOSIS — G81.94 LEFT HEMIPARESIS (HCC): ICD-10-CM

## 2020-10-26 DIAGNOSIS — E66.9 CLASS 1 OBESITY WITH SERIOUS COMORBIDITY AND BODY MASS INDEX (BMI) OF 32.0 TO 32.9 IN ADULT, UNSPECIFIED OBESITY TYPE: ICD-10-CM

## 2020-10-26 DIAGNOSIS — I25.10 CORONARY ARTERY DISEASE INVOLVING NATIVE CORONARY ARTERY OF NATIVE HEART WITHOUT ANGINA PECTORIS: ICD-10-CM

## 2020-10-26 DIAGNOSIS — I71.40 ABDOMINAL AORTIC ANEURYSM (AAA) WITHOUT RUPTURE (HCC): Primary | ICD-10-CM

## 2020-10-26 DIAGNOSIS — E78.2 MIXED HYPERLIPIDEMIA: ICD-10-CM

## 2020-10-26 DIAGNOSIS — F17.218 NICOTINE DEPENDENCE, CIGARETTES, WITH OTHER NICOTINE-INDUCED DISORDERS: ICD-10-CM

## 2020-10-26 PROCEDURE — 99214 OFFICE O/P EST MOD 30 MIN: CPT | Performed by: THORACIC SURGERY (CARDIOTHORACIC VASCULAR SURGERY)

## 2020-10-26 NOTE — PROGRESS NOTES
10/26/2020    Gregg Randle  1975    Chief Complaint:  AAA    HPI:  You have chosen to receive care through a telephone visit. Do you consent to use a telephone visit for your medical care today? Yes    PCP:  Dread Duvall MD  PCP:  Dr Jiménez  Nephrology:  Dr Jones     45y.o. male with HTN(stable, increased risk stroke, rupture), Hyperlipidemia(stable, increased risk cardiovascular events), Obesity(uncontrolled, increased risk cardiovascular events), COPD(stable, increased risk pulmonary complications) and Chronic Kidney Disease(stable, increased risk renal failure) AAA(stable, increase risk rupture),   former smoker.  Asymptomatic AAA.  endoAAA repair 2017. Mild to moderate numbness tingling LEFT arm, leg.  LEFT leg occasionally gives out.  Abdominal ultrasound done for kidney evaluation shows AAA.  On Coumadin  .  No other associated signs, symptoms or modifying factors.     3/2017 US Retroperitoneal, Renal  ([facility]):  Medical renal disease.  52mm infrarenal AAA, iliac R 27mm, L 19mm.  6/2017 CT Abdomen Pelvis without contrast:  Descending thoracic aorta 27mm, renals 23mm, infrarenal 52mm, iliacs R 29mm, L 21mm, femorals R 12mm, L 13mm.  8/2017: Endo AAA  9/2019 CT Abdomen Pelvis:  Descending thoracic aorta 25mm, abdominal 45mm, iliac R 33mm, L 20mm, femoral R 11mm, L 10mm.  endoAAA stent graft in place.  10/2020 CT Abdomen Pelvis:  Descending thoracic aorta 27mm, abdominal 44mm, iliac R 32mm, L 21mm, femoral R 11mm, L 10mm.  endoAAA stent graft in place.     5/2017 VIKASH:  RIGHT 1.1 triphasic.  LEFT 1.1 triphasic.    The following portions of the patient's history were reviewed and updated as appropriate: allergies, current medications, past family history, past medical history, past social history, past surgical history and problem list.  Recent images independently reviewed.  Available laboratory values reviewed.    PMH:  Past Medical History:   Diagnosis Date   • AAA (abdominal aortic aneurysm)  (CMS/[facility])    • Arthritis    • CAD (coronary artery disease)    • COPD (chronic obstructive pulmonary disease) (CMS/[facility])    • Depression    • Depression    • HTN (hypertension)    • Hyperlipidemia    • MI (myocardial infarction) (CMS/[facility])     x 2   • Renal insufficiency    • Sleep apnea     using c-pap   • Sleep apnea    • Stroke (CMS/[facility])     no residual except some intermittent vision changes   • Vision changes     post stroke, takes a minute to focus     Past Surgical History:   Procedure Laterality Date   • CORONARY ANGIOPLASTY WITH STENT PLACEMENT     • EAR TUBES Left    • [place] AAA REPAIR,AORTO-AORTIC TUBE PROSTH N/A 8/11/2017    Procedure: ABDOMINAL AORTIC ANEURYSM REPAIR WITH ENDOGRAFT, REPAIR ILIAC ARTERY ANEURYSM, POSSIBLE OPEN ABDOMINAL AORTOGRAM    (CELL SAVER STAND-BY);  Surgeon: Aren Arshad MD;  Location: [facility];  Service: Vascular     Family History   Problem Relation Age of Onset   • Stroke Mother    • Diabetes Mother    • Heart disease Father      Social History     Tobacco Use   • Smoking status: Former Smoker     Packs/day: 1.00     Years: 20.00     Pack years: 20.00   • Smokeless tobacco: Never Used   Substance Use Topics   • Alcohol use: No   • Drug use: No       ALLERGIES:  No Known Allergies      MEDICATIONS:    Current Outpatient Medications:   •  albuterol (PROVENTIL HFA;VENTOLIN HFA) 108 (90 BASE) MCG/ACT inhaler, Inhale 2 puffs Every 4 (Four) Hours As Needed for Wheezing., Disp: , Rfl:   •  allopurinol (ZYLOPRIM) 100 MG tablet, Take 1 tablet by mouth Daily., Disp: 30 tablet, Rfl: 3  •  atorvastatin (LIPITOR) 20 MG tablet, Take 1 tablet by mouth Every Night., Disp: 90 tablet, Rfl: 3  •  brimonidine (ALPHAGAN) 0.2 % ophthalmic solution, Administer 1 drop to the right eye 3 (Three) Times a Day., Disp: , Rfl:   •  calcitriol (ROCALTROL) 0.25 MCG capsule, Take 1 capsule by mouth Daily., Disp: 30 capsule, Rfl: 3  •  clopidogrel (PLAVIX) 75 MG tablet, Take 1 tablet by mouth Daily.,  Disp: 30 tablet, Rfl: 3  •  cyclobenzaprine (FLEXERIL) 10 MG tablet, Take 1 tablet by mouth every night at bedtime., Disp: 30 tablet, Rfl: 3  •  dilTIAZem CD (Cardizem CD) 180 MG 24 hr capsule, Take 1 capsule by mouth Daily. (Patient taking differently: Take 240 mg by mouth Daily.), Disp: 30 capsule, Rfl: 3  •  gabapentin (NEURONTIN) 600 MG tablet, Take 1 tablet by mouth 3 (Three) Times a Day., Disp: 90 tablet, Rfl: 3  •  guanFACINE (TENEX) 1 MG tablet, Take 1 tablet by mouth Every Night for 90 days., Disp: 90 tablet, Rfl: 1  •  labetalol (NORMODYNE) 200 MG tablet, Take 1 tablet by mouth 2 (Two) Times a Day., Disp: 60 tablet, Rfl: 3  •  linaclotide (LINZESS) 72 MCG capsule capsule, Take 1 capsule by mouth Every Morning Before Breakfast., Disp: 90 capsule, Rfl: 3  •  losartan (COZAAR) 100 MG tablet, Take 1 tablet by mouth Daily., Disp: 30 tablet, Rfl: 3  •  meclizine (ANTIVERT) 25 MG tablet, Take 25 mg by mouth 3 (Three) Times a Day As Needed for Dizziness., Disp: , Rfl:   •  polyethylene glycol (GoLYTELY) 236 g solution, Starting at noon on day prior to procedure, drink 8 ounces every 30 minutes until all gone or stools are clear. May add flavor packet., Disp: 4000 mL, Rfl: 0  •  Turmeric (QC Tumeric Complex) 500 MG capsule, Take 500 mg by mouth Daily., Disp: 30 capsule, Rfl: 3    Review of Systems   Review of Systems   Constitution: Positive for malaise/fatigue and night sweats. Negative for weight loss.   HENT: Positive for hearing loss.    Eyes: Positive for blurred vision.   Cardiovascular: Negative for chest pain, claudication and dyspnea on exertion.   Respiratory: Negative for cough and shortness of breath.    Skin: Negative for color change and poor wound healing.   Musculoskeletal: Positive for arthritis, back pain and neck pain.   Neurological: Positive for dizziness, focal weakness, numbness and paresthesias. Negative for weakness.       Physical Exam   There were no vitals filed for this  visit.  telehealth visit    BUN   Date Value Ref Range Status   01/15/2020 30 (H) 6 - 20 mg/dL Final     Creatinine   Date Value Ref Range Status   01/15/2020 2.72 (H) 0.76 - 1.27 mg/dL Final     eGFR   Amer   Date Value Ref Range Status   01/15/2020 31 (L) >60 mL/min/1.73 Final       ASSESSMENT:  Diagnoses and all orders for this visit:    1. Abdominal aortic aneurysm (AAA) without rupture (CMS/HCC) (Primary)  -     CT Abdomen Pelvis Without Contrast; Future    2. PVD (peripheral vascular disease) (CMS/HCC)    3. Mixed hyperlipidemia    4. Benign essential HTN    5. Coronary artery disease involving native coronary artery of native heart without angina pectoris    6. Class 1 obesity with serious comorbidity and body mass index (BMI) of 32.0 to 32.9 in adult, unspecified obesity type    7. Left hemiparesis (CMS/HCC)    8. Stage 3b chronic kidney disease    9. Nicotine dependence, cigarettes, with other nicotine-induced disorders    PLAN:  Detailed discussion with Gregg Randle regarding situation and options.  Aneurysm abdominal aorta.  Surgical intervention not indicated at this time.  Will plan to follow with interval imaging.  Smoking cessation, lipid management, BP control in 120 range advised.  Discussed symptoms of rupture, details of surgical repair including endovascular and open repair.  Risks, benefits discussed.  Understands and wishes to proceed with plan    Return in 1 year with CT Abdomen Pelvis without contrast    Return after above studies complete  Obesity Class  1. Increased risk cardiovascular events, sleep and breathing disorders, joint issues, type 2 diabetes mellitus. Options for weight management, heart healthy diet, exercise programs, and associated health risks of obesity discussed.  Recommended regular physical activity, progressive walking program.  Continue current medications as directed.    This visit has been rescheduled as a phone visit to comply with patient safety  concerns in accordance with CDC recommendations. Total time of discussion was 30 minutes.    Thank you for the opportunity to participate in this patient's care.    Copy to primary care provider.    EMR Dragon/Transcription disclaimer:   Much of this encounter note is an electronic transcription/translation of spoken language to printed text. The electronic translation of spoken language may permit erroneous, or at times, nonsensical words or phrases to be inadvertently transcribed; Although I have reviewed the note for such errors, some may still exist.

## 2020-10-30 RX ORDER — CALCITRIOL 0.25 UG/1
0.25 CAPSULE, LIQUID FILLED ORAL DAILY
Qty: 30 CAPSULE | Refills: 3 | Status: SHIPPED | OUTPATIENT
Start: 2020-10-30 | End: 2020-11-02 | Stop reason: SDUPTHER

## 2020-11-02 DIAGNOSIS — Z02.83 ENCOUNTER FOR DRUG SCREENING: ICD-10-CM

## 2020-11-02 RX ORDER — ATORVASTATIN CALCIUM 20 MG/1
20 TABLET, FILM COATED ORAL NIGHTLY
Qty: 90 TABLET | Refills: 3 | Status: SHIPPED | OUTPATIENT
Start: 2020-11-02 | End: 2021-10-14 | Stop reason: SDUPTHER

## 2020-11-02 RX ORDER — GABAPENTIN 600 MG/1
600 TABLET ORAL 3 TIMES DAILY
Qty: 90 TABLET | Refills: 3 | Status: SHIPPED | OUTPATIENT
Start: 2020-11-02 | End: 2020-11-04 | Stop reason: SDUPTHER

## 2020-11-02 RX ORDER — LOSARTAN POTASSIUM 100 MG/1
100 TABLET ORAL DAILY
Qty: 30 TABLET | Refills: 3 | Status: SHIPPED | OUTPATIENT
Start: 2020-11-02 | End: 2021-07-13 | Stop reason: SDUPTHER

## 2020-11-02 RX ORDER — CALCITRIOL 0.25 UG/1
0.25 CAPSULE, LIQUID FILLED ORAL DAILY
Qty: 30 CAPSULE | Refills: 3 | Status: SHIPPED | OUTPATIENT
Start: 2020-11-02 | End: 2021-03-16 | Stop reason: SDUPTHER

## 2020-11-02 RX ORDER — GUANFACINE 1 MG/1
1 TABLET ORAL NIGHTLY
Qty: 90 TABLET | Refills: 1 | Status: SHIPPED | OUTPATIENT
Start: 2020-11-02 | End: 2021-10-14 | Stop reason: SDUPTHER

## 2020-11-02 RX ORDER — ALLOPURINOL 100 MG/1
100 TABLET ORAL DAILY
Qty: 30 TABLET | Refills: 3 | Status: SHIPPED | OUTPATIENT
Start: 2020-11-02 | End: 2021-04-02 | Stop reason: SDUPTHER

## 2020-11-02 NOTE — TELEPHONE ENCOUNTER
Med refill on:    losartan (COZAAR) 100 MG tablet  allopurinol (ZYLOPRIM) 100 MG tablet    calcitriol (ROCALTROL) 0.25 MCG capsule    atorvastatin (LIPITOR) 20 MG tablet    guanFACINE (TENEX) 1 MG tablet    gabapentin (NEURONTIN) 600 MG tablet    He has about 3 days left of all    [id number] - [facility] [address] [facility] AT [address] - [phone] PH - [phone] FX    P: [phone]  Patient stated his cpap mask is broken one side wondering if he can reorder a new one     He doesn't know what kind of mask

## 2020-11-04 DIAGNOSIS — Z02.83 ENCOUNTER FOR DRUG SCREENING: ICD-10-CM

## 2020-11-04 RX ORDER — GABAPENTIN 600 MG/1
600 TABLET ORAL 3 TIMES DAILY
Qty: 90 TABLET | Refills: 3 | Status: SHIPPED | OUTPATIENT
Start: 2020-11-04 | End: 2021-05-18 | Stop reason: SDUPTHER

## 2020-11-04 NOTE — TELEPHONE ENCOUNTER
Relationship: Self    Best call back number: [phone]    Medication needed:   Requested Prescriptions     Pending Prescriptions Disp Refills   • gabapentin (NEURONTIN) 600 MG tablet 90 tablet 3     Sig: Take 1 tablet by mouth 3 (Three) Times a Day.       When do you need the refill by: 11/4/2020    Does the patient have less than a 3 day supply:  [] Yes  [] No    What is the patient's preferred pharmacy: [facility] #[id number] [place] [address] AT [address] [facility] - [phone] [facility] [phone] FX

## 2020-11-10 ENCOUNTER — TELEPHONE (OUTPATIENT)
Dept: FAMILY MEDICINE CLINIC | Facility: CLINIC | Age: 45
End: 2020-11-10

## 2020-11-10 NOTE — TELEPHONE ENCOUNTER
PATIENT CALLING IN REQUESTING THAT DR. LOMELI SEND HIM IN A FACE MASK FOR HIS CPAP MACHINE.    PATIENT CALLBACK # [phone]     [facility] #[id number] [address] AT [address] OF [facility] [address] - [phone]  - [phone] FX  [phone]

## 2020-11-11 ENCOUNTER — OFFICE VISIT (OUTPATIENT)
Dept: FAMILY MEDICINE CLINIC | Facility: CLINIC | Age: 45
End: 2020-11-11

## 2020-11-11 VITALS
OXYGEN SATURATION: 98 % | HEIGHT: 73 IN | HEART RATE: 76 BPM | SYSTOLIC BLOOD PRESSURE: 112 MMHG | DIASTOLIC BLOOD PRESSURE: 74 MMHG | WEIGHT: 240.6 LBS | BODY MASS INDEX: 31.89 KG/M2 | TEMPERATURE: 97.1 F

## 2020-11-11 DIAGNOSIS — Z00.00 MEDICARE ANNUAL WELLNESS VISIT, INITIAL: Primary | ICD-10-CM

## 2020-11-11 PROCEDURE — G0438 PPPS, INITIAL VISIT: HCPCS | Performed by: FAMILY MEDICINE

## 2020-11-11 NOTE — PATIENT INSTRUCTIONS
Medicare Wellness  Personal Prevention Plan of Service     Date of Office Visit:  2020  Encounter Provider:  Dread Duvall MD  Place of Service:  [facility]  Patient Name: Gregg Randle  :  1975    As part of the Medicare Wellness portion of your visit today, we are providing you with this personalized preventive plan of services (PPPS). This plan is based upon recommendations of the [place] Preventive Services Task Force (USPSTF) and the Advisory Committee on Immunization Practices (ACIP).    This lists the preventive care services that should be considered, and provides dates of when you are due. Items listed as completed are up-to-date and do not require any further intervention.    Health Maintenance   Topic Date Due   • COLONOSCOPY  2020 (Originally 1975)   • LIPID PANEL  01/15/2021   • ANNUAL WELLNESS VISIT  2021   • TDAP/TD VACCINES (2 - Td) 2030   • HEPATITIS C SCREENING  Completed   • Pneumococcal Vaccine 0-64  Completed   • INFLUENZA VACCINE  Completed       No orders of the defined types were placed in this encounter.      Return in about 1 year (around 2021) for Medicare Wellness.

## 2020-11-11 NOTE — PROGRESS NOTES
The ABCs of the Annual Wellness Visit  Initial Medicare Wellness Visit    Chief Complaint   Patient presents with   • Annual Exam     Initial Medicare Wellness       Subjective   History of Present Illness:  Gregg Randle is a 45 y.o. male who presents for an Initial Medicare Wellness Visit.    HEALTH RISK ASSESSMENT    Recent Hospitalizations:  No hospitalization(s) within the last year.    Current Medical Providers:  Patient Care Team:  Dread Duvall MD as PCP - General (Family Medicine)    Smoking Status:  Social History     Tobacco Use   Smoking Status Former Smoker   • Packs/day: 1.00   • Years: 20.00   • Pack years: 20.00   Smokeless Tobacco Never Used       Alcohol Consumption:  Social History     Substance and Sexual Activity   Alcohol Use No       Depression Screen:   PHQ-2/PHQ-9 Depression Screening 11/11/2020   Little interest or pleasure in doing things 0   Feeling down, depressed, or hopeless 1   Total Score 1       Fall Risk Screen:  BIADI Fall Risk Assessment has not been completed.    Health Habits and Functional and Cognitive Screening:  Functional & Cognitive Status 11/11/2020   Do you have difficulty preparing food and eating? No   Do you have difficulty bathing yourself, getting dressed or grooming yourself? No   Do you have difficulty using the toilet? No   Do you have difficulty moving around from place to place? No   Do you have trouble with steps or getting out of a bed or a chair? No   Current Diet Well Balanced Diet   Dental Exam Not up to date   Eye Exam Up to date   Exercise (times per week) 0 times per week   Current Exercise Activities Include None   Do you need help using the phone?  No   Are you deaf or do you have serious difficulty hearing?  Yes   Do you need help with transportation? No   Do you need help shopping? No   Do you need help preparing meals?  No   Do you need help with housework?  No   Do you need help with laundry? No   Do you need help taking your  medications? No   Do you need help managing money? No   Do you ever drive or ride in a car without wearing a seat belt? No   Have you felt unusual stress, anger or loneliness in the last month? Yes   Who do you live with? Alone   If you need help, do you have trouble finding someone available to you? No   Have you been bothered in the last four weeks by sexual problems? No   Do you have difficulty concentrating, remembering or making decisions? Yes         Does the patient have evidence of cognitive impairment? No    Asprin use counseling:Contraindicated from taking ASA    Age-appropriate Screening Schedule:  Refer to the list below for future screening recommendations based on patient's age, sex and/or medical conditions. Orders for these recommended tests are listed in the plan section. The patient has been provided with a written plan.    Health Maintenance   Topic Date Due   • COLONOSCOPY  11/27/2020 (Originally 1975)   • LIPID PANEL  01/15/2021   • TDAP/TD VACCINES (2 - Td) 09/30/2030   • INFLUENZA VACCINE  Completed          The following portions of the patient's history were reviewed and updated as appropriate: allergies, current medications, past family history, past medical history, past social history, past surgical history and problem list.    Outpatient Medications Prior to Visit   Medication Sig Dispense Refill   • albuterol (PROVENTIL HFA;VENTOLIN HFA) 108 (90 BASE) MCG/ACT inhaler Inhale 2 puffs Every 4 (Four) Hours As Needed for Wheezing.     • allopurinol (ZYLOPRIM) 100 MG tablet Take 1 tablet by mouth Daily. 30 tablet 3   • atorvastatin (LIPITOR) 20 MG tablet Take 1 tablet by mouth Every Night. 90 tablet 3   • brimonidine (ALPHAGAN) 0.2 % ophthalmic solution Administer 1 drop to the right eye 3 (Three) Times a Day.     • calcitriol (ROCALTROL) 0.25 MCG capsule Take 1 capsule by mouth Daily. 30 capsule 3   • clopidogrel (PLAVIX) 75 MG tablet Take 1 tablet by mouth Daily. 30 tablet 3   •  cyclobenzaprine (FLEXERIL) 10 MG tablet Take 1 tablet by mouth every night at bedtime. 30 tablet 3   • dilTIAZem CD (Cardizem CD) 180 MG 24 hr capsule Take 1 capsule by mouth Daily. (Patient taking differently: Take 240 mg by mouth Daily.) 30 capsule 3   • gabapentin (NEURONTIN) 600 MG tablet Take 1 tablet by mouth 3 (Three) Times a Day. 90 tablet 3   • guanFACINE (TENEX) 1 MG tablet Take 1 tablet by mouth Every Night for 90 days. 90 tablet 1   • labetalol (NORMODYNE) 200 MG tablet Take 1 tablet by mouth 2 (Two) Times a Day. 60 tablet 3   • linaclotide (LINZESS) 72 MCG capsule capsule Take 1 capsule by mouth Every Morning Before Breakfast. 90 capsule 3   • losartan (COZAAR) 100 MG tablet Take 1 tablet by mouth Daily. 30 tablet 3   • meclizine (ANTIVERT) 25 MG tablet Take 25 mg by mouth 3 (Three) Times a Day As Needed for Dizziness.     • polyethylene glycol (GoLYTELY) 236 g solution Starting at noon on day prior to procedure, drink 8 ounces every 30 minutes until all gone or stools are clear. May add flavor packet. 4000 mL 0   • Turmeric (QC Tumeric Complex) 500 MG capsule Take 500 mg by mouth Daily. 30 capsule 3     No facility-administered medications prior to visit.        Patient Active Problem List   Diagnosis   • Abdominal aortic aneurysm (AAA) without rupture (CMS/HCC)   • PVD (peripheral vascular disease) (CMS/HCC)   • Nicotine dependence, cigarettes, with other nicotine-induced disorders   • Mixed hyperlipidemia   • Panlobular emphysema (CMS/HCC)   • Benign essential HTN   • CAD (coronary artery disease)   • Class 1 obesity with serious comorbidity and body mass index (BMI) of 32.0 to 32.9 in adult   • Tobacco user   • Vitamin D deficiency, unspecified    • History of MI (myocardial infarction)   • History of CVA (cerebrovascular accident)   • Abnormal finding of blood chemistry, unspecified    • Stage 3 chronic kidney disease   • Former smoker   • Left hemiparesis (CMS/HCC)   • Elevated liver enzymes   •  "Hyperkalemia   • Chronic idiopathic constipation   • Dizziness   • Chronic bilateral low back pain with bilateral sciatica   • Encounter for drug screening   • Hypotension   • Essential hypertension   • Left leg pain   • Muscle spasm   • Encounter for screening for malignant neoplasm of colon       Advanced Care Planning:  ACP discussion was held with the patient during this visit. Patient has an advance directive (not in EMR), copy requested.    Review of Systems    Compared to one year ago, the patient feels his physical health is worse.  Compared to one year ago, the patient feels his mental health is better.    Reviewed chart for potential of high risk medication in the elderly: yes  Reviewed chart for potential of harmful drug interactions in the elderly:yes    Objective         Vitals:    11/11/20 0846   BP: 112/74   BP Location: Right arm   Patient Position: Sitting   Cuff Size: Large Adult   Pulse: 76   Temp: 97.1 °F (36.2 °C)   SpO2: 98%   Weight: 109 kg (240 lb 9.6 oz)   Height: 185.4 cm (73\")   PainSc: 0-No pain       Body mass index is 31.74 kg/m².  Discussed the patient's BMI with him. The BMI is above average; BMI management plan is completed.    Physical Exam          Assessment/Plan   Medicare Risks and Personalized Health Plan  CMS Preventative Services Quick Reference  Advance Directive Discussion  Alcohol Misuse  Cardiovascular risk  Chronic Pain   Colon Cancer Screening  Dementia/Memory   Depression/Dysphoria  Diabetic Lab Screening   Fall Risk  Glaucoma Risk  Hearing Problem  Inadequate Social Support, Isolation, Loneliness, Lack of Transportation, Financial Difficulties, or Caregiver Stress   Inactivity/Sedentary  Obesity/Overweight   Polypharmacy  Prostate Cancer Screening   Sexually Transmitted Infection (STI) Exposure Risk  Tobacco Use/Dependance (use dotphrase .tobaccocessation for documentation)    The above risks/problems have been discussed with the patient.  Pertinent information has " been shared with the patient in the After Visit Summary.  Follow up plans and orders are seen below in the Assessment/Plan Section.    Diagnoses and all orders for this visit:    1. Medicare annual wellness visit, initial (Primary)      Follow Up:  Return in about 1 year (around 11/11/2021) for Medicare Wellness.     An After Visit Summary and PPPS were given to the patient.     -Action plan discussed please see scanned documents  -repeat in 1 year        This document has been electronically signed by Dread Duvall MD on November 11, 2020 09:03 CST

## 2020-11-13 ENCOUNTER — TELEPHONE (OUTPATIENT)
Dept: FAMILY MEDICINE CLINIC | Facility: CLINIC | Age: 45
End: 2020-11-13

## 2020-11-13 DIAGNOSIS — G47.33 OSA ON CPAP: Primary | ICD-10-CM

## 2020-11-13 DIAGNOSIS — Z99.89 OSA ON CPAP: Primary | ICD-10-CM

## 2020-11-13 NOTE — TELEPHONE ENCOUNTER
I believe he will need to be referred to a specialist for his sleep apnea in order for him to get the mask. Please advise.

## 2020-11-13 NOTE — TELEPHONE ENCOUNTER
PATIENT CALLED STATING THAT HE NEEDS DR LOMELI TO HELP HIM GET HIS MASKS FOR HIS C PAP MACHINE. WHEN PICKING UP FROM THE SUPPLIER THEY ARE TOO EXPENSIVE. HE NEEDS DR LOMELI TO SPEAK WITH THE INSURANCE OR FOR DR LOMELI TO SUPPLY HIM WITH THE MASKS.     GOOD CALL BACK:  [phone]

## 2020-11-14 ENCOUNTER — LAB (OUTPATIENT)
Dept: LAB | Facility: HOSPITAL | Age: 45
End: 2020-11-14

## 2020-11-14 DIAGNOSIS — Z01.818 PREOP TESTING: Primary | ICD-10-CM

## 2020-11-14 PROCEDURE — C9803 HOPD COVID-19 SPEC COLLECT: HCPCS

## 2020-11-14 PROCEDURE — U0003 INFECTIOUS AGENT DETECTION BY NUCLEIC ACID (DNA OR RNA); SEVERE ACUTE RESPIRATORY SYNDROME CORONAVIRUS 2 (SARS-COV-2) (CORONAVIRUS DISEASE [COVID-19]), AMPLIFIED PROBE TECHNIQUE, MAKING USE OF HIGH THROUGHPUT TECHNOLOGIES AS DESCRIBED BY CMS-2020-01-R: HCPCS

## 2020-11-15 LAB
COVID LABCORP PRIORITY: NORMAL
SARS-COV-2 RNA RESP QL NAA+PROBE: NOT DETECTED

## 2020-11-17 ENCOUNTER — ANESTHESIA (OUTPATIENT)
Dept: GASTROENTEROLOGY | Facility: HOSPITAL | Age: 45
End: 2020-11-17

## 2020-11-17 ENCOUNTER — ANESTHESIA EVENT (OUTPATIENT)
Dept: GASTROENTEROLOGY | Facility: HOSPITAL | Age: 45
End: 2020-11-17

## 2020-11-17 ENCOUNTER — HOSPITAL ENCOUNTER (OUTPATIENT)
Facility: HOSPITAL | Age: 45
Setting detail: HOSPITAL OUTPATIENT SURGERY
Discharge: HOME | End: 2020-11-17
Attending: INTERNAL MEDICINE | Admitting: INTERNAL MEDICINE

## 2020-11-17 VITALS
HEART RATE: 84 BPM | RESPIRATION RATE: 18 BRPM | SYSTOLIC BLOOD PRESSURE: 153 MMHG | WEIGHT: 242.29 LBS | DIASTOLIC BLOOD PRESSURE: 95 MMHG | HEIGHT: 73 IN | OXYGEN SATURATION: 97 % | TEMPERATURE: 97.8 F | BODY MASS INDEX: 32.11 KG/M2

## 2020-11-17 DIAGNOSIS — Z12.11 ENCOUNTER FOR SCREENING FOR MALIGNANT NEOPLASM OF COLON: ICD-10-CM

## 2020-11-17 PROCEDURE — G0121 COLON CA SCRN NOT HI RSK IND: HCPCS | Performed by: INTERNAL MEDICINE

## 2020-11-17 PROCEDURE — 25010000002 PROPOFOL 10 MG/ML EMULSION: Performed by: NURSE ANESTHETIST, CERTIFIED REGISTERED

## 2020-11-17 PROCEDURE — 25010000002 HYDRALAZINE PER 20 MG: Performed by: NURSE ANESTHETIST, CERTIFIED REGISTERED

## 2020-11-17 RX ORDER — LABETALOL HYDROCHLORIDE 5 MG/ML
INJECTION, SOLUTION INTRAVENOUS AS NEEDED
Status: DISCONTINUED | OUTPATIENT
Start: 2020-11-17 | End: 2020-11-17 | Stop reason: SURG

## 2020-11-17 RX ORDER — ONDANSETRON 2 MG/ML
4 INJECTION INTRAMUSCULAR; INTRAVENOUS ONCE AS NEEDED
Status: DISCONTINUED | OUTPATIENT
Start: 2020-11-17 | End: 2020-11-17 | Stop reason: HOSPADM

## 2020-11-17 RX ORDER — DEXTROSE AND SODIUM CHLORIDE 5; .45 G/100ML; G/100ML
30 INJECTION, SOLUTION INTRAVENOUS CONTINUOUS PRN
Status: DISCONTINUED | OUTPATIENT
Start: 2020-11-17 | End: 2020-11-17 | Stop reason: HOSPADM

## 2020-11-17 RX ORDER — PROPOFOL 10 MG/ML
VIAL (ML) INTRAVENOUS AS NEEDED
Status: DISCONTINUED | OUTPATIENT
Start: 2020-11-17 | End: 2020-11-17 | Stop reason: SURG

## 2020-11-17 RX ORDER — LIDOCAINE HYDROCHLORIDE 20 MG/ML
INJECTION, SOLUTION EPIDURAL; INFILTRATION; INTRACAUDAL; PERINEURAL AS NEEDED
Status: DISCONTINUED | OUTPATIENT
Start: 2020-11-17 | End: 2020-11-17 | Stop reason: SURG

## 2020-11-17 RX ORDER — HYDRALAZINE HYDROCHLORIDE 20 MG/ML
INJECTION INTRAMUSCULAR; INTRAVENOUS AS NEEDED
Status: DISCONTINUED | OUTPATIENT
Start: 2020-11-17 | End: 2020-11-17 | Stop reason: SURG

## 2020-11-17 RX ADMIN — PROPOFOL 30 MG: 10 INJECTION, EMULSION INTRAVENOUS at 10:55

## 2020-11-17 RX ADMIN — DEXTROSE AND SODIUM CHLORIDE 30 ML/HR: 5; 450 INJECTION, SOLUTION INTRAVENOUS at 09:08

## 2020-11-17 RX ADMIN — LABETALOL HYDROCHLORIDE 10 MG: 5 INJECTION, SOLUTION INTRAVENOUS at 10:08

## 2020-11-17 RX ADMIN — PROPOFOL 90 MG: 10 INJECTION, EMULSION INTRAVENOUS at 10:50

## 2020-11-17 RX ADMIN — PROPOFOL 20 MG: 10 INJECTION, EMULSION INTRAVENOUS at 10:59

## 2020-11-17 RX ADMIN — LABETALOL HYDROCHLORIDE 5 MG: 5 INJECTION, SOLUTION INTRAVENOUS at 09:57

## 2020-11-17 RX ADMIN — PROPOFOL 20 MG: 10 INJECTION, EMULSION INTRAVENOUS at 11:02

## 2020-11-17 RX ADMIN — PROPOFOL 30 MG: 10 INJECTION, EMULSION INTRAVENOUS at 10:52

## 2020-11-17 RX ADMIN — PROPOFOL 20 MG: 10 INJECTION, EMULSION INTRAVENOUS at 11:05

## 2020-11-17 RX ADMIN — HYDRALAZINE HYDROCHLORIDE 10 MG: 20 INJECTION INTRAMUSCULAR; INTRAVENOUS at 10:22

## 2020-11-17 RX ADMIN — PROPOFOL 30 MG: 10 INJECTION, EMULSION INTRAVENOUS at 10:57

## 2020-11-17 RX ADMIN — DEXTROSE AND SODIUM CHLORIDE: 5; 450 INJECTION, SOLUTION INTRAVENOUS at 10:48

## 2020-11-17 RX ADMIN — LABETALOL HYDROCHLORIDE 10 MG: 5 INJECTION, SOLUTION INTRAVENOUS at 10:18

## 2020-11-17 RX ADMIN — LIDOCAINE HYDROCHLORIDE 80 MG: 20 INJECTION, SOLUTION EPIDURAL; INFILTRATION; INTRACAUDAL; PERINEURAL at 10:50

## 2020-11-17 NOTE — ANESTHESIA POSTPROCEDURE EVALUATION
Patient: Gregg Randle    Procedure Summary     Date: 11/17/20 Room / Location: [facility] ENDOSCOPY 1 / [facility] ENDOSCOPY    Anesthesia Start: 1048 Anesthesia Stop: 1115    Procedure: COLONOSCOPY (N/A ) Diagnosis:       Encounter for screening for malignant neoplasm of colon      (Encounter for screening for malignant neoplasm of colon [Z12.11])    Surgeon: Abi Miller MD Provider: Elvira Terry CRNA    Anesthesia Type: MAC ASA Status: 3          Anesthesia Type: MAC    Vitals  No vitals data found for the desired time range.          Post Anesthesia Care and Evaluation    Patient location during evaluation: bedside  Patient participation: complete - patient participated  Level of consciousness: sleepy but conscious  Pain score: 0  Pain management: adequate  Airway patency: patent  Anesthetic complications: No anesthetic complications  PONV Status: none  Cardiovascular status: hypertensive and blood pressure returned to baseline  Respiratory status: acceptable  Hydration status: acceptable    Comments: ---------------------------               11/17/20                      1115        ---------------------------   BP:        165/103      Pulse:         84          Resp:          16           Temp:   96.6 °F (35.9 °C)   SpO2:          96%         ---------------------------

## 2020-11-17 NOTE — ANESTHESIA PREPROCEDURE EVALUATION
Anesthesia Evaluation     Patient summary reviewed and Nursing notes reviewed     NPO Liquid Status: > 2 hours           Airway   Mallampati: III  TM distance: >3 FB  Neck ROM: full  Possible difficult intubation  Dental - normal exam     Pulmonary - normal exam   (+) COPD moderate, sleep apnea,   Cardiovascular - normal exam    PT is on anticoagulation therapy  Patient on routine beta blocker    (+) hypertension poorly controlled 2 medications or greater, past MI  >12 months, CAD, cardiac stents more than 12 months ago PVD, hyperlipidemia,     ROS comment: AAA repair in 2018      Neuro/Psych  (+) CVA (2011), dizziness/light headedness, numbness, psychiatric history Depression,     GI/Hepatic/Renal/Endo    (+) obesity,       Musculoskeletal     Abdominal  - normal exam   Substance History - negative use     OB/GYN          Other   arthritis,      ROS/Med Hx Other: Hypertensive this am, /113. Has not taken any of his home medications since Saturday. Patient is on labetalol 200mg BID at home. Treat with IV labetalol and reassess prior to procedure.    Reassess: After 25mg IV labetalol and 10mg IV hydralazine, /96, will proceed with procedure, instructed patient to take all home BP medications as soon as he gets home. Pt understands and agrees.                Anesthesia Plan    ASA 3     MAC     intravenous induction     Anesthetic plan, all risks, benefits, and alternatives have been provided, discussed and informed consent has been obtained with: patient.

## 2020-11-21 NOTE — PROGRESS NOTES
Subjective:  Gregg Randle is a 45 y.o. male who presents for hypertension       Patient Active Problem List   Diagnosis   • Abdominal aortic aneurysm (AAA) without rupture (CMS/HCC)   • PVD (peripheral vascular disease) (CMS/HCC)   • Nicotine dependence, cigarettes, with other nicotine-induced disorders   • Mixed hyperlipidemia   • Panlobular emphysema (CMS/HCC)   • Benign essential HTN   • CAD (coronary artery disease)   • Class 1 obesity with serious comorbidity and body mass index (BMI) of 32.0 to 32.9 in adult   • Tobacco user   • Vitamin D deficiency, unspecified    • History of MI (myocardial infarction)   • History of CVA (cerebrovascular accident)   • Abnormal finding of blood chemistry, unspecified    • Stage 3 chronic kidney disease   • Former smoker   • Left hemiparesis (CMS/HCC)   • Elevated liver enzymes   • Hyperkalemia   • Chronic idiopathic constipation   • Dizziness   • Chronic bilateral low back pain with bilateral sciatica   • Encounter for drug screening   • Hypotension   • Essential hypertension   • Left leg pain   • Muscle spasm   • Encounter for screening for malignant neoplasm of colon   • Sleep apnea   • Hyperuricemia   • Hemorrhoids   • Balance problem           Current Outpatient Medications:   •  albuterol (PROVENTIL HFA;VENTOLIN HFA) 108 (90 BASE) MCG/ACT inhaler, Inhale 2 puffs Every 4 (Four) Hours As Needed for Wheezing., Disp: , Rfl:   •  allopurinol (ZYLOPRIM) 100 MG tablet, Take 1 tablet by mouth Daily., Disp: 30 tablet, Rfl: 3  •  atorvastatin (LIPITOR) 20 MG tablet, Take 1 tablet by mouth Every Night., Disp: 90 tablet, Rfl: 3  •  brimonidine (ALPHAGAN) 0.2 % ophthalmic solution, Administer 1 drop to the right eye 3 (Three) Times a Day., Disp: , Rfl:   •  calcitriol (ROCALTROL) 0.25 MCG capsule, Take 1 capsule by mouth Daily., Disp: 30 capsule, Rfl: 3  •  clopidogrel (PLAVIX) 75 MG tablet, Take 1 tablet by mouth Daily., Disp: 30 tablet, Rfl: 3  •  cyclobenzaprine  (FLEXERIL) 10 MG tablet, Take 1 tablet by mouth every night at bedtime., Disp: 30 tablet, Rfl: 3  •  dilTIAZem CD (Cardizem CD) 180 MG 24 hr capsule, Take 1 capsule by mouth Daily., Disp: 30 capsule, Rfl: 3  •  gabapentin (NEURONTIN) 600 MG tablet, Take 1 tablet by mouth 3 (Three) Times a Day., Disp: 90 tablet, Rfl: 3  •  guanFACINE (TENEX) 1 MG tablet, Take 1 tablet by mouth Every Night for 90 days., Disp: 90 tablet, Rfl: 1  •  labetalol (NORMODYNE) 200 MG tablet, Take 1 tablet by mouth 2 (Two) Times a Day., Disp: 60 tablet, Rfl: 3  •  linaclotide (LINZESS) 72 MCG capsule capsule, Take 1 capsule by mouth Every Morning Before Breakfast., Disp: 90 capsule, Rfl: 3  •  losartan (COZAAR) 100 MG tablet, Take 1 tablet by mouth Daily., Disp: 30 tablet, Rfl: 3  •  meclizine (ANTIVERT) 25 MG tablet, Take 25 mg by mouth 3 (Three) Times a Day As Needed for Dizziness., Disp: , Rfl:   •  polyethylene glycol (GoLYTELY) 236 g solution, Starting at noon on day prior to procedure, drink 8 ounces every 30 minutes until all gone or stools are clear. May add flavor packet., Disp: 4000 mL, Rfl: 0  •  Turmeric (QC Tumeric Complex) 500 MG capsule, Take 500 mg by mouth Daily., Disp: 30 capsule, Rfl: 3    HPI        Pt is 45 yomale with management of obesity, CAD sp PCI , HTN, HLP, AAA  Sp repair , PVD with bilateral iliac stent , COPD (panlobar emphysema) former  tobacco user, dizziness, CKD stage 3 , claudication. Major depression, cholelithiasis, abnormal echocardiogram ( LVH grade 1 diastolic dysfunction)  History of CVA, former tobacco user , history of MI, left hemiparesis, DDD in lower back. elevated liver enzymes       9/30/20 in office visit for recheck on pt's above medical issues. Pt has yet to get labwork ordered on 7/30/20 . A CT of abdomen/pelvis was ordered on 9/25/20.  Pt does have history of AAA sp repair. His main concern is left leg pain on left thigh. No back pain and no hip pain. BP stable no chest pain no dizziness.       11/30/20 in office visit for recheck on pt's above medical issues.  Pt had colonoscopy done on 11/17/20 that showed internal hemorrhoids. He is doing well on medication. BP is stable. He continues to take his statin medication. Breathing stable. He is needing a new mask for his CPAP.   He has yet to get labwork ordered on   July 2020.  He does not report chest pain.  He does report having some balance issues. He is taking neurontin 600 mg PO TID            Hypertension   This is a chronic problem. The current episode started more than 1 year ago. The problem has been improving g since onset. The problem is controlled. Associated symptoms include shortness of breath. Pertinent negatives include no anxiety, blurred vision, chest pain, headaches, malaise/fatigue, neck pain, orthopnea, palpitations, peripheral edema, PND or sweats. Risk factors for coronary artery disease include dyslipidemia, obesity, male gender, sedentary lifestyle and smoking/tobacco exposure. Current antihypertension treatment includes calcium channel blockers, beta blockers and angiotensin blockers and diureitcs The current treatment provides moderate improvement. There are no compliance problems.  Hypertensive end-organ damage includes kidney disease, CAD/MI and CVA. There is no history of angina, heart failure, left ventricular hypertrophy, PVD or retinopathy. There is no history of chronic renal disease, coarctation of the aorta, hyperaldosteronism, hypercortisolism, hyperparathyroidism, a hypertension causing med, pheochromocytoma, renovascular disease, sleep apnea or a thyroid problem.   Obesity   This is a chronic problem. The current episode started more than 1 year ago. The problem occurs constantly. The problem has been unchanged. Associated symptoms include arthralgias, coughing, fatigue, numbness and weakness. Pertinent negatives include no abdominal pain, anorexia, change in bowel habit, chest  pain, chills, congestion, diaphoresis, fever, headaches, joint swelling, myalgias, nausea, neck pain, rash, sore throat, swollen glands, urinary symptoms, vertigo, visual change or vomiting. Nothing aggravates the symptoms. He has tried nothing for the symptoms. The treatment provided no relief.        Review of Systems  Review of Systems   Constitutional: Positive for activity change and fatigue. Negative for appetite change, chills, diaphoresis and fever.   HENT: Negative for congestion, postnasal drip, rhinorrhea, sinus pressure, sinus pain, sneezing, sore throat, trouble swallowing and voice change.    Respiratory: Positive for shortness of breath. Negative for cough, choking, chest tightness, wheezing and stridor.    Cardiovascular: Negative for chest pain.   Gastrointestinal: Positive for abdominal pain. Negative for diarrhea, nausea and vomiting.   Musculoskeletal: Positive for arthralgias.   Neurological: Positive for weakness and numbness. Negative for headaches.       Patient Active Problem List   Diagnosis   • Abdominal aortic aneurysm (AAA) without rupture (CMS/HCC)   • PVD (peripheral vascular disease) (CMS/HCC)   • Nicotine dependence, cigarettes, with other nicotine-induced disorders   • Mixed hyperlipidemia   • Panlobular emphysema (CMS/HCC)   • Benign essential HTN   • CAD (coronary artery disease)   • Class 1 obesity with serious comorbidity and body mass index (BMI) of 32.0 to 32.9 in adult   • Tobacco user   • Vitamin D deficiency, unspecified    • History of MI (myocardial infarction)   • History of CVA (cerebrovascular accident)   • Abnormal finding of blood chemistry, unspecified    • Stage 3 chronic kidney disease   • Former smoker   • Left hemiparesis (CMS/HCC)   • Elevated liver enzymes   • Hyperkalemia   • Chronic idiopathic constipation   • Dizziness   • Chronic bilateral low back pain with bilateral sciatica   • Encounter for drug screening   • Hypotension   • Essential hypertension   •  Left leg pain   • Muscle spasm   • Encounter for screening for malignant neoplasm of colon   • Sleep apnea   • Hyperuricemia   • Hemorrhoids   • Balance problem     Past Surgical History:   Procedure Laterality Date   • COLONOSCOPY N/A 11/17/2020    Procedure: COLONOSCOPY;  Surgeon: Abi Miller MD;  Location: [facility] ENDOSCOPY;  Service: Gastroenterology;  Laterality: N/A;   • CORONARY ANGIOPLASTY WITH STENT PLACEMENT     • EAR TUBES Left    • OK AAA REPAIR,AORTO-AORTIC TUBE PROSTH N/A 8/11/2017    Procedure: ABDOMINAL AORTIC ANEURYSM REPAIR WITH ENDOGRAFT, REPAIR ILIAC ARTERY ANEURYSM, POSSIBLE OPEN ABDOMINAL AORTOGRAM    (CELL SAVER STAND-BY);  Surgeon: Aren Arshad MD;  Location: [facility] OR;  Service: Vascular     Social History     Socioeconomic History   • Marital status: Single     Spouse name: Not on file   • Number of children: Not on file   • Years of education: Not on file   • Highest education level: Not on file   Tobacco Use   • Smoking status: Former Smoker     Packs/day: 1.00     Years: 20.00     Pack years: 20.00   • Smokeless tobacco: Never Used   Substance and Sexual Activity   • Alcohol use: No   • Drug use: No   • Sexual activity: Defer     Family History   Problem Relation Age of Onset   • Stroke Mother    • Diabetes Mother    • Heart disease Father      Lab on 11/14/2020   Component Date Value Ref Range Status   • SARS-CoV-2, OMAR 11/14/2020 Not Detected  Not Detected Final    Comment: This nucleic acid amplification test was developed and its performance  characteristics determined by [facility]. Nucleic acid  amplification tests include PCR and TMA. This test has not been FDA  cleared or approved. This test has been authorized by FDA under an  Emergency Use Authorization (EUA). This test is only authorized for  the duration of time the declaration that circumstances exist  justifying the authorization of the emergency use of in vitro  diagnostic tests for detection of  SARS-CoV-2 virus and/or diagnosis  of COVID-19 infection under section 564(b)(1) of the Act, 21 U.S.C.  360bbb-3(b) (1), unless the authorization is terminated or revoked  sooner.  When diagnostic testing is negative, the possibility of a false  negative result should be considered in the context of a patient's  recent exposures and the presence of clinical signs and symptoms  consistent with COVID-19. An individual without symptoms of COVID-19  and who is not shedding SARS-CoV-2 virus would                            expect to have a  negative (not detected) result in this assay.   • COVID [facility] PRIORITY 11/14/2020 Comment   Final    Received      CT Abdomen Pelvis Without Contrast  Narrative: CT Abdomen and Pelvis Without Contrast    History: Follow-up abdominal aortic aneurysm.    Axials spiral scans of the abdomen and pelvis were obtained  without contrast.  Coronal and sagital reconstructions were  preformed.      This exam was performed according to our departmental  dose-optimization program, which includes automated exposure  control, adjustment of the mA and/or kV according to patient size  and/or use of iterative reconstruction technique.    DLP: 538.20    Comparison: November 1, 2019    Findings:   Scans of the lung bases are unremarkable.    No acute osseous abnormality.  Minimal degenerative changes lower thoracic spine and  degenerative disc disease L5-S1.    The liver is unremarkable.  Cholelithiasis.  The spleen is unremarkable.  The pancreas is unremarkable.  The adrenal glands are unremarkable.    Some cortical scarring each kidney.  No renal or ureteral calculi.  No renal mass.  No hydronephrosis.    Unremarkable bladder.  No pelvic mass.    Stable 4.4 cm abdominal aortic aneurysm and stable 3.1 cm right  common iliac artery aneurysm with endovascular stents remaining  in the abdominal aorta and common iliac arteries.  As demonstrated on prior study, the stent does not completely  extend the  "entire length of the right common iliac artery  aneurysm.  No adenopathy.    Moderate amount retained feces in the colon and some gaseous  distention of the colon.  No bowel obstruction.  Normal appendix.  No free air.  No free fluid.    No hernia.  Impression: Conclusion:  Stable 4.4 cm abdominal aortic aneurysm and stable 3.1 cm right  common iliac artery aneurysm with endovascular stents remaining  in the abdominal aorta and common iliac arteries.  As demonstrated on prior study, the stent does not completely  extend the entire length of the right common iliac artery  aneurysm.  Cholelithiasis.  Some cortical scarring each kidney.  Moderate amount retained feces in the colon and some gaseous  distention of the colon.    [id number]    Electronically signed by:  Nick Smiley MD  10/16/2020 4:53 PM  CDT Workstation: [id number]    @[employer]@  Immunization History   Administered Date(s) Administered   • Flulaval/Fluarix/Fluzone Quad 01/15/2020, 01/15/2020, 09/30/2020   • Pneumococcal Polysaccharide (PPSV23) 09/30/2020   • Tdap 09/30/2020       The following portions of the patient's history were reviewed and updated as appropriate: allergies, current medications, past family history, past medical history, past social history, past surgical history and problem list.        Physical Exam  /82 (BP Location: Right arm, Patient Position: Sitting, Cuff Size: Large Adult)   Pulse 78   Temp 96.8 °F (36 °C)   Ht 185.4 cm (73\")   Wt 109 kg (240 lb)   SpO2 99%   BMI 31.66 kg/m²     Physical Exam  Vitals signs and nursing note reviewed.   Constitutional:       Appearance: He is well-developed. He is not diaphoretic.   HENT:      Head: Normocephalic and atraumatic.      Right Ear: External ear normal.   Eyes:      Conjunctiva/sclera: Conjunctivae normal.      Pupils: Pupils are equal, round, and reactive to light.   Neck:      Musculoskeletal: Normal range of motion and neck supple.   Cardiovascular:      Rate and " Rhythm: Normal rate and regular rhythm.      Heart sounds: Normal heart sounds. No murmur.   Pulmonary:      Effort: Pulmonary effort is normal. No respiratory distress.      Breath sounds: Normal breath sounds.   Abdominal:      General: Bowel sounds are normal. There is no distension.      Palpations: Abdomen is soft.      Tenderness: There is no abdominal tenderness.      Comments: Obese abdomen    Musculoskeletal: Normal range of motion.         General: Tenderness present. No deformity.   Skin:     General: Skin is warm.      Coloration: Skin is not pale.      Findings: No erythema or rash.   Neurological:      Mental Status: He is alert and oriented to person, place, and time.      Cranial Nerves: No cranial nerve deficit.   Psychiatric:         Behavior: Behavior normal.         Assessment/Plan    Diagnosis Plan   1. Hyperuricemia  Uric acid   2. History of CVA (cerebrovascular accident)     3. Coronary artery disease involving native coronary artery of native heart without angina pectoris     4. Mixed hyperlipidemia     5. Vitamin D deficiency, unspecified      6. Stage 3b chronic kidney disease     7. Essential hypertension     8. Sleep apnea, unspecified type     9. Hemorrhoids, unspecified hemorrhoid type     10. Balance problem             -recommend labwork  -recommend influenza vaccination -given today   -recommend pneumonia vaccination, tdap vaccination -given today   -balance issues - may be due to neurontin. Recommended pt cut back on neurontin and see if this is causing his balance issues   -dizziness/hypotension - stable   -internal hemorrhoids - recommend high fiber diet. Next colonscopy in 2030   - left leg pain - recommend PT/OT but pt declined.  Continue neurontin 600 mg PO TID. Also continue flexeril 10 mg at bedtime..  Start on tumeric 500 mg PO q daily.    -elevated liver enzymes -get US of liver along with hepatitis panel  -back pain -x-ray of lower back . recommendd PT/OT but pt declined.  Recommend Tylenol PRN no NSAIDs.  on  Neurontin 600 mg PO TID until pt sees Pain Management  Advised pt to make  appt with Pain Management  -sleep apnea -pt was seeing Dr. Mar. He will call for an appt. Gave prescription for CPAP machine and supplies   -CIC - start on linzess. Pt has failed Miralax.drug information provided   -hyperkalemia - recommend limit high potassium foods. Recheck in 1 [place]  -CKD stage 3 - Nephrology following. BP control   -COPD/emphysema. -referedl to Dr. Medina with Pulmonlogy with PFTs. He was seeing Dr. Mar. He would like to see a new Pulmonologist   -HTN - losartan 100 mg daily. Labetalol 200 mg PO BID  cardizem 180 mg PPo q daily.  Clonidine 1 mg PO qhs. Advised to stop chlorthalidone since BP on lower side today   Advised pt to check blood pressure in 2 weeks and record it. If BP continues to be high consider adding chlorthalidone.  -major depression- lexapro 20 mg daily.  -CAD -Cardiology following, aspirin, lipitor, labetalol  200 mg TID, losartan , plavix   -PVD - aspirin plavix, lipitor - Vascular Surgery following  -obesity - counseled weight loss >5 minutes BMI at 31.66   -AAA sp repair  - Vascular Surgery following   -advised pt to be safe and call with questions and concerns  -advised pt to go to ER or call 911 if symptoms worrisome or severe  -advised pt to followup with specialist and referrals  -advised pt be safe during COVID-19 pandemic   -total time with pt >25 minutes   -recheck in 3 months         This document has been electronically signed by Dread Duvall MD on November 30, 2020 09:06 CST

## 2020-11-23 ENCOUNTER — TELEPHONE (OUTPATIENT)
Dept: FAMILY MEDICINE CLINIC | Facility: CLINIC | Age: 45
End: 2020-11-23

## 2020-11-23 NOTE — TELEPHONE ENCOUNTER
Pt stated that he called our office on Friday and someone told him we would have a mask for him and that he called today and someone told him the mask was ready and to come get. Pt could not remember who he talk to and it is not documented in the chart that anyone called. I explained to pt again for the 3rd time that we do not have masks and that we have no way of getting them. explainded again that we had given him a prescription and put a referral in for sleep medicine because they would be the ones to follow him and able to help him get his mask.

## 2020-11-24 ENCOUNTER — OFFICE VISIT (OUTPATIENT)
Dept: GASTROENTEROLOGY | Facility: CLINIC | Age: 45
End: 2020-11-24

## 2020-11-24 VITALS
HEIGHT: 73 IN | SYSTOLIC BLOOD PRESSURE: 129 MMHG | BODY MASS INDEX: 31.81 KG/M2 | DIASTOLIC BLOOD PRESSURE: 108 MMHG | WEIGHT: 240 LBS | HEART RATE: 81 BPM

## 2020-11-24 DIAGNOSIS — E66.09 CLASS 1 OBESITY DUE TO EXCESS CALORIES WITH SERIOUS COMORBIDITY IN ADULT, UNSPECIFIED BMI: Primary | ICD-10-CM

## 2020-11-24 PROCEDURE — 99212 OFFICE O/P EST SF 10 MIN: CPT | Performed by: INTERNAL MEDICINE

## 2020-11-25 NOTE — PROGRESS NOTES
Chief Complaint   Patient presents with   • Follow-up       Subjective    Gregg Randle is a 45 y.o. male.    History of Present Illness  Patient presented to GI clinic for follow-up visit today.  He feels better currently.  No GI complaints at this time.  colonoscopy was consistent with internal hemorrhoids.  Has elevated diastolic blood pressure..       The following portions of the patient's history were reviewed and updated as appropriate:   Past Medical History:   Diagnosis Date   • AAA (abdominal aortic aneurysm) (CMS/[facility])    • Arthritis    • CAD (coronary artery disease)    • COPD (chronic obstructive pulmonary disease) (CMS/[facility])    • Depression    • Depression    • HTN (hypertension)    • Hyperlipidemia    • MI (myocardial infarction) (CMS/[facility])     x 2   • Renal insufficiency    • Sleep apnea     using c-pap   • Sleep apnea    • Stroke (CMS/[facility])     no residual except some intermittent vision changes   • Vision changes     post stroke, takes a minute to focus     Past Surgical History:   Procedure Laterality Date   • COLONOSCOPY N/A 11/17/2020    Procedure: COLONOSCOPY;  Surgeon: Abi Miller MD;  Location: [facility] ENDOSCOPY;  Service: Gastroenterology;  Laterality: N/A;   • CORONARY ANGIOPLASTY WITH STENT PLACEMENT     • EAR TUBES Left    • LA AAA REPAIR,AORTO-AORTIC TUBE PROSTH N/A 8/11/2017    Procedure: ABDOMINAL AORTIC ANEURYSM REPAIR WITH ENDOGRAFT, REPAIR ILIAC ARTERY ANEURYSM, POSSIBLE OPEN ABDOMINAL AORTOGRAM    (CELL SAVER STAND-BY);  Surgeon: Aren Arshad MD;  Location: [facility] OR;  Service: Vascular     Family History   Problem Relation Age of Onset   • Stroke Mother    • Diabetes Mother    • Heart disease Father        Prior to Admission medications    Medication Sig Start Date End Date Taking? Authorizing Provider   albuterol (PROVENTIL HFA;VENTOLIN HFA) 108 (90 BASE) MCG/ACT inhaler Inhale 2 puffs Every 4 (Four) Hours As Needed for Wheezing.   Yes Provider, MD Harry    allopurinol (ZYLOPRIM) 100 MG tablet Take 1 tablet by mouth Daily. 11/2/20  Yes Dread Duvall MD   atorvastatin (LIPITOR) 20 MG tablet Take 1 tablet by mouth Every Night. 11/2/20  Yes Dread Duvall MD   brimonidine (ALPHAGAN) 0.2 % ophthalmic solution Administer 1 drop to the right eye 3 (Three) Times a Day.   Yes Harry Oakes MD   calcitriol (ROCALTROL) 0.25 MCG capsule Take 1 capsule by mouth Daily. 11/2/20  Yes Dread Duvall MD   clopidogrel (PLAVIX) 75 MG tablet Take 1 tablet by mouth Daily. 10/1/20  Yes Dread Duvall MD   cyclobenzaprine (FLEXERIL) 10 MG tablet Take 1 tablet by mouth every night at bedtime. 5/21/20  Yes Dread Duvall MD   dilTIAZem CD (Cardizem CD) 180 MG 24 hr capsule Take 1 capsule by mouth Daily. 10/2/20  Yes Dread Duvall MD   gabapentin (NEURONTIN) 600 MG tablet Take 1 tablet by mouth 3 (Three) Times a Day. 11/4/20  Yes Dread Duvall MD   guanFACINE (TENEX) 1 MG tablet Take 1 tablet by mouth Every Night for 90 days. 11/2/20 1/31/21 Yes Dread Duvall MD   labetalol (NORMODYNE) 200 MG tablet Take 1 tablet by mouth 2 (Two) Times a Day. 4/23/20  Yes Dread Duvall MD   linaclotide (LINZESS) 72 MCG capsule capsule Take 1 capsule by mouth Every Morning Before Breakfast. 2/27/20  Yes Dread Duvall MD   losartan (COZAAR) 100 MG tablet Take 1 tablet by mouth Daily. 11/2/20  Yes Dread Duvall MD   meclizine (ANTIVERT) 25 MG tablet Take 25 mg by mouth 3 (Three) Times a Day As Needed for Dizziness.   Yes Harry Oakes MD   polyethylene glycol (GoLYTELY) 236 g solution Starting at noon on day prior to procedure, drink 8 ounces every 30 minutes until all gone or stools are clear. May add flavor packet. 10/26/20   Abi Miller MD   Turmeric (QC Tumeric Complex) 500 MG capsule Take 500 mg by mouth Daily. 9/30/20   Dread Duvall MD     No Known Allergies  Social History     Socioeconomic History   • Marital status: Single     Spouse name: Not on  "file   • Number of children: Not on file   • Years of education: Not on file   • Highest education level: Not on file   Tobacco Use   • Smoking status: Former Smoker     Packs/day: 1.00     Years: 20.00     Pack years: 20.00   • Smokeless tobacco: Never Used   Substance and Sexual Activity   • Alcohol use: No   • Drug use: No   • Sexual activity: Defer       Review of Systems  Review of Systems   Constitutional: Negative for chills, fatigue, fever and unexpected weight change.   HENT: Negative for congestion, ear discharge, hearing loss, nosebleeds and sore throat.    Eyes: Negative for pain, discharge and redness.   Respiratory: Negative for cough, chest tightness, shortness of breath and wheezing.    Cardiovascular: Negative for chest pain and palpitations.   Gastrointestinal: Negative for abdominal distention, abdominal pain, blood in stool, constipation, diarrhea, nausea and vomiting.   Endocrine: Negative for cold intolerance, polydipsia, polyphagia and polyuria.   Genitourinary: Negative for dysuria, flank pain, frequency, hematuria and urgency.   Musculoskeletal: Negative for arthralgias, back pain, joint swelling and myalgias.   Skin: Negative for color change, pallor and rash.   Neurological: Negative for tremors, seizures, syncope, weakness and headaches.   Hematological: Negative for adenopathy. Does not bruise/bleed easily.   Psychiatric/Behavioral: Negative for behavioral problems, confusion, dysphoric mood, hallucinations and suicidal ideas. The patient is not nervous/anxious.         BP (!) 129/108   Pulse 81   Ht 185.4 cm (73\")   Wt 109 kg (240 lb)   BMI 31.66 kg/m²     Objective    Physical Exam  Constitutional:       Appearance: He is well-developed.   HENT:      Head: Normocephalic and atraumatic.   Eyes:      Conjunctiva/sclera: Conjunctivae normal.      Pupils: Pupils are equal, round, and reactive to light.   Neck:      Musculoskeletal: Normal range of motion and neck supple.      Thyroid: " No thyromegaly.   Cardiovascular:      Rate and Rhythm: Normal rate and regular rhythm.      Heart sounds: Normal heart sounds. No murmur.   Pulmonary:      Effort: Pulmonary effort is normal.      Breath sounds: Normal breath sounds. No wheezing.   Abdominal:      General: Bowel sounds are normal. There is no distension.      Palpations: Abdomen is soft. There is no mass.      Tenderness: There is no abdominal tenderness.      Hernia: No hernia is present.   Genitourinary:     Comments: No lesions noted  Musculoskeletal: Normal range of motion.         General: No tenderness.   Lymphadenopathy:      Cervical: No cervical adenopathy.   Skin:     General: Skin is warm and dry.      Findings: No rash.   Neurological:      Mental Status: He is alert and oriented to person, place, and time.      Cranial Nerves: No cranial nerve deficit.   Psychiatric:         Thought Content: Thought content normal.       Lab on 11/14/2020   Component Date Value Ref Range Status   • SARS-CoV-2, OMAR 11/14/2020 Not Detected  Not Detected Final    Comment: This nucleic acid amplification test was developed and its performance  characteristics determined by [facility]. Nucleic acid  amplification tests include PCR and TMA. This test has not been FDA  cleared or approved. This test has been authorized by FDA under an  Emergency Use Authorization (EUA). This test is only authorized for  the duration of time the declaration that circumstances exist  justifying the authorization of the emergency use of in vitro  diagnostic tests for detection of SARS-CoV-2 virus and/or diagnosis  of COVID-19 infection under section 564(b)(1) of the Act, 21 U.S.C.  360bbb-3(b) (1), unless the authorization is terminated or revoked  sooner.  When diagnostic testing is negative, the possibility of a false  negative result should be considered in the context of a patient's  recent exposures and the presence of clinical signs and symptoms  consistent with  COVID-19. An individual without symptoms of COVID-19  and who is not shedding SARS-CoV-2 virus would                            expect to have a  negative (not detected) result in this assay.   • COVID [facility] PRIORITY 11/14/2020 Comment   Final    Received     Assessment/Plan    No diagnosis found..   1.  Colorectal cancer screening, repeat colonoscopy in 10 years.  2.  Obesity, recommend exercise and diet control.  3.  Accelerated diastolic hypertension, PCP evaluation.  Patient counseled.    Orders placed during this encounter include:  No orders of the defined types were placed in this encounter.      * Surgery not found *    Review and/or summary of lab tests, radiology, procedures, medications. Review and summary of old records and obtaining of history. The risks and benefits of my recommendations, as well as other treatment options were discussed with the patient today. Questions were answered.    No orders of the defined types were placed in this encounter.      Follow-up: No follow-ups on file.               Results for orders placed or performed in visit on 11/14/20   COVID [facility] Priority - Swab, Nasopharynx    Specimen: Nasopharynx; Swab   Result Value Ref Range    COVID [facility] PRIORITY Comment    COVID-19,[facility] ROUTINE, NP/OP SWAB IN TRANSPORT MEDIA OR ESWAB 72 HR TAT - Swab, Nasopharynx    Specimen: Nasopharynx; Swab   Result Value Ref Range    SARS-CoV-2, OMAR Not Detected Not Detected   Results for orders placed or performed in visit on 02/27/20   Urine Drug Screen - Urine, Clean Catch    Specimen: Urine, Clean Catch   Result Value Ref Range    THC, Screen, Urine Negative Negative    Phencyclidine (PCP), Urine Negative Negative    Cocaine Screen, Urine Negative Negative    Methamphetamine, Ur Negative Negative    Opiate Screen Negative Negative    Amphetamine Screen, Urine Negative Negative    Benzodiazepine Screen, Urine Negative Negative    Tricyclic Antidepressants Screen Negative Negative     Methadone Screen, Urine Negative Negative    Barbiturates Screen, Urine Negative Negative    Oxycodone Screen, Urine Negative Negative    Propoxyphene Screen Negative Negative    Buprenorphine, Screen, Urine Negative Negative   Results for orders placed or performed in visit on 02/13/20   Hepatitis Panel, Acute    Specimen: Blood   Result Value Ref Range    Hepatitis B Surface Ag Non-Reactive Non-Reactive    Hep A IgM Non-Reactive Non-Reactive    Hep B C IgM Non-Reactive Non-Reactive    Hepatitis C Ab Non-Reactive Non-Reactive   Hep B Confirmation Tube    Specimen: Blood   Result Value Ref Range    Extra Tube Hold for add-ons.    Potassium    Specimen: Blood   Result Value Ref Range    Potassium 4.7 3.5 - 5.2 mmol/L   Hepatic Function Panel    Specimen: Blood   Result Value Ref Range    Total Protein 7.8 6.0 - 8.5 g/dL    Albumin 4.50 3.50 - 5.20 g/dL    ALT (SGPT) 19 1 - 41 U/L    AST (SGOT) 28 1 - 40 U/L    Alkaline Phosphatase 101 39 - 117 U/L    Total Bilirubin 0.2 0.2 - 1.2 mg/dL    Bilirubin, Direct <0.2 (L) 0.2 - 0.3 mg/dL    Bilirubin, Indirect     Results for orders placed or performed in visit on 01/15/20   CBC Auto Differential    Specimen: Blood   Result Value Ref Range    WBC 6.55 3.40 - 10.80 10*3/mm3    RBC 5.47 4.14 - 5.80 10*6/mm3    Hemoglobin 15.0 13.0 - 17.7 g/dL    Hematocrit 43.6 37.5 - 51.0 %    MCV 79.7 79.0 - 97.0 fL    MCH 27.4 26.6 - 33.0 pg    MCHC 34.4 31.5 - 35.7 g/dL    RDW 12.7 12.3 - 15.4 %    RDW-SD 35.4 (L) 37.0 - 54.0 fl    MPV 11.0 6.0 - 12.0 fL    Platelets 277 140 - 450 10*3/mm3    Neutrophil % 53.5 42.7 - 76.0 %    Lymphocyte % 30.1 19.6 - 45.3 %    Monocyte % 11.3 5.0 - 12.0 %    Eosinophil % 4.0 0.3 - 6.2 %    Basophil % 0.9 0.0 - 1.5 %    Immature Grans % 0.2 0.0 - 0.5 %    Neutrophils, Absolute 3.51 1.70 - 7.00 10*3/mm3    Lymphocytes, Absolute 1.97 0.70 - 3.10 10*3/mm3    Monocytes, Absolute 0.74 0.10 - 0.90 10*3/mm3    Eosinophils, Absolute 0.26 0.00 - 0.40 10*3/mm3     Basophils, Absolute 0.06 0.00 - 0.20 10*3/mm3    Immature Grans, Absolute 0.01 0.00 - 0.05 10*3/mm3    nRBC 0.0 0.0 - 0.2 /100 WBC   Vitamin D 25 Hydroxy    Specimen: Blood   Result Value Ref Range    25 Hydroxy, Vitamin D 39.4 30.0 - 100.0 ng/ml   T3, Free    Specimen: Blood   Result Value Ref Range    T3, Free 3.52 2.00 - 4.40 pg/mL   TSH    Specimen: Blood   Result Value Ref Range    TSH 1.560 0.270 - 4.200 uIU/mL   T4, Free    Specimen: Blood   Result Value Ref Range    Free T4 1.25 0.93 - 1.70 ng/dL   Hemoglobin A1c    Specimen: Blood   Result Value Ref Range    Hemoglobin A1C 5.10 4.80 - 5.60 %   Lipid Panel    Specimen: Blood   Result Value Ref Range    Total Cholesterol 133 0 - 200 mg/dL    Triglycerides 122 0 - 150 mg/dL    HDL Cholesterol 37 (L) 40 - 60 mg/dL    LDL Cholesterol  72 0 - 100 mg/dL    VLDL Cholesterol 24.4 5 - 40 mg/dL    LDL/HDL Ratio 1.94    Comprehensive Metabolic Panel    Specimen: Blood   Result Value Ref Range    Glucose 80 65 - 99 mg/dL    BUN 30 (H) 6 - 20 mg/dL    Creatinine 2.72 (H) 0.76 - 1.27 mg/dL    Sodium 143 136 - 145 mmol/L    Potassium 5.5 (H) 3.5 - 5.2 mmol/L    Chloride 102 98 - 107 mmol/L    CO2 25.5 22.0 - 29.0 mmol/L    Calcium 10.4 8.6 - 10.5 mg/dL    Total Protein 8.2 6.0 - 8.5 g/dL    Albumin 4.60 3.50 - 5.20 g/dL    ALT (SGPT) 62 (H) 1 - 41 U/L    AST (SGOT) 100 (H) 1 - 40 U/L    Alkaline Phosphatase 128 (H) 39 - 117 U/L    Total Bilirubin 0.4 0.2 - 1.2 mg/dL    eGFR  African Amer 31 (L) >60 mL/min/1.73    Globulin 3.6 gm/dL    A/G Ratio 1.3 g/dL    BUN/Creatinine Ratio 11.0 7.0 - 25.0    Anion Gap 15.5 (H) 5.0 - 15.0 mmol/L   Results for orders placed or performed in visit on 11/26/19   Adult Transthoracic Echo Complete W/ Cont if Necessary Per Protocol   Result Value Ref Range    BSA 2.3 m^2    RVIDd 3.6 cm    IVSd 1.6 cm    LVIDd 4.6 cm    LVIDs 2.7 cm    LVPWd 1.7 cm    IVS/LVPW 0.89     FS 42.3 %    EDV(Teich) 96.8 ml    ESV(Teich) 25.8 ml    EF(Teich) 73.4 %     EDV(cubed) 96.7 ml    ESV(cubed) 18.6 ml    EF(cubed) 80.8 %    LV mass(C)d 327.2 grams    LV mass(C)dI 142.2 grams/m^2    SV(Teich) 71.0 ml    SI(Teich) 30.9 ml/m^2    SV(cubed) 78.1 ml    SI(cubed) 33.9 ml/m^2    EPSS 0.6 cm    Ao root diam 3.7 cm    Ao root area 10.8 cm^2    ACS 2.7 cm    LA dimension 3.8 cm    asc Aorta Diam 3.7 cm    Ao Isth Diam 3.6 cm    LA/Ao 1.0     LVOT diam 2.5 cm    LVOT area 4.9 cm^2    LVOT area(traced) 4.9 cm^2    LVLd ap4 8.1 cm    EDV(MOD-sp4) 86.9 ml    LVLs ap4 7.4 cm    ESV(MOD-sp4) 46.3 ml    EF(MOD-sp4) 46.7 %    LVLd ap2 8.1 cm    EDV(MOD-sp2) 75.9 ml    LVLs ap2 7.2 cm    ESV(MOD-sp2) 40.5 ml    EF(MOD-sp2) 46.6 %    SV(MOD-sp4) 40.6 ml    SI(MOD-sp4) 17.6 ml/m^2    SV(MOD-sp2) 35.4 ml    SI(MOD-sp2) 15.4 ml/m^2    Ao root area (BSA corrected) 1.6     LV [place] Vol (BSA corrected) 37.7 ml/m^2    LV Sys Vol (BSA corrected) 20.1 ml/m^2    MV E max dena 49.7 cm/sec    MV A max dena 58.3 cm/sec    MV E/A 0.85     MV V2 max 53.4 cm/sec    MV max PG 1.1 mmHg    MV V2 mean 35.6 cm/sec    MV mean PG 1.0 mmHg    MV V2 VTI 14.3 cm    MVA(VTI) 7.0 cm^2    MV P1/2t max dena 56.0 cm/sec    MV P1/2t 54.0 msec    MVA(P1/2t) 4.1 cm^2    MV dec slope 304.0 cm/sec^2    Ao pk dena 108.0 cm/sec    Ao max PG 4.7 mmHg    Ao max PG (full) 0.8 mmHg    Ao V2 mean 69.8 cm/sec    Ao mean PG 2.0 mmHg    Ao mean PG (full) 0 mmHg    Ao V2 VTI 20.3 cm    PEARL(I,A) 4.9 cm^2    PEARL(I,D) 4.9 cm^2    PEARL(V,A) 4.5 cm^2    PEARL(V,D) 4.5 cm^2    LV V1 max PG 3.9 mmHg    LV V1 mean PG 2.0 mmHg    LV V1 max 98.3 cm/sec    LV V1 mean 71.4 cm/sec    LV V1 VTI 20.3 cm    SV(Ao) 218.3 ml    SI(Ao) 94.8 ml/m^2    SV(LVOT) 99.6 ml    SI(LVOT) 43.3 ml/m^2    PA V2 max 96.1 cm/sec    PA max PG 3.7 mmHg    PA V2 mean 63.0 cm/sec    PA mean PG 2.0 mmHg    PA V2 VTI 19.5 cm    TR max dena 179.0 cm/sec    RVSP(TR) 15.8 mmHg    RAP systole 3.0 mmHg    MVA P1/2T LCG 3.9 cm^2    BH CV ECHO CHARLEY - BZI_BMI 32.6 kilograms/m^2    BH CV  ECHO CHARLEY - BSA([facility]) 2.4 m^2    BH CV ECHO CHARLEY - BZI_METRIC_WEIGHT 108.9 kg    BH CV ECHO CHARLEY - BZI_METRIC_HEIGHT 182.9 cm    BH CV VAS BP RIGHT /82 mmHg     *Note: Due to a large number of results and/or encounters for the requested time period, some results have not been displayed. A complete set of results can be found in Results Review.         This document has been electronically signed by Abi Miller MD on November 24, 2020 21:37 CST

## 2020-11-30 ENCOUNTER — OFFICE VISIT (OUTPATIENT)
Dept: FAMILY MEDICINE CLINIC | Facility: CLINIC | Age: 45
End: 2020-11-30

## 2020-11-30 VITALS
HEIGHT: 73 IN | DIASTOLIC BLOOD PRESSURE: 82 MMHG | TEMPERATURE: 96.8 F | OXYGEN SATURATION: 99 % | WEIGHT: 240 LBS | HEART RATE: 78 BPM | SYSTOLIC BLOOD PRESSURE: 128 MMHG | BODY MASS INDEX: 31.81 KG/M2

## 2020-11-30 DIAGNOSIS — G47.30 SLEEP APNEA, UNSPECIFIED TYPE: ICD-10-CM

## 2020-11-30 DIAGNOSIS — R26.89 BALANCE PROBLEM: ICD-10-CM

## 2020-11-30 DIAGNOSIS — E78.2 MIXED HYPERLIPIDEMIA: ICD-10-CM

## 2020-11-30 DIAGNOSIS — E55.9 VITAMIN D DEFICIENCY, UNSPECIFIED: ICD-10-CM

## 2020-11-30 DIAGNOSIS — N18.32 STAGE 3B CHRONIC KIDNEY DISEASE (HCC): ICD-10-CM

## 2020-11-30 DIAGNOSIS — E79.0 HYPERURICEMIA: Primary | ICD-10-CM

## 2020-11-30 DIAGNOSIS — Z86.73 HISTORY OF CVA (CEREBROVASCULAR ACCIDENT): ICD-10-CM

## 2020-11-30 DIAGNOSIS — I10 ESSENTIAL HYPERTENSION: ICD-10-CM

## 2020-11-30 DIAGNOSIS — I25.10 CORONARY ARTERY DISEASE INVOLVING NATIVE CORONARY ARTERY OF NATIVE HEART WITHOUT ANGINA PECTORIS: ICD-10-CM

## 2020-11-30 DIAGNOSIS — K64.9 HEMORRHOIDS, UNSPECIFIED HEMORRHOID TYPE: ICD-10-CM

## 2020-11-30 PROCEDURE — 99214 OFFICE O/P EST MOD 30 MIN: CPT | Performed by: FAMILY MEDICINE

## 2020-11-30 RX ORDER — CYCLOBENZAPRINE HCL 10 MG
10 TABLET ORAL
Qty: 30 TABLET | Refills: 3 | Status: SHIPPED | OUTPATIENT
Start: 2020-11-30 | End: 2021-07-14 | Stop reason: SDUPTHER

## 2020-11-30 NOTE — PATIENT INSTRUCTIONS
Get labwork this week     Try stopping gabapentin to see if this is causing your balance issues     Call Dr. Mar for an appt regarding CPAP     High-Fiber Diet  Fiber, also called dietary fiber, is a type of carbohydrate that is found in fruits, vegetables, whole grains, and beans. A high-fiber diet can have many health benefits. Your health care provider may recommend a high-fiber diet to help:  · Prevent constipation. Fiber can make your bowel movements more regular.  · Lower your cholesterol.  · Relieve the following conditions:  ? Swelling of veins in the anus (hemorrhoids).  ? Swelling and irritation (inflammation) of specific areas of the digestive tract (uncomplicated diverticulosis).  ? A problem of the large intestine (colon) that sometimes causes pain and diarrhea (irritable bowel syndrome, IBS).  · Prevent overeating as part of a weight-loss plan.  · Prevent heart disease, type 2 diabetes, and certain cancers.  What is my plan?  The recommended daily fiber intake in grams (g) includes:  · 38 g for men age 50 or younger.  · 30 g for men over age 50.  · 25 g for women age 50 or younger.  · 21 g for women over age 50.  You can get the recommended daily intake of dietary fiber by:  · Eating a variety of fruits, vegetables, grains, and beans.  · Taking a fiber supplement, if it is not possible to get enough fiber through your diet.  What do I need to know about a high-fiber diet?  · It is better to get fiber through food sources rather than from fiber supplements. There is not a lot of research about how effective supplements are.  · Always check the fiber content on the nutrition facts label of any prepackaged food. Look for foods that contain 5 g of fiber or more per serving.  · Talk with a diet and nutrition specialist (dietitian) if you have questions about specific foods that are recommended or not recommended for your medical condition, especially if those foods are not listed below.  · Gradually  increase how much fiber you consume. If you increase your intake of dietary fiber too quickly, you may have bloating, cramping, or gas.  · Drink plenty of water. Water helps you to digest fiber.  What are tips for following this plan?  · Eat a wide variety of high-fiber foods.  · Make sure that half of the grains that you eat each day are whole grains.  · Eat breads and cereals that are made with whole-grain flour instead of refined flour or white flour.  · Eat brown rice, bulgur wheat, or millet instead of white rice.  · Start the day with a breakfast that is high in fiber, such as a cereal that contains 5 g of fiber or more per serving.  · Use beans in place of meat in soups, salads, and pasta dishes.  · Eat high-fiber snacks, such as berries, raw vegetables, nuts, and popcorn.  · Choose whole fruits and vegetables instead of processed forms like juice or sauce.  What foods can I eat?    Fruits  Berries. Pears. Apples. Oranges. Avocado. Prunes and raisins. Dried figs.  Vegetables  Sweet potatoes. Spinach. Kale. Artichokes. Cabbage. Broccoli. Cauliflower. Green peas. Carrots. Squash.  Grains  Whole-grain breads. Multigrain cereal. Oats and oatmeal. Brown rice. Barley. Bulgur wheat. Millet. Quinoa. Bran muffins. Popcorn. Rye wafer crackers.  Meats and other proteins  Navy, kidney, and bentley beans. Soybeans. Split peas. Lentils. Nuts and seeds.  Dairy  Fiber-fortified yogurt.  Beverages  Fiber-fortified soy milk. Fiber-fortified orange juice.  Other foods  Fiber bars.  The items listed above may not be a complete list of recommended foods and beverages. Contact a dietitian for more options.  What foods are not recommended?  Fruits  Fruit juice. Cooked, strained fruit.  Vegetables  Fried potatoes. Canned vegetables. Well-cooked vegetables.  Grains  White bread. Pasta made with refined flour. White rice.  Meats and other proteins  Fatty cuts of meat. Fried chicken or fried fish.  Dairy  Milk. Yogurt. Cream cheese. Sour  cream.  Fats and oils  [place].  Beverages  Soft drinks.  Other foods  Cakes and pastries.  The items listed above may not be a complete list of foods and beverages to avoid. Contact a dietitian for more information.  Summary  · Fiber is a type of carbohydrate. It is found in fruits, vegetables, whole grains, and beans.  · There are many health benefits of eating a high-fiber diet, such as preventing constipation, lowering blood cholesterol, helping with weight loss, and reducing your risk of heart disease, diabetes, and certain cancers.  · Gradually increase your intake of fiber. Increasing too fast can result in cramping, bloating, and gas. Drink plenty of water while you increase your fiber.  · The best sources of fiber include whole fruits and vegetables, whole grains, nuts, seeds, and beans.  This information is not intended to replace advice given to you by your health care provider. Make sure you discuss any questions you have with your health care provider.  Document Revised: 10/22/2018 Document Reviewed: 10/22/2018  [employer] Patient Education © 2020 [employer]    Hemorrhoids  Hemorrhoids are swollen veins in and around the rectum or anus. There are two types of hemorrhoids:  · Internal hemorrhoids. These occur in the veins that are just inside the rectum. They may poke through to the outside and become irritated and painful.  · External hemorrhoids. These occur in the veins that are outside the anus and can be felt as a painful swelling or hard lump near the anus.  Most hemorrhoids do not cause serious problems, and they can be managed with home treatments such as diet and lifestyle changes. If home treatments do not help the symptoms, procedures can be done to shrink or remove the hemorrhoids.  What are the causes?  This condition is caused by increased pressure in the anal area. This pressure may result from various things, including:  · Constipation.  · Straining to have a bowel  movement.  · Diarrhea.  · Pregnancy.  · Obesity.  · Sitting for long periods of time.  · Heavy lifting or other activity that causes you to strain.  · Anal sex.  · Riding a bike for a long period of time.  What are the signs or symptoms?  Symptoms of this condition include:  · Pain.  · Anal itching or irritation.  · Rectal bleeding.  · Leakage of stool (feces).  · Anal swelling.  · One or more lumps around the anus.  How is this diagnosed?  This condition can often be diagnosed through a visual exam. Other exams or tests may also be done, such as:  · An exam that involves feeling the rectal area with a gloved hand (digital rectal exam).  · An exam of the anal canal that is done using a small tube (anoscope).  · A blood test, if you have lost a significant amount of blood.  · A test to look inside the colon using a flexible tube with a camera on the end (sigmoidoscopy or colonoscopy).  How is this treated?  This condition can usually be treated at home. However, various procedures may be done if dietary changes, lifestyle changes, and other home treatments do not help your symptoms. These procedures can help make the hemorrhoids smaller or remove them completely. Some of these procedures involve surgery, and others do not. Common procedures include:  · Rubber band ligation. Rubber bands are placed at the base of the hemorrhoids to cut off their blood supply.  · Sclerotherapy. Medicine is injected into the hemorrhoids to shrink them.  · Infrared coagulation. A type of light energy is used to get rid of the hemorrhoids.  · Hemorrhoidectomy surgery. The hemorrhoids are surgically removed, and the veins that supply them are tied off.  · Stapled hemorrhoidopexy surgery. The surgeon staples the base of the hemorrhoid to the rectal wall.  Follow these instructions at home:  Eating and drinking    · Eat foods that have a lot of fiber in them, such as whole grains, beans, nuts, fruits, and vegetables.  · Ask your health care  provider about taking products that have added fiber (fiber supplements).  · Reduce the amount of fat in your diet. You can do this by eating low-fat dairy products, eating less red meat, and avoiding processed foods.  · Drink enough fluid to keep your urine pale yellow.  Managing pain and swelling    · Take warm sitz baths for 20 minutes, 3-4 times a day to ease pain and discomfort. You may do this in a bathtub or using a portable sitz bath that fits over the toilet.  · If directed, apply ice to the affected area. Using ice packs between sitz baths may be helpful.  ? Put ice in a plastic bag.  ? Place a towel between your skin and the bag.  ? Leave the ice on for 20 minutes, 2-3 times a day.  General instructions  · Take over-the-counter and prescription medicines only as told by your health care provider.  · Use medicated creams or suppositories as told.  · Get regular exercise. Ask your health care provider how much and what kind of exercise is best for you. In general, you should do moderate exercise for at least 30 minutes on most days of the week (150 minutes each week). This can include activities such as walking, biking, or yoga.  · Go to the bathroom when you have the urge to have a bowel movement. Do not wait.  · Avoid straining to have bowel movements.  · Keep the anal area dry and clean. Use wet toilet paper or moist towelettes after a bowel movement.  · Do not sit on the toilet for long periods of time. This increases blood pooling and pain.  · Keep all follow-up visits as told by your health care provider. This is important.  Contact a health care provider if you have:  · Increasing pain and swelling that are not controlled by treatment or medicine.  · Difficulty having a bowel movement, or you are unable to have a bowel movement.  · Pain or inflammation outside the area of the hemorrhoids.  Get help right away if you have:  · Uncontrolled bleeding from your rectum.  Summary  · Hemorrhoids are swollen  veins in and around the rectum or anus.  · Most hemorrhoids can be managed with home treatments such as diet and lifestyle changes.  · Taking warm sitz baths can help ease pain and discomfort.  · In severe cases, procedures or surgery can be done to shrink or remove the hemorrhoids.  This information is not intended to replace advice given to you by your health care provider. Make sure you discuss any questions you have with your health care provider.  Document Revised: 05/15/2020 Document Reviewed: 05/09/2019  [employer] Patient Education © 2020 [employer]

## 2020-12-01 ENCOUNTER — LAB (OUTPATIENT)
Dept: LAB | Facility: HOSPITAL | Age: 45
End: 2020-12-01

## 2020-12-01 DIAGNOSIS — E66.9 CLASS 1 OBESITY WITH BODY MASS INDEX (BMI) OF 31.0 TO 31.9 IN ADULT, UNSPECIFIED OBESITY TYPE, UNSPECIFIED WHETHER SERIOUS COMORBIDITY PRESENT: ICD-10-CM

## 2020-12-01 DIAGNOSIS — E78.2 MIXED HYPERLIPIDEMIA: ICD-10-CM

## 2020-12-01 DIAGNOSIS — E79.0 HYPERURICEMIA: ICD-10-CM

## 2020-12-01 DIAGNOSIS — Z86.73 HISTORY OF CVA (CEREBROVASCULAR ACCIDENT): ICD-10-CM

## 2020-12-01 DIAGNOSIS — I25.10 CORONARY ARTERY DISEASE INVOLVING NATIVE CORONARY ARTERY OF NATIVE HEART WITHOUT ANGINA PECTORIS: ICD-10-CM

## 2020-12-01 DIAGNOSIS — E55.9 VITAMIN D DEFICIENCY, UNSPECIFIED: ICD-10-CM

## 2020-12-01 DIAGNOSIS — N18.30 STAGE 3 CHRONIC KIDNEY DISEASE (HCC): ICD-10-CM

## 2020-12-01 DIAGNOSIS — I10 ESSENTIAL HYPERTENSION: ICD-10-CM

## 2020-12-01 LAB
25(OH)D3 SERPL-MCNC: 22.7 NG/ML (ref 30–100)
ALBUMIN SERPL-MCNC: 4.8 G/DL (ref 3.5–5.2)
ALBUMIN/GLOB SERPL: 1.5 G/DL
ALP SERPL-CCNC: 133 U/L (ref 39–117)
ALT SERPL W P-5'-P-CCNC: 13 U/L (ref 1–41)
ANION GAP SERPL CALCULATED.3IONS-SCNC: 7.1 MMOL/L (ref 5–15)
AST SERPL-CCNC: 19 U/L (ref 1–40)
BASOPHILS # BLD AUTO: 0.07 10*3/MM3 (ref 0–0.2)
BASOPHILS NFR BLD AUTO: 1.2 % (ref 0–1.5)
BILIRUB SERPL-MCNC: 0.4 MG/DL (ref 0–1.2)
BUN SERPL-MCNC: 24 MG/DL (ref 6–20)
BUN/CREAT SERPL: 10 (ref 7–25)
CALCIUM SPEC-SCNC: 10 MG/DL (ref 8.6–10.5)
CHLORIDE SERPL-SCNC: 102 MMOL/L (ref 98–107)
CHOLEST SERPL-MCNC: 134 MG/DL (ref 0–200)
CO2 SERPL-SCNC: 30.9 MMOL/L (ref 22–29)
CREAT SERPL-MCNC: 2.39 MG/DL (ref 0.76–1.27)
DEPRECATED RDW RBC AUTO: 37.8 FL (ref 37–54)
EOSINOPHIL # BLD AUTO: 0.4 10*3/MM3 (ref 0–0.4)
EOSINOPHIL NFR BLD AUTO: 6.9 % (ref 0.3–6.2)
ERYTHROCYTE [DISTWIDTH] IN BLOOD BY AUTOMATED COUNT: 12.9 % (ref 12.3–15.4)
GFR SERPL CREATININE-BSD FRML MDRD: 36 ML/MIN/1.73
GLOBULIN UR ELPH-MCNC: 3.1 GM/DL
GLUCOSE SERPL-MCNC: 101 MG/DL (ref 65–99)
HCT VFR BLD AUTO: 44.1 % (ref 37.5–51)
HDLC SERPL-MCNC: 43 MG/DL (ref 40–60)
HGB BLD-MCNC: 14.6 G/DL (ref 13–17.7)
IMM GRANULOCYTES # BLD AUTO: 0.01 10*3/MM3 (ref 0–0.05)
IMM GRANULOCYTES NFR BLD AUTO: 0.2 % (ref 0–0.5)
LDLC SERPL CALC-MCNC: 70 MG/DL (ref 0–100)
LDLC/HDLC SERPL: 1.59 {RATIO}
LYMPHOCYTES # BLD AUTO: 1.7 10*3/MM3 (ref 0.7–3.1)
LYMPHOCYTES NFR BLD AUTO: 29.2 % (ref 19.6–45.3)
MCH RBC QN AUTO: 27 PG (ref 26.6–33)
MCHC RBC AUTO-ENTMCNC: 33.1 G/DL (ref 31.5–35.7)
MCV RBC AUTO: 81.7 FL (ref 79–97)
MONOCYTES # BLD AUTO: 0.6 10*3/MM3 (ref 0.1–0.9)
MONOCYTES NFR BLD AUTO: 10.3 % (ref 5–12)
NEUTROPHILS NFR BLD AUTO: 3.05 10*3/MM3 (ref 1.7–7)
NEUTROPHILS NFR BLD AUTO: 52.2 % (ref 42.7–76)
NRBC BLD AUTO-RTO: 0 /100 WBC (ref 0–0.2)
PLATELET # BLD AUTO: 281 10*3/MM3 (ref 140–450)
PMV BLD AUTO: 10.6 FL (ref 6–12)
POTASSIUM SERPL-SCNC: 4.3 MMOL/L (ref 3.5–5.2)
PROT SERPL-MCNC: 7.9 G/DL (ref 6–8.5)
RBC # BLD AUTO: 5.4 10*6/MM3 (ref 4.14–5.8)
SODIUM SERPL-SCNC: 140 MMOL/L (ref 136–145)
TRIGL SERPL-MCNC: 114 MG/DL (ref 0–150)
URATE SERPL-MCNC: 6.8 MG/DL (ref 3.4–7)
VLDLC SERPL-MCNC: 21 MG/DL (ref 5–40)
WBC # BLD AUTO: 5.83 10*3/MM3 (ref 3.4–10.8)

## 2020-12-01 PROCEDURE — 80061 LIPID PANEL: CPT

## 2020-12-01 PROCEDURE — 82306 VITAMIN D 25 HYDROXY: CPT

## 2020-12-01 PROCEDURE — 80053 COMPREHEN METABOLIC PANEL: CPT

## 2020-12-01 PROCEDURE — 84550 ASSAY OF BLOOD/URIC ACID: CPT

## 2020-12-01 PROCEDURE — 85025 COMPLETE CBC W/AUTO DIFF WBC: CPT

## 2020-12-02 RX ORDER — ERGOCALCIFEROL 1.25 MG/1
50000 CAPSULE ORAL
Qty: 4 CAPSULE | Refills: 3 | Status: SHIPPED | OUTPATIENT
Start: 2020-12-02 | End: 2021-03-29 | Stop reason: SDUPTHER

## 2020-12-02 NOTE — TELEPHONE ENCOUNTER
----- Message from Dread Duvall MD sent at 12/2/2020  7:44 AM CST -----  Please call pt    Vitamin D is low and recommend pt start taking Vitamin D3 50,000 units once a week give 4 pills and 3 refills    On CMP GFR at 36 with CR at 2.39. pt to continue seeing Nephrologist. Has CKD stage 3.      Lipid panel and uric acid stable     Recheck on next visit. Thanks

## 2021-02-01 RX ORDER — CLOPIDOGREL BISULFATE 75 MG/1
75 TABLET ORAL DAILY
Qty: 30 TABLET | Refills: 3 | Status: SHIPPED | OUTPATIENT
Start: 2021-02-01 | End: 2021-06-29 | Stop reason: SDUPTHER

## 2021-02-22 RX ORDER — DILTIAZEM HYDROCHLORIDE 180 MG/1
180 CAPSULE, COATED, EXTENDED RELEASE ORAL DAILY
Qty: 30 CAPSULE | Refills: 3 | Status: SHIPPED | OUTPATIENT
Start: 2021-02-22 | End: 2021-07-02 | Stop reason: SDUPTHER

## 2021-02-22 NOTE — TELEPHONE ENCOUNTER
Caller: Gregg Randle    Relationship: Self    Best call back number: [phone]    Medication needed:   Requested Prescriptions     Pending Prescriptions Disp Refills   • dilTIAZem CD (Cardizem CD) 180 MG 24 hr capsule 30 capsule 3     Sig: Take 1 capsule by mouth Daily.       When do you need the refill by: ASAP  What details did the patient provide when requesting the medication: ASAP    Does the patient have less than a 3 day supply:  [x] Yes  [] No    What is the patient's preferred pharmacy: [id number] [address] AT SEC OF [address] & [facility] - [phone] [facility] [phone] FX

## 2021-03-01 ENCOUNTER — LAB (OUTPATIENT)
Dept: LAB | Facility: HOSPITAL | Age: 46
End: 2021-03-01

## 2021-03-01 ENCOUNTER — OFFICE VISIT (OUTPATIENT)
Dept: FAMILY MEDICINE CLINIC | Facility: CLINIC | Age: 46
End: 2021-03-01

## 2021-03-01 VITALS
HEART RATE: 76 BPM | OXYGEN SATURATION: 98 % | WEIGHT: 240 LBS | DIASTOLIC BLOOD PRESSURE: 68 MMHG | TEMPERATURE: 93 F | SYSTOLIC BLOOD PRESSURE: 102 MMHG | HEIGHT: 73 IN | BODY MASS INDEX: 31.81 KG/M2

## 2021-03-01 DIAGNOSIS — T14.8XXA WOUND OF SKIN: ICD-10-CM

## 2021-03-01 DIAGNOSIS — E55.9 VITAMIN D DEFICIENCY, UNSPECIFIED: ICD-10-CM

## 2021-03-01 DIAGNOSIS — E78.2 MIXED HYPERLIPIDEMIA: ICD-10-CM

## 2021-03-01 DIAGNOSIS — I10 ESSENTIAL HYPERTENSION: ICD-10-CM

## 2021-03-01 DIAGNOSIS — E79.0 HYPERURICEMIA: ICD-10-CM

## 2021-03-01 DIAGNOSIS — I25.10 CORONARY ARTERY DISEASE INVOLVING NATIVE CORONARY ARTERY OF NATIVE HEART WITHOUT ANGINA PECTORIS: ICD-10-CM

## 2021-03-01 DIAGNOSIS — N18.32 STAGE 3B CHRONIC KIDNEY DISEASE (HCC): ICD-10-CM

## 2021-03-01 DIAGNOSIS — E66.9 CLASS 1 OBESITY WITH SERIOUS COMORBIDITY AND BODY MASS INDEX (BMI) OF 31.0 TO 31.9 IN ADULT, UNSPECIFIED OBESITY TYPE: ICD-10-CM

## 2021-03-01 DIAGNOSIS — E66.9 CLASS 1 OBESITY WITH SERIOUS COMORBIDITY AND BODY MASS INDEX (BMI) OF 31.0 TO 31.9 IN ADULT, UNSPECIFIED OBESITY TYPE: Primary | ICD-10-CM

## 2021-03-01 LAB
25(OH)D3 SERPL-MCNC: 43.4 NG/ML (ref 30–100)
ALBUMIN SERPL-MCNC: 4.6 G/DL (ref 3.5–5.2)
ALBUMIN/GLOB SERPL: 1.4 G/DL
ALP SERPL-CCNC: 110 U/L (ref 39–117)
ALT SERPL W P-5'-P-CCNC: 15 U/L (ref 1–41)
ANION GAP SERPL CALCULATED.3IONS-SCNC: 9.8 MMOL/L (ref 5–15)
AST SERPL-CCNC: 20 U/L (ref 1–40)
BASOPHILS # BLD AUTO: 0.05 10*3/MM3 (ref 0–0.2)
BASOPHILS NFR BLD AUTO: 1 % (ref 0–1.5)
BILIRUB SERPL-MCNC: 0.4 MG/DL (ref 0–1.2)
BUN SERPL-MCNC: 27 MG/DL (ref 6–20)
BUN/CREAT SERPL: 11.3 (ref 7–25)
CALCIUM SPEC-SCNC: 9.7 MG/DL (ref 8.6–10.5)
CHLORIDE SERPL-SCNC: 102 MMOL/L (ref 98–107)
CHOLEST SERPL-MCNC: 118 MG/DL (ref 0–200)
CO2 SERPL-SCNC: 29.2 MMOL/L (ref 22–29)
CREAT SERPL-MCNC: 2.38 MG/DL (ref 0.76–1.27)
DEPRECATED RDW RBC AUTO: 35.4 FL (ref 37–54)
EOSINOPHIL # BLD AUTO: 0.29 10*3/MM3 (ref 0–0.4)
EOSINOPHIL NFR BLD AUTO: 5.7 % (ref 0.3–6.2)
ERYTHROCYTE [DISTWIDTH] IN BLOOD BY AUTOMATED COUNT: 12.5 % (ref 12.3–15.4)
GFR SERPL CREATININE-BSD FRML MDRD: 36 ML/MIN/1.73
GLOBULIN UR ELPH-MCNC: 3.4 GM/DL
GLUCOSE SERPL-MCNC: 84 MG/DL (ref 65–99)
HBA1C MFR BLD: 5.14 % (ref 4.8–5.6)
HCT VFR BLD AUTO: 42.7 % (ref 37.5–51)
HDLC SERPL-MCNC: 36 MG/DL (ref 40–60)
HGB BLD-MCNC: 14.1 G/DL (ref 13–17.7)
IMM GRANULOCYTES # BLD AUTO: 0 10*3/MM3 (ref 0–0.05)
IMM GRANULOCYTES NFR BLD AUTO: 0 % (ref 0–0.5)
LDLC SERPL CALC-MCNC: 65 MG/DL (ref 0–100)
LDLC/HDLC SERPL: 1.8 {RATIO}
LYMPHOCYTES # BLD AUTO: 1.64 10*3/MM3 (ref 0.7–3.1)
LYMPHOCYTES NFR BLD AUTO: 32.3 % (ref 19.6–45.3)
MCH RBC QN AUTO: 26.7 PG (ref 26.6–33)
MCHC RBC AUTO-ENTMCNC: 33 G/DL (ref 31.5–35.7)
MCV RBC AUTO: 80.7 FL (ref 79–97)
MONOCYTES # BLD AUTO: 0.58 10*3/MM3 (ref 0.1–0.9)
MONOCYTES NFR BLD AUTO: 11.4 % (ref 5–12)
NEUTROPHILS NFR BLD AUTO: 2.51 10*3/MM3 (ref 1.7–7)
NEUTROPHILS NFR BLD AUTO: 49.6 % (ref 42.7–76)
NRBC BLD AUTO-RTO: 0 /100 WBC (ref 0–0.2)
PLATELET # BLD AUTO: 284 10*3/MM3 (ref 140–450)
PMV BLD AUTO: 10.5 FL (ref 6–12)
POTASSIUM SERPL-SCNC: 4.8 MMOL/L (ref 3.5–5.2)
PROT SERPL-MCNC: 8 G/DL (ref 6–8.5)
RBC # BLD AUTO: 5.29 10*6/MM3 (ref 4.14–5.8)
SODIUM SERPL-SCNC: 141 MMOL/L (ref 136–145)
TRIGL SERPL-MCNC: 86 MG/DL (ref 0–150)
URATE SERPL-MCNC: 6.6 MG/DL (ref 3.4–7)
VLDLC SERPL-MCNC: 17 MG/DL (ref 5–40)
WBC # BLD AUTO: 5.07 10*3/MM3 (ref 3.4–10.8)

## 2021-03-01 PROCEDURE — 84550 ASSAY OF BLOOD/URIC ACID: CPT

## 2021-03-01 PROCEDURE — 80061 LIPID PANEL: CPT

## 2021-03-01 PROCEDURE — 83036 HEMOGLOBIN GLYCOSYLATED A1C: CPT

## 2021-03-01 PROCEDURE — 85025 COMPLETE CBC W/AUTO DIFF WBC: CPT

## 2021-03-01 PROCEDURE — 82306 VITAMIN D 25 HYDROXY: CPT

## 2021-03-01 PROCEDURE — 80053 COMPREHEN METABOLIC PANEL: CPT

## 2021-03-01 PROCEDURE — 99214 OFFICE O/P EST MOD 30 MIN: CPT | Performed by: FAMILY MEDICINE

## 2021-03-03 ENCOUNTER — TELEPHONE (OUTPATIENT)
Dept: FAMILY MEDICINE CLINIC | Facility: CLINIC | Age: 46
End: 2021-03-03

## 2021-03-03 NOTE — TELEPHONE ENCOUNTER
----- Message from Dread Duvall MD sent at 3/2/2021  7:20 AM CST -----  Please let pt know all labwork stable on kidney function GFR at 36 and CR at 2.38. pt continues to have CKD stage 3. Continue followup with Nephrologist   Also on lipid panel HDL low. Recommend heart healthy diet    Recheck on next visit. Thanks

## 2021-03-16 RX ORDER — CALCITRIOL 0.25 UG/1
0.25 CAPSULE, LIQUID FILLED ORAL DAILY
Qty: 30 CAPSULE | Refills: 3 | Status: SHIPPED | OUTPATIENT
Start: 2021-03-16 | End: 2021-10-14 | Stop reason: SDUPTHER

## 2021-03-29 RX ORDER — ERGOCALCIFEROL 1.25 MG/1
50000 CAPSULE ORAL
Qty: 4 CAPSULE | Refills: 3 | Status: SHIPPED | OUTPATIENT
Start: 2021-03-29 | End: 2021-06-28 | Stop reason: SDUPTHER

## 2021-04-02 RX ORDER — ALLOPURINOL 100 MG/1
100 TABLET ORAL DAILY
Qty: 30 TABLET | Refills: 3 | Status: SHIPPED | OUTPATIENT
Start: 2021-04-02 | End: 2021-08-04 | Stop reason: SDUPTHER

## 2021-05-18 DIAGNOSIS — Z02.83 ENCOUNTER FOR DRUG SCREENING: ICD-10-CM

## 2021-05-18 RX ORDER — GABAPENTIN 600 MG/1
600 TABLET ORAL 3 TIMES DAILY
Qty: 90 TABLET | Refills: 3 | Status: SHIPPED | OUTPATIENT
Start: 2021-05-18 | End: 2021-09-17 | Stop reason: SDUPTHER

## 2021-06-01 ENCOUNTER — LAB (OUTPATIENT)
Dept: LAB | Facility: HOSPITAL | Age: 46
End: 2021-06-01

## 2021-06-01 ENCOUNTER — OFFICE VISIT (OUTPATIENT)
Dept: FAMILY MEDICINE CLINIC | Facility: CLINIC | Age: 46
End: 2021-06-01

## 2021-06-01 VITALS
HEIGHT: 73 IN | SYSTOLIC BLOOD PRESSURE: 108 MMHG | HEART RATE: 76 BPM | DIASTOLIC BLOOD PRESSURE: 70 MMHG | BODY MASS INDEX: 32.07 KG/M2 | TEMPERATURE: 97.5 F | OXYGEN SATURATION: 98 % | WEIGHT: 242 LBS

## 2021-06-01 DIAGNOSIS — E79.0 HYPERURICEMIA: Primary | ICD-10-CM

## 2021-06-01 DIAGNOSIS — E66.9 CLASS 1 OBESITY WITH SERIOUS COMORBIDITY AND BODY MASS INDEX (BMI) OF 31.0 TO 31.9 IN ADULT, UNSPECIFIED OBESITY TYPE: ICD-10-CM

## 2021-06-01 DIAGNOSIS — E79.0 HYPERURICEMIA: ICD-10-CM

## 2021-06-01 DIAGNOSIS — I25.10 CORONARY ARTERY DISEASE INVOLVING NATIVE CORONARY ARTERY OF NATIVE HEART WITHOUT ANGINA PECTORIS: ICD-10-CM

## 2021-06-01 DIAGNOSIS — I10 BENIGN ESSENTIAL HTN: ICD-10-CM

## 2021-06-01 DIAGNOSIS — E78.2 MIXED HYPERLIPIDEMIA: ICD-10-CM

## 2021-06-01 DIAGNOSIS — N18.32 STAGE 3B CHRONIC KIDNEY DISEASE (HCC): ICD-10-CM

## 2021-06-01 DIAGNOSIS — E55.9 VITAMIN D DEFICIENCY, UNSPECIFIED: ICD-10-CM

## 2021-06-01 LAB
25(OH)D3 SERPL-MCNC: 44.5 NG/ML
ALBUMIN SERPL-MCNC: 4.2 G/DL (ref 3.5–5.2)
ALBUMIN/GLOB SERPL: 1.2 G/DL
ALP SERPL-CCNC: 119 U/L (ref 39–117)
ALT SERPL W P-5'-P-CCNC: 26 U/L (ref 1–41)
ANION GAP SERPL CALCULATED.3IONS-SCNC: 10.8 MMOL/L (ref 5–15)
AST SERPL-CCNC: 21 U/L (ref 1–40)
BASOPHILS # BLD AUTO: 0.08 10*3/MM3 (ref 0–0.2)
BASOPHILS NFR BLD AUTO: 1.2 % (ref 0–1.5)
BILIRUB SERPL-MCNC: 0.3 MG/DL (ref 0–1.2)
BUN SERPL-MCNC: 15 MG/DL (ref 6–20)
BUN/CREAT SERPL: 7.2 (ref 7–25)
CALCIUM SPEC-SCNC: 9.7 MG/DL (ref 8.6–10.5)
CHLORIDE SERPL-SCNC: 104 MMOL/L (ref 98–107)
CHOLEST SERPL-MCNC: 140 MG/DL (ref 0–200)
CO2 SERPL-SCNC: 29.2 MMOL/L (ref 22–29)
CREAT SERPL-MCNC: 2.08 MG/DL (ref 0.76–1.27)
DEPRECATED RDW RBC AUTO: 39.3 FL (ref 37–54)
EOSINOPHIL # BLD AUTO: 0.27 10*3/MM3 (ref 0–0.4)
EOSINOPHIL NFR BLD AUTO: 4.2 % (ref 0.3–6.2)
ERYTHROCYTE [DISTWIDTH] IN BLOOD BY AUTOMATED COUNT: 12.8 % (ref 12.3–15.4)
GFR SERPL CREATININE-BSD FRML MDRD: 42 ML/MIN/1.73
GLOBULIN UR ELPH-MCNC: 3.6 GM/DL
GLUCOSE SERPL-MCNC: 99 MG/DL (ref 65–99)
HCT VFR BLD AUTO: 43.2 % (ref 37.5–51)
HDLC SERPL-MCNC: 42 MG/DL (ref 40–60)
HGB BLD-MCNC: 13.9 G/DL (ref 13–17.7)
IMM GRANULOCYTES # BLD AUTO: 0.01 10*3/MM3 (ref 0–0.05)
IMM GRANULOCYTES NFR BLD AUTO: 0.2 % (ref 0–0.5)
LDLC SERPL CALC-MCNC: 73 MG/DL (ref 0–100)
LDLC/HDLC SERPL: 1.66 {RATIO}
LYMPHOCYTES # BLD AUTO: 1.68 10*3/MM3 (ref 0.7–3.1)
LYMPHOCYTES NFR BLD AUTO: 26 % (ref 19.6–45.3)
MCH RBC QN AUTO: 27.2 PG (ref 26.6–33)
MCHC RBC AUTO-ENTMCNC: 32.2 G/DL (ref 31.5–35.7)
MCV RBC AUTO: 84.5 FL (ref 79–97)
MONOCYTES # BLD AUTO: 0.62 10*3/MM3 (ref 0.1–0.9)
MONOCYTES NFR BLD AUTO: 9.6 % (ref 5–12)
NEUTROPHILS NFR BLD AUTO: 3.81 10*3/MM3 (ref 1.7–7)
NEUTROPHILS NFR BLD AUTO: 58.8 % (ref 42.7–76)
NRBC BLD AUTO-RTO: 0 /100 WBC (ref 0–0.2)
PLATELET # BLD AUTO: 327 10*3/MM3 (ref 140–450)
PMV BLD AUTO: 10.6 FL (ref 6–12)
POTASSIUM SERPL-SCNC: 4.8 MMOL/L (ref 3.5–5.2)
PROT SERPL-MCNC: 7.8 G/DL (ref 6–8.5)
RBC # BLD AUTO: 5.11 10*6/MM3 (ref 4.14–5.8)
SODIUM SERPL-SCNC: 144 MMOL/L (ref 136–145)
T4 FREE SERPL-MCNC: 1 NG/DL (ref 0.93–1.7)
TRIGL SERPL-MCNC: 141 MG/DL (ref 0–150)
TSH SERPL DL<=0.05 MIU/L-ACNC: 1.9 UIU/ML (ref 0.27–4.2)
URATE SERPL-MCNC: 6.1 MG/DL (ref 3.4–7)
VIT B12 BLD-MCNC: 340 PG/ML (ref 211–946)
VLDLC SERPL-MCNC: 25 MG/DL (ref 5–40)
WBC # BLD AUTO: 6.47 10*3/MM3 (ref 3.4–10.8)

## 2021-06-01 PROCEDURE — 99214 OFFICE O/P EST MOD 30 MIN: CPT | Performed by: FAMILY MEDICINE

## 2021-06-01 PROCEDURE — 84443 ASSAY THYROID STIM HORMONE: CPT

## 2021-06-01 PROCEDURE — 80053 COMPREHEN METABOLIC PANEL: CPT

## 2021-06-01 PROCEDURE — 85025 COMPLETE CBC W/AUTO DIFF WBC: CPT

## 2021-06-01 PROCEDURE — 82607 VITAMIN B-12: CPT

## 2021-06-01 PROCEDURE — 84550 ASSAY OF BLOOD/URIC ACID: CPT

## 2021-06-01 PROCEDURE — 84439 ASSAY OF FREE THYROXINE: CPT

## 2021-06-01 PROCEDURE — 82306 VITAMIN D 25 HYDROXY: CPT

## 2021-06-01 PROCEDURE — 80061 LIPID PANEL: CPT

## 2021-06-01 RX ORDER — VIT C/B6/B5/MAGNESIUM/HERB 173 50-5-6-5MG
500 CAPSULE ORAL DAILY
Qty: 30 CAPSULE | Refills: 3 | Status: SHIPPED | OUTPATIENT
Start: 2021-06-01 | End: 2021-10-14 | Stop reason: SDUPTHER

## 2021-06-02 ENCOUNTER — TELEPHONE (OUTPATIENT)
Dept: FAMILY MEDICINE CLINIC | Facility: CLINIC | Age: 46
End: 2021-06-02

## 2021-06-02 DIAGNOSIS — R74.8 ELEVATED LIVER ENZYMES: Primary | ICD-10-CM

## 2021-06-02 RX ORDER — CHOLECALCIFEROL (VITAMIN D3) 125 MCG
500 CAPSULE ORAL DAILY
Qty: 30 TABLET | Refills: 3 | Status: SHIPPED | OUTPATIENT
Start: 2021-06-02 | End: 2021-09-16

## 2021-06-02 NOTE — TELEPHONE ENCOUNTER
----- Message from Dread Duvall MD sent at 6/1/2021  9:17 PM CDT -----  Please let pt know that vitamin B12 low normal and recommend vitamin B12 500 mcg PO q daily give 30 pills and 3 refills    Vitamin D levels normal     Ob CMP, CR at 42 from 36 and CR at 2.08. kidney function stable    On alkaline phosphatase slightly elevated and recommend US of liver. Suspect fatty liver disease.  If pt agreeable let me know and I will order   Uric acid stable    Lipid panel stable     Thyroid levels stable    On CBC hemoglobin and WBC stable    Recheck on next visit thanks

## 2021-06-02 NOTE — TELEPHONE ENCOUNTER
Gave pt results and recommendations. Pt voiced understanding and is agreeable to B12 and US at Imaging center.

## 2021-06-17 ENCOUNTER — TELEPHONE (OUTPATIENT)
Dept: FAMILY MEDICINE CLINIC | Facility: CLINIC | Age: 46
End: 2021-06-17

## 2021-06-17 NOTE — TELEPHONE ENCOUNTER
----- Message from Dread Duvall MD sent at 6/17/2021  1:46 PM CDT -----  Regarding: US of abdomen  Please let pt know that his US of abdomen on 6/16/21 showed gallstones but no evidence of gallbladder disease  such as cholecystitis.  Liver is normal    If pt is having pain on Right upper abdomen let me know and Likely he will need to see General Surgery for possible gallbladder removal    Recheck on next visit thanks

## 2021-06-18 ENCOUNTER — TELEPHONE (OUTPATIENT)
Dept: FAMILY MEDICINE CLINIC | Facility: CLINIC | Age: 46
End: 2021-06-18

## 2021-06-18 DIAGNOSIS — R74.8 ELEVATED LIVER ENZYMES: ICD-10-CM

## 2021-06-28 RX ORDER — ERGOCALCIFEROL 1.25 MG/1
50000 CAPSULE ORAL
Qty: 4 CAPSULE | Refills: 3 | Status: SHIPPED | OUTPATIENT
Start: 2021-06-28 | End: 2021-09-20 | Stop reason: SDUPTHER

## 2021-06-29 RX ORDER — CLOPIDOGREL BISULFATE 75 MG/1
75 TABLET ORAL DAILY
Qty: 30 TABLET | Refills: 3 | Status: SHIPPED | OUTPATIENT
Start: 2021-06-29 | End: 2021-10-14 | Stop reason: SDUPTHER

## 2021-07-02 RX ORDER — DILTIAZEM HYDROCHLORIDE 180 MG/1
180 CAPSULE, COATED, EXTENDED RELEASE ORAL DAILY
Qty: 30 CAPSULE | Refills: 3 | Status: SHIPPED | OUTPATIENT
Start: 2021-07-02 | End: 2021-10-14 | Stop reason: SDUPTHER

## 2021-07-13 RX ORDER — LOSARTAN POTASSIUM 100 MG/1
100 TABLET ORAL DAILY
Qty: 30 TABLET | Refills: 3 | Status: SHIPPED | OUTPATIENT
Start: 2021-07-13 | End: 2021-10-14 | Stop reason: SDUPTHER

## 2021-07-14 DIAGNOSIS — Z02.83 ENCOUNTER FOR DRUG SCREENING: ICD-10-CM

## 2021-07-14 RX ORDER — GABAPENTIN 600 MG/1
600 TABLET ORAL 3 TIMES DAILY
Qty: 90 TABLET | Refills: 3 | Status: CANCELLED | OUTPATIENT
Start: 2021-07-14

## 2021-07-14 RX ORDER — CHOLECALCIFEROL (VITAMIN D3) 125 MCG
500 CAPSULE ORAL DAILY
Qty: 30 TABLET | Refills: 3 | Status: CANCELLED | OUTPATIENT
Start: 2021-07-14

## 2021-07-14 RX ORDER — VIT C/B6/B5/MAGNESIUM/HERB 173 50-5-6-5MG
500 CAPSULE ORAL DAILY
Qty: 30 CAPSULE | Refills: 3 | Status: CANCELLED | OUTPATIENT
Start: 2021-07-14

## 2021-07-14 RX ORDER — CYCLOBENZAPRINE HCL 10 MG
10 TABLET ORAL
Qty: 30 TABLET | Refills: 3 | Status: SHIPPED | OUTPATIENT
Start: 2021-07-14 | End: 2021-10-14 | Stop reason: SDUPTHER

## 2021-08-04 RX ORDER — ALLOPURINOL 100 MG/1
100 TABLET ORAL DAILY
Qty: 30 TABLET | Refills: 3 | Status: SHIPPED | OUTPATIENT
Start: 2021-08-04 | End: 2021-10-14 | Stop reason: SDUPTHER

## 2021-08-04 NOTE — TELEPHONE ENCOUNTER
PLEASE SEND TO [facility]. ALSO STATES THAT THE VIT B 12, AND THE TURMERIC COST TO MUCH FOR HIM. IS THERE ANYTHING ELSE FOR EITHER OF THESE

## 2021-08-31 ENCOUNTER — TELEPHONE (OUTPATIENT)
Dept: FAMILY MEDICINE CLINIC | Facility: CLINIC | Age: 46
End: 2021-08-31

## 2021-09-16 RX ORDER — CHOLECALCIFEROL (VITAMIN D3) 125 MCG
500 CAPSULE ORAL DAILY
Qty: 30 TABLET | Refills: 3 | Status: SHIPPED | OUTPATIENT
Start: 2021-09-16 | End: 2021-10-14 | Stop reason: SDUPTHER

## 2021-09-17 DIAGNOSIS — Z02.83 ENCOUNTER FOR DRUG SCREENING: ICD-10-CM

## 2021-09-17 NOTE — TELEPHONE ENCOUNTER
Rx Refill Note  Requested Prescriptions     Pending Prescriptions Disp Refills   • gabapentin (NEURONTIN) 600 MG tablet 90 tablet 3     Sig: Take 1 tablet by mouth 3 (Three) Times a Day.   • labetalol (NORMODYNE) 200 MG tablet 60 tablet 3     Sig: Take 1 tablet by mouth 2 (Two) Times a Day.      Last office visit with prescribing clinician: 6/1/2021      Next office visit with prescribing clinician: Visit date not found   {TIP  Encounters:    No follow up scheduled    {TIP  Please add Last Relevant Lab Date if appropriate  {TIP  Is Refill Pharmacy correct? yes  Reginald Pineda LPN  09/17/21, 15:42 CDT

## 2021-09-18 RX ORDER — LABETALOL 200 MG/1
200 TABLET, FILM COATED ORAL 2 TIMES DAILY
Qty: 60 TABLET | Refills: 3
Start: 2021-09-18 | End: 2021-09-20 | Stop reason: SDUPTHER

## 2021-09-18 RX ORDER — GABAPENTIN 600 MG/1
600 TABLET ORAL 3 TIMES DAILY
Qty: 90 TABLET | Refills: 3 | Status: SHIPPED | OUTPATIENT
Start: 2021-09-18 | End: 2021-09-20 | Stop reason: SDUPTHER

## 2021-09-20 DIAGNOSIS — Z02.83 ENCOUNTER FOR DRUG SCREENING: ICD-10-CM

## 2021-09-20 RX ORDER — ERGOCALCIFEROL 1.25 MG/1
50000 CAPSULE ORAL
Qty: 4 CAPSULE | Refills: 3 | Status: SHIPPED | OUTPATIENT
Start: 2021-09-20 | End: 2021-10-14 | Stop reason: SDUPTHER

## 2021-09-20 RX ORDER — GABAPENTIN 600 MG/1
600 TABLET ORAL 3 TIMES DAILY
Qty: 90 TABLET | Refills: 3 | Status: SHIPPED | OUTPATIENT
Start: 2021-09-20 | End: 2021-10-14 | Stop reason: SDUPTHER

## 2021-09-20 RX ORDER — LABETALOL 200 MG/1
200 TABLET, FILM COATED ORAL 2 TIMES DAILY
Qty: 60 TABLET | Refills: 3
Start: 2021-09-20 | End: 2021-09-21 | Stop reason: SDUPTHER

## 2021-09-21 RX ORDER — LABETALOL 200 MG/1
200 TABLET, FILM COATED ORAL 2 TIMES DAILY
Qty: 60 TABLET | Refills: 3
Start: 2021-09-21 | End: 2021-09-23 | Stop reason: SDUPTHER

## 2021-09-23 ENCOUNTER — TELEPHONE (OUTPATIENT)
Dept: FAMILY MEDICINE CLINIC | Facility: CLINIC | Age: 46
End: 2021-09-23

## 2021-09-23 RX ORDER — LABETALOL 200 MG/1
200 TABLET, FILM COATED ORAL 2 TIMES DAILY
Qty: 60 TABLET | Refills: 3
Start: 2021-09-23 | End: 2021-09-27 | Stop reason: SDUPTHER

## 2021-09-23 NOTE — TELEPHONE ENCOUNTER
This was sent to the wrong pharmacy.  He would like it sent to [facility] pharmacy in [place]

## 2021-09-27 ENCOUNTER — TELEPHONE (OUTPATIENT)
Dept: FAMILY MEDICINE CLINIC | Facility: CLINIC | Age: 46
End: 2021-09-27

## 2021-09-27 RX ORDER — LABETALOL 200 MG/1
200 TABLET, FILM COATED ORAL 2 TIMES DAILY
Qty: 60 TABLET | Refills: 3
Start: 2021-09-27 | End: 2021-09-28 | Stop reason: SDUPTHER

## 2021-09-27 NOTE — TELEPHONE ENCOUNTER
Rx Refill Note  Requested Prescriptions     Pending Prescriptions Disp Refills   • labetalol (NORMODYNE) 200 MG tablet 60 tablet 3     Sig: Take 1 tablet by mouth 2 (Two) Times a Day.      Last office visit with prescribing clinician: 6/1/2021      Next office visit with prescribing clinician: 10/14/2021       {TIP  Please add Last Relevant Lab Date if appropriate  {TIP  Is Refill Pharmacy correct?  Veronica Del Toro MA  09/27/21, 11:57 CDT

## 2021-09-27 NOTE — TELEPHONE ENCOUNTER
Incoming Refill Request      Medication requested (name and dose): labetalol (NORMODYNE) 200 MG tablet    Pharmacy where request should be sent: [facility]    Additional details provided by patient: please send to [facility]    Best call back number: [phone]    Does the patient have less than a 3 day supply:  [x] Yes  [] No    Shawna Taylor  09/27/21, 08:50 CDT

## 2021-09-28 ENCOUNTER — TELEPHONE (OUTPATIENT)
Dept: FAMILY MEDICINE CLINIC | Facility: CLINIC | Age: 46
End: 2021-09-28

## 2021-09-28 RX ORDER — LABETALOL 200 MG/1
200 TABLET, FILM COATED ORAL 2 TIMES DAILY
Qty: 60 TABLET | Refills: 3
Start: 2021-09-28 | End: 2021-09-28 | Stop reason: SDUPTHER

## 2021-09-28 RX ORDER — LABETALOL 200 MG/1
200 TABLET, FILM COATED ORAL 2 TIMES DAILY
Qty: 60 TABLET | Refills: 3 | Status: SHIPPED | OUTPATIENT
Start: 2021-09-28 | End: 2021-10-14 | Stop reason: SDUPTHER

## 2021-10-14 ENCOUNTER — TELEPHONE (OUTPATIENT)
Dept: FAMILY MEDICINE CLINIC | Facility: CLINIC | Age: 46
End: 2021-10-14

## 2021-10-14 ENCOUNTER — OFFICE VISIT (OUTPATIENT)
Dept: FAMILY MEDICINE CLINIC | Facility: CLINIC | Age: 46
End: 2021-10-14

## 2021-10-14 VITALS
OXYGEN SATURATION: 97 % | SYSTOLIC BLOOD PRESSURE: 100 MMHG | BODY MASS INDEX: 29.82 KG/M2 | WEIGHT: 225 LBS | TEMPERATURE: 97 F | HEIGHT: 73 IN | DIASTOLIC BLOOD PRESSURE: 70 MMHG | HEART RATE: 76 BPM

## 2021-10-14 DIAGNOSIS — Z23 NEED FOR IMMUNIZATION AGAINST INFLUENZA: ICD-10-CM

## 2021-10-14 DIAGNOSIS — E55.9 VITAMIN D DEFICIENCY, UNSPECIFIED: ICD-10-CM

## 2021-10-14 DIAGNOSIS — M54.42 CHRONIC MIDLINE LOW BACK PAIN WITH BILATERAL SCIATICA: ICD-10-CM

## 2021-10-14 DIAGNOSIS — K59.09 CHRONIC CONSTIPATION: ICD-10-CM

## 2021-10-14 DIAGNOSIS — I10 ESSENTIAL HYPERTENSION: Primary | ICD-10-CM

## 2021-10-14 DIAGNOSIS — Z02.83 ENCOUNTER FOR DRUG SCREENING: ICD-10-CM

## 2021-10-14 DIAGNOSIS — I73.9 PVD (PERIPHERAL VASCULAR DISEASE) (HCC): ICD-10-CM

## 2021-10-14 DIAGNOSIS — I25.10 CORONARY ARTERY DISEASE INVOLVING NATIVE HEART, UNSPECIFIED VESSEL OR LESION TYPE, UNSPECIFIED WHETHER ANGINA PRESENT: ICD-10-CM

## 2021-10-14 DIAGNOSIS — M54.41 CHRONIC MIDLINE LOW BACK PAIN WITH BILATERAL SCIATICA: ICD-10-CM

## 2021-10-14 DIAGNOSIS — E66.3 OVERWEIGHT: ICD-10-CM

## 2021-10-14 DIAGNOSIS — N18.32 STAGE 3B CHRONIC KIDNEY DISEASE (HCC): ICD-10-CM

## 2021-10-14 DIAGNOSIS — E79.0 HYPERURICEMIA: ICD-10-CM

## 2021-10-14 DIAGNOSIS — G89.29 CHRONIC MIDLINE LOW BACK PAIN WITH BILATERAL SCIATICA: ICD-10-CM

## 2021-10-14 DIAGNOSIS — E78.2 MIXED HYPERLIPIDEMIA: ICD-10-CM

## 2021-10-14 DIAGNOSIS — Z86.73 HISTORY OF CVA (CEREBROVASCULAR ACCIDENT): ICD-10-CM

## 2021-10-14 PROCEDURE — 99214 OFFICE O/P EST MOD 30 MIN: CPT | Performed by: FAMILY MEDICINE

## 2021-10-14 PROCEDURE — 90686 IIV4 VACC NO PRSV 0.5 ML IM: CPT | Performed by: FAMILY MEDICINE

## 2021-10-14 PROCEDURE — G0008 ADMIN INFLUENZA VIRUS VAC: HCPCS | Performed by: FAMILY MEDICINE

## 2021-10-14 RX ORDER — CLOPIDOGREL BISULFATE 75 MG/1
75 TABLET ORAL DAILY
Qty: 30 TABLET | Refills: 3 | Status: SHIPPED | OUTPATIENT
Start: 2021-10-14 | End: 2021-10-28 | Stop reason: SDUPTHER

## 2021-10-14 RX ORDER — LOSARTAN POTASSIUM 100 MG/1
100 TABLET ORAL DAILY
Qty: 30 TABLET | Refills: 3 | Status: SHIPPED | OUTPATIENT
Start: 2021-10-14 | End: 2021-10-28 | Stop reason: SDUPTHER

## 2021-10-14 RX ORDER — DILTIAZEM HYDROCHLORIDE 120 MG/1
120 CAPSULE, COATED, EXTENDED RELEASE ORAL DAILY
Qty: 30 CAPSULE | Refills: 3 | Status: SHIPPED | OUTPATIENT
Start: 2021-10-14 | End: 2021-10-28 | Stop reason: SDUPTHER

## 2021-10-14 RX ORDER — CYCLOBENZAPRINE HCL 5 MG
5 TABLET ORAL NIGHTLY
Qty: 30 TABLET | Refills: 3 | Status: SHIPPED | OUTPATIENT
Start: 2021-10-14 | End: 2021-10-28 | Stop reason: SDUPTHER

## 2021-10-14 RX ORDER — CYCLOBENZAPRINE HCL 10 MG
10 TABLET ORAL
Qty: 30 TABLET | Refills: 3 | Status: SHIPPED | OUTPATIENT
Start: 2021-10-14 | End: 2021-10-14 | Stop reason: SDUPTHER

## 2021-10-14 RX ORDER — DILTIAZEM HYDROCHLORIDE 180 MG/1
180 CAPSULE, COATED, EXTENDED RELEASE ORAL DAILY
Qty: 30 CAPSULE | Refills: 3 | Status: SHIPPED | OUTPATIENT
Start: 2021-10-14 | End: 2021-10-14

## 2021-10-14 RX ORDER — GABAPENTIN 600 MG/1
600 TABLET ORAL 3 TIMES DAILY
Qty: 90 TABLET | Refills: 3 | Status: SHIPPED | OUTPATIENT
Start: 2021-10-14 | End: 2021-10-28 | Stop reason: SDUPTHER

## 2021-10-14 RX ORDER — CALCITRIOL 0.25 UG/1
0.25 CAPSULE, LIQUID FILLED ORAL DAILY
Qty: 30 CAPSULE | Refills: 3 | Status: SHIPPED | OUTPATIENT
Start: 2021-10-14 | End: 2021-10-28 | Stop reason: SDUPTHER

## 2021-10-14 RX ORDER — GUANFACINE 1 MG/1
1 TABLET ORAL NIGHTLY
Qty: 90 TABLET | Refills: 1 | Status: SHIPPED | OUTPATIENT
Start: 2021-10-14 | End: 2021-10-28 | Stop reason: SDUPTHER

## 2021-10-14 RX ORDER — VIT C/B6/B5/MAGNESIUM/HERB 173 50-5-6-5MG
500 CAPSULE ORAL DAILY
Qty: 30 CAPSULE | Refills: 3 | Status: SHIPPED | OUTPATIENT
Start: 2021-10-14 | End: 2021-10-28 | Stop reason: SDUPTHER

## 2021-10-14 RX ORDER — CYCLOBENZAPRINE HCL 5 MG
5 TABLET ORAL 3 TIMES DAILY PRN
Qty: 30 TABLET | Refills: 3 | Status: SHIPPED | OUTPATIENT
Start: 2021-10-14 | End: 2021-10-14 | Stop reason: SDUPTHER

## 2021-10-14 RX ORDER — ERGOCALCIFEROL 1.25 MG/1
50000 CAPSULE ORAL
Qty: 4 CAPSULE | Refills: 3 | Status: SHIPPED | OUTPATIENT
Start: 2021-10-14 | End: 2021-10-28 | Stop reason: SDUPTHER

## 2021-10-14 RX ORDER — LABETALOL 200 MG/1
200 TABLET, FILM COATED ORAL 2 TIMES DAILY
Qty: 60 TABLET | Refills: 3 | Status: SHIPPED | OUTPATIENT
Start: 2021-10-14 | End: 2021-10-28 | Stop reason: SDUPTHER

## 2021-10-14 RX ORDER — ALLOPURINOL 100 MG/1
100 TABLET ORAL DAILY
Qty: 30 TABLET | Refills: 3 | Status: SHIPPED | OUTPATIENT
Start: 2021-10-14 | End: 2021-10-28 | Stop reason: SDUPTHER

## 2021-10-14 RX ORDER — ATORVASTATIN CALCIUM 20 MG/1
20 TABLET, FILM COATED ORAL NIGHTLY
Qty: 90 TABLET | Refills: 3 | Status: SHIPPED | OUTPATIENT
Start: 2021-10-14 | End: 2021-10-28 | Stop reason: SDUPTHER

## 2021-10-14 RX ORDER — CHOLECALCIFEROL (VITAMIN D3) 125 MCG
500 CAPSULE ORAL DAILY
Qty: 30 TABLET | Refills: 3 | Status: SHIPPED | OUTPATIENT
Start: 2021-10-14 | End: 2021-10-28 | Stop reason: SDUPTHER

## 2021-10-14 NOTE — TELEPHONE ENCOUNTER
Veena from [facility] pharmacy called and for the rx flexeril they have a 5 MG and a 10 MG. They wasn't sure which one he should take or how to take them. Please give them a call back

## 2021-10-14 NOTE — TELEPHONE ENCOUNTER
It is 5 mg at bedtime.      Message text      Made pharmacy aware and they requested a new prescription be sent in.

## 2021-10-14 NOTE — PATIENT INSTRUCTIONS
Cut back on Cardizem or diltiazem 180 to 120 mg daily.       Get labwork before next visit    Recheck in 3 months    Keep eye on blood pressure. Call me if it start getting high above 140/90    Cyclobenzaprine tablets  What is this medicine?  CYCLOBENZAPRINE (sye kloe KRIS za preen) is a muscle relaxer. It is used to treat muscle pain, spasms, and stiffness.  This medicine may be used for other purposes; ask your health care provider or pharmacist if you have questions.  COMMON BRAND NAME(S): Fexmid, Flexeril  What should I tell my health care provider before I take this medicine?  They need to know if you have any of these conditions:  · heart disease, irregular heartbeat, or previous heart attack  · liver disease  · thyroid problem  · an unusual or allergic reaction to cyclobenzaprine, tricyclic antidepressants, lactose, other medicines, foods, dyes, or preservatives  · pregnant or trying to get pregnant  · breast-feeding  How should I use this medicine?  Take this medicine by mouth with a glass of water. Follow the directions on the prescription label. If this medicine upsets your stomach, take it with food or milk. Take your medicine at regular intervals. Do not take it more often than directed.  Talk to your pediatrician regarding the use of this medicine in children. Special care may be needed.  Overdosage: If you think you have taken too much of this medicine contact a poison control center or emergency room at once.  NOTE: This medicine is only for you. Do not share this medicine with others.  What if I miss a dose?  If you miss a dose, take it as soon as you can. If it is almost time for your next dose, take only that dose. Do not take double or extra doses.  What may interact with this medicine?  Do not take this medicine with any of the following medications:  · MAOIs like Carbex, Eldepryl, Marplan, Nardil, and Parnate  · narcotic medicines for cough  · safinamide  This medicine may also interact with the  following medications:  · alcohol  · bupropion  · antihistamines for allergy, cough and cold  · certain medicines for anxiety or sleep  · certain medicines for bladder problems like oxybutynin, tolterodine  · certain medicines for depression like amitriptyline, fluoxetine, sertraline  · certain medicines for Parkinson's disease like benztropine, trihexyphenidyl  · certain medicines for seizures like phenobarbital, primidone  · certain medicines for stomach problems like dicyclomine, hyoscyamine  · certain medicines for travel sickness like scopolamine  · general anesthetics like halothane, isoflurane, methoxyflurane, propofol  · ipratropium  · local anesthetics like lidocaine, pramoxine, tetracaine  · medicines that relax muscles for surgery  · narcotic medicines for pain  · phenothiazines like chlorpromazine, mesoridazine, prochlorperazine, thioridazine  · verapamil  This list may not describe all possible interactions. Give your health care provider a list of all the medicines, herbs, non-prescription drugs, or dietary supplements you use. Also tell them if you smoke, drink alcohol, or use illegal drugs. Some items may interact with your medicine.  What should I watch for while using this medicine?  Tell your doctor or health care professional if your symptoms do not start to get better or if they get worse.  You may get drowsy or dizzy. Do not drive, use machinery, or do anything that needs mental alertness until you know how this medicine affects you. Do not stand or sit up quickly, especially if you are an older patient. This reduces the risk of dizzy or fainting spells. Alcohol may interfere with the effect of this medicine. Avoid alcoholic drinks.  If you are taking another medicine that also causes drowsiness, you may have more side effects. Give your health care provider a list of all medicines you use. Your doctor will tell you how much medicine to take. Do not take more medicine than directed. Call  emergency for help if you have problems breathing or unusual sleepiness.  Your mouth may get dry. Chewing sugarless gum or sucking hard candy, and drinking plenty of water may help. Contact your doctor if the problem does not go away or is severe.  What side effects may I notice from receiving this medicine?  Side effects that you should report to your doctor or health care professional as soon as possible:  · allergic reactions like skin rash, itching or hives, swelling of the face, lips, or tongue  · breathing problems  · chest pain  · fast, irregular heartbeat  · hallucinations  · seizures  · unusually weak or tired  Side effects that usually do not require medical attention (report to your doctor or health care professional if they continue or are bothersome):  · headache  · nausea, vomiting  This list may not describe all possible side effects. Call your doctor for medical advice about side effects. You may report side effects to FDA at 7-268-FDA-4782.  Where should I keep my medicine?  Keep out of the reach of children.  Store at room temperature between 15 and 30 degrees C (59 and 86 degrees F). Keep container tightly closed. Throw away any unused medicine after the expiration date.  NOTE: This sheet is a summary. It may not cover all possible information. If you have questions about this medicine, talk to your doctor, pharmacist, or health care provider.  © 2021 [employer] (2019-11-20 12:49:26)

## 2021-10-27 ENCOUNTER — LAB (OUTPATIENT)
Dept: LAB | Facility: HOSPITAL | Age: 46
End: 2021-10-27

## 2021-10-27 DIAGNOSIS — Z86.73 HISTORY OF CVA (CEREBROVASCULAR ACCIDENT): ICD-10-CM

## 2021-10-27 DIAGNOSIS — E78.2 MIXED HYPERLIPIDEMIA: ICD-10-CM

## 2021-10-27 DIAGNOSIS — M54.41 CHRONIC MIDLINE LOW BACK PAIN WITH BILATERAL SCIATICA: ICD-10-CM

## 2021-10-27 DIAGNOSIS — K59.09 CHRONIC CONSTIPATION: ICD-10-CM

## 2021-10-27 DIAGNOSIS — E55.9 VITAMIN D DEFICIENCY, UNSPECIFIED: ICD-10-CM

## 2021-10-27 DIAGNOSIS — I10 ESSENTIAL HYPERTENSION: ICD-10-CM

## 2021-10-27 DIAGNOSIS — E66.3 OVERWEIGHT: ICD-10-CM

## 2021-10-27 DIAGNOSIS — M54.42 CHRONIC MIDLINE LOW BACK PAIN WITH BILATERAL SCIATICA: ICD-10-CM

## 2021-10-27 DIAGNOSIS — I25.10 CORONARY ARTERY DISEASE INVOLVING NATIVE HEART, UNSPECIFIED VESSEL OR LESION TYPE, UNSPECIFIED WHETHER ANGINA PRESENT: ICD-10-CM

## 2021-10-27 DIAGNOSIS — E79.0 HYPERURICEMIA: ICD-10-CM

## 2021-10-27 DIAGNOSIS — G89.29 CHRONIC MIDLINE LOW BACK PAIN WITH BILATERAL SCIATICA: ICD-10-CM

## 2021-10-27 DIAGNOSIS — N18.32 STAGE 3B CHRONIC KIDNEY DISEASE (HCC): ICD-10-CM

## 2021-10-27 DIAGNOSIS — Z02.83 ENCOUNTER FOR DRUG SCREENING: ICD-10-CM

## 2021-10-27 DIAGNOSIS — I73.9 PVD (PERIPHERAL VASCULAR DISEASE) (HCC): ICD-10-CM

## 2021-10-27 LAB
25(OH)D3 SERPL-MCNC: 55.9 NG/ML
ALBUMIN SERPL-MCNC: 4.6 G/DL (ref 3.5–5.2)
ALBUMIN/GLOB SERPL: 1.4 G/DL
ALP SERPL-CCNC: 128 U/L (ref 39–117)
ALT SERPL W P-5'-P-CCNC: 29 U/L (ref 1–41)
ANION GAP SERPL CALCULATED.3IONS-SCNC: 7.6 MMOL/L (ref 5–15)
AST SERPL-CCNC: 26 U/L (ref 1–40)
BASOPHILS # BLD AUTO: 0.04 10*3/MM3 (ref 0–0.2)
BASOPHILS NFR BLD AUTO: 0.8 % (ref 0–1.5)
BILIRUB SERPL-MCNC: 0.4 MG/DL (ref 0–1.2)
BUN SERPL-MCNC: 17 MG/DL (ref 6–20)
BUN/CREAT SERPL: 7.9 (ref 7–25)
CALCIUM SPEC-SCNC: 10.1 MG/DL (ref 8.6–10.5)
CHLORIDE SERPL-SCNC: 100 MMOL/L (ref 98–107)
CHOLEST SERPL-MCNC: 140 MG/DL (ref 0–200)
CO2 SERPL-SCNC: 32.4 MMOL/L (ref 22–29)
CREAT SERPL-MCNC: 2.14 MG/DL (ref 0.76–1.27)
DEPRECATED RDW RBC AUTO: 38.2 FL (ref 37–54)
EOSINOPHIL # BLD AUTO: 0.26 10*3/MM3 (ref 0–0.4)
EOSINOPHIL NFR BLD AUTO: 5 % (ref 0.3–6.2)
ERYTHROCYTE [DISTWIDTH] IN BLOOD BY AUTOMATED COUNT: 12.4 % (ref 12.3–15.4)
GFR SERPL CREATININE-BSD FRML MDRD: 40 ML/MIN/1.73
GLOBULIN UR ELPH-MCNC: 3.2 GM/DL
GLUCOSE SERPL-MCNC: 98 MG/DL (ref 65–99)
HCT VFR BLD AUTO: 43.5 % (ref 37.5–51)
HDLC SERPL-MCNC: 52 MG/DL (ref 40–60)
HGB BLD-MCNC: 13.4 G/DL (ref 13–17.7)
IMM GRANULOCYTES # BLD AUTO: 0 10*3/MM3 (ref 0–0.05)
IMM GRANULOCYTES NFR BLD AUTO: 0 % (ref 0–0.5)
LDLC SERPL CALC-MCNC: 72 MG/DL (ref 0–100)
LDLC/HDLC SERPL: 1.38 {RATIO}
LYMPHOCYTES # BLD AUTO: 1.35 10*3/MM3 (ref 0.7–3.1)
LYMPHOCYTES NFR BLD AUTO: 25.8 % (ref 19.6–45.3)
MCH RBC QN AUTO: 26.3 PG (ref 26.6–33)
MCHC RBC AUTO-ENTMCNC: 30.8 G/DL (ref 31.5–35.7)
MCV RBC AUTO: 85.5 FL (ref 79–97)
MONOCYTES # BLD AUTO: 0.57 10*3/MM3 (ref 0.1–0.9)
MONOCYTES NFR BLD AUTO: 10.9 % (ref 5–12)
NEUTROPHILS NFR BLD AUTO: 3.01 10*3/MM3 (ref 1.7–7)
NEUTROPHILS NFR BLD AUTO: 57.5 % (ref 42.7–76)
NRBC BLD AUTO-RTO: 0 /100 WBC (ref 0–0.2)
PLATELET # BLD AUTO: 304 10*3/MM3 (ref 140–450)
PMV BLD AUTO: 10.5 FL (ref 6–12)
POTASSIUM SERPL-SCNC: 5 MMOL/L (ref 3.5–5.2)
PROT SERPL-MCNC: 7.8 G/DL (ref 6–8.5)
RBC # BLD AUTO: 5.09 10*6/MM3 (ref 4.14–5.8)
SODIUM SERPL-SCNC: 140 MMOL/L (ref 136–145)
TRIGL SERPL-MCNC: 80 MG/DL (ref 0–150)
URATE SERPL-MCNC: 5.5 MG/DL (ref 3.4–7)
VIT B12 BLD-MCNC: 458 PG/ML (ref 211–946)
VLDLC SERPL-MCNC: 16 MG/DL (ref 5–40)
WBC # BLD AUTO: 5.23 10*3/MM3 (ref 3.4–10.8)

## 2021-10-27 PROCEDURE — 82607 VITAMIN B-12: CPT

## 2021-10-27 PROCEDURE — 82306 VITAMIN D 25 HYDROXY: CPT

## 2021-10-27 PROCEDURE — 80061 LIPID PANEL: CPT

## 2021-10-27 PROCEDURE — 84550 ASSAY OF BLOOD/URIC ACID: CPT

## 2021-10-27 PROCEDURE — 80053 COMPREHEN METABOLIC PANEL: CPT

## 2021-10-27 PROCEDURE — 85025 COMPLETE CBC W/AUTO DIFF WBC: CPT

## 2021-10-28 ENCOUNTER — TELEPHONE (OUTPATIENT)
Dept: FAMILY MEDICINE CLINIC | Facility: CLINIC | Age: 46
End: 2021-10-28

## 2021-10-28 DIAGNOSIS — Z02.83 ENCOUNTER FOR DRUG SCREENING: ICD-10-CM

## 2021-10-28 RX ORDER — CYCLOBENZAPRINE HCL 5 MG
5 TABLET ORAL NIGHTLY
Qty: 30 TABLET | Refills: 3 | Status: SHIPPED | OUTPATIENT
Start: 2021-10-28 | End: 2022-03-08 | Stop reason: SDUPTHER

## 2021-10-28 RX ORDER — ALLOPURINOL 100 MG/1
100 TABLET ORAL DAILY
Qty: 30 TABLET | Refills: 3 | Status: SHIPPED | OUTPATIENT
Start: 2021-10-28 | End: 2022-03-08 | Stop reason: SDUPTHER

## 2021-10-28 RX ORDER — CHOLECALCIFEROL (VITAMIN D3) 125 MCG
500 CAPSULE ORAL DAILY
Qty: 30 TABLET | Refills: 3 | Status: SHIPPED | OUTPATIENT
Start: 2021-10-28 | End: 2022-03-08 | Stop reason: SDUPTHER

## 2021-10-28 RX ORDER — CLOPIDOGREL BISULFATE 75 MG/1
75 TABLET ORAL DAILY
Qty: 30 TABLET | Refills: 3 | Status: SHIPPED | OUTPATIENT
Start: 2021-10-28 | End: 2022-03-08 | Stop reason: SDUPTHER

## 2021-10-28 RX ORDER — GABAPENTIN 600 MG/1
600 TABLET ORAL 3 TIMES DAILY
Qty: 90 TABLET | Refills: 3 | Status: SHIPPED | OUTPATIENT
Start: 2021-10-28 | End: 2022-02-22

## 2021-10-28 RX ORDER — DILTIAZEM HYDROCHLORIDE 120 MG/1
120 CAPSULE, COATED, EXTENDED RELEASE ORAL DAILY
Qty: 30 CAPSULE | Refills: 3 | Status: SHIPPED | OUTPATIENT
Start: 2021-10-28 | End: 2022-03-29 | Stop reason: SDUPTHER

## 2021-10-28 RX ORDER — CALCITRIOL 0.25 UG/1
0.25 CAPSULE, LIQUID FILLED ORAL DAILY
Qty: 30 CAPSULE | Refills: 3 | Status: SHIPPED | OUTPATIENT
Start: 2021-10-28 | End: 2022-03-08 | Stop reason: SDUPTHER

## 2021-10-28 RX ORDER — VIT C/B6/B5/MAGNESIUM/HERB 173 50-5-6-5MG
500 CAPSULE ORAL DAILY
Qty: 30 CAPSULE | Refills: 3 | Status: SHIPPED | OUTPATIENT
Start: 2021-10-28 | End: 2022-03-08 | Stop reason: SDUPTHER

## 2021-10-28 RX ORDER — GUANFACINE 1 MG/1
1 TABLET ORAL NIGHTLY
Qty: 90 TABLET | Refills: 1 | Status: SHIPPED | OUTPATIENT
Start: 2021-10-28 | End: 2022-03-29 | Stop reason: SDUPTHER

## 2021-10-28 RX ORDER — MECLIZINE HYDROCHLORIDE 25 MG/1
25 TABLET ORAL 3 TIMES DAILY PRN
Qty: 90 TABLET | Refills: 1 | Status: SHIPPED | OUTPATIENT
Start: 2021-10-28 | End: 2021-12-10

## 2021-10-28 RX ORDER — LABETALOL 200 MG/1
200 TABLET, FILM COATED ORAL 2 TIMES DAILY
Qty: 60 TABLET | Refills: 3 | Status: SHIPPED | OUTPATIENT
Start: 2021-10-28 | End: 2022-03-29 | Stop reason: SDUPTHER

## 2021-10-28 RX ORDER — ATORVASTATIN CALCIUM 20 MG/1
20 TABLET, FILM COATED ORAL NIGHTLY
Qty: 90 TABLET | Refills: 3 | Status: SHIPPED | OUTPATIENT
Start: 2021-10-28 | End: 2021-12-10

## 2021-10-28 RX ORDER — LOSARTAN POTASSIUM 100 MG/1
100 TABLET ORAL DAILY
Qty: 30 TABLET | Refills: 3 | Status: SHIPPED | OUTPATIENT
Start: 2021-10-28 | End: 2022-03-08 | Stop reason: SDUPTHER

## 2021-10-28 RX ORDER — ERGOCALCIFEROL 1.25 MG/1
50000 CAPSULE ORAL
Qty: 4 CAPSULE | Refills: 3 | Status: SHIPPED | OUTPATIENT
Start: 2021-10-28 | End: 2022-03-29

## 2021-10-28 NOTE — TELEPHONE ENCOUNTER
----- Message from Dread Duvall MD sent at 10/28/2021  7:52 AM CDT -----  Please let pt know labwork stable    GFR on CMP is 40 from 42.  Continue followup with Nephrologist for CKD stage 3a     Alkaline phosphatase continues to be elevated. Will monitor     Recheck on next visit thanks

## 2021-11-15 ENCOUNTER — TELEPHONE (OUTPATIENT)
Dept: FAMILY MEDICINE CLINIC | Facility: CLINIC | Age: 46
End: 2021-11-15

## 2021-11-15 NOTE — TELEPHONE ENCOUNTER
Mr. ewing is needing a list of his medications. He is needing to know exactly when he needs to take it. Like which ones in the morning, afternoon and at night. I showed him on the med list where it says when but he is wanting to know exactly.. is there anyway to know that. Please call patient

## 2021-11-17 NOTE — TELEPHONE ENCOUNTER
Spoke to pt and he stated that he is having problems with his hands and walking. He thinks this is related to his medications. He stated the directions need to say take morning, afternoon, night, or bedtimes on all of them. Transferred to front to make an appointment to discuss this.

## 2021-11-17 NOTE — PROGRESS NOTES
Chief Complaint  Upper Extremity Issue, Balance Issues, and Medication Problem    Subjective          Gregg Randle presents to [facility] [place]     Pt is 47 yo male with management of obesity, CAD sp PCI , HTN, HLP, AAA  Sp repair , PVD with bilateral iliac stent , COPD (panlobar emphysema) former  tobacco user, dizziness, CKD stage 3 , claudication. Major depression, cholelithiasis, abnormal echocardiogram ( LVH grade 1 diastolic dysfunction)  History of CVA, former tobacco user , history of MI, left hemiparesis, chronic back pain (DDD lumbar spine)  elevated liver enzymes      10/14/21 in office visti for recheck on pt's above medical issues . Pt had labwork done on 6/1/21 that was stable. His CR did improve from 2.38 to 2.08 and GFR is at 42 from 36. Pt continues to take his medications for vitamin D and b12 deficiency, HTN, CAD constipation, dizziness,  Chronic back pain, HLP hyperuricemia. Pt lost 17 lbs since his last visit.  He is feeling well. BP on lower side today.  No chest pain no dizziness. Pt does have some back issues and is neurontin and it locks up on him mainly at nighttime     11/18/21 in office visit for recheck on pt's above medical issues. Pt continues to take his medications for HTN HLP, CAD sp stent, hyperuricemia, HTN,  Constipatin, chronic back/neck pain, vitamin D and b12 deficiency. Pt had labwork on 10/27/21 that showed normal uric acid vitamin b12 and vitamin D stable lipid panelstable. CMP showed GFR At 40 from 42 with CR at 2.14.  CBC showed stable hemoglobin and WBC  Blood pressure is stable  No chest pain has occasional dizziness. No syncope. Breathing stable.  Dizziness occurs about twice a week     Dizziness  This is a chronic problem. The current episode started more than 1 year ago. The problem occurs constantly. The problem has been unchanged. Pertinent negatives include no abdominal pain, anorexia, arthralgias, change  in bowel habit, chest pain, chills, congestion, coughing, diaphoresis, fatigue, fever, headaches, joint swelling, myalgias, nausea, neck pain, numbness, rash, sore throat, swollen glands, urinary symptoms, vertigo, visual change, vomiting or weakness. Nothing aggravates the symptoms. He has tried nothing for the symptoms. The treatment provided no relief.   Hyperlipidemia  This is a chronic problem. The current episode started more than 1 year ago. The problem is controlled. Recent lipid tests were reviewed and are variable. Exacerbating diseases include chronic renal disease and obesity. He has no history of diabetes, hypothyroidism, liver disease or nephrotic syndrome. Pertinent negatives include no chest pain, focal sensory loss, focal weakness, leg pain, myalgias or shortness of breath. Current antihyperlipidemic treatment includes statins. Compliance problems include adherence to exercise.  Risk factors for coronary artery disease include diabetes mellitus, hypertension, male sex and obesity.   Chronic Kidney Disease  This is a chronic problem. The current episode started more than 1 year ago. The problem occurs constantly. The problem has been gradually worsening. Pertinent negatives include no abdominal pain, anorexia, arthralgias, change in bowel habit, chest pain, chills, congestion, coughing, diaphoresis, fatigue, fever, headaches, joint swelling, myalgias, nausea, neck pain, numbness, rash, sore throat, swollen glands, urinary symptoms, vertigo, visual change, vomiting or weakness. Nothing aggravates the symptoms. He has tried nothing for the symptoms.   Coronary Artery Disease  Presents for follow-up visit. Pertinent negatives include no chest pain, chest pressure, chest tightness, dizziness, leg swelling, muscle weakness, palpitations or shortness of breath. Risk factors include hyperlipidemia and obesity. The symptoms have been stable. Compliance with diet is good. Compliance with exercise is good.  "Compliance with medications is good.   Hypertension   This is a chronic problem. The current episode started more than 1 year ago. The problem has been improving g since onset. The problem is controlled. Associated symptoms include shortness of breath. Pertinent negatives include no anxiety, blurred vision, chest pain, headaches, malaise/fatigue, neck pain, orthopnea, palpitations, peripheral edema, PND or sweats. Risk factors for coronary artery disease include dyslipidemia, obesity, male gender, sedentary lifestyle and smoking/tobacco exposure. Current antihypertension treatment includes calcium channel blockers, beta blockers and angiotensin blockers and diureitcs The current treatment provides moderate improvement. There are no compliance problems.  Hypertensive end-organ damage includes kidney disease, CAD/MI and CVA. There is no history of angina, heart failure, left ventricular hypertrophy, PVD or retinopathy. There is no history of chronic renal disease, coarctation of the aorta, hyperaldosteronism, hypercortisolism, hyperparathyroidism, a hypertension causing med, pheochromocytoma, renovascular disease, sleep apnea or a thyroid problem.     Objective   Vital Signs:   /80 (BP Location: Left arm, Patient Position: Sitting, Cuff Size: Adult)   Pulse 66   Temp 97.9 °F (36.6 °C)   Ht 185.4 cm (73\")   Wt 108 kg (238 lb)   SpO2 95%   BMI 31.40 kg/m²     Physical Exam  Vitals and nursing note reviewed.   Constitutional:       Appearance: He is well-developed. He is not diaphoretic.   HENT:      Head: Normocephalic and atraumatic.      Right Ear: External ear normal.   Eyes:      Conjunctiva/sclera: Conjunctivae normal.      Pupils: Pupils are equal, round, and reactive to light.   Cardiovascular:      Rate and Rhythm: Normal rate and regular rhythm.      Heart sounds: Normal heart sounds. No murmur heard.      Pulmonary:      Effort: Pulmonary effort is normal. No respiratory distress.      Breath " sounds: Normal breath sounds.   Abdominal:      General: Bowel sounds are normal. There is no distension.      Palpations: Abdomen is soft.      Tenderness: There is no abdominal tenderness.      Comments: Obese abdomen    Musculoskeletal:         General: Tenderness present. No deformity. Normal range of motion.      Cervical back: Normal range of motion and neck supple.   Skin:     General: Skin is warm.      Coloration: Skin is not pale.      Findings: No erythema or rash.   Neurological:      Mental Status: He is alert and oriented to person, place, and time.      Cranial Nerves: No cranial nerve deficit.   Psychiatric:         Behavior: Behavior normal.        Result Review :                 Assessment and Plan    Diagnoses and all orders for this visit:    1. Dizziness (Primary)  -     US Carotid Bilateral; Future    2. Essential hypertension    3. Coronary artery disease involving native heart, unspecified vessel or lesion type, unspecified whether angina present    4. Mixed hyperlipidemia    5. Vitamin D deficiency, unspecified     6. Class 1 obesity with serious comorbidity and body mass index (BMI) of 31.0 to 31.9 in adult, unspecified obesity type    7. Stage 3b chronic kidney disease (HCC)    8. Abdominal aortic aneurysm (AAA) without rupture (HCC)  -     US Abdomen Complete; Future         -went over labwork   -dizziness/hypotension - stable on meclizine PRN. Will check carotid Ultrasound   -internal hemorrhoids - recommend high fiber diet. Next colonscopy in 2030   -back pain with sciatica  -on neurontin 600 mg PO TID. Start on flexeril 5 mg at beditme   -OSAsleep medicine following   -hyperuricemia - on allopurinol 100 mg daiy.   -CIC - start on linzess. Pt has failed Miralax.drug information provided   -CKD stage 3b- Nephrology following.  -COPD/emphysema. -referedl to Dr. Medina with Pulmonlogy with PFTs. He was seeing Dr. Mar. He would like to see a new Pulmonologist   -HTN - losartan 100 mg  daily. Labetalol 200 mg PO BID  cut back on cardizem CD from 180 to 120 mg daily.    -major depression-  On lexapro 20 mg daily.  -CAD -Cardiology following, aspirin, lipitor, labetalol  200 mg TID, losartan , plavix . Upcoming Cardiology appt soon   -PVD - aspirin plavix, lipitor - Vascular Surgery following  -obesity - counseled weight loss >5 minutes BMI at 31.40   -AAA sp repair  - Vascular Surgery following. Recommend repeat US to assess stability. Will order at imaging center   -advised pt to be safe and call with questions and concerns  -advised pt to go to ER or call 911 if symptoms worrisome or severe  -advised pt to followup with specialist and referrals  -advised pt be safe during COVID-19 pandemic   I spent 30  minutes caring for Gregg on this date of service. This time includes time spent by me in the following activities: preparing for the visit, reviewing tests, obtaining and/or reviewing a separately obtained history, performing a medically appropriate examination and/or evaluation, counseling and educating the patient/family/caregiver, ordering medications, tests, or procedures, referring and communicating with other health care professionals, documenting information in the medical record and independently interpreting results and communicating that information with the patient/family/caregiver        This document has been electronically signed by Dread Duvall MD on November 18, 2021 10:46 CST        Follow Up   Return in about 2 months (around 1/18/2022).  Patient was given instructions and counseling regarding his condition or for health maintenance advice. Please see specific information pulled into the AVS if appropriate.

## 2021-11-18 ENCOUNTER — OFFICE VISIT (OUTPATIENT)
Dept: FAMILY MEDICINE CLINIC | Facility: CLINIC | Age: 46
End: 2021-11-18

## 2021-11-18 VITALS
HEART RATE: 66 BPM | SYSTOLIC BLOOD PRESSURE: 126 MMHG | HEIGHT: 73 IN | DIASTOLIC BLOOD PRESSURE: 80 MMHG | BODY MASS INDEX: 31.54 KG/M2 | OXYGEN SATURATION: 95 % | WEIGHT: 238 LBS | TEMPERATURE: 97.9 F

## 2021-11-18 DIAGNOSIS — E66.9 CLASS 1 OBESITY WITH SERIOUS COMORBIDITY AND BODY MASS INDEX (BMI) OF 31.0 TO 31.9 IN ADULT, UNSPECIFIED OBESITY TYPE: ICD-10-CM

## 2021-11-18 DIAGNOSIS — I10 ESSENTIAL HYPERTENSION: ICD-10-CM

## 2021-11-18 DIAGNOSIS — N18.32 STAGE 3B CHRONIC KIDNEY DISEASE (HCC): ICD-10-CM

## 2021-11-18 DIAGNOSIS — E55.9 VITAMIN D DEFICIENCY, UNSPECIFIED: ICD-10-CM

## 2021-11-18 DIAGNOSIS — R42 DIZZINESS: Primary | ICD-10-CM

## 2021-11-18 DIAGNOSIS — I25.10 CORONARY ARTERY DISEASE INVOLVING NATIVE HEART, UNSPECIFIED VESSEL OR LESION TYPE, UNSPECIFIED WHETHER ANGINA PRESENT: ICD-10-CM

## 2021-11-18 DIAGNOSIS — I71.40 ABDOMINAL AORTIC ANEURYSM (AAA) WITHOUT RUPTURE (HCC): ICD-10-CM

## 2021-11-18 DIAGNOSIS — E78.2 MIXED HYPERLIPIDEMIA: ICD-10-CM

## 2021-11-18 PROCEDURE — 99214 OFFICE O/P EST MOD 30 MIN: CPT | Performed by: FAMILY MEDICINE

## 2021-11-18 NOTE — PATIENT INSTRUCTIONS
December 10th in [facility]  At 815     Medications at bedtime  Lipitor/atorvastatin  Flexeril at bedtime  Labetalol morning and nighttime

## 2021-12-02 DIAGNOSIS — R42 DIZZINESS: ICD-10-CM

## 2021-12-02 DIAGNOSIS — I71.40 ABDOMINAL AORTIC ANEURYSM (AAA) WITHOUT RUPTURE (HCC): ICD-10-CM

## 2021-12-10 ENCOUNTER — OFFICE VISIT (OUTPATIENT)
Dept: CARDIOLOGY | Facility: CLINIC | Age: 46
End: 2021-12-10

## 2021-12-10 VITALS
TEMPERATURE: 97.8 F | HEART RATE: 74 BPM | WEIGHT: 232.4 LBS | OXYGEN SATURATION: 98 % | SYSTOLIC BLOOD PRESSURE: 134 MMHG | BODY MASS INDEX: 30.8 KG/M2 | DIASTOLIC BLOOD PRESSURE: 90 MMHG | HEIGHT: 73 IN

## 2021-12-10 DIAGNOSIS — I25.10 CORONARY ARTERY DISEASE INVOLVING NATIVE CORONARY ARTERY OF NATIVE HEART WITHOUT ANGINA PECTORIS: Primary | ICD-10-CM

## 2021-12-10 DIAGNOSIS — I71.40 ABDOMINAL AORTIC ANEURYSM (AAA) WITHOUT RUPTURE (HCC): ICD-10-CM

## 2021-12-10 PROCEDURE — 93000 ELECTROCARDIOGRAM COMPLETE: CPT | Performed by: INTERNAL MEDICINE

## 2021-12-10 PROCEDURE — 99214 OFFICE O/P EST MOD 30 MIN: CPT | Performed by: INTERNAL MEDICINE

## 2021-12-10 RX ORDER — ATORVASTATIN CALCIUM 40 MG/1
40 TABLET, FILM COATED ORAL NIGHTLY
Qty: 90 TABLET | Refills: 3 | Status: SHIPPED | OUTPATIENT
Start: 2021-12-10 | End: 2023-01-11 | Stop reason: SDUPTHER

## 2021-12-10 NOTE — PROGRESS NOTES
[facility] Cardiology  OFFICE NOTE    Cardiovascular Medicine  Elsi Adams M.D., [facility], [facility] [place], [facility]         No referring provider defined for this encounter.    Thank you for asking me to see Gregg Randle for CAD.    History of Present Illness  This is a 46 y.o. male with:    1.  Coronary artery disease status post PCI at [facility], PCI of mid LAD in the setting of an NSTEMI in 2005.  2.  Hypertension  3.  Hyperlipidemia  4.  Peripheral vascular disease  5.  Abdominal aortic aneurysm.  6.  CKD     Gregg Randle is a 46 y.o. y.o. male who presents for consultation today.  Patient was here for establishment of care ans see in 2019.  He is complaining of shortness of breath on exertion however denying any chest pain.  Complaining of occasional numbness on the left side of his body.  Patient is denying any claudications.  Patient is not aware why he was on warfarin at some point however he stopped taking that.  Follow with nephrology for CKD.  Dr. Arshad for his AAA status post repair.  No other acute problems.    No acute issues since last visit.  Denies any chest pain or shortness of breath.  It appears that he had seen Dr. Guerrier in between and he has a loop recorder in place.  We will try to get records.    Review of Systems - ROS  Constitution: Negative for weakness, weight gain and weight loss.   HENT: Negative for congestion.    Eyes: Negative for blurred vision.   Cardiovascular: As mentioned above  Respiratory: Negative for cough and hemoptysis.    Endocrine: Negative for polydipsia and polyuria.   Hematologic/Lymphatic: Negative for bleeding problem. Does not bruise/bleed easily.   Skin: Negative for flushing.   Musculoskeletal: Negative for neck pain and stiffness.   Gastrointestinal: Negative for abdominal pain, diarrhea, jaundice, melena, nausea and vomiting.   Genitourinary: Negative for dysuria and hematuria.   Neurological: Negative for dizziness, focal weakness  and numbness.   Psychiatric/Behavioral: Negative for altered mental status and depression.          All other systems were reviewed and were negative.    family history includes Diabetes in his mother; Heart disease in his father; Stroke in his mother.     reports that he has quit smoking. He has a 20.00 pack-year smoking history. He has never used smokeless tobacco. He reports that he does not drink alcohol and does not use drugs.    No Known Allergies      Current Outpatient Medications:   •  albuterol (PROVENTIL HFA;VENTOLIN HFA) 108 (90 BASE) MCG/ACT inhaler, Inhale 2 puffs Every 4 (Four) Hours As Needed for Wheezing., Disp: , Rfl:   •  allopurinol (ZYLOPRIM) 100 MG tablet, Take 1 tablet by mouth Daily., Disp: 30 tablet, Rfl: 3  •  atorvastatin (LIPITOR) 20 MG tablet, Take 1 tablet by mouth Every Night., Disp: 90 tablet, Rfl: 3  •  brimonidine (ALPHAGAN) 0.2 % ophthalmic solution, Administer 1 drop to the right eye 3 (Three) Times a Day., Disp: , Rfl:   •  calcitriol (ROCALTROL) 0.25 MCG capsule, Take 1 capsule by mouth Daily., Disp: 30 capsule, Rfl: 3  •  clopidogrel (PLAVIX) 75 MG tablet, Take 1 tablet by mouth Daily., Disp: 30 tablet, Rfl: 3  •  cyclobenzaprine (FLEXERIL) 5 MG tablet, Take 1 tablet by mouth Every Night., Disp: 30 tablet, Rfl: 3  •  dilTIAZem CD (Cardizem CD) 120 MG 24 hr capsule, Take 1 capsule by mouth Daily., Disp: 30 capsule, Rfl: 3  •  gabapentin (NEURONTIN) 600 MG tablet, Take 1 tablet by mouth 3 (Three) Times a Day., Disp: 90 tablet, Rfl: 3  •  guanFACINE (TENEX) 1 MG tablet, Take 1 tablet by mouth Every Night for 90 days., Disp: 90 tablet, Rfl: 1  •  labetalol (NORMODYNE) 200 MG tablet, Take 1 tablet by mouth 2 (Two) Times a Day., Disp: 60 tablet, Rfl: 3  •  linaclotide (Linzess) 145 MCG capsule capsule, Take 1 capsule by mouth Every Morning Before Breakfast., Disp: 30 capsule, Rfl: 3  •  losartan (COZAAR) 100 MG tablet, Take 1 tablet by mouth Daily., Disp: 30 tablet, Rfl: 3  •   "Turmeric (QC Tumeric Complex) 500 MG capsule, Take 1 capsule by mouth Daily., Disp: 30 capsule, Rfl: 3  •  vitamin B-12 (CYANOCOBALAMIN) 500 MCG tablet, Take 1 tablet by mouth Daily., Disp: 30 tablet, Rfl: 3  •  vitamin D (ERGOCALCIFEROL) 1.25 MG (05385 UT) capsule capsule, Take 1 capsule by mouth Every 7 (Seven) Days., Disp: 4 capsule, Rfl: 3    Physical Exam:  Vitals:    12/10/21 0810   BP: 134/90   BP Location: Left arm   Patient Position: Sitting   Cuff Size: Adult   Pulse: 74   Temp: 97.8 °F (36.6 °C)   SpO2: 98%   Weight: 105 kg (232 lb 6.4 oz)   Height: 185.4 cm (73\")   PainSc:   5   PainLoc: Comment: left side     Current Pain Level: none  Pulse Ox: Normal  on room air  General: alert, appears stated age and cooperative     Body Habitus: well-nourished    HEENT: Head: Normocephalic, no lesions, without obvious abnormality. No arcus senilis, xanthelasma or xanthomas.    Neuro: alert, oriented x3  Pulses: 2+ and symmetric  JVP: Volume/Pulsation: Normal.  Normal waveforms.   Appropriate inspiratory decrease.  No Kussmaul's. No Renae's.   Carotid Exam: no bruit normal pulsation bilaterally   Carotid Volume: normal.     Respirations: no increased work of breathing   Chest:  Normal    Pulmonary:Normal   Precordium: Normal impulses. P2 is not palpable.  RV Heave: absent  LV Heave: absent  [place]:  normal size and placement  Palpable S4: absent.  Heart rate: normal    Heart Rhythm: regular     Heart Sounds: S1: normal  S2: normal  S3: absent   S4: absent  Opening Snap: absent    Pericardial Rub:  Absent: .    Abdomen:   Appearance: normal .  Palpation: Soft, non-tender to palpation, bowel sounds positive in all four quadrants; no guarding or rebound tenderness  Extremity: no edema.   LE Skin: no rashes  LE Hair:  normal  LE Pulses: well perfused with normal pulses in the distal extremities  Pallor on elevation: Absent. Rubor on dependency: None      DATA REVIEWED:     EKG. I personally reviewed and interpreted the " EKG.  Normal sinus rhythm, inferior infarct.    ECG/EMG Results (all)     Procedure Component Value Units Date/Time    ECG 12 Lead [[id number]] Collected: 12/10/21 0809     Updated: 12/10/21 0811     QT Interval 374 ms      QTC Interval 415 ms     Narrative:      Test Reason : cad  Blood Pressure :   */*   mmHG  Vent. Rate :  74 BPM     Atrial Rate :  74 BPM     P-R Int : 178 ms          QRS Dur : 108 ms      QT Int : 374 ms       P-R-T Axes :  52  81  43 degrees     QTc Int : 415 ms    Normal sinus rhythm  Inferior infarct (cited on or before 07-AUG-2017)  Anterolateral infarct (cited on or before 07-AUG-2017)  Abnormal ECG  When compared with ECG of 26-NOV-2019 12:02,  No significant change was found    Referred By:            Confirmed By:         ---------------------------------------------------  TTE/NIVIA:  Results for orders placed in visit on 11/26/19    Adult Transthoracic Echo Complete W/ Cont if Necessary Per Protocol    Interpretation Summary  · Estimated EF appears to be in the range of 46 - 50%.  · Left ventricular systolic function is mildly decreased.  · Left ventricular wall thickness is consistent with moderate concentric hypertrophy.  · Left ventricular diastolic dysfunction (grade I a) consistent with impaired relaxation.            --------------------------------------------------------------------------------------------------  LABS:     The CVD Risk score (Amber, et al., 2008) failed to calculate for the following reasons:    The patient has a prior MI, stroke, CHF, or peripheral vascular disease diagnosis         Lab Results   Component Value Date    GLUCOSE 98 10/27/2021    BUN 17 10/27/2021    CREATININE 2.14 (H) 10/27/2021    EGFRIFAFRI 40 (L) 10/27/2021    BCR 7.9 10/27/2021    K 5.0 10/27/2021    CO2 32.4 (H) 10/27/2021    CALCIUM 10.1 10/27/2021    ALBUMIN 4.60 10/27/2021    AST 26 10/27/2021    ALT 29 10/27/2021     Lab Results   Component Value Date    WBC 5.23 10/27/2021     HGB 13.4 10/27/2021    HCT 43.5 10/27/2021    MCV 85.5 10/27/2021     10/27/2021     Lab Results   Component Value Date    CHOL 140 10/27/2021    TRIG 80 10/27/2021    HDL 52 10/27/2021    LDL 72 10/27/2021     Lab Results   Component Value Date    TSH 1.900 06/01/2021     No results found for: CKTOTAL, CKMB, CKMBINDEX, TROPONINI, TROPONINT  Lab Results   Component Value Date    HGBA1C 5.14 03/01/2021     No results found for: DDIMER  Lab Results   Component Value Date    ALT 29 10/27/2021     Lab Results   Component Value Date    HGBA1C 5.14 03/01/2021    HGBA1C 5.10 01/15/2020     Lab Results   Component Value Date    CREATININE 2.14 (H) 10/27/2021     No results found for: IRON, TIBC, FERRITIN  Lab Results   Component Value Date    INR 1.18 08/11/2017    PROTIME 15.0 08/11/2017       Assessment/Plan     1. Coronary artery disease involving native coronary artery of native heart without angina pectoris  Was able to find records from [place] in 2005, he had presented with anterior STEMI and underwent PCI. Echo from 01/2020 showed EF of 46-50% with grade I DD  Continue aspirin Plavix  Continue high intensity statin.  Currently denying any chest pain we will hold off on ischemic evaluation.     2.  Hypertension:  Blood pressure is well controlled on current regimen of losartan 100mg mg, Cardizem 120 mg, he is on labetalol 200 mg as well.  On clonidine and chlorthalidone.  He is following with nephrology for CKD.     3.  Hyperlipidemia:  Continue Lipitor 20 mg  LDL was 72, will uptitrate to 40mg.      4.  Peripheral vascular disease:  Status post abdominal aortic aneurysm repair, follows with vascular.    We will get records for his loop recorder         Prevention:  Patient's Body mass index is 30.66 kg/m². indicating that he is obese (BMI >30). Obesity-related health conditions include the following: hypertension. Obesity is newly identified. BMI is is above average; BMI management plan is completed. We  discussed portion control and increasing exercise..      Gregg Randle  reports that he has quit smoking. He has a 20.00 pack-year smoking history. He has never used smokeless tobacco.      This document has been electronically signed by Elsi Adams MD on December 10, 2021 08:29 CST

## 2021-12-22 LAB
QT INTERVAL: 374 MS
QTC INTERVAL: 415 MS

## 2022-01-10 ENCOUNTER — TELEPHONE (OUTPATIENT)
Dept: FAMILY MEDICINE CLINIC | Facility: CLINIC | Age: 47
End: 2022-01-10

## 2022-01-27 ENCOUNTER — OFFICE VISIT (OUTPATIENT)
Dept: FAMILY MEDICINE CLINIC | Facility: CLINIC | Age: 47
End: 2022-01-27

## 2022-01-27 VITALS
BODY MASS INDEX: 30.99 KG/M2 | SYSTOLIC BLOOD PRESSURE: 128 MMHG | DIASTOLIC BLOOD PRESSURE: 90 MMHG | HEIGHT: 73 IN | OXYGEN SATURATION: 97 % | WEIGHT: 233.8 LBS | HEART RATE: 80 BPM | TEMPERATURE: 97.1 F

## 2022-01-27 DIAGNOSIS — Z00.00 MEDICARE ANNUAL WELLNESS VISIT, SUBSEQUENT: Primary | ICD-10-CM

## 2022-01-27 PROCEDURE — 1159F MED LIST DOCD IN RCRD: CPT | Performed by: NURSE PRACTITIONER

## 2022-01-27 PROCEDURE — 1170F FXNL STATUS ASSESSED: CPT | Performed by: NURSE PRACTITIONER

## 2022-01-27 PROCEDURE — 1125F AMNT PAIN NOTED PAIN PRSNT: CPT | Performed by: NURSE PRACTITIONER

## 2022-01-27 PROCEDURE — G0439 PPPS, SUBSEQ VISIT: HCPCS | Performed by: NURSE PRACTITIONER

## 2022-01-27 NOTE — PROGRESS NOTES
The ABCs of the Annual Wellness Visit  Subsequent Medicare Wellness Visit    Chief Complaint   Patient presents with   • Annual Exam     Subsequent Medicare Wellness      Subjective    History of Present Illness:  Gregg Randle is a 46 y.o. male who presents for a Subsequent Medicare Wellness Visit.    The following portions of the patient's history were reviewed and   updated as appropriate: current medications, past family history, past medical history, past social history, past surgical history and problem list.allergies    Compared to one year ago, the patient feels his physical   health is better.    Compared to one year ago, the patient feels his mental   health is better.    Recent Hospitalizations:  He was not admitted to the hospital during the last year.       Current Medical Providers:  Patient Care Team:  Dread Duvall MD as PCP - General (Family Medicine)    Outpatient Medications Prior to Visit   Medication Sig Dispense Refill   • albuterol (PROVENTIL HFA;VENTOLIN HFA) 108 (90 BASE) MCG/ACT inhaler Inhale 2 puffs Every 4 (Four) Hours As Needed for Wheezing.     • allopurinol (ZYLOPRIM) 100 MG tablet Take 1 tablet by mouth Daily. 30 tablet 3   • atorvastatin (LIPITOR) 40 MG tablet Take 1 tablet by mouth Every Night. 90 tablet 3   • brimonidine (ALPHAGAN) 0.2 % ophthalmic solution Administer 1 drop to the right eye 3 (Three) Times a Day.     • calcitriol (ROCALTROL) 0.25 MCG capsule Take 1 capsule by mouth Daily. 30 capsule 3   • clopidogrel (PLAVIX) 75 MG tablet Take 1 tablet by mouth Daily. 30 tablet 3   • cyclobenzaprine (FLEXERIL) 5 MG tablet Take 1 tablet by mouth Every Night. 30 tablet 3   • dilTIAZem CD (Cardizem CD) 120 MG 24 hr capsule Take 1 capsule by mouth Daily. 30 capsule 3   • gabapentin (NEURONTIN) 600 MG tablet Take 1 tablet by mouth 3 (Three) Times a Day. 90 tablet 3   • labetalol (NORMODYNE) 200 MG tablet Take 1 tablet by mouth 2 (Two) Times a Day. 60 tablet 3   •  linaclotide (Linzess) 145 MCG capsule capsule Take 1 capsule by mouth Every Morning Before Breakfast. 30 capsule 3   • losartan (COZAAR) 100 MG tablet Take 1 tablet by mouth Daily. 30 tablet 3   • Turmeric (QC Tumeric Complex) 500 MG capsule Take 1 capsule by mouth Daily. 30 capsule 3   • vitamin B-12 (CYANOCOBALAMIN) 500 MCG tablet Take 1 tablet by mouth Daily. 30 tablet 3   • vitamin D (ERGOCALCIFEROL) 1.25 MG (91814 UT) capsule capsule Take 1 capsule by mouth Every 7 (Seven) Days. 4 capsule 3   • guanFACINE (TENEX) 1 MG tablet Take 1 tablet by mouth Every Night for 90 days. 90 tablet 1     No facility-administered medications prior to visit.       No opioid medication identified on active medication list. I have reviewed chart for other potential  high risk medication/s and harmful drug interactions in the elderly.          Aspirin is not on active medication list.  Aspirin use is contraindicated for this patient due to: current use of clopidogrel.  .    Patient Active Problem List   Diagnosis   • Abdominal aortic aneurysm (AAA) without rupture (HCC)   • PVD (peripheral vascular disease) (HCC)   • Nicotine dependence, cigarettes, with other nicotine-induced disorders   • Mixed hyperlipidemia   • Panlobular emphysema (HCC)   • Benign essential HTN   • CAD (coronary artery disease)   • Class 1 obesity with serious comorbidity and body mass index (BMI) of 32.0 to 32.9 in adult   • Tobacco user   • Vitamin D deficiency, unspecified    • History of MI (myocardial infarction)   • History of CVA (cerebrovascular accident)   • Abnormal finding of blood chemistry, unspecified    • Stage 3 chronic kidney disease (HCC)   • Former smoker   • Left hemiparesis (HCC)   • Elevated liver enzymes   • Hyperkalemia   • Chronic idiopathic constipation   • Dizziness   • Chronic bilateral low back pain with bilateral sciatica   • Encounter for drug screening   • Hypotension   • Essential hypertension   • Left leg pain   • Muscle spasm  "  • Encounter for screening for malignant neoplasm of colon   • Sleep apnea   • Hyperuricemia   • Hemorrhoids   • Balance problem   • Class 1 obesity with serious comorbidity and body mass index (BMI) of 31.0 to 31.9 in adult   • Wound of skin   • Coronary artery disease involving native heart   • Chronic midline low back pain with bilateral sciatica   • Overweight     Advance Care Planning  Advance Directive is not on file.  ACP discussion was held with the patient during this visit. Patient does not have an advance directive, declines further assistance.          Objective    Vitals:    01/27/22 0802   BP: 128/90   BP Location: Left arm   Patient Position: Sitting   Cuff Size: Large Adult   Pulse: 80   Temp: 97.1 °F (36.2 °C)   SpO2: 97%   Weight: 106 kg (233 lb 12.8 oz)   Height: 185.4 cm (73\")   PainSc:   5     BMI Readings from Last 1 Encounters:   01/27/22 30.85 kg/m²   BMI is above normal parameters. Recommendations include: educational material    Does the patient have evidence of cognitive impairment? Yes    Physical Exam            HEALTH RISK ASSESSMENT    Smoking Status:  Social History     Tobacco Use   Smoking Status Former Smoker   • Packs/day: 1.00   • Years: 20.00   • Pack years: 20.00   Smokeless Tobacco Never Used     Alcohol Consumption:  Social History     Substance and Sexual Activity   Alcohol Use No     Fall Risk Screen:    STEADI Fall Risk Assessment was completed, and patient is at LOW risk for falls.Assessment completed on:1/27/2022    Depression Screening:  PHQ-2/PHQ-9 Depression Screening 1/27/2022   Little interest or pleasure in doing things 0   Feeling down, depressed, or hopeless 0   Total Score 0       Health Habits and Functional and Cognitive Screening:  Functional & Cognitive Status 1/27/2022   Do you have difficulty preparing food and eating? No   Do you have difficulty bathing yourself, getting dressed or grooming yourself? No   Do you have difficulty using the toilet? No   Do " you have difficulty moving around from place to place? -   Do you have trouble with steps or getting out of a bed or a chair? Yes   Current Diet Unhealthy Diet   Dental Exam Not up to date   Eye Exam Not up to date   Exercise (times per week) 3 times per week   Current Exercises Include Walking;Home Exercise Program (TV, Computer, Etc.)   Current Exercise Activities Include -   Do you need help using the phone?  No   Are you deaf or do you have serious difficulty hearing?  Yes   Do you need help with transportation? No   Do you need help shopping? No   Do you need help preparing meals?  No   Do you need help with housework?  No   Do you need help with laundry? No   Do you need help taking your medications? Yes   Do you need help managing money? No   Do you ever drive or ride in a car without wearing a seat belt? No   Have you felt unusual stress, anger or loneliness in the last month? Yes   Who do you live with? Alone   If you need help, do you have trouble finding someone available to you? No   Have you been bothered in the last four weeks by sexual problems? No   Do you have difficulty concentrating, remembering or making decisions? Yes       Age-appropriate Screening Schedule:  Refer to the list below for future screening recommendations based on patient's age, sex and/or medical conditions. Orders for these recommended tests are listed in the plan section. The patient has been provided with a written plan.    Health Maintenance   Topic Date Due   • LIPID PANEL  10/27/2022   • TDAP/TD VACCINES (2 - Td or Tdap) 09/30/2030   • INFLUENZA VACCINE  Completed              Assessment/Plan   CMS Preventative Services Quick Reference  Risk Factors Identified During Encounter  Cardiovascular Disease  Obesity/Overweight   Polypharmacy  The above risks/problems have been discussed with the patient.  Follow up actions/plans if indicated are seen below in the Assessment/Plan Section.  Pertinent information has been shared with  the patient in the After Visit Summary.    Discussed cognitive changes with patient.  Previously seeing neurology, Dr. Oivedo, but has not seen a year or so.  Reports he doesn't wish to return due to always getting a bill afterwards.  Offered new referral to different neurologist, he declines at this time.  Has concerns about out of pocket expenses on fixed income, offered case management referral, but he declines at this time.     Diagnoses and all orders for this visit:    1. Medicare annual wellness visit, subsequent (Primary)        Follow Up:   Return in about 1 year (around 1/27/2023) for Medicare Wellness.     An After Visit Summary and PPPS were made available to the patient.        This document has been electronically signed by JEFF Morris on January 28, 2022 08:06 CST,.

## 2022-01-27 NOTE — PATIENT INSTRUCTIONS
Medicare Wellness  Personal Prevention Plan of Service     Date of Office Visit:  2022  Encounter Provider:  JEFF Morris  Place of Service:  [facility]  Patient Name: Gregg Randle  :  1975    As part of the Medicare Wellness portion of your visit today, we are providing you with this personalized preventive plan of services (PPPS). This plan is based upon recommendations of the [place] Preventive Services Task Force (USPSTF) and the Advisory Committee on Immunization Practices (ACIP).    This lists the preventive care services that should be considered, and provides dates of when you are due. Items listed as completed are up-to-date and do not require any further intervention.    Health Maintenance   Topic Date Due   • COVID-19 Vaccine (3 - Booster for Pfizer series) 2022   • LIPID PANEL  10/27/2022   • ANNUAL WELLNESS VISIT  2023   • TDAP/TD VACCINES (2 - Td or Tdap) 2030   • COLORECTAL CANCER SCREENING  2030   • Pneumococcal Vaccine 0-64 (2 of 2 - PPSV23) 2039   • HEPATITIS C SCREENING  Completed   • INFLUENZA VACCINE  Completed       No orders of the defined types were placed in this encounter.      Return in about 1 year (around 2023) for Medicare Wellness.

## 2022-02-22 DIAGNOSIS — Z02.83 ENCOUNTER FOR DRUG SCREENING: ICD-10-CM

## 2022-02-22 RX ORDER — GABAPENTIN 600 MG/1
TABLET ORAL
Qty: 90 TABLET | Refills: 0 | Status: SHIPPED | OUTPATIENT
Start: 2022-02-22 | End: 2022-03-29 | Stop reason: SDUPTHER

## 2022-02-22 NOTE — TELEPHONE ENCOUNTER
Rx Refill Note  Requested Prescriptions     Pending Prescriptions Disp Refills   • gabapentin (NEURONTIN) 600 MG tablet [Pharmacy Med Name: gabapentin 600 mg tablet] 90 tablet 3     Sig: TAKE ONE TABLET THREE TIMES DAILY      Last office visit with prescribing clinician: 11/18/2021      Next office visit with prescribing clinician: 3/23/2022   {TIP  Encounters:    DR LOMELI PT.  PT IS REQUESTING A REFILL FOR GABAPENTIN TO BE SENT TO [facility]. HE REPORTS THAT IS HELPS WITH HIS CHRONIC BACK PAIN AND SCIATICA 5/10. NO SIDE EFFECTS/ADVERSE REACTIONS REPORTED. PT WAS LAST SEEN BY DR LOMELI ON 11/18/21 AND SAW HORACIO FOR HIS MEDICARE WELLNESS EXAM ON 1/22/22. HE HAS AN A[[T SCHEDULED WITH DR LOMELI FOR 3/23/22 PENDING PROVIDER'S RETURN TO THE OFFICE.     {TIP  Please add Last Relevant Lab Date if appropriate  {TIP  Is Refill Pharmacy correct? YES  Reginald Pineda, RAJENDRA  02/22/22, 14:45 CST

## 2022-02-23 ENCOUNTER — TELEPHONE (OUTPATIENT)
Dept: FAMILY MEDICINE CLINIC | Facility: CLINIC | Age: 47
End: 2022-02-23

## 2022-02-23 NOTE — TELEPHONE ENCOUNTER
Pt stated he is going to be using a mail order pharmacy through [employer]. The name and number he provided did not pull up a pharmacy. Made pt aware he would need to contact his insurance and find out the name, address, and phone number of the mail order pharmacy. He voiced understanding.

## 2022-03-08 ENCOUNTER — OFFICE VISIT (OUTPATIENT)
Dept: FAMILY MEDICINE CLINIC | Facility: CLINIC | Age: 47
End: 2022-03-08

## 2022-03-08 VITALS
TEMPERATURE: 97.8 F | OXYGEN SATURATION: 100 % | BODY MASS INDEX: 30.83 KG/M2 | HEIGHT: 73 IN | RESPIRATION RATE: 20 BRPM | WEIGHT: 232.6 LBS | SYSTOLIC BLOOD PRESSURE: 132 MMHG | HEART RATE: 73 BPM | DIASTOLIC BLOOD PRESSURE: 86 MMHG

## 2022-03-08 DIAGNOSIS — E79.0 HYPERURICEMIA: ICD-10-CM

## 2022-03-08 DIAGNOSIS — N18.32 STAGE 3B CHRONIC KIDNEY DISEASE: ICD-10-CM

## 2022-03-08 DIAGNOSIS — R20.2 NUMBNESS AND TINGLING OF LEFT ARM AND LEG: Primary | ICD-10-CM

## 2022-03-08 DIAGNOSIS — M54.41 CHRONIC BILATERAL LOW BACK PAIN WITH BILATERAL SCIATICA: ICD-10-CM

## 2022-03-08 DIAGNOSIS — I25.10 CORONARY ARTERY DISEASE INVOLVING NATIVE CORONARY ARTERY OF NATIVE HEART WITHOUT ANGINA PECTORIS: ICD-10-CM

## 2022-03-08 DIAGNOSIS — I10 BENIGN ESSENTIAL HTN: ICD-10-CM

## 2022-03-08 DIAGNOSIS — G89.29 CHRONIC BILATERAL LOW BACK PAIN WITH BILATERAL SCIATICA: ICD-10-CM

## 2022-03-08 DIAGNOSIS — R20.0 NUMBNESS AND TINGLING OF LEFT ARM AND LEG: Primary | ICD-10-CM

## 2022-03-08 DIAGNOSIS — K59.09 CHRONIC CONSTIPATION: ICD-10-CM

## 2022-03-08 DIAGNOSIS — M54.42 CHRONIC BILATERAL LOW BACK PAIN WITH BILATERAL SCIATICA: ICD-10-CM

## 2022-03-08 PROCEDURE — 99214 OFFICE O/P EST MOD 30 MIN: CPT | Performed by: NURSE PRACTITIONER

## 2022-03-08 RX ORDER — CYCLOBENZAPRINE HCL 5 MG
5 TABLET ORAL NIGHTLY
Qty: 30 TABLET | Refills: 3 | Status: SHIPPED | OUTPATIENT
Start: 2022-03-08 | End: 2022-04-07 | Stop reason: SDUPTHER

## 2022-03-08 RX ORDER — CLOPIDOGREL BISULFATE 75 MG/1
75 TABLET ORAL DAILY
Qty: 30 TABLET | Refills: 3 | Status: SHIPPED | OUTPATIENT
Start: 2022-03-08 | End: 2022-04-07 | Stop reason: SDUPTHER

## 2022-03-08 RX ORDER — ALLOPURINOL 100 MG/1
100 TABLET ORAL DAILY
Qty: 30 TABLET | Refills: 3 | Status: SHIPPED | OUTPATIENT
Start: 2022-03-08 | End: 2022-04-07 | Stop reason: SDUPTHER

## 2022-03-08 RX ORDER — CALCITRIOL 0.25 UG/1
0.25 CAPSULE, LIQUID FILLED ORAL DAILY
Qty: 30 CAPSULE | Refills: 3 | Status: SHIPPED | OUTPATIENT
Start: 2022-03-08 | End: 2022-04-07 | Stop reason: SDUPTHER

## 2022-03-08 RX ORDER — VIT C/B6/B5/MAGNESIUM/HERB 173 50-5-6-5MG
500 CAPSULE ORAL DAILY
Qty: 30 CAPSULE | Refills: 3 | Status: SHIPPED | OUTPATIENT
Start: 2022-03-08 | End: 2022-04-07 | Stop reason: SDUPTHER

## 2022-03-08 RX ORDER — LOSARTAN POTASSIUM 100 MG/1
100 TABLET ORAL DAILY
Qty: 30 TABLET | Refills: 3 | Status: SHIPPED | OUTPATIENT
Start: 2022-03-08 | End: 2022-04-07 | Stop reason: SDUPTHER

## 2022-03-08 RX ORDER — CHOLECALCIFEROL (VITAMIN D3) 125 MCG
500 CAPSULE ORAL DAILY
Qty: 30 TABLET | Refills: 3 | Status: SHIPPED | OUTPATIENT
Start: 2022-03-08 | End: 2022-04-07 | Stop reason: SDUPTHER

## 2022-03-08 NOTE — PROGRESS NOTES
"Chief Complaint  Arm Pain (Left) and Leg Pain (Left)    Subjective    History of Present Illness {CC  Problem List  Visit  Diagnosis   Encounters  Notes  Medications  Labs  Result Review Imaging  Media :23}     Gregg Randle presents to [facility] [place] for   C/o left arm pain that \"goes all the way down to his left foot\" that has been present x 1-2 months. Review of records indicates lumbar xray 2/13/20 that indicates narrowing of L5-S1 - does report numbness and tingling of left arm and left leg - denies loss of bowel or bladder control. Also requesting refills of chronic meds. PCP, Dr. Duvall - last PCP visit 11/18/2021. Sees Marco Patel, nephrology - last appt 3/7/22; also sees Dr. Adams, cardiology.        Objective     Physical Exam  Vitals and nursing note reviewed.   Constitutional:       General: He is not in acute distress.     Appearance: Normal appearance. He is normal weight. He is not ill-appearing.   HENT:      Head: Normocephalic and atraumatic.   Eyes:      Conjunctiva/sclera: Conjunctivae normal.      Pupils: Pupils are equal, round, and reactive to light.   Neck:      Vascular: No carotid bruit.   Cardiovascular:      Rate and Rhythm: Normal rate and regular rhythm.      Heart sounds: Normal heart sounds.   Pulmonary:      Effort: Pulmonary effort is normal.      Breath sounds: Normal breath sounds.   Musculoskeletal:         General: Tenderness (left arm; left leg; lumbar) present. Normal range of motion.      Cervical back: Normal range of motion and neck supple.   Lymphadenopathy:      Cervical: No cervical adenopathy.   Skin:     General: Skin is warm and dry.      Capillary Refill: Capillary refill takes less than 2 seconds.      Findings: No bruising, erythema or rash.   Neurological:      General: No focal deficit present.      Mental Status: He is alert and oriented to person, place, and time.      Motor: No weakness. "      Gait: Gait normal.   Psychiatric:         Mood and Affect: Mood normal.         Behavior: Behavior normal.        Result Review  Data Reviewed:{ Labs  Result Review  Imaging  Med Tab  Media :23}   The following data was reviewed by (Optional):[id number]}  Common labs    Common Labsle 6/1/21 6/1/21 6/1/21 6/1/21 10/27/21 10/27/21 10/27/21 10/27/21    0844 0844 0844 0844 1015 1015 1015 1015   Glucose   99   98     BUN   15   17     Creatinine   2.08 (A)   2.14 (A)     eGFR  Am   42 (A)   40 (A)     Sodium   144   140     Potassium   4.8   5.0     Chloride   104   100     Calcium   9.7   10.1     Albumin   4.20   4.60     Total Bilirubin   0.3   0.4     Alkaline Phosphatase   119 (A)   128 (A)     AST (SGOT)   21   26     ALT (SGPT)   26   29     WBC 6.47    5.23      Hemoglobin 13.9    13.4      Hematocrit 43.2    43.5      Platelets 327    304      Total Cholesterol  140     140    Triglycerides  141     80    HDL Cholesterol  42     52    LDL Cholesterol   73     72    Uric Acid    6.1    5.5   (A) Abnormal value          XR Spine Cervical Complete 4 or 5 View    Result Date: 3/9/2022  1. No evidence of acute fracture or subluxation of the cervical spine. 2. Mild disc degenerative changes at mid to lower cervical levels as above. 3. Facet degeneration C7-T1 If ongoing symptoms recommend MRI Electronically signed by:  Shawn Aguiar MD  3/9/2022 2:46 PM CST Workstation: [id number]    XR Spine Thoracic 3 View    Result Date: 3/9/2022  Impression: Negative thoracic spine. Electronically signed by:  Zeke Gamez MD  3/9/2022 2:17 PM CST Workstation: [id number]    XR Spine Lumbar 4+ View    Result Date: 3/9/2022  1. No evidence for fracture or subluxation of the lumbar spine.  2.  Disc and facet degeneration L5-S1 with probable associated foraminal stenosis. Consider MRI. 3. Disc disc and facet degeneration L4-L5 with probable associated foraminal stenosis. Electronically signed by:  Shawn Aguiar MD  3/9/2022 2:14 PM  "[employer] Workstation: [id number]       Vital Signs:   /86 (BP Location: Right arm, Patient Position: Sitting, Cuff Size: Adult)   Pulse 73   Temp 97.8 °F (36.6 °C) (Temporal)   Resp 20   Ht 185.4 cm (73\")   Wt 106 kg (232 lb 9.6 oz)   SpO2 100%   BMI 30.69 kg/m²          Assessment and Plan {CC Problem List  Visit Diagnosis  ROS  Review (Popup)  Health Maintenance  Quality  BestPractice  Medications  SmartSets  SnapShot Encounters  Media :23}   Problem List Items Addressed This Visit        Cardiac and Vasculature    Benign essential HTN    Relevant Medications    losartan (COZAAR) 100 MG tablet    CAD (coronary artery disease)    Relevant Medications    clopidogrel (PLAVIX) 75 MG tablet       Genitourinary and Reproductive     Stage 3 chronic kidney disease (HCC)    Relevant Medications    calcitriol (ROCALTROL) 0.25 MCG capsule       Musculoskeletal and Injuries    Chronic bilateral low back pain with bilateral sciatica    Relevant Medications    Turmeric (QC Tumeric Complex) 500 MG capsule    cyclobenzaprine (FLEXERIL) 5 MG tablet    Hyperuricemia    Relevant Medications    allopurinol (ZYLOPRIM) 100 MG tablet      Other Visit Diagnoses     Numbness and tingling of left arm and leg    -  Primary    Relevant Medications    vitamin B-12 (CYANOCOBALAMIN) 500 MCG tablet    Other Relevant Orders    XR Spine Cervical Complete 4 or 5 View (Completed)    XR Spine Lumbar 4+ View (Completed)    XR Spine Thoracic 3 View (Completed)    Chronic constipation        Relevant Medications    linaclotide (Linzess) 145 MCG capsule capsule         Diagnosis Plan   1. Numbness and tingling of left arm and leg  XR Spine Cervical Complete 4 or 5 View    XR Spine Lumbar 4+ View    XR Spine Thoracic 3 View    vitamin B-12 (CYANOCOBALAMIN) 500 MCG tablet   2. Chronic bilateral low back pain with bilateral sciatica  Turmeric (QC Tumeric Complex) 500 MG capsule    cyclobenzaprine (FLEXERIL) 5 MG tablet   3. Benign " essential HTN  losartan (COZAAR) 100 MG tablet   4. Coronary artery disease involving native coronary artery of native heart without angina pectoris  clopidogrel (PLAVIX) 75 MG tablet   5. Hyperuricemia  allopurinol (ZYLOPRIM) 100 MG tablet   6. Stage 3b chronic kidney disease (HCC)  calcitriol (ROCALTROL) 0.25 MCG capsule   7. Chronic constipation  linaclotide (Linzess) 145 MCG capsule capsule     - Numbness/tingling left arm and leg- cspine, tspine and lspine xrays ordered for further evaluation - refill of vitamin B12 submitted - cont current dosing.  - Chronic bilat low back pain - refill of tumeric and cyclobenzaprine submitted - cont current dosing - repeating Lspine xray d/t report of worsening numbness/tingline left leg x 1-2 mos.  - HTN - controlled - refill of losartan submitted - cont current dosing - cont scheduled appts with Dr Adams.  - CAD - refill of clopidogrel submitted - cont current dosing - cont scheduled appts with Dr. Adams  - Hyperuricemia - refill of allopurinol submitted - cont current dosing  - CKD 3 - refill of calcitrol submitted - cont current dosing - cont scheduled appts with nephrology  - Chronic constipation - refill of linaclotide submitted - cont current dosing  - RTC PRN or f/u with PCP, Dr. Duvall, as scheduled and for mgmt of chronic issues    Follow Up {Instructions Charge Capture  Follow-up Communications :23}   Return in about 1 month (around 4/8/2022), or if symptoms worsen or fail to improve.  Patient was given instructions and counseling regarding his condition or for health maintenance advice. Please see specific information pulled into the AVS if appropriate            .  This document has been electronically signed by JEFF Oliva on March 16, 2022 22:18 CDT

## 2022-03-12 DIAGNOSIS — M54.41 CHRONIC BILATERAL LOW BACK PAIN WITH BILATERAL SCIATICA: ICD-10-CM

## 2022-03-12 DIAGNOSIS — M47.22 CERVICAL RADICULOPATHY DUE TO DEGENERATIVE JOINT DISEASE OF SPINE: ICD-10-CM

## 2022-03-12 DIAGNOSIS — R20.2 NUMBNESS AND TINGLING OF LEFT ARM AND LEG: Primary | ICD-10-CM

## 2022-03-12 DIAGNOSIS — M54.42 CHRONIC BILATERAL LOW BACK PAIN WITH BILATERAL SCIATICA: ICD-10-CM

## 2022-03-12 DIAGNOSIS — R20.0 NUMBNESS AND TINGLING OF LEFT ARM AND LEG: Primary | ICD-10-CM

## 2022-03-12 DIAGNOSIS — G89.29 CHRONIC BILATERAL LOW BACK PAIN WITH BILATERAL SCIATICA: ICD-10-CM

## 2022-03-21 ENCOUNTER — TELEPHONE (OUTPATIENT)
Dept: FAMILY MEDICINE CLINIC | Facility: CLINIC | Age: 47
End: 2022-03-21

## 2022-03-21 NOTE — TELEPHONE ENCOUNTER
Mr. Randle is needing a new face mask for his Cpap machine. Please send to [facility]

## 2022-03-29 ENCOUNTER — OFFICE VISIT (OUTPATIENT)
Dept: FAMILY MEDICINE CLINIC | Facility: CLINIC | Age: 47
End: 2022-03-29

## 2022-03-29 VITALS
SYSTOLIC BLOOD PRESSURE: 120 MMHG | WEIGHT: 231.8 LBS | TEMPERATURE: 97.3 F | BODY MASS INDEX: 30.72 KG/M2 | HEART RATE: 78 BPM | HEIGHT: 73 IN | OXYGEN SATURATION: 100 % | DIASTOLIC BLOOD PRESSURE: 80 MMHG

## 2022-03-29 DIAGNOSIS — M54.41 CHRONIC MIDLINE LOW BACK PAIN WITH BILATERAL SCIATICA: ICD-10-CM

## 2022-03-29 DIAGNOSIS — I71.40 ABDOMINAL AORTIC ANEURYSM (AAA) WITHOUT RUPTURE: ICD-10-CM

## 2022-03-29 DIAGNOSIS — M54.42 CHRONIC MIDLINE LOW BACK PAIN WITH BILATERAL SCIATICA: ICD-10-CM

## 2022-03-29 DIAGNOSIS — G89.29 CHRONIC MIDLINE LOW BACK PAIN WITH BILATERAL SCIATICA: ICD-10-CM

## 2022-03-29 DIAGNOSIS — I25.2 HISTORY OF MI (MYOCARDIAL INFARCTION): ICD-10-CM

## 2022-03-29 DIAGNOSIS — Z86.73 HISTORY OF CVA (CEREBROVASCULAR ACCIDENT): ICD-10-CM

## 2022-03-29 DIAGNOSIS — G47.33 OBSTRUCTIVE SLEEP APNEA SYNDROME: ICD-10-CM

## 2022-03-29 DIAGNOSIS — I10 BENIGN ESSENTIAL HTN: ICD-10-CM

## 2022-03-29 PROCEDURE — 99214 OFFICE O/P EST MOD 30 MIN: CPT | Performed by: FAMILY MEDICINE

## 2022-03-29 RX ORDER — ERGOCALCIFEROL 1.25 MG/1
50000 CAPSULE ORAL
Qty: 12 CAPSULE | Refills: 1 | Status: CANCELLED | OUTPATIENT
Start: 2022-03-29

## 2022-03-29 RX ORDER — DILTIAZEM HYDROCHLORIDE 120 MG/1
120 CAPSULE, COATED, EXTENDED RELEASE ORAL DAILY
Qty: 30 CAPSULE | Refills: 3 | Status: SHIPPED | OUTPATIENT
Start: 2022-03-29 | End: 2022-04-07 | Stop reason: SDUPTHER

## 2022-03-29 RX ORDER — LABETALOL 200 MG/1
200 TABLET, FILM COATED ORAL 2 TIMES DAILY
Qty: 60 TABLET | Refills: 3 | Status: SHIPPED | OUTPATIENT
Start: 2022-03-29 | End: 2022-04-07 | Stop reason: SDUPTHER

## 2022-03-29 RX ORDER — GUANFACINE 1 MG/1
1 TABLET ORAL NIGHTLY
Qty: 90 TABLET | Refills: 1 | Status: SHIPPED | OUTPATIENT
Start: 2022-03-29 | End: 2022-04-07 | Stop reason: SDUPTHER

## 2022-03-29 RX ORDER — GABAPENTIN 600 MG/1
600 TABLET ORAL 3 TIMES DAILY
Qty: 90 TABLET | Refills: 2 | Status: SHIPPED | OUTPATIENT
Start: 2022-03-29 | End: 2022-04-07 | Stop reason: SDUPTHER

## 2022-03-29 NOTE — PROGRESS NOTES
Chief Complaint  Leg Pain and Numbness  ' 2011 had stroke effected L face, eye. L side of body to foot. Neurontin helps with pain on L side. Had x-rays done recently of Neck and back. Need new mask for CPAP machine, old one has broken'    Subjective          Review of Systems   Constitutional: Positive for activity change and fatigue. Negative for appetite change, chills, diaphoresis and fever.   HENT: Negative for congestion, postnasal drip, rhinorrhea, sinus pressure, sinus pain, sneezing, sore throat, trouble swallowing and voice change.    Eyes:        Left eye weak seeing Dr. Kellogg   Respiratory: Positive for apnea. Negative for cough, choking, chest tightness, shortness of breath, wheezing and stridor.         BRANDI on CPAP mask has broken, needs a new one   Cardiovascular: Negative for chest pain.        AAA followed by [facility]   Gastrointestinal: Negative for abdominal pain, diarrhea, nausea and vomiting.   Endocrine: Negative.    Genitourinary: Negative.    Musculoskeletal: Positive for arthralgias.   Skin: Negative.    Allergic/Immunologic: Negative.    Neurological: Positive for weakness and numbness. Negative for headaches.   Hematological: Negative.        Gregg Randle presents to [facility] [place]  Leg Pain   Injury mechanism: H/O CVA, Back pain. The pain is present in the left leg. The pain is at a severity of 5/10. The pain is moderate. Associated symptoms include numbness. He has tried acetaminophen (Neurontin) for the symptoms. The treatment provided moderate relief.   Neurologic Problem  The patient's primary symptoms include focal sensory loss and weakness. This is a chronic (>10 years) problem. The neurological problem developed suddenly. Progression since onset: Improvement. There was left-sided, facial, lower extremity and upper extremity focality noted. Associated symptoms include fatigue. Pertinent negatives include no abdominal pain, chest  "pain, diaphoresis, fever, headaches, nausea, shortness of breath or vomiting. Treatments tried: Re-hab after CVA. The treatment provided moderate relief. His past medical history is significant for a CVA.       Objective   Vital Signs:   /80 (BP Location: Left arm, Patient Position: Sitting, Cuff Size: Adult)   Pulse 78   Temp 97.3 °F (36.3 °C) (Temporal)   Ht 185.4 cm (73\")   Wt 105 kg (231 lb 12.8 oz)   SpO2 100%   BMI 30.58 kg/m²     Physical Exam  Vitals and nursing note reviewed.   Constitutional:       Appearance: He is well-developed. He is not diaphoretic.   HENT:      Head: Normocephalic and atraumatic.      Right Ear: Tympanic membrane, ear canal and external ear normal. There is no impacted cerumen.      Left Ear: Tympanic membrane, ear canal and external ear normal. There is no impacted cerumen.      Mouth/Throat:      Mouth: Mucous membranes are moist.      Pharynx: Oropharynx is clear.   Eyes:      Conjunctiva/sclera: Conjunctivae normal.      Pupils: Pupils are equal, round, and reactive to light.   Neck:      Comments: Has WB 4 with ROM cervical spine.   Cardiovascular:      Rate and Rhythm: Normal rate and regular rhythm.      Pulses: Normal pulses.      Heart sounds: Normal heart sounds. No murmur heard.  Pulmonary:      Effort: Pulmonary effort is normal. No respiratory distress.      Breath sounds: Normal breath sounds.   Abdominal:      General: Bowel sounds are normal. There is no distension.      Palpations: Abdomen is soft.      Tenderness: There is no abdominal tenderness. There is no right CVA tenderness, left CVA tenderness or guarding.      Comments: Obese abdomen    Musculoskeletal:         General: Tenderness present. No deformity.      Cervical back: Neck supple.      Comments: WB 4 with ROM Lumbar spine   Skin:     General: Skin is warm.      Capillary Refill: Capillary refill takes 2 to 3 seconds.      Coloration: Skin is not pale.      Findings: No erythema or rash. "   Neurological:      Mental Status: He is alert and oriented to person, place, and time.      Cranial Nerves: Cranial nerve deficit present.      Sensory: Sensory deficit present.      Motor: Weakness present.      Comments: L facial droop residual from past CVA,   Has mild difference in sensation decreased on L side.    Psychiatric:         Mood and Affect: Mood normal.         Behavior: Behavior normal.         Thought Content: Thought content normal.         Judgment: Judgment normal.        Result Review :                  Covering for Dr Duvall.   Discussed exam, health problems, meds, indications, tx plan rationale. No NSAIDs with H/O CVA, MI, HTN . Discussed safety with controlled meds, stroke risk reduction. Rx for CPAP mask given, will defer referral to new Sleep Med to PCP if desired. Reviewed x-rays with Pt, has MRIs ordered per recommendation Radiology.    Assessment and Plan    Diagnoses and all orders for this visit:    1. Obstructive sleep apnea syndrome  Comments:  Needs new CPAP mask Rx given.     2. Abdominal aortic aneurysm (AAA) without rupture (HCC)    3. Benign essential HTN  -     dilTIAZem CD (Cardizem CD) 120 MG 24 hr capsule; Take 1 capsule by mouth Daily.  Dispense: 30 capsule; Refill: 3  -     guanFACINE (TENEX) 1 MG tablet; Take 1 tablet by mouth Every Night for 90 days.  Dispense: 90 tablet; Refill: 1  -     labetalol (NORMODYNE) 200 MG tablet; Take 1 tablet by mouth 2 (Two) Times a Day.  Dispense: 60 tablet; Refill: 3    4. History of MI (myocardial infarction)    5. History of CVA (cerebrovascular accident)  -     gabapentin (NEURONTIN) 600 MG tablet; Take 1 tablet by mouth 3 (Three) Times a Day.  Dispense: 90 tablet; Refill: 2    6. Chronic midline low back pain with bilateral sciatica  -     gabapentin (NEURONTIN) 600 MG tablet; Take 1 tablet by mouth 3 (Three) Times a Day.  Dispense: 90 tablet; Refill: 2        Follow Up   Return in about 3 months (around 6/29/2022).  Patient was  given instructions and counseling regarding his condition or for health maintenance advice. Please see specific information pulled into the AVS if appropriate.         This document has been electronically signed by Derrick Banks MD on March 29, 2022 11:51 CDT

## 2022-04-07 DIAGNOSIS — M54.42 CHRONIC BILATERAL LOW BACK PAIN WITH BILATERAL SCIATICA: ICD-10-CM

## 2022-04-07 DIAGNOSIS — G89.29 CHRONIC MIDLINE LOW BACK PAIN WITH BILATERAL SCIATICA: ICD-10-CM

## 2022-04-07 DIAGNOSIS — R20.0 NUMBNESS AND TINGLING OF LEFT ARM AND LEG: ICD-10-CM

## 2022-04-07 DIAGNOSIS — E79.0 HYPERURICEMIA: ICD-10-CM

## 2022-04-07 DIAGNOSIS — M54.42 CHRONIC MIDLINE LOW BACK PAIN WITH BILATERAL SCIATICA: ICD-10-CM

## 2022-04-07 DIAGNOSIS — Z86.73 HISTORY OF CVA (CEREBROVASCULAR ACCIDENT): ICD-10-CM

## 2022-04-07 DIAGNOSIS — N18.32 STAGE 3B CHRONIC KIDNEY DISEASE: ICD-10-CM

## 2022-04-07 DIAGNOSIS — I10 BENIGN ESSENTIAL HTN: ICD-10-CM

## 2022-04-07 DIAGNOSIS — M54.41 CHRONIC BILATERAL LOW BACK PAIN WITH BILATERAL SCIATICA: ICD-10-CM

## 2022-04-07 DIAGNOSIS — M54.41 CHRONIC MIDLINE LOW BACK PAIN WITH BILATERAL SCIATICA: ICD-10-CM

## 2022-04-07 DIAGNOSIS — K59.09 CHRONIC CONSTIPATION: ICD-10-CM

## 2022-04-07 DIAGNOSIS — R20.2 NUMBNESS AND TINGLING OF LEFT ARM AND LEG: ICD-10-CM

## 2022-04-07 DIAGNOSIS — I25.10 CORONARY ARTERY DISEASE INVOLVING NATIVE CORONARY ARTERY OF NATIVE HEART WITHOUT ANGINA PECTORIS: ICD-10-CM

## 2022-04-07 DIAGNOSIS — G89.29 CHRONIC BILATERAL LOW BACK PAIN WITH BILATERAL SCIATICA: ICD-10-CM

## 2022-04-07 RX ORDER — CHOLECALCIFEROL (VITAMIN D3) 125 MCG
500 CAPSULE ORAL DAILY
Qty: 30 TABLET | Refills: 3 | Status: SHIPPED | OUTPATIENT
Start: 2022-04-07 | End: 2022-05-18 | Stop reason: SDUPTHER

## 2022-04-07 RX ORDER — CLOPIDOGREL BISULFATE 75 MG/1
75 TABLET ORAL DAILY
Qty: 30 TABLET | Refills: 3 | Status: SHIPPED | OUTPATIENT
Start: 2022-04-07 | End: 2022-05-18 | Stop reason: SDUPTHER

## 2022-04-07 RX ORDER — ALLOPURINOL 100 MG/1
100 TABLET ORAL DAILY
Qty: 30 TABLET | Refills: 3 | Status: SHIPPED | OUTPATIENT
Start: 2022-04-07 | End: 2022-05-18 | Stop reason: SDUPTHER

## 2022-04-07 RX ORDER — VIT C/B6/B5/MAGNESIUM/HERB 173 50-5-6-5MG
500 CAPSULE ORAL DAILY
Qty: 30 CAPSULE | Refills: 3 | Status: SHIPPED | OUTPATIENT
Start: 2022-04-07 | End: 2022-05-18 | Stop reason: SDUPTHER

## 2022-04-07 RX ORDER — DILTIAZEM HYDROCHLORIDE 120 MG/1
120 CAPSULE, COATED, EXTENDED RELEASE ORAL DAILY
Qty: 30 CAPSULE | Refills: 3 | Status: SHIPPED | OUTPATIENT
Start: 2022-04-07 | End: 2022-05-18 | Stop reason: SDUPTHER

## 2022-04-07 RX ORDER — GUANFACINE 1 MG/1
1 TABLET ORAL NIGHTLY
Qty: 90 TABLET | Refills: 1 | Status: SHIPPED | OUTPATIENT
Start: 2022-04-07 | End: 2022-09-28

## 2022-04-07 RX ORDER — GABAPENTIN 600 MG/1
600 TABLET ORAL 3 TIMES DAILY
Qty: 90 TABLET | Refills: 2 | Status: SHIPPED | OUTPATIENT
Start: 2022-04-07 | End: 2022-05-23 | Stop reason: SDUPTHER

## 2022-04-07 RX ORDER — CYCLOBENZAPRINE HCL 5 MG
5 TABLET ORAL NIGHTLY
Qty: 30 TABLET | Refills: 3 | Status: SHIPPED | OUTPATIENT
Start: 2022-04-07 | End: 2022-05-18 | Stop reason: SDUPTHER

## 2022-04-07 RX ORDER — LOSARTAN POTASSIUM 100 MG/1
100 TABLET ORAL DAILY
Qty: 30 TABLET | Refills: 3 | Status: SHIPPED | OUTPATIENT
Start: 2022-04-07 | End: 2022-05-18 | Stop reason: SDUPTHER

## 2022-04-07 RX ORDER — CALCITRIOL 0.25 UG/1
0.25 CAPSULE, LIQUID FILLED ORAL DAILY
Qty: 30 CAPSULE | Refills: 3 | Status: SHIPPED | OUTPATIENT
Start: 2022-04-07 | End: 2022-05-18 | Stop reason: SDUPTHER

## 2022-04-07 RX ORDER — LABETALOL 200 MG/1
200 TABLET, FILM COATED ORAL 2 TIMES DAILY
Qty: 60 TABLET | Refills: 3 | Status: SHIPPED | OUTPATIENT
Start: 2022-04-07 | End: 2022-05-18 | Stop reason: SDUPTHER

## 2022-04-07 NOTE — TELEPHONE ENCOUNTER
Verified with pt he wanted to change. Pt stated he did.     Called pharmacy and cancelled Gabapentin and Tenex and made aware pt was switching to mail order.

## 2022-04-07 NOTE — TELEPHONE ENCOUNTER
Carlene from [facility] called and the patient is wanting to use [facility] from now on. They are needing all of the patients prescriptions faxed over to [facility]. That fax number is [phone]

## 2022-04-14 ENCOUNTER — TELEPHONE (OUTPATIENT)
Dept: FAMILY MEDICINE CLINIC | Facility: CLINIC | Age: 47
End: 2022-04-14

## 2022-04-14 NOTE — TELEPHONE ENCOUNTER
JEFF Shea ordered 2 different MRI's for patient to have completed. They were scheduled for April 5,2022 but patient No Showed for them.

## 2022-04-14 NOTE — TELEPHONE ENCOUNTER
"Patient called about his medication refills. He stated he called \"his insurance\" who said they didn't have his turmeric or B-12 medication and that it would be another week. Wanted to know why we didn't send him the medication. Explained to patient that all of his medications requested, including those 2, were sent to his pharmacy mail service to deliver and if the medications were unavailable through them that he could request them to another pharmacy to . Patient v/u.  "

## 2022-05-18 DIAGNOSIS — N18.32 STAGE 3B CHRONIC KIDNEY DISEASE: ICD-10-CM

## 2022-05-18 DIAGNOSIS — G89.29 CHRONIC BILATERAL LOW BACK PAIN WITH BILATERAL SCIATICA: ICD-10-CM

## 2022-05-18 DIAGNOSIS — K59.09 CHRONIC CONSTIPATION: ICD-10-CM

## 2022-05-18 DIAGNOSIS — E79.0 HYPERURICEMIA: ICD-10-CM

## 2022-05-18 DIAGNOSIS — Z86.73 HISTORY OF CVA (CEREBROVASCULAR ACCIDENT): ICD-10-CM

## 2022-05-18 DIAGNOSIS — M54.41 CHRONIC MIDLINE LOW BACK PAIN WITH BILATERAL SCIATICA: ICD-10-CM

## 2022-05-18 DIAGNOSIS — R20.2 NUMBNESS AND TINGLING OF LEFT ARM AND LEG: ICD-10-CM

## 2022-05-18 DIAGNOSIS — M54.41 CHRONIC BILATERAL LOW BACK PAIN WITH BILATERAL SCIATICA: ICD-10-CM

## 2022-05-18 DIAGNOSIS — M54.42 CHRONIC MIDLINE LOW BACK PAIN WITH BILATERAL SCIATICA: ICD-10-CM

## 2022-05-18 DIAGNOSIS — M54.42 CHRONIC BILATERAL LOW BACK PAIN WITH BILATERAL SCIATICA: ICD-10-CM

## 2022-05-18 DIAGNOSIS — R20.0 NUMBNESS AND TINGLING OF LEFT ARM AND LEG: ICD-10-CM

## 2022-05-18 DIAGNOSIS — I25.10 CORONARY ARTERY DISEASE INVOLVING NATIVE CORONARY ARTERY OF NATIVE HEART WITHOUT ANGINA PECTORIS: ICD-10-CM

## 2022-05-18 DIAGNOSIS — I10 BENIGN ESSENTIAL HTN: ICD-10-CM

## 2022-05-18 DIAGNOSIS — G89.29 CHRONIC MIDLINE LOW BACK PAIN WITH BILATERAL SCIATICA: ICD-10-CM

## 2022-05-18 RX ORDER — CALCITRIOL 0.25 UG/1
0.25 CAPSULE, LIQUID FILLED ORAL DAILY
Qty: 90 CAPSULE | Refills: 0 | Status: SHIPPED | OUTPATIENT
Start: 2022-05-18 | End: 2022-07-29 | Stop reason: SDUPTHER

## 2022-05-18 RX ORDER — LABETALOL 200 MG/1
200 TABLET, FILM COATED ORAL 2 TIMES DAILY
Qty: 180 TABLET | Refills: 0 | Status: SHIPPED | OUTPATIENT
Start: 2022-05-18 | End: 2022-07-29 | Stop reason: SDUPTHER

## 2022-05-18 RX ORDER — CLOPIDOGREL BISULFATE 75 MG/1
75 TABLET ORAL DAILY
Qty: 90 TABLET | Refills: 0 | Status: SHIPPED | OUTPATIENT
Start: 2022-05-18 | End: 2022-07-29 | Stop reason: SDUPTHER

## 2022-05-18 RX ORDER — LOSARTAN POTASSIUM 100 MG/1
100 TABLET ORAL DAILY
Qty: 90 TABLET | Refills: 0 | Status: SHIPPED | OUTPATIENT
Start: 2022-05-18 | End: 2022-07-29 | Stop reason: SDUPTHER

## 2022-05-18 RX ORDER — CYCLOBENZAPRINE HCL 5 MG
5 TABLET ORAL NIGHTLY
Qty: 90 TABLET | Refills: 0 | Status: SHIPPED | OUTPATIENT
Start: 2022-05-18 | End: 2022-07-29 | Stop reason: SDUPTHER

## 2022-05-18 RX ORDER — ALLOPURINOL 100 MG/1
100 TABLET ORAL DAILY
Qty: 90 TABLET | Refills: 0 | Status: SHIPPED | OUTPATIENT
Start: 2022-05-18 | End: 2022-07-29 | Stop reason: SDUPTHER

## 2022-05-18 RX ORDER — DILTIAZEM HYDROCHLORIDE 120 MG/1
120 CAPSULE, COATED, EXTENDED RELEASE ORAL DAILY
Qty: 90 CAPSULE | Refills: 0 | Status: SHIPPED | OUTPATIENT
Start: 2022-05-18 | End: 2022-07-29 | Stop reason: SDUPTHER

## 2022-05-18 RX ORDER — VIT C/B6/B5/MAGNESIUM/HERB 173 50-5-6-5MG
500 CAPSULE ORAL DAILY
Qty: 90 CAPSULE | Refills: 0 | Status: SHIPPED | OUTPATIENT
Start: 2022-05-18 | End: 2022-07-29 | Stop reason: SDUPTHER

## 2022-05-18 RX ORDER — CHOLECALCIFEROL (VITAMIN D3) 125 MCG
500 CAPSULE ORAL DAILY
Qty: 90 TABLET | Refills: 0 | Status: SHIPPED | OUTPATIENT
Start: 2022-05-18 | End: 2022-07-29 | Stop reason: SDUPTHER

## 2022-05-18 NOTE — TELEPHONE ENCOUNTER
Patient is requesting 90 day supply of his medications as it's a lot cheaper for him. He said Dr. Dread Duvall would need to call his pharmacy and if he calls today, he will get it by Wednesday of next week.  [facility]: [phone]  Call back number: [phone]

## 2022-05-23 ENCOUNTER — TELEPHONE (OUTPATIENT)
Dept: FAMILY MEDICINE CLINIC | Facility: CLINIC | Age: 47
End: 2022-05-23

## 2022-05-23 DIAGNOSIS — G89.29 CHRONIC MIDLINE LOW BACK PAIN WITH BILATERAL SCIATICA: ICD-10-CM

## 2022-05-23 DIAGNOSIS — Z86.73 HISTORY OF CVA (CEREBROVASCULAR ACCIDENT): ICD-10-CM

## 2022-05-23 DIAGNOSIS — M54.42 CHRONIC MIDLINE LOW BACK PAIN WITH BILATERAL SCIATICA: ICD-10-CM

## 2022-05-23 DIAGNOSIS — M54.41 CHRONIC MIDLINE LOW BACK PAIN WITH BILATERAL SCIATICA: ICD-10-CM

## 2022-05-23 RX ORDER — GABAPENTIN 600 MG/1
600 TABLET ORAL 3 TIMES DAILY
Qty: 90 TABLET | Refills: 2 | Status: SHIPPED | OUTPATIENT
Start: 2022-05-23 | End: 2022-06-01 | Stop reason: SDUPTHER

## 2022-05-23 NOTE — TELEPHONE ENCOUNTER
Patient stated that the [facility] pharmacy never received that script for the gabapentin.. can this be sent there? Please call patient about this

## 2022-05-25 DIAGNOSIS — G89.29 CHRONIC MIDLINE LOW BACK PAIN WITH BILATERAL SCIATICA: ICD-10-CM

## 2022-05-25 DIAGNOSIS — M54.41 CHRONIC MIDLINE LOW BACK PAIN WITH BILATERAL SCIATICA: ICD-10-CM

## 2022-05-25 DIAGNOSIS — Z86.73 HISTORY OF CVA (CEREBROVASCULAR ACCIDENT): ICD-10-CM

## 2022-05-25 DIAGNOSIS — M54.42 CHRONIC MIDLINE LOW BACK PAIN WITH BILATERAL SCIATICA: ICD-10-CM

## 2022-05-25 RX ORDER — GABAPENTIN 600 MG/1
TABLET ORAL
Qty: 90 TABLET | Refills: 2 | OUTPATIENT
Start: 2022-05-25

## 2022-06-01 DIAGNOSIS — M54.41 CHRONIC MIDLINE LOW BACK PAIN WITH BILATERAL SCIATICA: ICD-10-CM

## 2022-06-01 DIAGNOSIS — Z86.73 HISTORY OF CVA (CEREBROVASCULAR ACCIDENT): ICD-10-CM

## 2022-06-01 DIAGNOSIS — M54.42 CHRONIC MIDLINE LOW BACK PAIN WITH BILATERAL SCIATICA: ICD-10-CM

## 2022-06-01 DIAGNOSIS — G89.29 CHRONIC MIDLINE LOW BACK PAIN WITH BILATERAL SCIATICA: ICD-10-CM

## 2022-06-01 RX ORDER — GABAPENTIN 600 MG/1
600 TABLET ORAL 3 TIMES DAILY
Qty: 90 TABLET | Refills: 2 | Status: SHIPPED | OUTPATIENT
Start: 2022-06-01 | End: 2022-06-30 | Stop reason: SDUPTHER

## 2022-06-01 NOTE — TELEPHONE ENCOUNTER
Patient called and states this medication was sent to his mail order and it was to be sent to [facility] in [place].

## 2022-06-10 ENCOUNTER — OFFICE VISIT (OUTPATIENT)
Dept: CARDIOLOGY | Facility: CLINIC | Age: 47
End: 2022-06-10

## 2022-06-10 VITALS
SYSTOLIC BLOOD PRESSURE: 136 MMHG | HEIGHT: 73 IN | WEIGHT: 242.6 LBS | DIASTOLIC BLOOD PRESSURE: 90 MMHG | OXYGEN SATURATION: 94 % | HEART RATE: 74 BPM | BODY MASS INDEX: 32.15 KG/M2

## 2022-06-10 DIAGNOSIS — I10 HYPERTENSION, ESSENTIAL: Primary | ICD-10-CM

## 2022-06-10 DIAGNOSIS — E78.2 HYPERLIPEMIA, MIXED: ICD-10-CM

## 2022-06-10 PROCEDURE — 99214 OFFICE O/P EST MOD 30 MIN: CPT | Performed by: INTERNAL MEDICINE

## 2022-06-10 RX ORDER — ERGOCALCIFEROL 1.25 MG/1
CAPSULE ORAL
COMMUNITY
Start: 2022-05-25 | End: 2022-07-29 | Stop reason: SDUPTHER

## 2022-06-10 NOTE — PROGRESS NOTES
[facility] Cardiology  OFFICE NOTE    Cardiovascular Medicine  Elsi Adams M.D., [facility], [facility], [facility]         No referring provider defined for this encounter.    Thank you for asking me to see Gregg Randle for CAD.    History of Present Illness  This is a 47 y.o. male with:    1.  Coronary artery disease status post PCI at [facility], PCI of mid LAD in the setting of an NSTEMI in 2005.  2.  Hypertension  3.  Hyperlipidemia  4.  Peripheral vascular disease  5.  Abdominal aortic aneurysm.  6.  CKD     Gregg Randle is a 47 y.o. y.o. male who presents for consultation today.  Patient was here for establishment of care ans see in 2019.  He is complaining of shortness of breath on exertion however denying any chest pain.  Complaining of occasional numbness on the left side of his body.  Patient is denying any claudications.  Patient is not aware why he was on warfarin at some point however he stopped taking that.  Follow with nephrology for CKD.  Dr. Arshad for his AAA status post repair.  No other acute problems.    No acute issues since last visit.  Denies any chest pain or shortness of breath.  It appears that he had seen Dr. Guerrier in between and he has a loop recorder in place.  We will try to get records.    We were not able to get records from Dr. Dean's office for his loop recorder.    No acute issues since last visit.    Review of Systems - ROS  Constitution: Negative for weakness, weight gain and weight loss.   HENT: Negative for congestion.    Eyes: Negative for blurred vision.   Cardiovascular: As mentioned above  Respiratory: Negative for cough and hemoptysis.    Endocrine: Negative for polydipsia and polyuria.   Hematologic/Lymphatic: Negative for bleeding problem. Does not bruise/bleed easily.   Skin: Negative for flushing.   Musculoskeletal: Negative for neck pain and stiffness.   Gastrointestinal: Negative for abdominal pain, diarrhea, jaundice, melena, nausea  and vomiting.   Genitourinary: Negative for dysuria and hematuria.   Neurological: Negative for dizziness, focal weakness and numbness.   Psychiatric/Behavioral: Negative for altered mental status and depression.          All other systems were reviewed and were negative.    family history includes Diabetes in his mother; Heart disease in his father; Stroke in his mother.     reports that he has quit smoking. He has a 20.00 pack-year smoking history. He has never used smokeless tobacco. He reports that he does not drink alcohol and does not use drugs.    No Known Allergies      Current Outpatient Medications:   •  albuterol (PROVENTIL HFA;VENTOLIN HFA) 108 (90 BASE) MCG/ACT inhaler, Inhale 2 puffs Every 4 (Four) Hours As Needed for Wheezing., Disp: , Rfl:   •  allopurinol (ZYLOPRIM) 100 MG tablet, Take 1 tablet by mouth Daily., Disp: 90 tablet, Rfl: 0  •  atorvastatin (LIPITOR) 40 MG tablet, Take 1 tablet by mouth Every Night., Disp: 90 tablet, Rfl: 3  •  calcitriol (ROCALTROL) 0.25 MCG capsule, Take 1 capsule by mouth Daily., Disp: 90 capsule, Rfl: 0  •  clopidogrel (PLAVIX) 75 MG tablet, Take 1 tablet by mouth Daily., Disp: 90 tablet, Rfl: 0  •  cyclobenzaprine (FLEXERIL) 5 MG tablet, Take 1 tablet by mouth Every Night., Disp: 90 tablet, Rfl: 0  •  dilTIAZem CD (Cardizem CD) 120 MG 24 hr capsule, Take 1 capsule by mouth Daily., Disp: 90 capsule, Rfl: 0  •  gabapentin (NEURONTIN) 600 MG tablet, Take 1 tablet by mouth 3 (Three) Times a Day., Disp: 90 tablet, Rfl: 2  •  guanFACINE (TENEX) 1 MG tablet, Take 1 tablet by mouth Every Night for 90 days., Disp: 90 tablet, Rfl: 1  •  labetalol (NORMODYNE) 200 MG tablet, Take 1 tablet by mouth 2 (Two) Times a Day., Disp: 180 tablet, Rfl: 0  •  linaclotide (Linzess) 145 MCG capsule capsule, Take 1 capsule by mouth Every Morning Before Breakfast., Disp: 90 capsule, Rfl: 0  •  losartan (COZAAR) 100 MG tablet, Take 1 tablet by mouth Daily., Disp: 90 tablet, Rfl: 0  •  Turmeric  "(QC Tumeric Complex) 500 MG capsule, Take 1 capsule by mouth Daily., Disp: 90 capsule, Rfl: 0  •  vitamin B-12 (CYANOCOBALAMIN) 500 MCG tablet, Take 1 tablet by mouth Daily., Disp: 90 tablet, Rfl: 0  •  vitamin D (ERGOCALCIFEROL) 1.25 MG (45195 UT) capsule capsule, TAKE ONE CAPSULE EVERY 7 DAYS, Disp: , Rfl:   •  brimonidine (ALPHAGAN) 0.2 % ophthalmic solution, Administer 1 drop to the right eye 3 (Three) Times a Day., Disp: , Rfl:     Physical Exam:  Vitals:    06/10/22 0809   BP: 136/90   BP Location: Left arm   Patient Position: Sitting   Cuff Size: Adult   Pulse: 74   SpO2: 94%   Weight: 110 kg (242 lb 9.6 oz)   Height: 185.4 cm (73\")   PainSc: 0-No pain     Current Pain Level: none  Pulse Ox: Normal  on room air  General: alert, appears stated age and cooperative     Body Habitus: well-nourished    HEENT: Head: Normocephalic, no lesions, without obvious abnormality. No arcus senilis, xanthelasma or xanthomas.    Neuro: alert, oriented x3  Pulses: 2+ and symmetric  JVP: Volume/Pulsation: Normal.  Normal waveforms.   Appropriate inspiratory decrease.  No Kussmaul's. No Renae's.   Carotid Exam: no bruit normal pulsation bilaterally   Carotid Volume: normal.     Respirations: no increased work of breathing   Chest:  Normal    Pulmonary:Normal   Precordium: Normal impulses. P2 is not palpable.  RV Heave: absent  LV Heave: absent  [place]:  normal size and placement  Palpable S4: absent.  Heart rate: normal    Heart Rhythm: regular     Heart Sounds: S1: normal  S2: normal  S3: absent   S4: absent  Opening Snap: absent    Pericardial Rub:  Absent: .    Abdomen:   Appearance: normal .  Palpation: Soft, non-tender to palpation, bowel sounds positive in all four quadrants; no guarding or rebound tenderness  Extremity: no edema.   LE Skin: no rashes  LE Hair:  normal  LE Pulses: well perfused with normal pulses in the distal extremities  Pallor on elevation: Absent. Rubor on dependency: None      DATA REVIEWED:     EKG. " I personally reviewed and interpreted the EKG.  Normal sinus rhythm, inferior infarct.    ECG/EMG Results (all)     Procedure Component Value Units Date/Time    ECG 12 Lead [[id number]] Collected: 12/10/21 0809     Updated: 12/10/21 0811     QT Interval 374 ms      QTC Interval 415 ms     Narrative:      Test Reason : cad  Blood Pressure :   */*   mmHG  Vent. Rate :  74 BPM     Atrial Rate :  74 BPM     P-R Int : 178 ms          QRS Dur : 108 ms      QT Int : 374 ms       P-R-T Axes :  52  81  43 degrees     QTc Int : 415 ms    Normal sinus rhythm  Inferior infarct (cited on or before 07-AUG-2017)  Anterolateral infarct (cited on or before 07-AUG-2017)  Abnormal ECG  When compared with ECG of 26-NOV-2019 12:02,  No significant change was found    Referred By:            Confirmed By:         ---------------------------------------------------  TTE/NIVAI:  Results for orders placed in visit on 11/26/19    Adult Transthoracic Echo Complete W/ Cont if Necessary Per Protocol    Interpretation Summary  · Estimated EF appears to be in the range of 46 - 50%.  · Left ventricular systolic function is mildly decreased.  · Left ventricular wall thickness is consistent with moderate concentric hypertrophy.  · Left ventricular diastolic dysfunction (grade I a) consistent with impaired relaxation.            --------------------------------------------------------------------------------------------------  LABS:     The CVD Risk score (Amber, et al., 2008) failed to calculate for the following reasons:    The patient has a prior MI, stroke, CHF, or peripheral vascular disease diagnosis         Lab Results   Component Value Date    GLUCOSE 98 10/27/2021    BUN 17 10/27/2021    CREATININE 2.14 (H) 10/27/2021    EGFRIFAFRI 40 (L) 10/27/2021    BCR 7.9 10/27/2021    K 5.0 10/27/2021    CO2 32.4 (H) 10/27/2021    CALCIUM 10.1 10/27/2021    ALBUMIN 4.60 10/27/2021    AST 26 10/27/2021    ALT 29 10/27/2021     Lab Results    Component Value Date    WBC 5.23 10/27/2021    HGB 13.4 10/27/2021    HCT 43.5 10/27/2021    MCV 85.5 10/27/2021     10/27/2021     Lab Results   Component Value Date    CHOL 140 10/27/2021    TRIG 80 10/27/2021    HDL 52 10/27/2021    LDL 72 10/27/2021     Lab Results   Component Value Date    TSH 1.900 06/01/2021     No results found for: CKTOTAL, CKMB, CKMBINDEX, TROPONINI, TROPONINT  Lab Results   Component Value Date    HGBA1C 5.14 03/01/2021     No results found for: DDIMER  Lab Results   Component Value Date    ALT 29 10/27/2021     Lab Results   Component Value Date    HGBA1C 5.14 03/01/2021    HGBA1C 5.10 01/15/2020     Lab Results   Component Value Date    CREATININE 2.14 (H) 10/27/2021     No results found for: IRON, TIBC, FERRITIN  Lab Results   Component Value Date    INR 1.18 08/11/2017    PROTIME 15.0 08/11/2017       Assessment/Plan     1. Coronary artery disease involving native coronary artery of native heart without angina pectoris  Was able to find records from [place] in 2005, he had presented with anterior STEMI and underwent PCI. Echo from 01/2020 showed EF of 46-50% with grade I DD  Continue aspirin Plavix  Continue high intensity statin.  Currently denying any chest pain we will hold off on ischemic evaluation.     2.  Hypertension:  Blood pressure is well controlled on current regimen of losartan 100mg mg, Cardizem 120 mg, he is on labetalol 200 mg as well.  On clonidine and chlorthalidone.  He is following with nephrology for CKD.     3.  Hyperlipidemia:  Continue Lipitor 20 mg  LDL was 72, will uptitrate to 40mg.      4.  Peripheral vascular disease:  Status post abdominal aortic aneurysm repair, follows with vascular.  Has not been seen by Dr. Arshad looks like he missed his last CT appointment.  We will refer him to back for surveillance and follow-up.    We will try to interrogate his loop recorder.  Is been more than a year.  If the battery is dead then will need  extraction.         Prevention:  Patient's Body mass index is 32.01 kg/m². indicating that he is obese (BMI >30). Obesity-related health conditions include the following: hypertension. Obesity is newly identified. BMI is is above average; BMI management plan is completed. We discussed portion control and increasing exercise..      Gregg Randle  reports that he has quit smoking. He has a 20.00 pack-year smoking history. He has never used smokeless tobacco.      This document has been electronically signed by Elsi Adams MD on Marlyn 10, 2022 08:24 CDT

## 2022-06-14 DIAGNOSIS — I71.40 ABDOMINAL AORTIC ANEURYSM (AAA) WITHOUT RUPTURE: Primary | ICD-10-CM

## 2022-06-21 DIAGNOSIS — Z86.73 HISTORY OF CVA (CEREBROVASCULAR ACCIDENT): ICD-10-CM

## 2022-06-21 DIAGNOSIS — G89.29 CHRONIC MIDLINE LOW BACK PAIN WITH BILATERAL SCIATICA: ICD-10-CM

## 2022-06-21 DIAGNOSIS — M54.42 CHRONIC MIDLINE LOW BACK PAIN WITH BILATERAL SCIATICA: ICD-10-CM

## 2022-06-21 DIAGNOSIS — M54.41 CHRONIC MIDLINE LOW BACK PAIN WITH BILATERAL SCIATICA: ICD-10-CM

## 2022-06-21 RX ORDER — GABAPENTIN 600 MG/1
600 TABLET ORAL 3 TIMES DAILY
Qty: 90 TABLET | Refills: 2 | OUTPATIENT
Start: 2022-06-21

## 2022-06-21 NOTE — TELEPHONE ENCOUNTER
Rx Refill Note  Requested Prescriptions     Refused Prescriptions Disp Refills   • gabapentin (NEURONTIN) 600 MG tablet 90 tablet 2     Sig: Take 1 tablet by mouth 3 (Three) Times a Day.      Last office visit with prescribing clinician: 11/18/2021      Next office visit with prescribing clinician: 7/14/2022   {TIP  Encounters:    Controlled medication--Rx was sent to [facility] on 6/1/22 #90 x 2 refills.          Reginald Pineda LPN  06/21/22, 16:25 CDT

## 2022-06-21 NOTE — TELEPHONE ENCOUNTER
Incoming Refill Request      Medication requested (name and dose):  gabapentin (NEURONTIN) 600 MG tablet    Pharmacy where request should be sent: [facility]    Additional details provided by patient: WOULD LIKE A 90 DAY SUPPLY PLEASE    Best call back number: [phone]    Does the patient have less than a 3 day supply:  [] Yes  [x] No    Shawna Taylor  06/21/22, 15:16 CDT

## 2022-06-24 DIAGNOSIS — Z86.73 HISTORY OF CVA (CEREBROVASCULAR ACCIDENT): ICD-10-CM

## 2022-06-24 DIAGNOSIS — M54.42 CHRONIC MIDLINE LOW BACK PAIN WITH BILATERAL SCIATICA: ICD-10-CM

## 2022-06-24 DIAGNOSIS — M54.41 CHRONIC MIDLINE LOW BACK PAIN WITH BILATERAL SCIATICA: ICD-10-CM

## 2022-06-24 DIAGNOSIS — G89.29 CHRONIC MIDLINE LOW BACK PAIN WITH BILATERAL SCIATICA: ICD-10-CM

## 2022-06-24 RX ORDER — GABAPENTIN 600 MG/1
600 TABLET ORAL 3 TIMES DAILY
Qty: 90 TABLET | Refills: 0 | OUTPATIENT
Start: 2022-06-24

## 2022-06-24 NOTE — TELEPHONE ENCOUNTER
Rx Refill Note  Requested Prescriptions     Refused Prescriptions Disp Refills   • gabapentin (NEURONTIN) 600 MG tablet 90 tablet 0     Sig: Take 1 tablet by mouth 3 (Three) Times a Day.      Last office visit with prescribing clinician: 11/18/2021      Next office visit with prescribing clinician: 7/14/2022   {TIP  Encounters:  RX SENT TO [facility] 6/1/22 FOR #90 X 2 REFILLS    {TIP  Please add Last Relevant Lab Date if appropriate  {TIP  Is Refill Pharmacy correct? YES  Reginald Pineda LPN  06/24/22, 13:33 CDT

## 2022-06-29 DIAGNOSIS — M54.42 CHRONIC MIDLINE LOW BACK PAIN WITH BILATERAL SCIATICA: ICD-10-CM

## 2022-06-29 DIAGNOSIS — M54.41 CHRONIC MIDLINE LOW BACK PAIN WITH BILATERAL SCIATICA: ICD-10-CM

## 2022-06-29 DIAGNOSIS — G89.29 CHRONIC MIDLINE LOW BACK PAIN WITH BILATERAL SCIATICA: ICD-10-CM

## 2022-06-29 DIAGNOSIS — Z86.73 HISTORY OF CVA (CEREBROVASCULAR ACCIDENT): ICD-10-CM

## 2022-06-29 NOTE — TELEPHONE ENCOUNTER
Patient would like a call in regards to his medication and insurance.  Call back number: [phone]

## 2022-06-30 RX ORDER — GABAPENTIN 600 MG/1
600 TABLET ORAL 3 TIMES DAILY
Qty: 270 TABLET | Refills: 0 | Status: ON HOLD | OUTPATIENT
Start: 2022-06-30 | End: 2022-07-26 | Stop reason: SDUPTHER

## 2022-07-07 ENCOUNTER — TELEPHONE (OUTPATIENT)
Dept: CARDIOLOGY | Facility: CLINIC | Age: 47
End: 2022-07-07

## 2022-07-07 NOTE — TELEPHONE ENCOUNTER
Left message, no signal from [employer] home monitor. Left [employer] tech service number for him to call with any problems or to call me back.

## 2022-07-11 ENCOUNTER — TELEPHONE (OUTPATIENT)
Dept: FAMILY MEDICINE CLINIC | Facility: CLINIC | Age: 47
End: 2022-07-11

## 2022-07-15 ENCOUNTER — HOSPITAL ENCOUNTER (OUTPATIENT)
Dept: CT IMAGING | Facility: HOSPITAL | Age: 47
Discharge: HOME | End: 2022-07-15
Admitting: THORACIC SURGERY (CARDIOTHORACIC VASCULAR SURGERY)

## 2022-07-15 DIAGNOSIS — I71.40 ABDOMINAL AORTIC ANEURYSM (AAA) WITHOUT RUPTURE: ICD-10-CM

## 2022-07-15 PROCEDURE — 74176 CT ABD & PELVIS W/O CONTRAST: CPT

## 2022-07-22 ENCOUNTER — APPOINTMENT (OUTPATIENT)
Dept: ULTRASOUND IMAGING | Facility: HOSPITAL | Age: 47
End: 2022-07-22

## 2022-07-22 ENCOUNTER — HOSPITAL ENCOUNTER (INPATIENT)
Facility: HOSPITAL | Age: 47
LOS: 4 days | Discharge: HOME | End: 2022-07-26
Attending: EMERGENCY MEDICINE | Admitting: STUDENT IN AN ORGANIZED HEALTH CARE EDUCATION/TRAINING PROGRAM

## 2022-07-22 ENCOUNTER — APPOINTMENT (OUTPATIENT)
Dept: CARDIOLOGY | Facility: HOSPITAL | Age: 47
End: 2022-07-22

## 2022-07-22 DIAGNOSIS — M54.41 CHRONIC MIDLINE LOW BACK PAIN WITH BILATERAL SCIATICA: ICD-10-CM

## 2022-07-22 DIAGNOSIS — N17.9 AKI (ACUTE KIDNEY INJURY): Primary | ICD-10-CM

## 2022-07-22 DIAGNOSIS — I21.4 NSTEMI (NON-ST ELEVATED MYOCARDIAL INFARCTION): ICD-10-CM

## 2022-07-22 DIAGNOSIS — G89.29 CHRONIC MIDLINE LOW BACK PAIN WITH BILATERAL SCIATICA: ICD-10-CM

## 2022-07-22 DIAGNOSIS — Z86.73 HISTORY OF CVA (CEREBROVASCULAR ACCIDENT): ICD-10-CM

## 2022-07-22 DIAGNOSIS — E86.0 DEHYDRATION: ICD-10-CM

## 2022-07-22 DIAGNOSIS — M54.42 CHRONIC MIDLINE LOW BACK PAIN WITH BILATERAL SCIATICA: ICD-10-CM

## 2022-07-22 LAB
ALBUMIN SERPL-MCNC: 4.5 G/DL (ref 3.5–5.2)
ALBUMIN/GLOB SERPL: 1.2 G/DL
ALP SERPL-CCNC: 131 U/L (ref 39–117)
ALT SERPL W P-5'-P-CCNC: 26 U/L (ref 1–41)
AMORPH URATE CRY URNS QL MICRO: ABNORMAL /HPF
ANION GAP SERPL CALCULATED.3IONS-SCNC: 14 MMOL/L (ref 5–15)
APTT PPP: 28.7 SECONDS (ref 20–40.3)
AST SERPL-CCNC: 37 U/L (ref 1–40)
BACTERIA UR QL AUTO: ABNORMAL /HPF
BASOPHILS # BLD AUTO: 0.03 10*3/MM3 (ref 0–0.2)
BASOPHILS NFR BLD AUTO: 0.4 % (ref 0–1.5)
BILIRUB SERPL-MCNC: 0.3 MG/DL (ref 0–1.2)
BILIRUB UR QL STRIP: NEGATIVE
BUN SERPL-MCNC: 63 MG/DL (ref 6–20)
BUN/CREAT SERPL: 10 (ref 7–25)
CALCIUM SPEC-SCNC: 9.5 MG/DL (ref 8.6–10.5)
CHLORIDE SERPL-SCNC: 93 MMOL/L (ref 98–107)
CK SERPL-CCNC: 1401 U/L (ref 20–200)
CLARITY UR: ABNORMAL
CO2 SERPL-SCNC: 24 MMOL/L (ref 22–29)
COD CRY URNS QL: ABNORMAL /HPF
COLOR UR: YELLOW
CREAT SERPL-MCNC: 6.3 MG/DL (ref 0.76–1.27)
CREAT UR-MCNC: 248.5 MG/DL
D-LACTATE SERPL-SCNC: 2.7 MMOL/L (ref 0.5–2)
DEPRECATED RDW RBC AUTO: 40.7 FL (ref 37–54)
EGFRCR SERPLBLD CKD-EPI 2021: 10.2 ML/MIN/1.73
EOSINOPHIL # BLD AUTO: 0.01 10*3/MM3 (ref 0–0.4)
EOSINOPHIL NFR BLD AUTO: 0.1 % (ref 0.3–6.2)
EOSINOPHIL SPEC QL MICRO: 0 % EOS/100 CELLS (ref 0–0)
ERYTHROCYTE [DISTWIDTH] IN BLOOD BY AUTOMATED COUNT: 13.3 % (ref 12.3–15.4)
FLUAV RNA RESP QL NAA+PROBE: NOT DETECTED
FLUBV RNA RESP QL NAA+PROBE: NOT DETECTED
GLOBULIN UR ELPH-MCNC: 3.8 GM/DL
GLUCOSE SERPL-MCNC: 94 MG/DL (ref 65–99)
GLUCOSE UR STRIP-MCNC: NEGATIVE MG/DL
GRAN CASTS URNS QL MICRO: ABNORMAL /LPF
HCT VFR BLD AUTO: 45.4 % (ref 37.5–51)
HGB BLD-MCNC: 14.4 G/DL (ref 13–17.7)
HGB UR QL STRIP.AUTO: NEGATIVE
HOLD SPECIMEN: NORMAL
HOLD SPECIMEN: NORMAL
HYALINE CASTS UR QL AUTO: ABNORMAL /LPF
IMM GRANULOCYTES # BLD AUTO: 0.03 10*3/MM3 (ref 0–0.05)
IMM GRANULOCYTES NFR BLD AUTO: 0.4 % (ref 0–0.5)
INR PPP: 1.05 (ref 0.8–1.2)
KETONES UR QL STRIP: ABNORMAL
LEUKOCYTE ESTERASE UR QL STRIP.AUTO: NEGATIVE
LYMPHOCYTES # BLD AUTO: 1.19 10*3/MM3 (ref 0.7–3.1)
LYMPHOCYTES NFR BLD AUTO: 14 % (ref 19.6–45.3)
MAXIMAL PREDICTED HEART RATE: 172 BPM
MCH RBC QN AUTO: 26.6 PG (ref 26.6–33)
MCHC RBC AUTO-ENTMCNC: 31.7 G/DL (ref 31.5–35.7)
MCV RBC AUTO: 83.8 FL (ref 79–97)
MONOCYTES # BLD AUTO: 0.52 10*3/MM3 (ref 0.1–0.9)
MONOCYTES NFR BLD AUTO: 6.1 % (ref 5–12)
NEUTROPHILS NFR BLD AUTO: 6.71 10*3/MM3 (ref 1.7–7)
NEUTROPHILS NFR BLD AUTO: 79 % (ref 42.7–76)
NITRITE UR QL STRIP: NEGATIVE
NRBC BLD AUTO-RTO: 0 /100 WBC (ref 0–0.2)
PH UR STRIP.AUTO: <=5 [PH] (ref 5–9)
PLATELET # BLD AUTO: 293 10*3/MM3 (ref 140–450)
PMV BLD AUTO: 10 FL (ref 6–12)
POTASSIUM SERPL-SCNC: 5.7 MMOL/L (ref 3.5–5.2)
PROT SERPL-MCNC: 8.3 G/DL (ref 6–8.5)
PROT UR QL STRIP: ABNORMAL
PROTHROMBIN TIME: 13.5 SECONDS (ref 11.1–15.3)
QT INTERVAL: 422 MS
QTC INTERVAL: 417 MS
RBC # BLD AUTO: 5.42 10*6/MM3 (ref 4.14–5.8)
RBC # UR STRIP: ABNORMAL /HPF
REF LAB TEST METHOD: ABNORMAL
SARS-COV-2 RNA RESP QL NAA+PROBE: NOT DETECTED
SODIUM SERPL-SCNC: 131 MMOL/L (ref 136–145)
SODIUM UR-SCNC: <20 MMOL/L
SP GR UR STRIP: 1.01 (ref 1–1.03)
SQUAMOUS #/AREA URNS HPF: ABNORMAL /HPF
STRESS TARGET HR: 146 BPM
TROPONIN T SERPL-MCNC: 0.04 NG/ML (ref 0–0.03)
TROPONIN T SERPL-MCNC: 0.09 NG/ML (ref 0–0.03)
UROBILINOGEN UR QL STRIP: ABNORMAL
WBC # UR STRIP: ABNORMAL /HPF
WBC CASTS #/AREA URNS LPF: ABNORMAL /LPF
WBC NRBC COR # BLD: 8.49 10*3/MM3 (ref 3.4–10.8)
WHOLE BLOOD HOLD COAG: NORMAL

## 2022-07-22 PROCEDURE — 84300 ASSAY OF URINE SODIUM: CPT | Performed by: STUDENT IN AN ORGANIZED HEALTH CARE EDUCATION/TRAINING PROGRAM

## 2022-07-22 PROCEDURE — 85730 THROMBOPLASTIN TIME PARTIAL: CPT | Performed by: STUDENT IN AN ORGANIZED HEALTH CARE EDUCATION/TRAINING PROGRAM

## 2022-07-22 PROCEDURE — 85610 PROTHROMBIN TIME: CPT | Performed by: EMERGENCY MEDICINE

## 2022-07-22 PROCEDURE — 84484 ASSAY OF TROPONIN QUANT: CPT | Performed by: EMERGENCY MEDICINE

## 2022-07-22 PROCEDURE — 93005 ELECTROCARDIOGRAM TRACING: CPT | Performed by: STUDENT IN AN ORGANIZED HEALTH CARE EDUCATION/TRAINING PROGRAM

## 2022-07-22 PROCEDURE — 25010000002 DEXAMETHASONE PER 1 MG: Performed by: EMERGENCY MEDICINE

## 2022-07-22 PROCEDURE — 85730 THROMBOPLASTIN TIME PARTIAL: CPT | Performed by: EMERGENCY MEDICINE

## 2022-07-22 PROCEDURE — 84484 ASSAY OF TROPONIN QUANT: CPT | Performed by: STUDENT IN AN ORGANIZED HEALTH CARE EDUCATION/TRAINING PROGRAM

## 2022-07-22 PROCEDURE — 93306 TTE W/DOPPLER COMPLETE: CPT

## 2022-07-22 PROCEDURE — 82550 ASSAY OF CK (CPK): CPT | Performed by: STUDENT IN AN ORGANIZED HEALTH CARE EDUCATION/TRAINING PROGRAM

## 2022-07-22 PROCEDURE — 25010000002 HEPARIN (PORCINE) PER 1000 UNITS: Performed by: STUDENT IN AN ORGANIZED HEALTH CARE EDUCATION/TRAINING PROGRAM

## 2022-07-22 PROCEDURE — 93306 TTE W/DOPPLER COMPLETE: CPT | Performed by: INTERNAL MEDICINE

## 2022-07-22 PROCEDURE — 25010000002 PERFLUTREN (DEFINITY) 8.476 MG IN SODIUM CHLORIDE (PF) 0.9 % 10 ML INJECTION: Performed by: EMERGENCY MEDICINE

## 2022-07-22 PROCEDURE — 99284 EMERGENCY DEPT VISIT MOD MDM: CPT

## 2022-07-22 PROCEDURE — 80053 COMPREHEN METABOLIC PANEL: CPT | Performed by: EMERGENCY MEDICINE

## 2022-07-22 PROCEDURE — 76775 US EXAM ABDO BACK WALL LIM: CPT

## 2022-07-22 PROCEDURE — 87636 SARSCOV2 & INF A&B AMP PRB: CPT | Performed by: EMERGENCY MEDICINE

## 2022-07-22 PROCEDURE — 83605 ASSAY OF LACTIC ACID: CPT | Performed by: STUDENT IN AN ORGANIZED HEALTH CARE EDUCATION/TRAINING PROGRAM

## 2022-07-22 PROCEDURE — 99222 1ST HOSP IP/OBS MODERATE 55: CPT | Performed by: INTERNAL MEDICINE

## 2022-07-22 PROCEDURE — 81001 URINALYSIS AUTO W/SCOPE: CPT | Performed by: STUDENT IN AN ORGANIZED HEALTH CARE EDUCATION/TRAINING PROGRAM

## 2022-07-22 PROCEDURE — 82570 ASSAY OF URINE CREATININE: CPT | Performed by: STUDENT IN AN ORGANIZED HEALTH CARE EDUCATION/TRAINING PROGRAM

## 2022-07-22 PROCEDURE — 36415 COLL VENOUS BLD VENIPUNCTURE: CPT | Performed by: STUDENT IN AN ORGANIZED HEALTH CARE EDUCATION/TRAINING PROGRAM

## 2022-07-22 PROCEDURE — 87205 SMEAR GRAM STAIN: CPT | Performed by: STUDENT IN AN ORGANIZED HEALTH CARE EDUCATION/TRAINING PROGRAM

## 2022-07-22 PROCEDURE — 85025 COMPLETE CBC W/AUTO DIFF WBC: CPT | Performed by: EMERGENCY MEDICINE

## 2022-07-22 PROCEDURE — 93005 ELECTROCARDIOGRAM TRACING: CPT | Performed by: EMERGENCY MEDICINE

## 2022-07-22 RX ORDER — CYCLOBENZAPRINE HCL 5 MG
5 TABLET ORAL NIGHTLY
Status: DISCONTINUED | OUTPATIENT
Start: 2022-07-22 | End: 2022-07-26 | Stop reason: HOSPADM

## 2022-07-22 RX ORDER — SODIUM CHLORIDE 9 MG/ML
100 INJECTION, SOLUTION INTRAVENOUS CONTINUOUS
Status: DISCONTINUED | OUTPATIENT
Start: 2022-07-22 | End: 2022-07-25

## 2022-07-22 RX ORDER — SODIUM CHLORIDE 0.9 % (FLUSH) 0.9 %
10 SYRINGE (ML) INJECTION EVERY 12 HOURS SCHEDULED
Status: DISCONTINUED | OUTPATIENT
Start: 2022-07-22 | End: 2022-07-26 | Stop reason: HOSPADM

## 2022-07-22 RX ORDER — ATORVASTATIN CALCIUM 40 MG/1
40 TABLET, FILM COATED ORAL NIGHTLY
Status: DISCONTINUED | OUTPATIENT
Start: 2022-07-22 | End: 2022-07-26 | Stop reason: HOSPADM

## 2022-07-22 RX ORDER — LABETALOL 200 MG/1
200 TABLET, FILM COATED ORAL 2 TIMES DAILY
Status: DISCONTINUED | OUTPATIENT
Start: 2022-07-22 | End: 2022-07-26 | Stop reason: HOSPADM

## 2022-07-22 RX ORDER — ACETAMINOPHEN 325 MG/1
650 TABLET ORAL EVERY 4 HOURS PRN
Status: DISCONTINUED | OUTPATIENT
Start: 2022-07-22 | End: 2022-07-26 | Stop reason: HOSPADM

## 2022-07-22 RX ORDER — DEXAMETHASONE SODIUM PHOSPHATE 4 MG/ML
6 INJECTION, SOLUTION INTRA-ARTICULAR; INTRALESIONAL; INTRAMUSCULAR; INTRAVENOUS; SOFT TISSUE ONCE
Status: COMPLETED | OUTPATIENT
Start: 2022-07-22 | End: 2022-07-22

## 2022-07-22 RX ORDER — HEPARIN SODIUM 5000 [USP'U]/ML
4000 INJECTION, SOLUTION INTRAVENOUS; SUBCUTANEOUS ONCE
Status: COMPLETED | OUTPATIENT
Start: 2022-07-22 | End: 2022-07-22

## 2022-07-22 RX ORDER — CHOLECALCIFEROL (VITAMIN D3) 125 MCG
500 CAPSULE ORAL DAILY
Status: DISCONTINUED | OUTPATIENT
Start: 2022-07-23 | End: 2022-07-26 | Stop reason: HOSPADM

## 2022-07-22 RX ORDER — NALOXONE HCL 0.4 MG/ML
0.4 VIAL (ML) INJECTION
Status: DISCONTINUED | OUTPATIENT
Start: 2022-07-22 | End: 2022-07-26 | Stop reason: HOSPADM

## 2022-07-22 RX ORDER — ONDANSETRON 2 MG/ML
4 INJECTION INTRAMUSCULAR; INTRAVENOUS EVERY 6 HOURS PRN
Status: DISCONTINUED | OUTPATIENT
Start: 2022-07-22 | End: 2022-07-26 | Stop reason: HOSPADM

## 2022-07-22 RX ORDER — LANOLIN ALCOHOL/MO/W.PET/CERES
5.25 CREAM (GRAM) TOPICAL NIGHTLY PRN
Status: DISCONTINUED | OUTPATIENT
Start: 2022-07-22 | End: 2022-07-26 | Stop reason: HOSPADM

## 2022-07-22 RX ORDER — HEPARIN SODIUM 5000 [USP'U]/ML
4000 INJECTION, SOLUTION INTRAVENOUS; SUBCUTANEOUS AS NEEDED
Status: DISCONTINUED | OUTPATIENT
Start: 2022-07-22 | End: 2022-07-23

## 2022-07-22 RX ORDER — ALBUTEROL SULFATE 2.5 MG/3ML
2.5 SOLUTION RESPIRATORY (INHALATION) EVERY 4 HOURS PRN
Status: DISCONTINUED | OUTPATIENT
Start: 2022-07-22 | End: 2022-07-26 | Stop reason: HOSPADM

## 2022-07-22 RX ORDER — BRIMONIDINE TARTRATE 0.15 %
1 DROPS OPHTHALMIC (EYE) 3 TIMES DAILY
Status: DISCONTINUED | OUTPATIENT
Start: 2022-07-22 | End: 2022-07-26 | Stop reason: HOSPADM

## 2022-07-22 RX ORDER — SODIUM CHLORIDE 0.9 % (FLUSH) 0.9 %
10 SYRINGE (ML) INJECTION AS NEEDED
Status: DISCONTINUED | OUTPATIENT
Start: 2022-07-22 | End: 2022-07-26 | Stop reason: HOSPADM

## 2022-07-22 RX ORDER — CLOPIDOGREL BISULFATE 75 MG/1
75 TABLET ORAL DAILY
Status: DISCONTINUED | OUTPATIENT
Start: 2022-07-23 | End: 2022-07-26 | Stop reason: HOSPADM

## 2022-07-22 RX ORDER — ALBUTEROL SULFATE 90 UG/1
2 AEROSOL, METERED RESPIRATORY (INHALATION) EVERY 4 HOURS PRN
Status: DISCONTINUED | OUTPATIENT
Start: 2022-07-22 | End: 2022-07-22 | Stop reason: CLARIF

## 2022-07-22 RX ORDER — MELATONIN
1000 DAILY
Status: DISCONTINUED | OUTPATIENT
Start: 2022-07-23 | End: 2022-07-26 | Stop reason: HOSPADM

## 2022-07-22 RX ORDER — ONDANSETRON 4 MG/1
4 TABLET, FILM COATED ORAL EVERY 6 HOURS PRN
Status: DISCONTINUED | OUTPATIENT
Start: 2022-07-22 | End: 2022-07-26 | Stop reason: HOSPADM

## 2022-07-22 RX ORDER — HEPARIN SODIUM 5000 [USP'U]/ML
30 INJECTION, SOLUTION INTRAVENOUS; SUBCUTANEOUS AS NEEDED
Status: DISCONTINUED | OUTPATIENT
Start: 2022-07-22 | End: 2022-07-23

## 2022-07-22 RX ORDER — DILTIAZEM HYDROCHLORIDE 120 MG/1
120 CAPSULE, COATED, EXTENDED RELEASE ORAL DAILY
Status: DISCONTINUED | OUTPATIENT
Start: 2022-07-23 | End: 2022-07-26 | Stop reason: HOSPADM

## 2022-07-22 RX ORDER — CALCITRIOL 0.25 UG/1
0.25 CAPSULE, LIQUID FILLED ORAL DAILY
Status: DISCONTINUED | OUTPATIENT
Start: 2022-07-23 | End: 2022-07-26 | Stop reason: HOSPADM

## 2022-07-22 RX ORDER — CYCLOBENZAPRINE HCL 10 MG
10 TABLET ORAL ONCE
Status: COMPLETED | OUTPATIENT
Start: 2022-07-22 | End: 2022-07-22

## 2022-07-22 RX ORDER — ASPIRIN 81 MG/1
324 TABLET, CHEWABLE ORAL ONCE
Status: COMPLETED | OUTPATIENT
Start: 2022-07-22 | End: 2022-07-22

## 2022-07-22 RX ORDER — GABAPENTIN 100 MG/1
200 CAPSULE ORAL EVERY 8 HOURS SCHEDULED
Status: DISCONTINUED | OUTPATIENT
Start: 2022-07-22 | End: 2022-07-26 | Stop reason: HOSPADM

## 2022-07-22 RX ORDER — HEPARIN SODIUM 10000 [USP'U]/100ML
11.26 INJECTION, SOLUTION INTRAVENOUS
Status: DISCONTINUED | OUTPATIENT
Start: 2022-07-22 | End: 2022-07-23

## 2022-07-22 RX ADMIN — BRIMONIDINE TARTRATE 1 DROP: 1.5 SOLUTION/ DROPS OPHTHALMIC at 21:57

## 2022-07-22 RX ADMIN — BRIMONIDINE TARTRATE 1 DROP: 1.5 SOLUTION/ DROPS OPHTHALMIC at 18:11

## 2022-07-22 RX ADMIN — GABAPENTIN 200 MG: 100 CAPSULE ORAL at 21:10

## 2022-07-22 RX ADMIN — PERFLUTREN 3 ML: 6.52 INJECTION, SUSPENSION INTRAVENOUS at 16:37

## 2022-07-22 RX ADMIN — ATORVASTATIN CALCIUM 40 MG: 40 TABLET, FILM COATED ORAL at 21:09

## 2022-07-22 RX ADMIN — ASPIRIN 324 MG: 81 TABLET, CHEWABLE ORAL at 13:59

## 2022-07-22 RX ADMIN — CYCLOBENZAPRINE HYDROCHLORIDE 10 MG: 10 TABLET, FILM COATED ORAL at 13:54

## 2022-07-22 RX ADMIN — Medication 10 ML: at 15:01

## 2022-07-22 RX ADMIN — SODIUM CHLORIDE 125 ML/HR: 9 INJECTION, SOLUTION INTRAVENOUS at 15:01

## 2022-07-22 RX ADMIN — HEPARIN SODIUM 4000 UNITS: 5000 INJECTION INTRAVENOUS; SUBCUTANEOUS at 17:22

## 2022-07-22 RX ADMIN — SODIUM CHLORIDE 1000 ML: 9 INJECTION, SOLUTION INTRAVENOUS at 13:53

## 2022-07-22 RX ADMIN — SODIUM ZIRCONIUM CYCLOSILICATE 5 G: 5 POWDER, FOR SUSPENSION ORAL at 14:58

## 2022-07-22 RX ADMIN — CYCLOBENZAPRINE 5 MG: 5 TABLET, FILM COATED ORAL at 21:10

## 2022-07-22 RX ADMIN — LABETALOL HYDROCHLORIDE 200 MG: 200 TABLET, FILM COATED ORAL at 21:09

## 2022-07-22 RX ADMIN — SODIUM ZIRCONIUM CYCLOSILICATE 5 G: 5 POWDER, FOR SUSPENSION ORAL at 21:11

## 2022-07-22 RX ADMIN — DEXAMETHASONE SODIUM PHOSPHATE 6 MG: 4 INJECTION, SOLUTION INTRAMUSCULAR; INTRAVENOUS at 13:54

## 2022-07-22 RX ADMIN — HEPARIN SODIUM 9.26 UNITS/KG/HR: 10000 INJECTION, SOLUTION INTRAVENOUS at 17:22

## 2022-07-23 PROBLEM — R79.89 ELEVATED TROPONIN LEVEL NOT DUE MYOCARDIAL INFARCTION: Status: ACTIVE | Noted: 2022-07-23

## 2022-07-23 PROBLEM — R77.8 ELEVATED TROPONIN LEVEL NOT DUE MYOCARDIAL INFARCTION: Status: ACTIVE | Noted: 2022-07-23

## 2022-07-23 LAB
ALBUMIN SERPL-MCNC: 3.9 G/DL (ref 3.5–5.2)
ALBUMIN SERPL-MCNC: 4.3 G/DL (ref 3.5–5.2)
ALBUMIN/GLOB SERPL: 1.1 G/DL
ALBUMIN/GLOB SERPL: 1.3 G/DL
ALP SERPL-CCNC: 102 U/L (ref 39–117)
ALP SERPL-CCNC: 106 U/L (ref 39–117)
ALT SERPL W P-5'-P-CCNC: 22 U/L (ref 1–41)
ALT SERPL W P-5'-P-CCNC: 23 U/L (ref 1–41)
ANION GAP SERPL CALCULATED.3IONS-SCNC: 12 MMOL/L (ref 5–15)
ANION GAP SERPL CALCULATED.3IONS-SCNC: 14 MMOL/L (ref 5–15)
APTT PPP: 46.4 SECONDS (ref 20–40.3)
APTT PPP: 59.1 SECONDS (ref 20–40.3)
APTT PPP: 68.2 SECONDS (ref 20–40.3)
AST SERPL-CCNC: 32 U/L (ref 1–40)
AST SERPL-CCNC: 33 U/L (ref 1–40)
BASOPHILS # BLD AUTO: 0.01 10*3/MM3 (ref 0–0.2)
BASOPHILS # BLD AUTO: 0.02 10*3/MM3 (ref 0–0.2)
BASOPHILS NFR BLD AUTO: 0.1 % (ref 0–1.5)
BASOPHILS NFR BLD AUTO: 0.1 % (ref 0–1.5)
BILIRUB SERPL-MCNC: 0.2 MG/DL (ref 0–1.2)
BILIRUB SERPL-MCNC: 0.2 MG/DL (ref 0–1.2)
BUN SERPL-MCNC: 69 MG/DL (ref 6–20)
BUN SERPL-MCNC: 72 MG/DL (ref 6–20)
BUN/CREAT SERPL: 13.1 (ref 7–25)
BUN/CREAT SERPL: 14.5 (ref 7–25)
CALCIUM SPEC-SCNC: 8.6 MG/DL (ref 8.6–10.5)
CALCIUM SPEC-SCNC: 8.7 MG/DL (ref 8.6–10.5)
CHLORIDE SERPL-SCNC: 100 MMOL/L (ref 98–107)
CHLORIDE SERPL-SCNC: 101 MMOL/L (ref 98–107)
CK SERPL-CCNC: 1006 U/L (ref 20–200)
CO2 SERPL-SCNC: 21 MMOL/L (ref 22–29)
CO2 SERPL-SCNC: 24 MMOL/L (ref 22–29)
CREAT SERPL-MCNC: 4.75 MG/DL (ref 0.76–1.27)
CREAT SERPL-MCNC: 5.49 MG/DL (ref 0.76–1.27)
D-LACTATE SERPL-SCNC: 1.1 MMOL/L (ref 0.5–2)
D-LACTATE SERPL-SCNC: 2.7 MMOL/L (ref 0.5–2)
DEPRECATED RDW RBC AUTO: 39.4 FL (ref 37–54)
DEPRECATED RDW RBC AUTO: 39.8 FL (ref 37–54)
EGFRCR SERPLBLD CKD-EPI 2021: 12 ML/MIN/1.73
EGFRCR SERPLBLD CKD-EPI 2021: 14.3 ML/MIN/1.73
EOSINOPHIL # BLD AUTO: 0 10*3/MM3 (ref 0–0.4)
EOSINOPHIL # BLD AUTO: 0 10*3/MM3 (ref 0–0.4)
EOSINOPHIL NFR BLD AUTO: 0 % (ref 0.3–6.2)
EOSINOPHIL NFR BLD AUTO: 0 % (ref 0.3–6.2)
ERYTHROCYTE [DISTWIDTH] IN BLOOD BY AUTOMATED COUNT: 13.3 % (ref 12.3–15.4)
ERYTHROCYTE [DISTWIDTH] IN BLOOD BY AUTOMATED COUNT: 13.3 % (ref 12.3–15.4)
GLOBULIN UR ELPH-MCNC: 3.3 GM/DL
GLOBULIN UR ELPH-MCNC: 3.4 GM/DL
GLUCOSE BLDC GLUCOMTR-MCNC: 147 MG/DL (ref 70–130)
GLUCOSE BLDC GLUCOMTR-MCNC: 190 MG/DL (ref 70–130)
GLUCOSE SERPL-MCNC: 127 MG/DL (ref 65–99)
GLUCOSE SERPL-MCNC: 163 MG/DL (ref 65–99)
HCT VFR BLD AUTO: 39.8 % (ref 37.5–51)
HCT VFR BLD AUTO: 40.2 % (ref 37.5–51)
HGB BLD-MCNC: 13.1 G/DL (ref 13–17.7)
HGB BLD-MCNC: 13.2 G/DL (ref 13–17.7)
IMM GRANULOCYTES # BLD AUTO: 0.03 10*3/MM3 (ref 0–0.05)
IMM GRANULOCYTES # BLD AUTO: 0.07 10*3/MM3 (ref 0–0.05)
IMM GRANULOCYTES NFR BLD AUTO: 0.3 % (ref 0–0.5)
IMM GRANULOCYTES NFR BLD AUTO: 0.5 % (ref 0–0.5)
LYMPHOCYTES # BLD AUTO: 0.81 10*3/MM3 (ref 0.7–3.1)
LYMPHOCYTES # BLD AUTO: 0.95 10*3/MM3 (ref 0.7–3.1)
LYMPHOCYTES NFR BLD AUTO: 7.1 % (ref 19.6–45.3)
LYMPHOCYTES NFR BLD AUTO: 9.1 % (ref 19.6–45.3)
MAGNESIUM SERPL-MCNC: 2.6 MG/DL (ref 1.6–2.6)
MCH RBC QN AUTO: 27 PG (ref 26.6–33)
MCH RBC QN AUTO: 27.2 PG (ref 26.6–33)
MCHC RBC AUTO-ENTMCNC: 32.8 G/DL (ref 31.5–35.7)
MCHC RBC AUTO-ENTMCNC: 32.9 G/DL (ref 31.5–35.7)
MCV RBC AUTO: 82.1 FL (ref 79–97)
MCV RBC AUTO: 82.9 FL (ref 79–97)
MONOCYTES # BLD AUTO: 0.3 10*3/MM3 (ref 0.1–0.9)
MONOCYTES # BLD AUTO: 0.82 10*3/MM3 (ref 0.1–0.9)
MONOCYTES NFR BLD AUTO: 3.4 % (ref 5–12)
MONOCYTES NFR BLD AUTO: 6.1 % (ref 5–12)
NEUTROPHILS NFR BLD AUTO: 11.58 10*3/MM3 (ref 1.7–7)
NEUTROPHILS NFR BLD AUTO: 7.77 10*3/MM3 (ref 1.7–7)
NEUTROPHILS NFR BLD AUTO: 86.2 % (ref 42.7–76)
NEUTROPHILS NFR BLD AUTO: 87.1 % (ref 42.7–76)
NRBC BLD AUTO-RTO: 0 /100 WBC (ref 0–0.2)
NRBC BLD AUTO-RTO: 0 /100 WBC (ref 0–0.2)
PHOSPHATE SERPL-MCNC: 3.2 MG/DL (ref 2.5–4.5)
PLATELET # BLD AUTO: 262 10*3/MM3 (ref 140–450)
PLATELET # BLD AUTO: 281 10*3/MM3 (ref 140–450)
PMV BLD AUTO: 10.6 FL (ref 6–12)
PMV BLD AUTO: 9.9 FL (ref 6–12)
POTASSIUM SERPL-SCNC: 5.5 MMOL/L (ref 3.5–5.2)
POTASSIUM SERPL-SCNC: 6.5 MMOL/L (ref 3.5–5.2)
PROT SERPL-MCNC: 7.3 G/DL (ref 6–8.5)
PROT SERPL-MCNC: 7.6 G/DL (ref 6–8.5)
QT INTERVAL: 370 MS
QT INTERVAL: 376 MS
QTC INTERVAL: 460 MS
QTC INTERVAL: 462 MS
RBC # BLD AUTO: 4.85 10*6/MM3 (ref 4.14–5.8)
RBC # BLD AUTO: 4.85 10*6/MM3 (ref 4.14–5.8)
SODIUM SERPL-SCNC: 136 MMOL/L (ref 136–145)
SODIUM SERPL-SCNC: 136 MMOL/L (ref 136–145)
T4 FREE SERPL-MCNC: 1.04 NG/DL (ref 0.93–1.7)
TROPONIN T SERPL-MCNC: 0.04 NG/ML (ref 0–0.03)
TSH SERPL DL<=0.05 MIU/L-ACNC: 0.39 UIU/ML (ref 0.27–4.2)
WBC NRBC COR # BLD: 13.44 10*3/MM3 (ref 3.4–10.8)
WBC NRBC COR # BLD: 8.92 10*3/MM3 (ref 3.4–10.8)

## 2022-07-23 PROCEDURE — 25010000002 HEPARIN (PORCINE) PER 1000 UNITS: Performed by: STUDENT IN AN ORGANIZED HEALTH CARE EDUCATION/TRAINING PROGRAM

## 2022-07-23 PROCEDURE — 93005 ELECTROCARDIOGRAM TRACING: CPT

## 2022-07-23 PROCEDURE — 84100 ASSAY OF PHOSPHORUS: CPT | Performed by: STUDENT IN AN ORGANIZED HEALTH CARE EDUCATION/TRAINING PROGRAM

## 2022-07-23 PROCEDURE — 63710000001 INSULIN REGULAR HUMAN PER 5 UNITS

## 2022-07-23 PROCEDURE — 25010000002 CALCIUM GLUCONATE PER 10 ML

## 2022-07-23 PROCEDURE — 85730 THROMBOPLASTIN TIME PARTIAL: CPT | Performed by: STUDENT IN AN ORGANIZED HEALTH CARE EDUCATION/TRAINING PROGRAM

## 2022-07-23 PROCEDURE — 82550 ASSAY OF CK (CPK): CPT | Performed by: INTERNAL MEDICINE

## 2022-07-23 PROCEDURE — 99232 SBSQ HOSP IP/OBS MODERATE 35: CPT | Performed by: INTERNAL MEDICINE

## 2022-07-23 PROCEDURE — 83605 ASSAY OF LACTIC ACID: CPT | Performed by: STUDENT IN AN ORGANIZED HEALTH CARE EDUCATION/TRAINING PROGRAM

## 2022-07-23 PROCEDURE — 85730 THROMBOPLASTIN TIME PARTIAL: CPT | Performed by: FAMILY MEDICINE

## 2022-07-23 PROCEDURE — 85025 COMPLETE CBC W/AUTO DIFF WBC: CPT | Performed by: FAMILY MEDICINE

## 2022-07-23 PROCEDURE — 84443 ASSAY THYROID STIM HORMONE: CPT | Performed by: STUDENT IN AN ORGANIZED HEALTH CARE EDUCATION/TRAINING PROGRAM

## 2022-07-23 PROCEDURE — 82962 GLUCOSE BLOOD TEST: CPT

## 2022-07-23 PROCEDURE — 84439 ASSAY OF FREE THYROXINE: CPT | Performed by: STUDENT IN AN ORGANIZED HEALTH CARE EDUCATION/TRAINING PROGRAM

## 2022-07-23 PROCEDURE — 85025 COMPLETE CBC W/AUTO DIFF WBC: CPT | Performed by: STUDENT IN AN ORGANIZED HEALTH CARE EDUCATION/TRAINING PROGRAM

## 2022-07-23 PROCEDURE — 94640 AIRWAY INHALATION TREATMENT: CPT

## 2022-07-23 PROCEDURE — 80053 COMPREHEN METABOLIC PANEL: CPT | Performed by: FAMILY MEDICINE

## 2022-07-23 PROCEDURE — 80053 COMPREHEN METABOLIC PANEL: CPT | Performed by: STUDENT IN AN ORGANIZED HEALTH CARE EDUCATION/TRAINING PROGRAM

## 2022-07-23 PROCEDURE — 83735 ASSAY OF MAGNESIUM: CPT | Performed by: STUDENT IN AN ORGANIZED HEALTH CARE EDUCATION/TRAINING PROGRAM

## 2022-07-23 RX ORDER — DEXTROSE MONOHYDRATE 25 G/50ML
50 INJECTION, SOLUTION INTRAVENOUS ONCE
Status: COMPLETED | OUTPATIENT
Start: 2022-07-23 | End: 2022-07-23

## 2022-07-23 RX ADMIN — CALCITRIOL 0.25 MCG: 0.25 CAPSULE ORAL at 09:46

## 2022-07-23 RX ADMIN — BRIMONIDINE TARTRATE 1 DROP: 1.5 SOLUTION/ DROPS OPHTHALMIC at 09:47

## 2022-07-23 RX ADMIN — GABAPENTIN 200 MG: 100 CAPSULE ORAL at 13:57

## 2022-07-23 RX ADMIN — LABETALOL HYDROCHLORIDE 200 MG: 200 TABLET, FILM COATED ORAL at 09:46

## 2022-07-23 RX ADMIN — DILTIAZEM HYDROCHLORIDE 120 MG: 120 CAPSULE, COATED, EXTENDED RELEASE ORAL at 09:46

## 2022-07-23 RX ADMIN — SODIUM CHLORIDE 125 ML/HR: 9 INJECTION, SOLUTION INTRAVENOUS at 20:22

## 2022-07-23 RX ADMIN — CALCIUM GLUCONATE 1 G: 98 INJECTION, SOLUTION INTRAVENOUS at 03:41

## 2022-07-23 RX ADMIN — CYCLOBENZAPRINE 5 MG: 5 TABLET, FILM COATED ORAL at 20:22

## 2022-07-23 RX ADMIN — HUMAN INSULIN 10 UNITS: 100 INJECTION, SOLUTION SUBCUTANEOUS at 04:46

## 2022-07-23 RX ADMIN — SODIUM ZIRCONIUM CYCLOSILICATE 5 G: 5 POWDER, FOR SUSPENSION ORAL at 09:45

## 2022-07-23 RX ADMIN — ALBUTEROL SULFATE 10 MG: 2.5 SOLUTION RESPIRATORY (INHALATION) at 03:35

## 2022-07-23 RX ADMIN — ATORVASTATIN CALCIUM 40 MG: 40 TABLET, FILM COATED ORAL at 20:22

## 2022-07-23 RX ADMIN — DEXTROSE MONOHYDRATE 50 ML: 25 INJECTION, SOLUTION INTRAVENOUS at 04:46

## 2022-07-23 RX ADMIN — LABETALOL HYDROCHLORIDE 200 MG: 200 TABLET, FILM COATED ORAL at 20:22

## 2022-07-23 RX ADMIN — GABAPENTIN 200 MG: 100 CAPSULE ORAL at 06:15

## 2022-07-23 RX ADMIN — HEPARIN SODIUM 3200 UNITS: 5000 INJECTION INTRAVENOUS; SUBCUTANEOUS at 00:35

## 2022-07-23 RX ADMIN — GABAPENTIN 200 MG: 100 CAPSULE ORAL at 21:39

## 2022-07-23 RX ADMIN — CYANOCOBALAMIN TAB 500 MCG 500 MCG: 500 TAB at 09:46

## 2022-07-23 RX ADMIN — CLOPIDOGREL BISULFATE 75 MG: 75 TABLET ORAL at 09:46

## 2022-07-23 RX ADMIN — BRIMONIDINE TARTRATE 1 DROP: 1.5 SOLUTION/ DROPS OPHTHALMIC at 20:22

## 2022-07-24 LAB
ALBUMIN SERPL-MCNC: 3.9 G/DL (ref 3.5–5.2)
ALBUMIN/GLOB SERPL: 1.3 G/DL
ALP SERPL-CCNC: 96 U/L (ref 39–117)
ALT SERPL W P-5'-P-CCNC: 22 U/L (ref 1–41)
ANION GAP SERPL CALCULATED.3IONS-SCNC: 8 MMOL/L (ref 5–15)
AST SERPL-CCNC: 24 U/L (ref 1–40)
BASOPHILS # BLD AUTO: 0.03 10*3/MM3 (ref 0–0.2)
BASOPHILS NFR BLD AUTO: 0.3 % (ref 0–1.5)
BILIRUB SERPL-MCNC: 0.2 MG/DL (ref 0–1.2)
BUN SERPL-MCNC: 55 MG/DL (ref 6–20)
BUN/CREAT SERPL: 17.9 (ref 7–25)
CALCIUM SPEC-SCNC: 9.1 MG/DL (ref 8.6–10.5)
CHLORIDE SERPL-SCNC: 105 MMOL/L (ref 98–107)
CO2 SERPL-SCNC: 27 MMOL/L (ref 22–29)
CREAT SERPL-MCNC: 3.07 MG/DL (ref 0.76–1.27)
DEPRECATED RDW RBC AUTO: 40.4 FL (ref 37–54)
EGFRCR SERPLBLD CKD-EPI 2021: 24.2 ML/MIN/1.73
EOSINOPHIL # BLD AUTO: 0.03 10*3/MM3 (ref 0–0.4)
EOSINOPHIL NFR BLD AUTO: 0.3 % (ref 0.3–6.2)
ERYTHROCYTE [DISTWIDTH] IN BLOOD BY AUTOMATED COUNT: 13.6 % (ref 12.3–15.4)
GLOBULIN UR ELPH-MCNC: 3.1 GM/DL
GLUCOSE SERPL-MCNC: 98 MG/DL (ref 65–99)
HCT VFR BLD AUTO: 39.6 % (ref 37.5–51)
HGB BLD-MCNC: 12.7 G/DL (ref 13–17.7)
IMM GRANULOCYTES # BLD AUTO: 0.04 10*3/MM3 (ref 0–0.05)
IMM GRANULOCYTES NFR BLD AUTO: 0.4 % (ref 0–0.5)
LYMPHOCYTES # BLD AUTO: 1.98 10*3/MM3 (ref 0.7–3.1)
LYMPHOCYTES NFR BLD AUTO: 18.4 % (ref 19.6–45.3)
MCH RBC QN AUTO: 26.6 PG (ref 26.6–33)
MCHC RBC AUTO-ENTMCNC: 32.1 G/DL (ref 31.5–35.7)
MCV RBC AUTO: 82.8 FL (ref 79–97)
MONOCYTES # BLD AUTO: 1.3 10*3/MM3 (ref 0.1–0.9)
MONOCYTES NFR BLD AUTO: 12.1 % (ref 5–12)
NEUTROPHILS NFR BLD AUTO: 68.5 % (ref 42.7–76)
NEUTROPHILS NFR BLD AUTO: 7.39 10*3/MM3 (ref 1.7–7)
NRBC BLD AUTO-RTO: 0 /100 WBC (ref 0–0.2)
PLATELET # BLD AUTO: 253 10*3/MM3 (ref 140–450)
PMV BLD AUTO: 9.8 FL (ref 6–12)
POTASSIUM SERPL-SCNC: 5.1 MMOL/L (ref 3.5–5.2)
PROT SERPL-MCNC: 7 G/DL (ref 6–8.5)
RBC # BLD AUTO: 4.78 10*6/MM3 (ref 4.14–5.8)
SODIUM SERPL-SCNC: 140 MMOL/L (ref 136–145)
WBC NRBC COR # BLD: 10.77 10*3/MM3 (ref 3.4–10.8)

## 2022-07-24 PROCEDURE — 80053 COMPREHEN METABOLIC PANEL: CPT | Performed by: STUDENT IN AN ORGANIZED HEALTH CARE EDUCATION/TRAINING PROGRAM

## 2022-07-24 PROCEDURE — 85025 COMPLETE CBC W/AUTO DIFF WBC: CPT | Performed by: STUDENT IN AN ORGANIZED HEALTH CARE EDUCATION/TRAINING PROGRAM

## 2022-07-24 PROCEDURE — 99232 SBSQ HOSP IP/OBS MODERATE 35: CPT | Performed by: INTERNAL MEDICINE

## 2022-07-24 RX ORDER — LOSARTAN POTASSIUM 50 MG/1
100 TABLET ORAL
Status: DISCONTINUED | OUTPATIENT
Start: 2022-07-24 | End: 2022-07-26 | Stop reason: HOSPADM

## 2022-07-24 RX ADMIN — BRIMONIDINE TARTRATE 1 DROP: 1.5 SOLUTION/ DROPS OPHTHALMIC at 09:23

## 2022-07-24 RX ADMIN — GABAPENTIN 200 MG: 100 CAPSULE ORAL at 14:36

## 2022-07-24 RX ADMIN — GABAPENTIN 200 MG: 100 CAPSULE ORAL at 06:25

## 2022-07-24 RX ADMIN — CALCITRIOL 0.25 MCG: 0.25 CAPSULE ORAL at 09:24

## 2022-07-24 RX ADMIN — LABETALOL HYDROCHLORIDE 200 MG: 200 TABLET, FILM COATED ORAL at 09:24

## 2022-07-24 RX ADMIN — DILTIAZEM HYDROCHLORIDE 120 MG: 120 CAPSULE, COATED, EXTENDED RELEASE ORAL at 09:24

## 2022-07-24 RX ADMIN — CYANOCOBALAMIN TAB 500 MCG 500 MCG: 500 TAB at 09:24

## 2022-07-24 RX ADMIN — ATORVASTATIN CALCIUM 40 MG: 40 TABLET, FILM COATED ORAL at 20:32

## 2022-07-24 RX ADMIN — CYCLOBENZAPRINE 5 MG: 5 TABLET, FILM COATED ORAL at 20:32

## 2022-07-24 RX ADMIN — BRIMONIDINE TARTRATE 1 DROP: 1.5 SOLUTION/ DROPS OPHTHALMIC at 20:32

## 2022-07-24 RX ADMIN — LOSARTAN POTASSIUM 100 MG: 50 TABLET, FILM COATED ORAL at 12:05

## 2022-07-24 RX ADMIN — LABETALOL HYDROCHLORIDE 200 MG: 200 TABLET, FILM COATED ORAL at 20:32

## 2022-07-24 RX ADMIN — CLOPIDOGREL BISULFATE 75 MG: 75 TABLET ORAL at 09:24

## 2022-07-24 RX ADMIN — GABAPENTIN 200 MG: 100 CAPSULE ORAL at 21:21

## 2022-07-25 LAB
ALBUMIN SERPL-MCNC: 3.7 G/DL (ref 3.5–5.2)
ALBUMIN/GLOB SERPL: 1.2 G/DL
ALP SERPL-CCNC: 96 U/L (ref 39–117)
ALT SERPL W P-5'-P-CCNC: 23 U/L (ref 1–41)
ANION GAP SERPL CALCULATED.3IONS-SCNC: 6 MMOL/L (ref 5–15)
AST SERPL-CCNC: 20 U/L (ref 1–40)
BASOPHILS # BLD AUTO: 0.07 10*3/MM3 (ref 0–0.2)
BASOPHILS NFR BLD AUTO: 1 % (ref 0–1.5)
BILIRUB SERPL-MCNC: 0.3 MG/DL (ref 0–1.2)
BUN SERPL-MCNC: 41 MG/DL (ref 6–20)
BUN/CREAT SERPL: 16 (ref 7–25)
CALCIUM SPEC-SCNC: 9.3 MG/DL (ref 8.6–10.5)
CHLORIDE SERPL-SCNC: 105 MMOL/L (ref 98–107)
CO2 SERPL-SCNC: 30 MMOL/L (ref 22–29)
CREAT SERPL-MCNC: 2.57 MG/DL (ref 0.76–1.27)
DEPRECATED RDW RBC AUTO: 41.5 FL (ref 37–54)
EGFRCR SERPLBLD CKD-EPI 2021: 29.9 ML/MIN/1.73
EOSINOPHIL # BLD AUTO: 0.21 10*3/MM3 (ref 0–0.4)
EOSINOPHIL NFR BLD AUTO: 3 % (ref 0.3–6.2)
ERYTHROCYTE [DISTWIDTH] IN BLOOD BY AUTOMATED COUNT: 13.3 % (ref 12.3–15.4)
GLOBULIN UR ELPH-MCNC: 3.2 GM/DL
GLUCOSE SERPL-MCNC: 111 MG/DL (ref 65–99)
HCT VFR BLD AUTO: 41.2 % (ref 37.5–51)
HGB BLD-MCNC: 12.8 G/DL (ref 13–17.7)
IMM GRANULOCYTES # BLD AUTO: 0.02 10*3/MM3 (ref 0–0.05)
IMM GRANULOCYTES NFR BLD AUTO: 0.3 % (ref 0–0.5)
LYMPHOCYTES # BLD AUTO: 2.12 10*3/MM3 (ref 0.7–3.1)
LYMPHOCYTES NFR BLD AUTO: 29.9 % (ref 19.6–45.3)
MCH RBC QN AUTO: 26.4 PG (ref 26.6–33)
MCHC RBC AUTO-ENTMCNC: 31.1 G/DL (ref 31.5–35.7)
MCV RBC AUTO: 84.9 FL (ref 79–97)
MONOCYTES # BLD AUTO: 0.89 10*3/MM3 (ref 0.1–0.9)
MONOCYTES NFR BLD AUTO: 12.5 % (ref 5–12)
NEUTROPHILS NFR BLD AUTO: 3.79 10*3/MM3 (ref 1.7–7)
NEUTROPHILS NFR BLD AUTO: 53.3 % (ref 42.7–76)
NRBC BLD AUTO-RTO: 0 /100 WBC (ref 0–0.2)
PLATELET # BLD AUTO: 253 10*3/MM3 (ref 140–450)
PMV BLD AUTO: 10.1 FL (ref 6–12)
POTASSIUM SERPL-SCNC: 5.3 MMOL/L (ref 3.5–5.2)
PROT SERPL-MCNC: 6.9 G/DL (ref 6–8.5)
RBC # BLD AUTO: 4.85 10*6/MM3 (ref 4.14–5.8)
SODIUM SERPL-SCNC: 141 MMOL/L (ref 136–145)
WBC NRBC COR # BLD: 7.1 10*3/MM3 (ref 3.4–10.8)

## 2022-07-25 PROCEDURE — 85025 COMPLETE CBC W/AUTO DIFF WBC: CPT | Performed by: STUDENT IN AN ORGANIZED HEALTH CARE EDUCATION/TRAINING PROGRAM

## 2022-07-25 PROCEDURE — 25010000002 HYDRALAZINE PER 20 MG: Performed by: STUDENT IN AN ORGANIZED HEALTH CARE EDUCATION/TRAINING PROGRAM

## 2022-07-25 PROCEDURE — 80053 COMPREHEN METABOLIC PANEL: CPT | Performed by: STUDENT IN AN ORGANIZED HEALTH CARE EDUCATION/TRAINING PROGRAM

## 2022-07-25 RX ORDER — HYDRALAZINE HYDROCHLORIDE 20 MG/ML
10 INJECTION INTRAMUSCULAR; INTRAVENOUS EVERY 6 HOURS PRN
Status: DISCONTINUED | OUTPATIENT
Start: 2022-07-25 | End: 2022-07-26 | Stop reason: HOSPADM

## 2022-07-25 RX ADMIN — HYDRALAZINE HYDROCHLORIDE 10 MG: 20 INJECTION INTRAMUSCULAR; INTRAVENOUS at 18:33

## 2022-07-25 RX ADMIN — CYANOCOBALAMIN TAB 500 MCG 500 MCG: 500 TAB at 08:26

## 2022-07-25 RX ADMIN — SODIUM CHLORIDE 100 ML/HR: 9 INJECTION, SOLUTION INTRAVENOUS at 08:25

## 2022-07-25 RX ADMIN — Medication 10 ML: at 21:51

## 2022-07-25 RX ADMIN — GABAPENTIN 200 MG: 100 CAPSULE ORAL at 21:10

## 2022-07-25 RX ADMIN — Medication 10 ML: at 08:24

## 2022-07-25 RX ADMIN — CYCLOBENZAPRINE 5 MG: 5 TABLET, FILM COATED ORAL at 21:10

## 2022-07-25 RX ADMIN — Medication 1000 UNITS: at 08:26

## 2022-07-25 RX ADMIN — LABETALOL HYDROCHLORIDE 200 MG: 200 TABLET, FILM COATED ORAL at 08:26

## 2022-07-25 RX ADMIN — CALCITRIOL 0.25 MCG: 0.25 CAPSULE ORAL at 08:25

## 2022-07-25 RX ADMIN — LOSARTAN POTASSIUM 100 MG: 50 TABLET, FILM COATED ORAL at 08:26

## 2022-07-25 RX ADMIN — BRIMONIDINE TARTRATE 1 DROP: 1.5 SOLUTION/ DROPS OPHTHALMIC at 15:34

## 2022-07-25 RX ADMIN — LABETALOL HYDROCHLORIDE 200 MG: 200 TABLET, FILM COATED ORAL at 21:10

## 2022-07-25 RX ADMIN — CLOPIDOGREL BISULFATE 75 MG: 75 TABLET ORAL at 08:26

## 2022-07-25 RX ADMIN — ATORVASTATIN CALCIUM 40 MG: 40 TABLET, FILM COATED ORAL at 21:10

## 2022-07-25 RX ADMIN — GABAPENTIN 200 MG: 100 CAPSULE ORAL at 15:34

## 2022-07-25 RX ADMIN — BRIMONIDINE TARTRATE 1 DROP: 1.5 SOLUTION/ DROPS OPHTHALMIC at 21:50

## 2022-07-25 RX ADMIN — DILTIAZEM HYDROCHLORIDE 120 MG: 120 CAPSULE, COATED, EXTENDED RELEASE ORAL at 08:26

## 2022-07-25 RX ADMIN — BRIMONIDINE TARTRATE 1 DROP: 1.5 SOLUTION/ DROPS OPHTHALMIC at 08:25

## 2022-07-25 RX ADMIN — GABAPENTIN 200 MG: 100 CAPSULE ORAL at 06:09

## 2022-07-26 ENCOUNTER — READMISSION MANAGEMENT (OUTPATIENT)
Dept: CALL CENTER | Facility: HOSPITAL | Age: 47
End: 2022-07-26

## 2022-07-26 VITALS
OXYGEN SATURATION: 94 % | RESPIRATION RATE: 18 BRPM | HEIGHT: 73 IN | DIASTOLIC BLOOD PRESSURE: 122 MMHG | WEIGHT: 242 LBS | TEMPERATURE: 98.1 F | BODY MASS INDEX: 32.07 KG/M2 | SYSTOLIC BLOOD PRESSURE: 152 MMHG | HEART RATE: 92 BPM

## 2022-07-26 LAB
ALBUMIN SERPL-MCNC: 4.1 G/DL (ref 3.5–5.2)
ALBUMIN/GLOB SERPL: 1.2 G/DL
ALP SERPL-CCNC: 97 U/L (ref 39–117)
ALT SERPL W P-5'-P-CCNC: 23 U/L (ref 1–41)
ANION GAP SERPL CALCULATED.3IONS-SCNC: 9 MMOL/L (ref 5–15)
AST SERPL-CCNC: 20 U/L (ref 1–40)
BASOPHILS # BLD AUTO: 0.05 10*3/MM3 (ref 0–0.2)
BASOPHILS NFR BLD AUTO: 0.6 % (ref 0–1.5)
BILIRUB SERPL-MCNC: 0.2 MG/DL (ref 0–1.2)
BUN SERPL-MCNC: 42 MG/DL (ref 6–20)
BUN/CREAT SERPL: 16.7 (ref 7–25)
CALCIUM SPEC-SCNC: 9.8 MG/DL (ref 8.6–10.5)
CHLORIDE SERPL-SCNC: 102 MMOL/L (ref 98–107)
CO2 SERPL-SCNC: 29 MMOL/L (ref 22–29)
CREAT SERPL-MCNC: 2.51 MG/DL (ref 0.76–1.27)
DEPRECATED RDW RBC AUTO: 39.5 FL (ref 37–54)
EGFRCR SERPLBLD CKD-EPI 2021: 30.8 ML/MIN/1.73
EOSINOPHIL # BLD AUTO: 0.44 10*3/MM3 (ref 0–0.4)
EOSINOPHIL NFR BLD AUTO: 5.3 % (ref 0.3–6.2)
ERYTHROCYTE [DISTWIDTH] IN BLOOD BY AUTOMATED COUNT: 12.9 % (ref 12.3–15.4)
GLOBULIN UR ELPH-MCNC: 3.4 GM/DL
GLUCOSE SERPL-MCNC: 94 MG/DL (ref 65–99)
HCT VFR BLD AUTO: 44.3 % (ref 37.5–51)
HGB BLD-MCNC: 14 G/DL (ref 13–17.7)
IMM GRANULOCYTES # BLD AUTO: 0.03 10*3/MM3 (ref 0–0.05)
IMM GRANULOCYTES NFR BLD AUTO: 0.4 % (ref 0–0.5)
LYMPHOCYTES # BLD AUTO: 2.08 10*3/MM3 (ref 0.7–3.1)
LYMPHOCYTES NFR BLD AUTO: 25.2 % (ref 19.6–45.3)
MCH RBC QN AUTO: 26.5 PG (ref 26.6–33)
MCHC RBC AUTO-ENTMCNC: 31.6 G/DL (ref 31.5–35.7)
MCV RBC AUTO: 83.9 FL (ref 79–97)
MONOCYTES # BLD AUTO: 1.16 10*3/MM3 (ref 0.1–0.9)
MONOCYTES NFR BLD AUTO: 14.1 % (ref 5–12)
NEUTROPHILS NFR BLD AUTO: 4.48 10*3/MM3 (ref 1.7–7)
NEUTROPHILS NFR BLD AUTO: 54.4 % (ref 42.7–76)
NRBC BLD AUTO-RTO: 0 /100 WBC (ref 0–0.2)
PLATELET # BLD AUTO: 298 10*3/MM3 (ref 140–450)
PMV BLD AUTO: 10.3 FL (ref 6–12)
POTASSIUM SERPL-SCNC: 4.9 MMOL/L (ref 3.5–5.2)
PROT SERPL-MCNC: 7.5 G/DL (ref 6–8.5)
RBC # BLD AUTO: 5.28 10*6/MM3 (ref 4.14–5.8)
SODIUM SERPL-SCNC: 140 MMOL/L (ref 136–145)
WBC NRBC COR # BLD: 8.24 10*3/MM3 (ref 3.4–10.8)

## 2022-07-26 PROCEDURE — 80053 COMPREHEN METABOLIC PANEL: CPT | Performed by: STUDENT IN AN ORGANIZED HEALTH CARE EDUCATION/TRAINING PROGRAM

## 2022-07-26 PROCEDURE — 85025 COMPLETE CBC W/AUTO DIFF WBC: CPT | Performed by: STUDENT IN AN ORGANIZED HEALTH CARE EDUCATION/TRAINING PROGRAM

## 2022-07-26 RX ORDER — GUANFACINE 1 MG/1
1 TABLET ORAL NIGHTLY
Status: DISCONTINUED | OUTPATIENT
Start: 2022-07-26 | End: 2022-07-26

## 2022-07-26 RX ORDER — HYDRALAZINE HYDROCHLORIDE 25 MG/1
25 TABLET, FILM COATED ORAL EVERY 8 HOURS SCHEDULED
Status: DISCONTINUED | OUTPATIENT
Start: 2022-07-26 | End: 2022-07-26

## 2022-07-26 RX ORDER — HYDRALAZINE HYDROCHLORIDE 50 MG/1
50 TABLET, FILM COATED ORAL EVERY 8 HOURS SCHEDULED
Status: DISCONTINUED | OUTPATIENT
Start: 2022-07-26 | End: 2022-07-26 | Stop reason: HOSPADM

## 2022-07-26 RX ORDER — GUANFACINE 1 MG/1
1 TABLET ORAL NIGHTLY
Status: DISCONTINUED | OUTPATIENT
Start: 2022-07-26 | End: 2022-07-26 | Stop reason: HOSPADM

## 2022-07-26 RX ORDER — HYDRALAZINE HYDROCHLORIDE 50 MG/1
50 TABLET, FILM COATED ORAL EVERY 8 HOURS SCHEDULED
Qty: 90 TABLET | Refills: 0 | Status: SHIPPED | OUTPATIENT
Start: 2022-07-26 | End: 2022-07-29 | Stop reason: SDUPTHER

## 2022-07-26 RX ORDER — GABAPENTIN 600 MG/1
300 TABLET ORAL 2 TIMES DAILY
Qty: 270 TABLET | Refills: 0
Start: 2022-07-26 | End: 2022-07-29 | Stop reason: SDUPTHER

## 2022-07-26 RX ADMIN — Medication 1000 UNITS: at 09:21

## 2022-07-26 RX ADMIN — GABAPENTIN 200 MG: 100 CAPSULE ORAL at 06:37

## 2022-07-26 RX ADMIN — GABAPENTIN 200 MG: 100 CAPSULE ORAL at 13:51

## 2022-07-26 RX ADMIN — CYANOCOBALAMIN TAB 500 MCG 500 MCG: 500 TAB at 09:21

## 2022-07-26 RX ADMIN — CALCITRIOL 0.25 MCG: 0.25 CAPSULE ORAL at 09:21

## 2022-07-26 RX ADMIN — HYDRALAZINE HYDROCHLORIDE 50 MG: 50 TABLET, FILM COATED ORAL at 13:51

## 2022-07-26 RX ADMIN — CLOPIDOGREL BISULFATE 75 MG: 75 TABLET ORAL at 09:21

## 2022-07-26 RX ADMIN — BRIMONIDINE TARTRATE 1 DROP: 1.5 SOLUTION/ DROPS OPHTHALMIC at 09:22

## 2022-07-26 RX ADMIN — LABETALOL HYDROCHLORIDE 200 MG: 200 TABLET, FILM COATED ORAL at 09:21

## 2022-07-26 RX ADMIN — DILTIAZEM HYDROCHLORIDE 120 MG: 120 CAPSULE, COATED, EXTENDED RELEASE ORAL at 09:21

## 2022-07-26 NOTE — OUTREACH NOTE
Prep Survey    Flowsheet Row Responses   [facility] facility patient discharged from? [facility]   Is LACE score < 7 ? No   Emergency Room discharge w/ pulse ox? No   Eligibility [facility]   Date of Admission 07/22/22   Date of Discharge 07/26/22   Discharge Disposition Home or Self Care   Discharge diagnosis Acute renal failure Abdominal aortic aneurysm    Does the patient have one of the following disease processes/diagnoses(primary or secondary)? Other   Does the patient have Home health ordered? No   Is there a DME ordered? No   Prep survey completed? Yes          OWEN MCKEON - Registered Nurse

## 2022-07-27 ENCOUNTER — TRANSITIONAL CARE MANAGEMENT TELEPHONE ENCOUNTER (OUTPATIENT)
Dept: CALL CENTER | Facility: HOSPITAL | Age: 47
End: 2022-07-27

## 2022-07-27 NOTE — PROGRESS NOTES
Subjective:  Gregg Randle is a 47 y.o. male who presents for       Patient Active Problem List   Diagnosis   • Abdominal aortic aneurysm (AAA) without rupture (HCC)   • PVD (peripheral vascular disease) (HCC)   • Nicotine dependence, cigarettes, with other nicotine-induced disorders   • Mixed hyperlipidemia   • Panlobular emphysema (HCC)   • Benign essential HTN   • CAD (coronary artery disease)   • Class 1 obesity with serious comorbidity and body mass index (BMI) of 32.0 to 32.9 in adult   • Tobacco user   • Vitamin D deficiency, unspecified    • History of MI (myocardial infarction)   • History of CVA (cerebrovascular accident)   • Abnormal finding of blood chemistry, unspecified    • Stage 3 chronic kidney disease (HCC)   • Former smoker   • Left hemiparesis (HCC)   • Elevated liver enzymes   • Hyperkalemia   • Chronic idiopathic constipation   • Dizziness   • Chronic bilateral low back pain with bilateral sciatica   • Encounter for drug screening   • Hypotension   • Essential hypertension   • Left leg pain   • Muscle spasm   • Encounter for screening for malignant neoplasm of colon   • Sleep apnea   • Hyperuricemia   • Hemorrhoids   • Balance problem   • Class 1 obesity with serious comorbidity and body mass index (BMI) of 31.0 to 31.9 in adult   • Wound of skin   • Coronary artery disease involving native heart   • Chronic midline low back pain with bilateral sciatica   • Overweight   • PARRISH (acute kidney injury) (HCC)   • Acute renal failure (ARF) (HCC)   • Elevated troponin level not due myocardial infarction   • Acute renal failure superimposed on stage 3b chronic kidney disease (HCC)   • Dehydration   • History of heart artery stent           Current Outpatient Medications:   •  albuterol (PROVENTIL HFA;VENTOLIN HFA) 108 (90 BASE) MCG/ACT inhaler, Inhale 2 puffs Every 4 (Four) Hours As Needed for Wheezing., Disp: , Rfl:   •  allopurinol (ZYLOPRIM) 100 MG tablet, Take 1 tablet by mouth Daily.,  Disp: 90 tablet, Rfl: 0  •  atorvastatin (LIPITOR) 40 MG tablet, Take 1 tablet by mouth Every Night., Disp: 90 tablet, Rfl: 3  •  brimonidine (ALPHAGAN) 0.2 % ophthalmic solution, Administer 1 drop to the right eye 3 (Three) Times a Day., Disp: , Rfl:   •  calcitriol (ROCALTROL) 0.25 MCG capsule, Take 1 capsule by mouth Daily., Disp: 90 capsule, Rfl: 0  •  clopidogrel (PLAVIX) 75 MG tablet, Take 1 tablet by mouth Daily., Disp: 90 tablet, Rfl: 0  •  cyclobenzaprine (FLEXERIL) 5 MG tablet, Take 1 tablet by mouth Every Night., Disp: 90 tablet, Rfl: 0  •  dilTIAZem CD (Cardizem CD) 120 MG 24 hr capsule, Take 1 capsule by mouth Daily., Disp: 90 capsule, Rfl: 0  •  gabapentin (NEURONTIN) 600 MG tablet, Take 0.5 tablets by mouth 2 (Two) Times a Day., Disp: 270 tablet, Rfl: 0  •  guanFACINE (TENEX) 1 MG tablet, Take 1 tablet by mouth Every Night for 90 days., Disp: 90 tablet, Rfl: 1  •  hydrALAZINE (APRESOLINE) 50 MG tablet, Take 1 tablet by mouth Every 8 (Eight) Hours., Disp: 90 tablet, Rfl: 0  •  labetalol (NORMODYNE) 200 MG tablet, Take 1 tablet by mouth 2 (Two) Times a Day., Disp: 180 tablet, Rfl: 0  •  linaclotide (Linzess) 145 MCG capsule capsule, Take 1 capsule by mouth Every Morning Before Breakfast., Disp: 90 capsule, Rfl: 0  •  losartan (COZAAR) 100 MG tablet, Take 1 tablet by mouth Daily., Disp: 90 tablet, Rfl: 0  •  Turmeric (QC Tumeric Complex) 500 MG capsule, Take 1 capsule by mouth Daily., Disp: 90 capsule, Rfl: 0  •  vitamin B-12 (CYANOCOBALAMIN) 500 MCG tablet, Take 1 tablet by mouth Daily., Disp: 90 tablet, Rfl: 0  •  vitamin D (ERGOCALCIFEROL) 1.25 MG (85271 UT) capsule capsule, TAKE ONE CAPSULE EVERY 7 DAYS, Disp: , Rfl:     HPI        Pt is 48 yo male with management of obesity, CAD sp PCI , HTN, HLP, AAA  Sp repair , PVD with bilateral iliac stent , COPD (panlobar emphysema) former  tobacco user, dizziness, CKD stage 3 , claudication. Major depression, cholelithiasis, abnormal echocardiogram ( LVH  grade 1 diastolic dysfunction)  History of CVA, former tobacco user , history of MI, left hemiparesis, chronic back pain (DDD lumbar spine)  elevated liver enzymes     11/18/21 in office visit for recheck on pt's above medical issues. Pt continues to take his medications for HTN HLP, CAD sp stent, hyperuricemia, HTN,  Constipatin, chronic back/neck pain, vitamin D and b12 deficiency. Pt had labwork on 10/27/21 that showed normal uric acid vitamin b12 and vitamin D stable lipid panelstable. CMP showed GFR At 40 from 42 with CR at 2.14.  CBC showed stable hemoglobin and WBC  Blood pressure is stable  No chest pain has occasional dizziness. No syncope. Breathing stable.  Dizziness occurs about twice a week     7/29/22 in office visit for recheck. Pt was recently admitted to [facility], [place]  From 7/22/22 to 7/26/22 for acute renal failure, elevated troponin. Prior to admission he was having left sided neck pain and diaphoresis while working outside in heat. He was treated for acute on chronic failure.  With IV nephrology was consulted and his CR improved.  He was also treated for hyperkalemia with IV fluids and monitoring.  His potassium normalizied. His COPD was treated with albuterol as needed. His BP medicaiton was adjusted.  Cardiology was consulted for NSTEMI and he was recommended medical management. His hydralazine was adjusted to 50 mg every 8 hours. Pt recently saw Vascular Surgery  on 7/28/2 for AAA.   Pt is doing well overall not chest pain no dizziness. He is feeling better.       Dizziness  This is a chronic problem. The current episode started more than 1 year ago. The problem occurs constantly. The problem has been unchanged. Pertinent negatives include no abdominal pain, anorexia, arthralgias, change in bowel habit, chest pain, chills, congestion, coughing, diaphoresis, fatigue, fever, headaches, joint swelling, myalgias, nausea, neck pain, numbness, rash, sore throat, swollen glands, urinary symptoms, vertigo,  visual change, vomiting or weakness. Nothing aggravates the symptoms. He has tried nothing for the symptoms. The treatment provided no relief.   Hyperlipidemia  This is a chronic problem. The current episode started more than 1 year ago. The problem is controlled. Recent lipid tests were reviewed and are variable. Exacerbating diseases include chronic renal disease and obesity. He has no history of diabetes, hypothyroidism, liver disease or nephrotic syndrome. Pertinent negatives include no chest pain, focal sensory loss, focal weakness, leg pain, myalgias or shortness of breath. Current antihyperlipidemic treatment includes statins. Compliance problems include adherence to exercise.  Risk factors for coronary artery disease include diabetes mellitus, hypertension, male sex and obesity.   Chronic Kidney Disease  This is a chronic problem. The current episode started more than 1 year ago. The problem occurs constantly. The problem has been gradually worsening. Pertinent negatives include no abdominal pain, anorexia, arthralgias, change in bowel habit, chest pain, chills, congestion, coughing, diaphoresis, fatigue, fever, headaches, joint swelling, myalgias, nausea, neck pain, numbness, rash, sore throat, swollen glands, urinary symptoms, vertigo, visual change, vomiting or weakness. Nothing aggravates the symptoms. He has tried nothing for the symptoms.   Coronary Artery Disease  Presents for follow-up visit. Pertinent negatives include no chest pain, chest pressure, chest tightness, dizziness, leg swelling, muscle weakness, palpitations or shortness of breath. Risk factors include hyperlipidemia and obesity. The symptoms have been stable. Compliance with diet is good. Compliance with exercise is good. Compliance with medications is good.   Hypertension   This is a chronic problem. The current episode started more than 1 year ago. The problem has been improving g since onset. The problem is controlled. Associated  symptoms include shortness of breath. Pertinent negatives include no anxiety, blurred vision, chest pain, headaches, malaise/fatigue, neck pain, orthopnea, palpitations, peripheral edema, PND or sweats. Risk factors for coronary artery disease include dyslipidemia, obesity, male gender, sedentary lifestyle and smoking/tobacco exposure. Current antihypertension treatment includes calcium channel blockers, beta blockers and angiotensin blockers and diureitcs The current treatment provides moderate improvement. There are no compliance problems.  Hypertensive end-organ damage includes kidney disease, CAD/MI and CVA. There is no history of angina, heart failure, left ventricular hypertrophy, PVD or retinopathy. There is no history of chronic renal disease, coarctation of the aorta, hyperaldosteronism, hypercortisolism, hyperparathyroidism, a hypertension causing med, pheochromocytoma, renovascular disease, sleep apnea or a thyroid problem.        Review of Systems  Review of Systems   Constitutional: Positive for activity change and fatigue. Negative for appetite change, chills, diaphoresis and fever.   HENT: Negative for congestion, postnasal drip, rhinorrhea, sinus pressure, sinus pain, sneezing, sore throat, trouble swallowing and voice change.    Respiratory: Positive for chest tightness and shortness of breath. Negative for cough, choking, wheezing and stridor.    Cardiovascular: Positive for chest pain.   Gastrointestinal: Negative for diarrhea, nausea and vomiting.   Musculoskeletal: Positive for arthralgias and back pain.   Neurological: Positive for weakness and numbness. Negative for headaches.       Patient Active Problem List   Diagnosis   • Abdominal aortic aneurysm (AAA) without rupture (HCC)   • PVD (peripheral vascular disease) ([facility])   • Nicotine dependence, cigarettes, with other nicotine-induced disorders   • Mixed hyperlipidemia   • Panlobular emphysema (HCC)   • Benign essential HTN   • CAD (coronary  artery disease)   • Class 1 obesity with serious comorbidity and body mass index (BMI) of 32.0 to 32.9 in adult   • Tobacco user   • Vitamin D deficiency, unspecified    • History of MI (myocardial infarction)   • History of CVA (cerebrovascular accident)   • Abnormal finding of blood chemistry, unspecified    • Stage 3 chronic kidney disease (HCC)   • Former smoker   • Left hemiparesis (HCC)   • Elevated liver enzymes   • Hyperkalemia   • Chronic idiopathic constipation   • Dizziness   • Chronic bilateral low back pain with bilateral sciatica   • Encounter for drug screening   • Hypotension   • Essential hypertension   • Left leg pain   • Muscle spasm   • Encounter for screening for malignant neoplasm of colon   • Sleep apnea   • Hyperuricemia   • Hemorrhoids   • Balance problem   • Class 1 obesity with serious comorbidity and body mass index (BMI) of 31.0 to 31.9 in adult   • Wound of skin   • Coronary artery disease involving native heart   • Chronic midline low back pain with bilateral sciatica   • Overweight   • PARRISH (acute kidney injury) (HCC)   • Acute renal failure (ARF) (HCC)   • Elevated troponin level not due myocardial infarction   • Acute renal failure superimposed on stage 3b chronic kidney disease (HCC)   • Dehydration   • History of heart artery stent     Past Surgical History:   Procedure Laterality Date   • COLONOSCOPY N/A 11/17/2020    Procedure: COLONOSCOPY;  Surgeon: Abi Miller MD;  Location: [facility] ENDOSCOPY;  Service: Gastroenterology;  Laterality: N/A;   • CORONARY ANGIOPLASTY WITH STENT PLACEMENT     • EAR TUBES Left    • AR AAA REPAIR,AORTO-AORTIC TUBE PROSTH N/A 8/11/2017    Procedure: ABDOMINAL AORTIC ANEURYSM REPAIR WITH ENDOGRAFT, REPAIR ILIAC ARTERY ANEURYSM, POSSIBLE OPEN ABDOMINAL AORTOGRAM    (CELL SAVER STAND-BY);  Surgeon: Aren Arshad MD;  Location: [facility] OR;  Service: Vascular     Social History     Socioeconomic History   • Marital status: Single   Tobacco Use    • Smoking status: Former Smoker     Packs/day: 1.00     Years: 20.00     Pack years: 20.00   • Smokeless tobacco: Never Used   Vaping Use   • Vaping Use: Never used   Substance and Sexual Activity   • Alcohol use: No   • Drug use: No   • Sexual activity: Defer     Family History   Problem Relation Age of Onset   • Stroke Mother    • Diabetes Mother    • Heart disease Father      No results displayed because visit has over 200 results.         Adult Transthoracic Echo Complete w/ Color, Spectral and Contrast if necessary per protocol  · Left ventricular ejection fraction appears to be 56 - 60%. Left   ventricular systolic function is normal.  · Left ventricular wall thickness is consistent with moderate concentric   hypertrophy.  · Left ventricular diastolic function was normal.     US Renal Bilateral  Narrative: ULTRASOUND RENAL BILATERAL    INDICATION: acute renal failure, N17.9 Acute kidney failure,  unspecified E86.0 Dehydration I21.4 Non-ST elevation (NSTEMI)  myocardial infarction    COMPARISON: CT abdomen pelvis 7/15/2022    TECHNIQUE: Grayscale and color Doppler ultrasonographic images of  bilateral kidneys and bladder were obtained.    FINDINGS:  Right kidney: Measures 9.5 cm. There is no hydronephrosis. The  kidney itself is poorly visualized with ultrasound.  Bladder: Nondistended.  Left kidney: Measures 10.1 cm. There is no hydronephrosis. There  is a tiny simple renal cyst measuring 6 mm. There is a second  tiny simple renal cyst measuring 8 mm.  Impression: Two tiny simple left renal cysts. Otherwise unremarkable.    Electronically signed by:  Mireille Adhikari MD  7/22/2022 4:11 PM CDT  Workstation: [id number]    @[email or web address]@  Immunization History   Administered Date(s) Administered   • COVID-19 (PFIZER) PURPLE CAP 06/02/2021, 06/23/2021, 10/13/2021   • FluLaval/Fluarix/Fluzone >6 01/15/2020, 01/15/2020, 09/30/2020, 10/14/2021   • Pneumococcal Polysaccharide (PPSV23) 09/30/2020   • Tdap 09/30/2020  "      The following portions of the patient's history were reviewed and updated as appropriate: allergies, current medications, past family history, past medical history, past social history, past surgical history and problem list.        Physical Exam  /70 (BP Location: Left arm, Patient Position: Sitting, Cuff Size: Large Adult)   Pulse 88   Temp 97.8 °F (36.6 °C)   Ht 185.4 cm (73\")   Wt 110 kg (242 lb)   SpO2 97%   BMI 31.93 kg/m²     Physical Exam  Vitals and nursing note reviewed.   Constitutional:       Appearance: He is well-developed. He is not diaphoretic.   HENT:      Head: Normocephalic and atraumatic.      Right Ear: External ear normal.   Eyes:      Conjunctiva/sclera: Conjunctivae normal.      Pupils: Pupils are equal, round, and reactive to light.   Cardiovascular:      Rate and Rhythm: Normal rate and regular rhythm.      Heart sounds: Normal heart sounds. No murmur heard.  Pulmonary:      Effort: Pulmonary effort is normal. No respiratory distress.      Breath sounds: Normal breath sounds.   Abdominal:      General: Bowel sounds are normal. There is no distension.      Palpations: Abdomen is soft.      Tenderness: There is no abdominal tenderness.   Musculoskeletal:         General: Tenderness present. No deformity. Normal range of motion.      Cervical back: Normal range of motion and neck supple.   Skin:     General: Skin is warm.      Coloration: Skin is not pale.      Findings: No erythema or rash.   Neurological:      Mental Status: He is alert and oriented to person, place, and time.      Cranial Nerves: No cranial nerve deficit.   Psychiatric:         Behavior: Behavior normal.         [unfilled]   Diagnosis Plan   1. History of MI (myocardial infarction)     2. Essential hypertension     3. Coronary artery disease involving native heart, unspecified vessel or lesion type, unspecified whether angina present     4. PVD (peripheral vascular disease) (HCC)     5. Mixed " hyperlipidemia     6. History of CVA (cerebrovascular accident)     7. Hyperuricemia     8. History of heart artery stent     9. Dehydration     10. Acute renal failure superimposed on stage 3b chronic kidney disease, unspecified acute renal failure type (HCC)     11. Hyperkalemia             -went over labwork   -dizziness/hypotension - stable on meclizine PRN. Will check carotid Ultrasound   -internal hemorrhoids - recommend high fiber diet. Next colonscopy in 2030   -back pain with sciatica  -on neurontin 600 mg PO TID. Start on flexeril 5 mg at beditme   -BRANDI-sleep medicine following   -hyperuricemia - on allopurinol 100 mg daiy.   -CIC - start on linzess. Pt has failed Miralax.drug information provided   -acute renal failure CKD stage 3b/hyperkalemia /dehydration - Nephrology following. Will check CMP. Advised pt to make appt with Nephrology soon   -COPD/emphysema. -referedto Pulmonlogy   -HTN - losartan 100 mg daily. Labetalol 200 mg PO BID  on cardizem 120 mg daily.  Now On hydralazine 50 mg every 8 hours   -major depression-  On lexapro 20 mg daily.  -CAD sp stent/history of NSTEMI -Cardiology following, aspirin, lipitor, labetalol  200 mg TID, losartan , plavix 75 mg daily.  . Upcoming Cardiology appt soon   -PVD - aspirin plavix, lipitor - Vascular Surgery following  -obesity - counseled weight loss >5 minutes BMI at 31.93  -AAA sp repair  - Vascular Surgery following. Recommend repeat US to assess stability. Will order at imaging center   -advised pt to be safe and call with questions and concerns  -advised pt to go to ER or call 911 if symptoms worrisome or severe  -advised pt to followup with specialist and referrals  -advised pt be safe during COVID-19 pandemic   I spent 30  minutes caring for Gregg on this date of service. This time includes time spent by me in the following activities: preparing for the visit, reviewing tests, obtaining and/or reviewing a separately obtained history, performing a  medically appropriate examination and/or evaluation, counseling and educating the patient/family/caregiver, ordering medications, tests, or procedures, referring and communicating with other health care professionals, documenting information in the medical record and independently interpreting results and communicating that information with the patient/family/caregiver  -recheck in 6 weeks         This document has been electronically signed by Dread Duvall MD on July 29, 2022 08:02 CDT

## 2022-07-27 NOTE — OUTREACH NOTE
Call Center TCM Note    Flowsheet Row Responses   [facility] patient discharged from? [place]   Does the patient have one of the following disease processes/diagnoses(primary or secondary)? Other   TCM attempt successful? Yes   Call start time 1144   Call end time 1149   Discharge diagnosis Acute renal failure Abdominal aortic aneurysm    Meds reviewed with patient/caregiver? Yes   Is the patient having any side effects they believe may be caused by any medication additions or changes? No   Does the patient have all medications ordered at discharge? Yes   Is the patient taking all medications as directed (includes completed medication regime)? Yes   Comments regarding appointments Cardiology 7/28/22@200pm   Does the patient have a primary care provider?  Yes   Does the patient have an appointment with their PCP within 7 days of discharge? Yes   Comments regarding PCP Hospital PCP FOLLOW UP APPOINTMENT IS 7/29/22@0930am   Has the patient kept scheduled appointments due by today? N/A   Has home health visited the patient within 72 hours of discharge? N/A   Psychosocial issues? No   Did the patient receive a copy of their discharge instructions? Yes   Nursing interventions Reviewed instructions with patient   What is the patient's perception of their health status since discharge? Improving  [Denies ongoing issues with chest pain at time of call--he will start new BP medication later today.  Encouraged to take readings to f/u appt, cardiology appt is 7/28/22.  Reviewed cardiac s/s and need to seek care---v/u]   Is the patient/caregiver able to teach back the hierarchy of who to call/visit for symptoms/problems? PCP, Specialist, Home health nurse, Urgent Care, ED, 911 Yes   TCM call completed? Yes          Blanca Arroyo RN    7/27/2022, 11:51 EDT

## 2022-07-28 ENCOUNTER — OFFICE VISIT (OUTPATIENT)
Dept: CARDIAC SURGERY | Facility: CLINIC | Age: 47
End: 2022-07-28

## 2022-07-28 VITALS
WEIGHT: 242 LBS | HEIGHT: 73 IN | OXYGEN SATURATION: 98 % | DIASTOLIC BLOOD PRESSURE: 88 MMHG | HEART RATE: 92 BPM | TEMPERATURE: 97.7 F | BODY MASS INDEX: 32.07 KG/M2 | SYSTOLIC BLOOD PRESSURE: 146 MMHG

## 2022-07-28 DIAGNOSIS — I71.40 ABDOMINAL AORTIC ANEURYSM (AAA) WITHOUT RUPTURE: Primary | ICD-10-CM

## 2022-07-28 DIAGNOSIS — Z86.73 HISTORY OF CVA (CEREBROVASCULAR ACCIDENT): ICD-10-CM

## 2022-07-28 DIAGNOSIS — E78.2 MIXED HYPERLIPIDEMIA: ICD-10-CM

## 2022-07-28 DIAGNOSIS — N18.32 STAGE 3B CHRONIC KIDNEY DISEASE: ICD-10-CM

## 2022-07-28 DIAGNOSIS — E66.9 CLASS 1 OBESITY WITH SERIOUS COMORBIDITY AND BODY MASS INDEX (BMI) OF 32.0 TO 32.9 IN ADULT, UNSPECIFIED OBESITY TYPE: ICD-10-CM

## 2022-07-28 DIAGNOSIS — I25.10 CORONARY ARTERY DISEASE INVOLVING NATIVE CORONARY ARTERY OF NATIVE HEART WITHOUT ANGINA PECTORIS: ICD-10-CM

## 2022-07-28 DIAGNOSIS — J43.1 PANLOBULAR EMPHYSEMA: ICD-10-CM

## 2022-07-28 DIAGNOSIS — I10 BENIGN ESSENTIAL HTN: ICD-10-CM

## 2022-07-28 DIAGNOSIS — I73.9 PVD (PERIPHERAL VASCULAR DISEASE): ICD-10-CM

## 2022-07-28 PROCEDURE — 99204 OFFICE O/P NEW MOD 45 MIN: CPT | Performed by: THORACIC SURGERY (CARDIOTHORACIC VASCULAR SURGERY)

## 2022-07-29 ENCOUNTER — OFFICE VISIT (OUTPATIENT)
Dept: FAMILY MEDICINE CLINIC | Facility: CLINIC | Age: 47
End: 2022-07-29

## 2022-07-29 ENCOUNTER — LAB (OUTPATIENT)
Dept: LAB | Facility: HOSPITAL | Age: 47
End: 2022-07-29

## 2022-07-29 VITALS
TEMPERATURE: 97.8 F | BODY MASS INDEX: 32.07 KG/M2 | DIASTOLIC BLOOD PRESSURE: 70 MMHG | SYSTOLIC BLOOD PRESSURE: 110 MMHG | OXYGEN SATURATION: 97 % | HEIGHT: 73 IN | WEIGHT: 242 LBS | HEART RATE: 88 BPM

## 2022-07-29 DIAGNOSIS — I25.2 HISTORY OF MI (MYOCARDIAL INFARCTION): ICD-10-CM

## 2022-07-29 DIAGNOSIS — Z13.29 ENCOUNTER FOR SCREENING FOR ENDOCRINE DISORDER: ICD-10-CM

## 2022-07-29 DIAGNOSIS — R20.2 NUMBNESS AND TINGLING OF LEFT ARM AND LEG: ICD-10-CM

## 2022-07-29 DIAGNOSIS — M54.41 CHRONIC MIDLINE LOW BACK PAIN WITH BILATERAL SCIATICA: ICD-10-CM

## 2022-07-29 DIAGNOSIS — E86.0 DEHYDRATION: Primary | ICD-10-CM

## 2022-07-29 DIAGNOSIS — E79.0 HYPERURICEMIA: ICD-10-CM

## 2022-07-29 DIAGNOSIS — I25.10 CORONARY ARTERY DISEASE INVOLVING NATIVE CORONARY ARTERY OF NATIVE HEART WITHOUT ANGINA PECTORIS: ICD-10-CM

## 2022-07-29 DIAGNOSIS — N18.32 STAGE 3B CHRONIC KIDNEY DISEASE: ICD-10-CM

## 2022-07-29 DIAGNOSIS — E66.9 CLASS 1 OBESITY WITH SERIOUS COMORBIDITY AND BODY MASS INDEX (BMI) OF 31.0 TO 31.9 IN ADULT, UNSPECIFIED OBESITY TYPE: ICD-10-CM

## 2022-07-29 DIAGNOSIS — Z95.5 HISTORY OF HEART ARTERY STENT: ICD-10-CM

## 2022-07-29 DIAGNOSIS — M54.42 CHRONIC BILATERAL LOW BACK PAIN WITH BILATERAL SCIATICA: ICD-10-CM

## 2022-07-29 DIAGNOSIS — I73.9 PVD (PERIPHERAL VASCULAR DISEASE): ICD-10-CM

## 2022-07-29 DIAGNOSIS — M54.41 CHRONIC BILATERAL LOW BACK PAIN WITH BILATERAL SCIATICA: ICD-10-CM

## 2022-07-29 DIAGNOSIS — E78.2 MIXED HYPERLIPIDEMIA: ICD-10-CM

## 2022-07-29 DIAGNOSIS — N17.9 ACUTE RENAL FAILURE SUPERIMPOSED ON STAGE 3B CHRONIC KIDNEY DISEASE, UNSPECIFIED ACUTE RENAL FAILURE TYPE: ICD-10-CM

## 2022-07-29 DIAGNOSIS — G89.29 CHRONIC MIDLINE LOW BACK PAIN WITH BILATERAL SCIATICA: ICD-10-CM

## 2022-07-29 DIAGNOSIS — I10 ESSENTIAL HYPERTENSION: ICD-10-CM

## 2022-07-29 DIAGNOSIS — E86.0 DEHYDRATION: ICD-10-CM

## 2022-07-29 DIAGNOSIS — R20.0 NUMBNESS AND TINGLING OF LEFT ARM AND LEG: ICD-10-CM

## 2022-07-29 DIAGNOSIS — K59.09 CHRONIC CONSTIPATION: ICD-10-CM

## 2022-07-29 DIAGNOSIS — Z86.73 HISTORY OF CVA (CEREBROVASCULAR ACCIDENT): ICD-10-CM

## 2022-07-29 DIAGNOSIS — E87.5 HYPERKALEMIA: ICD-10-CM

## 2022-07-29 DIAGNOSIS — I25.10 CORONARY ARTERY DISEASE INVOLVING NATIVE HEART, UNSPECIFIED VESSEL OR LESION TYPE, UNSPECIFIED WHETHER ANGINA PRESENT: ICD-10-CM

## 2022-07-29 DIAGNOSIS — I10 BENIGN ESSENTIAL HTN: ICD-10-CM

## 2022-07-29 DIAGNOSIS — R73.01 IMPAIRED FASTING GLUCOSE: ICD-10-CM

## 2022-07-29 DIAGNOSIS — G89.29 CHRONIC BILATERAL LOW BACK PAIN WITH BILATERAL SCIATICA: ICD-10-CM

## 2022-07-29 DIAGNOSIS — M54.42 CHRONIC MIDLINE LOW BACK PAIN WITH BILATERAL SCIATICA: ICD-10-CM

## 2022-07-29 DIAGNOSIS — N18.32 ACUTE RENAL FAILURE SUPERIMPOSED ON STAGE 3B CHRONIC KIDNEY DISEASE, UNSPECIFIED ACUTE RENAL FAILURE TYPE: ICD-10-CM

## 2022-07-29 PROCEDURE — 80061 LIPID PANEL: CPT

## 2022-07-29 PROCEDURE — 82607 VITAMIN B-12: CPT

## 2022-07-29 PROCEDURE — 1111F DSCHRG MED/CURRENT MED MERGE: CPT | Performed by: FAMILY MEDICINE

## 2022-07-29 PROCEDURE — 84439 ASSAY OF FREE THYROXINE: CPT

## 2022-07-29 PROCEDURE — 82306 VITAMIN D 25 HYDROXY: CPT

## 2022-07-29 PROCEDURE — 85025 COMPLETE CBC W/AUTO DIFF WBC: CPT

## 2022-07-29 PROCEDURE — 84443 ASSAY THYROID STIM HORMONE: CPT

## 2022-07-29 PROCEDURE — 80053 COMPREHEN METABOLIC PANEL: CPT

## 2022-07-29 PROCEDURE — 99214 OFFICE O/P EST MOD 30 MIN: CPT | Performed by: FAMILY MEDICINE

## 2022-07-29 PROCEDURE — 83036 HEMOGLOBIN GLYCOSYLATED A1C: CPT

## 2022-07-29 RX ORDER — CHOLECALCIFEROL (VITAMIN D3) 125 MCG
500 CAPSULE ORAL DAILY
Qty: 90 TABLET | Refills: 1 | Status: SHIPPED | OUTPATIENT
Start: 2022-07-29

## 2022-07-29 RX ORDER — ERGOCALCIFEROL 1.25 MG/1
50000 CAPSULE ORAL
Qty: 5 CAPSULE | Refills: 3 | Status: SHIPPED | OUTPATIENT
Start: 2022-07-29 | End: 2022-10-05

## 2022-07-29 RX ORDER — GABAPENTIN 600 MG/1
300 TABLET ORAL 2 TIMES DAILY
Qty: 270 TABLET | Refills: 0
Start: 2022-07-29 | End: 2022-10-05

## 2022-07-29 RX ORDER — CLOPIDOGREL BISULFATE 75 MG/1
75 TABLET ORAL DAILY
Qty: 90 TABLET | Refills: 1 | Status: SHIPPED | OUTPATIENT
Start: 2022-07-29 | End: 2022-09-12 | Stop reason: SDUPTHER

## 2022-07-29 RX ORDER — HYDRALAZINE HYDROCHLORIDE 50 MG/1
50 TABLET, FILM COATED ORAL EVERY 8 HOURS SCHEDULED
Qty: 90 TABLET | Refills: 1 | Status: SHIPPED | OUTPATIENT
Start: 2022-07-29 | End: 2022-09-12 | Stop reason: SDUPTHER

## 2022-07-29 RX ORDER — ALLOPURINOL 100 MG/1
100 TABLET ORAL DAILY
Qty: 90 TABLET | Refills: 1 | Status: SHIPPED | OUTPATIENT
Start: 2022-07-29 | End: 2022-09-12 | Stop reason: SDUPTHER

## 2022-07-29 RX ORDER — LOSARTAN POTASSIUM 100 MG/1
100 TABLET ORAL DAILY
Qty: 90 TABLET | Refills: 1 | Status: SHIPPED | OUTPATIENT
Start: 2022-07-29 | End: 2022-09-12 | Stop reason: SDUPTHER

## 2022-07-29 RX ORDER — DILTIAZEM HYDROCHLORIDE 120 MG/1
120 CAPSULE, COATED, EXTENDED RELEASE ORAL DAILY
Qty: 90 CAPSULE | Refills: 0 | Status: SHIPPED | OUTPATIENT
Start: 2022-07-29 | End: 2022-11-07

## 2022-07-29 RX ORDER — CYCLOBENZAPRINE HCL 5 MG
5 TABLET ORAL NIGHTLY
Qty: 90 TABLET | Refills: 1 | Status: SHIPPED | OUTPATIENT
Start: 2022-07-29

## 2022-07-29 RX ORDER — CALCITRIOL 0.25 UG/1
0.25 CAPSULE, LIQUID FILLED ORAL DAILY
Qty: 90 CAPSULE | Refills: 1 | Status: SHIPPED | OUTPATIENT
Start: 2022-07-29 | End: 2022-09-12 | Stop reason: SDUPTHER

## 2022-07-29 RX ORDER — VIT C/B6/B5/MAGNESIUM/HERB 173 50-5-6-5MG
500 CAPSULE ORAL DAILY
Qty: 90 CAPSULE | Refills: 1 | Status: SHIPPED | OUTPATIENT
Start: 2022-07-29 | End: 2022-09-12 | Stop reason: SDUPTHER

## 2022-07-29 RX ORDER — LABETALOL 200 MG/1
200 TABLET, FILM COATED ORAL 2 TIMES DAILY
Qty: 180 TABLET | Refills: 1 | Status: SHIPPED | OUTPATIENT
Start: 2022-07-29 | End: 2022-09-12 | Stop reason: SDUPTHER

## 2022-07-30 LAB
25(OH)D3 SERPL-MCNC: 51.5 NG/ML (ref 30–100)
ALBUMIN SERPL-MCNC: 4.5 G/DL (ref 3.5–5.2)
ALBUMIN/GLOB SERPL: 1.3 G/DL
ALP SERPL-CCNC: 108 U/L (ref 39–117)
ALT SERPL W P-5'-P-CCNC: 34 U/L (ref 1–41)
ANION GAP SERPL CALCULATED.3IONS-SCNC: 11 MMOL/L (ref 5–15)
AST SERPL-CCNC: 29 U/L (ref 1–40)
BASOPHILS # BLD AUTO: 0.05 10*3/MM3 (ref 0–0.2)
BASOPHILS NFR BLD AUTO: 0.7 % (ref 0–1.5)
BILIRUB SERPL-MCNC: 0.2 MG/DL (ref 0–1.2)
BUN SERPL-MCNC: 49 MG/DL (ref 6–20)
BUN/CREAT SERPL: 15.4 (ref 7–25)
CALCIUM SPEC-SCNC: 9.7 MG/DL (ref 8.6–10.5)
CHLORIDE SERPL-SCNC: 101 MMOL/L (ref 98–107)
CHOLEST SERPL-MCNC: 123 MG/DL (ref 0–200)
CO2 SERPL-SCNC: 26 MMOL/L (ref 22–29)
CREAT SERPL-MCNC: 3.19 MG/DL (ref 0.76–1.27)
DEPRECATED RDW RBC AUTO: 38.1 FL (ref 37–54)
EGFRCR SERPLBLD CKD-EPI 2021: 23.1 ML/MIN/1.73
EOSINOPHIL # BLD AUTO: 0.32 10*3/MM3 (ref 0–0.4)
EOSINOPHIL NFR BLD AUTO: 4.3 % (ref 0.3–6.2)
ERYTHROCYTE [DISTWIDTH] IN BLOOD BY AUTOMATED COUNT: 12.9 % (ref 12.3–15.4)
GLOBULIN UR ELPH-MCNC: 3.4 GM/DL
GLUCOSE SERPL-MCNC: 101 MG/DL (ref 65–99)
HBA1C MFR BLD: 5.2 % (ref 4.8–5.6)
HCT VFR BLD AUTO: 43.5 % (ref 37.5–51)
HDLC SERPL-MCNC: 40 MG/DL (ref 40–60)
HGB BLD-MCNC: 14 G/DL (ref 13–17.7)
IMM GRANULOCYTES # BLD AUTO: 0.02 10*3/MM3 (ref 0–0.05)
IMM GRANULOCYTES NFR BLD AUTO: 0.3 % (ref 0–0.5)
LDLC SERPL CALC-MCNC: 60 MG/DL (ref 0–100)
LDLC/HDLC SERPL: 1.44 {RATIO}
LYMPHOCYTES # BLD AUTO: 1.77 10*3/MM3 (ref 0.7–3.1)
LYMPHOCYTES NFR BLD AUTO: 23.8 % (ref 19.6–45.3)
MCH RBC QN AUTO: 26.5 PG (ref 26.6–33)
MCHC RBC AUTO-ENTMCNC: 32.2 G/DL (ref 31.5–35.7)
MCV RBC AUTO: 82.2 FL (ref 79–97)
MONOCYTES # BLD AUTO: 0.88 10*3/MM3 (ref 0.1–0.9)
MONOCYTES NFR BLD AUTO: 11.8 % (ref 5–12)
NEUTROPHILS NFR BLD AUTO: 4.41 10*3/MM3 (ref 1.7–7)
NEUTROPHILS NFR BLD AUTO: 59.1 % (ref 42.7–76)
NRBC BLD AUTO-RTO: 0.1 /100 WBC (ref 0–0.2)
PLATELET # BLD AUTO: 329 10*3/MM3 (ref 140–450)
PMV BLD AUTO: 10.5 FL (ref 6–12)
POTASSIUM SERPL-SCNC: 5.4 MMOL/L (ref 3.5–5.2)
PROT SERPL-MCNC: 7.9 G/DL (ref 6–8.5)
RBC # BLD AUTO: 5.29 10*6/MM3 (ref 4.14–5.8)
SODIUM SERPL-SCNC: 138 MMOL/L (ref 136–145)
T4 FREE SERPL-MCNC: 1.32 NG/DL (ref 0.93–1.7)
TRIGL SERPL-MCNC: 127 MG/DL (ref 0–150)
TSH SERPL DL<=0.05 MIU/L-ACNC: 1.63 UIU/ML (ref 0.27–4.2)
VIT B12 BLD-MCNC: >2000 PG/ML (ref 211–946)
VLDLC SERPL-MCNC: 23 MG/DL (ref 5–40)
WBC NRBC COR # BLD: 7.45 10*3/MM3 (ref 3.4–10.8)

## 2022-08-03 ENCOUNTER — TELEPHONE (OUTPATIENT)
Dept: FAMILY MEDICINE CLINIC | Facility: CLINIC | Age: 47
End: 2022-08-03

## 2022-08-03 ENCOUNTER — READMISSION MANAGEMENT (OUTPATIENT)
Dept: CALL CENTER | Facility: HOSPITAL | Age: 47
End: 2022-08-03

## 2022-08-03 NOTE — OUTREACH NOTE
Medical Week 2 Survey    Flowsheet Row Responses   [facility] patient discharged from? [place]   Does the patient have one of the following disease processes/diagnoses(primary or secondary)? Other   Week 2 attempt successful? Yes   Call start time 1713   Discharge diagnosis Acute renal failure, essential HTN, stage 3 CKD, AAA   Call end time 1722   Is patient permission given to speak with other caregiver? Yes   List who call center can speak with Vandana Bonilla Relative    Person spoke with today (if not patient) and relationship Vandana and patient   Meds reviewed with patient/caregiver? Yes   Does the patient have all medications ordered at discharge? Yes   Is the patient taking all medications as directed (includes completed medication regime)? Yes   Does the patient have a primary care provider?  Yes   Comments regarding PCP patient has seen PCP since discharge.    Has the patient kept scheduled appointments due by today? Yes   Has home health visited the patient within 72 hours of discharge? N/A   Psychosocial issues? No   Did the patient receive a copy of their discharge instructions? Yes   Nursing interventions Reviewed instructions with patient   What is the patient's perception of their health status since discharge? Improving   Is the patient/caregiver able to teach back signs and symptoms related to disease process for when to call PCP? Yes   Is the patient/caregiver able to teach back signs and symptoms related to disease process for when to call 911? Yes   Is the patient/caregiver able to teach back the hierarchy of who to call/visit for symptoms/problems? PCP, Specialist, Home health nurse, Urgent Care, ED, 911 Yes   If the patient is a current smoker, are they able to teach back resources for cessation? Not a smoker   Week 2 Call Completed? Yes          OWEN Ruiz Registered Nurse

## 2022-08-08 ENCOUNTER — CLINICAL SUPPORT (OUTPATIENT)
Dept: CARDIOLOGY | Facility: CLINIC | Age: 47
End: 2022-08-08

## 2022-08-08 DIAGNOSIS — G81.94 LEFT HEMIPARESIS: Primary | ICD-10-CM

## 2022-08-08 NOTE — PROGRESS NOTES
Loop Recorder 30 day Event Summary, OFFICE     Indications:   Syncope         Loop recorder interrogation shows 0 episodes of atrial fibrillation     COUNTER for last 30 days  Symptoms: 0  Tachy: 0  Pause: 0  Ha: 0  AT: 0  AF: 0  % of time in AF: 0    Changes made: none. Rep activated home monitor for patient.     Observation Summary:     Diagnosis Plan   1. Left hemiparesis (HCC)

## 2022-08-12 ENCOUNTER — READMISSION MANAGEMENT (OUTPATIENT)
Dept: CALL CENTER | Facility: HOSPITAL | Age: 47
End: 2022-08-12

## 2022-08-12 NOTE — OUTREACH NOTE
Medical Week 3 Survey    Flowsheet Row Responses   [facility] facility patient discharged from? [place]   Does the patient have one of the following disease processes/diagnoses(primary or secondary)? Other   Week 3 attempt successful? No   Unsuccessful attempts Attempt 1   Discharge diagnosis Acute renal failure, essential HTN, stage 3 CKD, AAA          JOSIE T - Registered Nurse

## 2022-08-17 ENCOUNTER — TELEPHONE (OUTPATIENT)
Dept: FAMILY MEDICINE CLINIC | Facility: CLINIC | Age: 47
End: 2022-08-17

## 2022-08-17 NOTE — TELEPHONE ENCOUNTER
Patient would like a call back from nurse in regards to Dr. Duavll writing a letter to electric company stating patient needs assistance with paying his bill.   ID #: [id number]  Call back number: [phone]

## 2022-08-17 NOTE — TELEPHONE ENCOUNTER
Left VM for pt asking what letter needs to say specifically from Dr. Duvall. Asked pt to return call and leave detailed message with information.

## 2022-08-22 NOTE — TELEPHONE ENCOUNTER
Spoke to pt and he stated that the letter needs to say that he can receive $50 from [facility] to pay on utilities.

## 2022-09-01 ENCOUNTER — OFFICE VISIT (OUTPATIENT)
Dept: FAMILY MEDICINE CLINIC | Facility: CLINIC | Age: 47
End: 2022-09-01

## 2022-09-01 VITALS
DIASTOLIC BLOOD PRESSURE: 86 MMHG | WEIGHT: 250 LBS | HEIGHT: 73 IN | BODY MASS INDEX: 33.13 KG/M2 | HEART RATE: 90 BPM | TEMPERATURE: 97.8 F | SYSTOLIC BLOOD PRESSURE: 132 MMHG | OXYGEN SATURATION: 98 %

## 2022-09-01 DIAGNOSIS — I73.9 PVD (PERIPHERAL VASCULAR DISEASE): ICD-10-CM

## 2022-09-01 DIAGNOSIS — I10 ESSENTIAL HYPERTENSION: Primary | ICD-10-CM

## 2022-09-01 DIAGNOSIS — Z72.0 TOBACCO USER: ICD-10-CM

## 2022-09-01 DIAGNOSIS — E79.0 HYPERURICEMIA: ICD-10-CM

## 2022-09-01 DIAGNOSIS — I10 BENIGN ESSENTIAL HTN: ICD-10-CM

## 2022-09-01 DIAGNOSIS — E66.9 CLASS 1 OBESITY WITH SERIOUS COMORBIDITY AND BODY MASS INDEX (BMI) OF 32.0 TO 32.9 IN ADULT, UNSPECIFIED OBESITY TYPE: ICD-10-CM

## 2022-09-01 DIAGNOSIS — N18.4 STAGE 4 CHRONIC KIDNEY DISEASE: ICD-10-CM

## 2022-09-01 DIAGNOSIS — I25.10 CORONARY ARTERY DISEASE INVOLVING NATIVE HEART, UNSPECIFIED VESSEL OR LESION TYPE, UNSPECIFIED WHETHER ANGINA PRESENT: ICD-10-CM

## 2022-09-01 DIAGNOSIS — Z95.5 HISTORY OF HEART ARTERY STENT: ICD-10-CM

## 2022-09-01 DIAGNOSIS — E55.9 VITAMIN D DEFICIENCY, UNSPECIFIED: ICD-10-CM

## 2022-09-01 DIAGNOSIS — E78.2 MIXED HYPERLIPIDEMIA: ICD-10-CM

## 2022-09-01 DIAGNOSIS — I25.2 HISTORY OF MI (MYOCARDIAL INFARCTION): ICD-10-CM

## 2022-09-01 PROCEDURE — 99214 OFFICE O/P EST MOD 30 MIN: CPT | Performed by: FAMILY MEDICINE

## 2022-09-01 NOTE — PATIENT INSTRUCTIONS
Check Blood pressure at home and goal <130/90    Followup with Kidney doctor    Recheck in 2 months

## 2022-09-05 NOTE — PROGRESS NOTES
7/28/2022    Gregg Randle  1975    Chief Complaint:    Chief Complaint   Patient presents with   • Aortic Aneurysm       HPI:      PCP:  Dread Duvall MD  PCP:  Dread Duvall MD  PCP:  Dr Jiménez  Nephrology:  Dr Jones     45y.o. male with HTN(stable, increased risk stroke, rupture), Hyperlipidemia(stable, increased risk cardiovascular events), Obesity(uncontrolled, increased risk cardiovascular events), COPD(stable, increased risk pulmonary complications) and Chronic Kidney Disease(stable, increased risk renal failure) AAA(stable, increase risk rupture),   former smoker.  Asymptomatic AAA.  endoAAA repair 2017. Mild to moderate numbness tingling LEFT arm, leg.  LEFT leg occasionally gives out.  Abdominal ultrasound done for kidney evaluation shows AAA.  On Coumadin  .  No other associated signs, symptoms or modifying factors.     3/2017 US Retroperitoneal, Renal  ([facility]):  Medical renal disease.  52mm infrarenal AAA, iliac R 27mm, L 19mm.  6/2017 CT Abdomen Pelvis without contrast:  Descending thoracic aorta 27mm, renals 23mm, infrarenal 52mm, iliacs R 29mm, L 21mm, femorals R 12mm, L 13mm.  8/2017: Endo AAA  9/2019 CT Abdomen Pelvis:  Descending thoracic aorta 25mm, abdominal 45mm, iliac R 33mm, L 20mm, femoral R 11mm, L 10mm.  endoAAA stent graft in place.  10/2020 CT Abdomen Pelvis:  Descending thoracic aorta 27mm, abdominal 44mm, iliac R 32mm, L 21mm, femoral R 11mm, L 10mm.  endoAAA stent graft in place.  7/2022  CT Abdomen Pelvis:  Descending thoracic aorta 27mm, abdominal 49mm, iliac R 38mm, L 24mm, femoral R 12mm, L 12mm.  endoAAA stent graft in place. No endoleak    5/2017 VIKASH:  RIGHT 1.1 triphasic.  LEFT 1.1 triphasic.     The following portions of the patient's history were reviewed and updated as appropriate: allergies, current medications, past family history, past medical history, past social history, past surgical history and problem list.  Recent images independently reviewed.   Available laboratory values reviewed.    PMH:  Past Medical History:   Diagnosis Date   • AAA (abdominal aortic aneurysm) ([facility])    • Arthritis    • CAD (coronary artery disease)    • COPD (chronic obstructive pulmonary disease) ([facility])    • Depression    • Depression    • HTN (hypertension)    • Hyperlipidemia    • MI (myocardial infarction) ([facility])     x 2   • Renal insufficiency    • Sleep apnea     using c-pap   • Sleep apnea    • Stroke ([facility])     no residual except some intermittent vision changes   • Vision changes     post stroke, takes a minute to focus     Past Surgical History:   Procedure Laterality Date   • COLONOSCOPY N/A 11/17/2020    Procedure: COLONOSCOPY;  Surgeon: Abi Miller MD;  Location: [facility] ENDOSCOPY;  Service: Gastroenterology;  Laterality: N/A;   • CORONARY ANGIOPLASTY WITH STENT PLACEMENT     • EAR TUBES Left    • [place] AAA REPAIR,AORTO-AORTIC TUBE PROSTH N/A 8/11/2017    Procedure: ABDOMINAL AORTIC ANEURYSM REPAIR WITH ENDOGRAFT, REPAIR ILIAC ARTERY ANEURYSM, POSSIBLE OPEN ABDOMINAL AORTOGRAM    (CELL SAVER STAND-BY);  Surgeon: Aren Arshad MD;  Location: [facility] OR;  Service: Vascular     Family History   Problem Relation Age of Onset   • Stroke Mother    • Diabetes Mother    • Heart disease Father      Social History     Tobacco Use   • Smoking status: Former Smoker     Packs/day: 1.00     Years: 20.00     Pack years: 20.00   • Smokeless tobacco: Never Used   Vaping Use   • Vaping Use: Never used   Substance Use Topics   • Alcohol use: No   • Drug use: No       ALLERGIES:  No Known Allergies      MEDICATIONS:    Current Outpatient Medications:   •  albuterol (PROVENTIL HFA;VENTOLIN HFA) 108 (90 BASE) MCG/ACT inhaler, Inhale 2 puffs Every 4 (Four) Hours As Needed for Wheezing., Disp: , Rfl:   •  atorvastatin (LIPITOR) 40 MG tablet, Take 1 tablet by mouth Every Night., Disp: 90 tablet, Rfl: 3  •  brimonidine (ALPHAGAN) 0.2 % ophthalmic solution, Administer 1 drop to the right eye  3 (Three) Times a Day., Disp: , Rfl:   •  allopurinol (ZYLOPRIM) 100 MG tablet, Take 1 tablet by mouth Daily., Disp: 90 tablet, Rfl: 1  •  calcitriol (ROCALTROL) 0.25 MCG capsule, Take 1 capsule by mouth Daily., Disp: 90 capsule, Rfl: 1  •  clopidogrel (PLAVIX) 75 MG tablet, Take 1 tablet by mouth Daily., Disp: 90 tablet, Rfl: 1  •  cyclobenzaprine (FLEXERIL) 5 MG tablet, Take 1 tablet by mouth Every Night., Disp: 90 tablet, Rfl: 1  •  dilTIAZem CD (Cardizem CD) 120 MG 24 hr capsule, Take 1 capsule by mouth Daily., Disp: 90 capsule, Rfl: 0  •  gabapentin (NEURONTIN) 600 MG tablet, Take 0.5 tablets by mouth 2 (Two) Times a Day., Disp: 270 tablet, Rfl: 0  •  guanFACINE (TENEX) 1 MG tablet, Take 1 tablet by mouth Every Night for 90 days., Disp: 90 tablet, Rfl: 1  •  hydrALAZINE (APRESOLINE) 50 MG tablet, Take 1 tablet by mouth Every 8 (Eight) Hours., Disp: 90 tablet, Rfl: 1  •  labetalol (NORMODYNE) 200 MG tablet, Take 1 tablet by mouth 2 (Two) Times a Day., Disp: 180 tablet, Rfl: 1  •  linaclotide (Linzess) 145 MCG capsule capsule, Take 1 capsule by mouth Every Morning Before Breakfast., Disp: 90 capsule, Rfl: 1  •  losartan (COZAAR) 100 MG tablet, Take 1 tablet by mouth Daily., Disp: 90 tablet, Rfl: 1  •  Turmeric (QC Tumeric Complex) 500 MG capsule, Take 1 capsule by mouth Daily., Disp: 90 capsule, Rfl: 1  •  vitamin B-12 (CYANOCOBALAMIN) 500 MCG tablet, Take 1 tablet by mouth Daily., Disp: 90 tablet, Rfl: 1  •  vitamin D (ERGOCALCIFEROL) 1.25 MG (06117 UT) capsule capsule, Take 1 capsule by mouth Every 7 (Seven) Days., Disp: 5 capsule, Rfl: 3    Review of Systems   Review of Systems   Constitutional: Positive for malaise/fatigue. Negative for weight loss.   HENT: Positive for hearing loss.    Eyes: Positive for blurred vision.   Cardiovascular: Negative for chest pain, claudication and dyspnea on exertion.   Respiratory: Negative for cough and shortness of breath.    Skin: Negative for color change and poor wound  "healing.   Musculoskeletal: Positive for arthritis, back pain and neck pain.   Neurological: Positive for dizziness, focal weakness, numbness and paresthesias. Negative for weakness.       Physical Exam   Vitals:    07/28/22 1308   BP: 146/88   BP Location: Left arm   Patient Position: Sitting   Cuff Size: Adult   Pulse: 92   Temp: 97.7 °F (36.5 °C)   TempSrc: Temporal   SpO2: 98%   Weight: 110 kg (242 lb)   Height: 185.4 cm (73\")     Body surface area is 2.34 meters squared.  Body mass index is 31.93 kg/m².  Physical Exam  Constitutional:       General: He is not in acute distress.     Appearance: He is not ill-appearing.   HENT:      Right Ear: Hearing normal.      Left Ear: Hearing normal.      Nose: No nasal deformity.      Mouth/Throat:      Dentition: Normal dentition. Does not have dentures.   Cardiovascular:      Rate and Rhythm: Normal rate and regular rhythm.      Pulses:           Carotid pulses are 2+ on the right side and 2+ on the left side.       Radial pulses are 2+ on the right side and 2+ on the left side.        Posterior tibial pulses are 2+ on the right side and 2+ on the left side.      Heart sounds: No murmur heard.  Pulmonary:      Effort: Pulmonary effort is normal.      Breath sounds: Normal breath sounds.   Abdominal:      General: There is no distension.      Palpations: Abdomen is soft. There is no mass.      Tenderness: There is no abdominal tenderness.   Musculoskeletal:         General: No deformity.      Comments: Gait normal.    Skin:     General: Skin is warm and dry.      Coloration: Skin is not pale.      Findings: No erythema.      Comments: No venous staining   Neurological:      Mental Status: He is alert and oriented to person, place, and time.   Psychiatric:         Speech: Speech normal.         Behavior: Behavior is cooperative.         Thought Content: Thought content normal.         Judgment: Judgment normal.         BUN   Date Value Ref Range Status   07/29/2022 49 (H) " 6 - 20 mg/dL Final     Creatinine   Date Value Ref Range Status   07/29/2022 3.19 (H) 0.76 - 1.27 mg/dL Final     eGFR   Amer   Date Value Ref Range Status   10/27/2021 40 (L) >60 mL/min/1.73 Final       ASSESSMENT:  Diagnoses and all orders for this visit:    1. Abdominal aortic aneurysm (AAA) without rupture (HCC) (Primary)  -     CT Abdomen Pelvis Without Contrast; Future    2. PVD (peripheral vascular disease) (HCC)    3. Mixed hyperlipidemia    4. Panlobular emphysema (HCC)    5. Benign essential HTN    6. Coronary artery disease involving native coronary artery of native heart without angina pectoris    7. Class 1 obesity with serious comorbidity and body mass index (BMI) of 32.0 to 32.9 in adult, unspecified obesity type    8. History of CVA (cerebrovascular accident)    9. Stage 3b chronic kidney disease (HCC)    PLAN:  Detailed discussion with Gregg Randle regarding situation and options.  Aneurysm abdominal aorta and iliac artery. Slow enlargement RIGHT iliac may require intervention if continues. Surgical intervention not indicated at this time.  Will plan to follow with interval imaging.  Smoking cessation, lipid management, BP control in 120 range advised.  Discussed symptoms of rupture, details of surgical repair including endovascular and open repair.  Risks, benefits discussed.  Understands and wishes to proceed with plan    Return in 1 year with CT Abdomen Pelvis without    Return after above studies complete  Obesity Class  1. Increased risk cardiovascular events, sleep and breathing disorders, joint issues, type 2 diabetes mellitus. Options for weight management, heart healthy diet, exercise programs, and associated health risks of obesity discussed.    Recommended regular physical activity, progressive walking program.  Continue current medications as directed.  Advance Care Planning   ACP discussion was declined by the patient. Patient does not have an advance directive, declines  further assistance.     Thank you for the opportunity to participate in this patient's care.    Copy to primary care provider.

## 2022-09-12 DIAGNOSIS — G89.29 CHRONIC BILATERAL LOW BACK PAIN WITH BILATERAL SCIATICA: ICD-10-CM

## 2022-09-12 DIAGNOSIS — K59.09 CHRONIC CONSTIPATION: ICD-10-CM

## 2022-09-12 DIAGNOSIS — M54.41 CHRONIC BILATERAL LOW BACK PAIN WITH BILATERAL SCIATICA: ICD-10-CM

## 2022-09-12 DIAGNOSIS — E79.0 HYPERURICEMIA: ICD-10-CM

## 2022-09-12 DIAGNOSIS — M54.42 CHRONIC BILATERAL LOW BACK PAIN WITH BILATERAL SCIATICA: ICD-10-CM

## 2022-09-12 DIAGNOSIS — I25.10 CORONARY ARTERY DISEASE INVOLVING NATIVE CORONARY ARTERY OF NATIVE HEART WITHOUT ANGINA PECTORIS: ICD-10-CM

## 2022-09-12 DIAGNOSIS — I10 BENIGN ESSENTIAL HTN: ICD-10-CM

## 2022-09-12 DIAGNOSIS — N18.32 STAGE 3B CHRONIC KIDNEY DISEASE: ICD-10-CM

## 2022-09-12 RX ORDER — LOSARTAN POTASSIUM 100 MG/1
100 TABLET ORAL DAILY
Qty: 90 TABLET | Refills: 0 | Status: SHIPPED | OUTPATIENT
Start: 2022-09-12 | End: 2022-12-16 | Stop reason: SDUPTHER

## 2022-09-12 RX ORDER — HYDRALAZINE HYDROCHLORIDE 50 MG/1
50 TABLET, FILM COATED ORAL EVERY 8 HOURS SCHEDULED
Qty: 90 TABLET | Refills: 0 | Status: SHIPPED | OUTPATIENT
Start: 2022-09-12 | End: 2022-09-12

## 2022-09-12 RX ORDER — ALLOPURINOL 100 MG/1
100 TABLET ORAL DAILY
Qty: 90 TABLET | Refills: 0 | Status: SHIPPED | OUTPATIENT
Start: 2022-09-12 | End: 2022-12-16 | Stop reason: SDUPTHER

## 2022-09-12 RX ORDER — CLOPIDOGREL BISULFATE 75 MG/1
75 TABLET ORAL DAILY
Qty: 90 TABLET | Refills: 0 | Status: SHIPPED | OUTPATIENT
Start: 2022-09-12

## 2022-09-12 RX ORDER — VIT C/B6/B5/MAGNESIUM/HERB 173 50-5-6-5MG
500 CAPSULE ORAL DAILY
Qty: 90 CAPSULE | Refills: 0 | Status: SHIPPED | OUTPATIENT
Start: 2022-09-12

## 2022-09-12 RX ORDER — LABETALOL 200 MG/1
200 TABLET, FILM COATED ORAL 2 TIMES DAILY
Qty: 180 TABLET | Refills: 0 | Status: SHIPPED | OUTPATIENT
Start: 2022-09-12

## 2022-09-12 RX ORDER — CALCITRIOL 0.25 UG/1
0.25 CAPSULE, LIQUID FILLED ORAL DAILY
Qty: 90 CAPSULE | Refills: 0 | Status: SHIPPED | OUTPATIENT
Start: 2022-09-12 | End: 2022-12-16 | Stop reason: SDUPTHER

## 2022-09-12 RX ORDER — HYDRALAZINE HYDROCHLORIDE 50 MG/1
TABLET, FILM COATED ORAL
Qty: 270 TABLET | Refills: 0 | Status: SHIPPED | OUTPATIENT
Start: 2022-09-12 | End: 2023-03-02

## 2022-09-12 NOTE — TELEPHONE ENCOUNTER
Rx Refill Note  Requested Prescriptions     Pending Prescriptions Disp Refills   • hydrALAZINE (APRESOLINE) 50 MG tablet 90 tablet 1     Sig: Take 1 tablet by mouth Every 8 (Eight) Hours.   • linaclotide (Linzess) 145 MCG capsule capsule 90 capsule 1     Sig: Take 1 capsule by mouth Every Morning Before Breakfast.   • losartan (COZAAR) 100 MG tablet 90 tablet 1     Sig: Take 1 tablet by mouth Daily.   • calcitriol (ROCALTROL) 0.25 MCG capsule 90 capsule 1     Sig: Take 1 capsule by mouth Daily.   • clopidogrel (PLAVIX) 75 MG tablet 90 tablet 1     Sig: Take 1 tablet by mouth Daily.   • Turmeric (QC Tumeric Complex) 500 MG capsule 90 capsule 1     Sig: Take 1 capsule by mouth Daily.   • labetalol (NORMODYNE) 200 MG tablet 180 tablet 1     Sig: Take 1 tablet by mouth 2 (Two) Times a Day.   • allopurinol (ZYLOPRIM) 100 MG tablet 90 tablet 1     Sig: Take 1 tablet by mouth Daily.      Last office visit with prescribing clinician: 9/1/2022      Next office visit with prescribing clinician: 11/3/2022   {TIP  Encounters:    ARB Protocol Failed 09/12/2022 02:36 PM   Protocol Details  Normal serum potassium on file within the past year    GFR > 30 in past year      Gout Agent Protocol Failed 09/12/2022 02:36 PM   Protocol Details  Creatinine < 1.3 in past 12 months            {TIP  Please add Last Relevant Lab Date if appropriate  {TIP  Is Refill Pharmacy correct? yes  Reginald Pineda LPN  09/12/22, 14:39 CDT

## 2022-09-15 RX ORDER — CYCLOBENZAPRINE HCL 10 MG
10 TABLET ORAL
Qty: 30 TABLET | Refills: 3 | OUTPATIENT
Start: 2022-09-15

## 2022-09-15 NOTE — TELEPHONE ENCOUNTER
Rx Refill Note  Requested Prescriptions     Pending Prescriptions Disp Refills   • cyclobenzaprine (FLEXERIL) 10 MG tablet [Pharmacy Med Name: CYCLOBENZAPRINE 10MG TABLETS] 30 tablet 3     Sig: TAKE 1 TABLET BY MOUTH EVERY NIGHT AT BEDTIME      Last office visit with prescribing clinician: 9/1/2022      Next office visit with prescribing clinician: 11/3/2022   {TIP  Encounters:    discontinued on 11/30/2020 by Dread Duvall MD for the following reason:          Reginald Pineda LPN  09/15/22, 15:54 CDT

## 2022-09-19 ENCOUNTER — TELEPHONE (OUTPATIENT)
Dept: FAMILY MEDICINE CLINIC | Facility: CLINIC | Age: 47
End: 2022-09-19

## 2022-09-19 NOTE — TELEPHONE ENCOUNTER
Patient called stated he is needing a refill on his cyclobenzaprine (FLEXERIL) 5 MG tablet.    Could that please get called into [facility]?    I let patient know that has been requested.

## 2022-09-20 NOTE — TELEPHONE ENCOUNTER
Called pt. No answer. Left voicemail informing medication was sent to mail order in July and has refills. He will need to contact them for a refill.

## 2022-09-28 DIAGNOSIS — I10 BENIGN ESSENTIAL HTN: ICD-10-CM

## 2022-10-01 RX ORDER — GUANFACINE 1 MG/1
TABLET ORAL
Qty: 90 TABLET | Refills: 0 | Status: SHIPPED | OUTPATIENT
Start: 2022-10-01 | End: 2023-03-23

## 2022-10-03 ENCOUNTER — TELEPHONE (OUTPATIENT)
Dept: FAMILY MEDICINE CLINIC | Facility: CLINIC | Age: 47
End: 2022-10-03

## 2022-10-03 NOTE — TELEPHONE ENCOUNTER
Patient would like 3 month refills on his gabapentin and Vitamin D. Patient said he was out now. Let patient know to allow 24-48 hours for approval/denial of medications.  Call back number: [phone]

## 2022-10-04 DIAGNOSIS — M54.42 CHRONIC MIDLINE LOW BACK PAIN WITH BILATERAL SCIATICA: ICD-10-CM

## 2022-10-04 DIAGNOSIS — Z86.73 HISTORY OF CVA (CEREBROVASCULAR ACCIDENT): ICD-10-CM

## 2022-10-04 DIAGNOSIS — M54.41 CHRONIC MIDLINE LOW BACK PAIN WITH BILATERAL SCIATICA: ICD-10-CM

## 2022-10-04 DIAGNOSIS — G89.29 CHRONIC MIDLINE LOW BACK PAIN WITH BILATERAL SCIATICA: ICD-10-CM

## 2022-10-04 RX ORDER — ERGOCALCIFEROL 1.25 MG/1
CAPSULE ORAL
Qty: 4 CAPSULE | OUTPATIENT
Start: 2022-10-04

## 2022-10-05 RX ORDER — ERGOCALCIFEROL 1.25 MG/1
CAPSULE ORAL
Qty: 4 CAPSULE | Refills: 3 | Status: SHIPPED | OUTPATIENT
Start: 2022-10-05 | End: 2023-03-02

## 2022-10-05 RX ORDER — GABAPENTIN 600 MG/1
TABLET ORAL
Qty: 90 TABLET | Refills: 1 | Status: SHIPPED | OUTPATIENT
Start: 2022-10-05 | End: 2022-10-07 | Stop reason: SDUPTHER

## 2022-10-05 RX ORDER — DILTIAZEM HYDROCHLORIDE 180 MG/1
180 CAPSULE, COATED, EXTENDED RELEASE ORAL DAILY
Qty: 30 CAPSULE | Refills: 3 | Status: SHIPPED | OUTPATIENT
Start: 2022-10-05 | End: 2023-02-02 | Stop reason: SDUPTHER

## 2022-10-07 DIAGNOSIS — G89.29 CHRONIC MIDLINE LOW BACK PAIN WITH BILATERAL SCIATICA: ICD-10-CM

## 2022-10-07 DIAGNOSIS — M54.41 CHRONIC MIDLINE LOW BACK PAIN WITH BILATERAL SCIATICA: ICD-10-CM

## 2022-10-07 DIAGNOSIS — Z86.73 HISTORY OF CVA (CEREBROVASCULAR ACCIDENT): ICD-10-CM

## 2022-10-07 DIAGNOSIS — M54.42 CHRONIC MIDLINE LOW BACK PAIN WITH BILATERAL SCIATICA: ICD-10-CM

## 2022-10-07 RX ORDER — GABAPENTIN 600 MG/1
600 TABLET ORAL 3 TIMES DAILY
Qty: 90 TABLET | Refills: 1 | Status: SHIPPED | OUTPATIENT
Start: 2022-10-07 | End: 2023-01-25

## 2022-10-08 PROCEDURE — G2066 INTER DEVC REMOTE 30D: HCPCS | Performed by: INTERNAL MEDICINE

## 2022-10-08 PROCEDURE — 93298 REM INTERROG DEV EVAL SCRMS: CPT | Performed by: INTERNAL MEDICINE

## 2022-10-15 DIAGNOSIS — K59.09 CHRONIC CONSTIPATION: ICD-10-CM

## 2022-10-17 RX ORDER — ATORVASTATIN CALCIUM 20 MG/1
20 TABLET, FILM COATED ORAL NIGHTLY
Qty: 90 TABLET | Refills: 3 | OUTPATIENT
Start: 2022-10-17

## 2022-10-17 RX ORDER — LINACLOTIDE 145 UG/1
CAPSULE, GELATIN COATED ORAL
Qty: 90 CAPSULE | Refills: 0 | Status: SHIPPED | OUTPATIENT
Start: 2022-10-17 | End: 2023-03-02 | Stop reason: SDUPTHER

## 2022-10-17 NOTE — TELEPHONE ENCOUNTER
Rx Refill Note  Requested Prescriptions     Pending Prescriptions Disp Refills   • atorvastatin (LIPITOR) 20 MG tablet [Pharmacy Med Name: ATORVASTATIN 20MG TABLETS] 90 tablet 3     Sig: TAKE 1 TABLET BY MOUTH EVERY NIGHT   • Linzess 145 MCG capsule capsule [Pharmacy Med Name: LINZESS 145MCG CAPSULES] 90 capsule 0     Sig: TAKE 1 CAPSULE BY MOUTH EVERY MORNING BEFORE BREAKFAST      Last office visit with prescribing clinician: 9/1/2022      Next office visit with prescribing clinician: 11/3/2022   {TIP  Encounters:    discontinued on 10/14/2021 by Dread Duvall MD-PT IS ON 40 MG PER CARDIOLOGY    {TIP  Please add Last Relevant Lab Date if appropriate  {TIP  Is Refill Pharmacy correct? YES  Reginald Pineda LPN  10/17/22, 08:55 CDT

## 2022-11-03 ENCOUNTER — LAB (OUTPATIENT)
Dept: LAB | Facility: HOSPITAL | Age: 47
End: 2022-11-03

## 2022-11-03 ENCOUNTER — OFFICE VISIT (OUTPATIENT)
Dept: FAMILY MEDICINE CLINIC | Facility: CLINIC | Age: 47
End: 2022-11-03

## 2022-11-03 VITALS
HEART RATE: 80 BPM | OXYGEN SATURATION: 96 % | DIASTOLIC BLOOD PRESSURE: 72 MMHG | SYSTOLIC BLOOD PRESSURE: 126 MMHG | BODY MASS INDEX: 31.51 KG/M2 | TEMPERATURE: 97.8 F | HEIGHT: 73 IN | WEIGHT: 237.8 LBS

## 2022-11-03 DIAGNOSIS — M79.605 LEFT LEG PAIN: ICD-10-CM

## 2022-11-03 DIAGNOSIS — N18.32 STAGE 3B CHRONIC KIDNEY DISEASE: ICD-10-CM

## 2022-11-03 DIAGNOSIS — E78.2 MIXED HYPERLIPIDEMIA: ICD-10-CM

## 2022-11-03 DIAGNOSIS — E55.9 VITAMIN D DEFICIENCY, UNSPECIFIED: ICD-10-CM

## 2022-11-03 DIAGNOSIS — G89.29 CHRONIC MIDLINE LOW BACK PAIN WITH LEFT-SIDED SCIATICA: Primary | ICD-10-CM

## 2022-11-03 DIAGNOSIS — R79.89 HIGH SERUM VITAMIN B12: ICD-10-CM

## 2022-11-03 DIAGNOSIS — E66.9 OBESITY WITH SERIOUS COMORBIDITY, UNSPECIFIED CLASSIFICATION, UNSPECIFIED OBESITY TYPE: ICD-10-CM

## 2022-11-03 DIAGNOSIS — I73.9 PVD (PERIPHERAL VASCULAR DISEASE): ICD-10-CM

## 2022-11-03 DIAGNOSIS — Z95.5 HISTORY OF HEART ARTERY STENT: ICD-10-CM

## 2022-11-03 DIAGNOSIS — I10 BENIGN ESSENTIAL HTN: ICD-10-CM

## 2022-11-03 DIAGNOSIS — I65.21 CAROTID STENOSIS, RIGHT: ICD-10-CM

## 2022-11-03 DIAGNOSIS — I25.10 CORONARY ARTERY DISEASE INVOLVING NATIVE HEART, UNSPECIFIED VESSEL OR LESION TYPE, UNSPECIFIED WHETHER ANGINA PRESENT: ICD-10-CM

## 2022-11-03 DIAGNOSIS — M54.16 LUMBAR RADICULOPATHY: ICD-10-CM

## 2022-11-03 DIAGNOSIS — E79.0 HYPERURICEMIA: ICD-10-CM

## 2022-11-03 DIAGNOSIS — R42 DIZZINESS: ICD-10-CM

## 2022-11-03 DIAGNOSIS — M54.42 CHRONIC MIDLINE LOW BACK PAIN WITH LEFT-SIDED SCIATICA: Primary | ICD-10-CM

## 2022-11-03 LAB
25(OH)D3 SERPL-MCNC: 57.1 NG/ML (ref 30–100)
ALBUMIN SERPL-MCNC: 4.7 G/DL (ref 3.5–5.2)
ALBUMIN/GLOB SERPL: 1.6 G/DL
ALP SERPL-CCNC: 87 U/L (ref 39–117)
ALT SERPL W P-5'-P-CCNC: 15 U/L (ref 1–41)
ANION GAP SERPL CALCULATED.3IONS-SCNC: 11 MMOL/L (ref 5–15)
AST SERPL-CCNC: 22 U/L (ref 1–40)
BASOPHILS # BLD AUTO: 0.04 10*3/MM3 (ref 0–0.2)
BASOPHILS NFR BLD AUTO: 0.7 % (ref 0–1.5)
BILIRUB SERPL-MCNC: 0.5 MG/DL (ref 0–1.2)
BUN SERPL-MCNC: 40 MG/DL (ref 6–20)
BUN/CREAT SERPL: 13.4 (ref 7–25)
CALCIUM SPEC-SCNC: 10.2 MG/DL (ref 8.6–10.5)
CHLORIDE SERPL-SCNC: 102 MMOL/L (ref 98–107)
CHOLEST SERPL-MCNC: 129 MG/DL (ref 0–200)
CO2 SERPL-SCNC: 25 MMOL/L (ref 22–29)
CREAT SERPL-MCNC: 2.99 MG/DL (ref 0.76–1.27)
DEPRECATED RDW RBC AUTO: 38.8 FL (ref 37–54)
EGFRCR SERPLBLD CKD-EPI 2021: 24.9 ML/MIN/1.73
EOSINOPHIL # BLD AUTO: 0.19 10*3/MM3 (ref 0–0.4)
EOSINOPHIL NFR BLD AUTO: 3.3 % (ref 0.3–6.2)
ERYTHROCYTE [DISTWIDTH] IN BLOOD BY AUTOMATED COUNT: 13 % (ref 12.3–15.4)
GLOBULIN UR ELPH-MCNC: 2.9 GM/DL
GLUCOSE SERPL-MCNC: 103 MG/DL (ref 65–99)
HCT VFR BLD AUTO: 41 % (ref 37.5–51)
HDLC SERPL-MCNC: 44 MG/DL (ref 40–60)
HGB BLD-MCNC: 13.1 G/DL (ref 13–17.7)
IMM GRANULOCYTES # BLD AUTO: 0.02 10*3/MM3 (ref 0–0.05)
IMM GRANULOCYTES NFR BLD AUTO: 0.4 % (ref 0–0.5)
LDLC SERPL CALC-MCNC: 69 MG/DL (ref 0–100)
LDLC/HDLC SERPL: 1.58 {RATIO}
LYMPHOCYTES # BLD AUTO: 1.45 10*3/MM3 (ref 0.7–3.1)
LYMPHOCYTES NFR BLD AUTO: 25.5 % (ref 19.6–45.3)
MCH RBC QN AUTO: 26.5 PG (ref 26.6–33)
MCHC RBC AUTO-ENTMCNC: 32 G/DL (ref 31.5–35.7)
MCV RBC AUTO: 82.8 FL (ref 79–97)
MONOCYTES # BLD AUTO: 0.76 10*3/MM3 (ref 0.1–0.9)
MONOCYTES NFR BLD AUTO: 13.4 % (ref 5–12)
NEUTROPHILS NFR BLD AUTO: 3.23 10*3/MM3 (ref 1.7–7)
NEUTROPHILS NFR BLD AUTO: 56.7 % (ref 42.7–76)
NRBC BLD AUTO-RTO: 0 /100 WBC (ref 0–0.2)
PLATELET # BLD AUTO: 316 10*3/MM3 (ref 140–450)
PMV BLD AUTO: 10.3 FL (ref 6–12)
POTASSIUM SERPL-SCNC: 4.7 MMOL/L (ref 3.5–5.2)
PROT SERPL-MCNC: 7.6 G/DL (ref 6–8.5)
RBC # BLD AUTO: 4.95 10*6/MM3 (ref 4.14–5.8)
SODIUM SERPL-SCNC: 138 MMOL/L (ref 136–145)
TRIGL SERPL-MCNC: 78 MG/DL (ref 0–150)
URATE SERPL-MCNC: 7.4 MG/DL (ref 3.4–7)
VIT B12 BLD-MCNC: 1092 PG/ML (ref 211–946)
VLDLC SERPL-MCNC: 16 MG/DL (ref 5–40)
WBC NRBC COR # BLD: 5.69 10*3/MM3 (ref 3.4–10.8)

## 2022-11-03 PROCEDURE — 80053 COMPREHEN METABOLIC PANEL: CPT

## 2022-11-03 PROCEDURE — 84550 ASSAY OF BLOOD/URIC ACID: CPT

## 2022-11-03 PROCEDURE — 90686 IIV4 VACC NO PRSV 0.5 ML IM: CPT | Performed by: FAMILY MEDICINE

## 2022-11-03 PROCEDURE — 85025 COMPLETE CBC W/AUTO DIFF WBC: CPT

## 2022-11-03 PROCEDURE — 82607 VITAMIN B-12: CPT

## 2022-11-03 PROCEDURE — G0008 ADMIN INFLUENZA VIRUS VAC: HCPCS | Performed by: FAMILY MEDICINE

## 2022-11-03 PROCEDURE — 99214 OFFICE O/P EST MOD 30 MIN: CPT | Performed by: FAMILY MEDICINE

## 2022-11-03 PROCEDURE — 80061 LIPID PANEL: CPT

## 2022-11-03 PROCEDURE — 82306 VITAMIN D 25 HYDROXY: CPT

## 2022-11-03 NOTE — PROGRESS NOTES
Injection  Injection performed in Left Deltoid by Dylan Rodas MA. Patient tolerated the procedure well without complications.  11/03/22   Dylan Rodas MA

## 2022-11-03 NOTE — PATIENT INSTRUCTIONS
Get labwork before next vsiit    Will order MRI of back    Will refer to Dr. Zamora with Neurology for back pain and leg pain    Will get repeat carotid ultrasound     Recheck in 4 weeks

## 2022-11-07 ENCOUNTER — TELEPHONE (OUTPATIENT)
Dept: FAMILY MEDICINE CLINIC | Facility: CLINIC | Age: 47
End: 2022-11-07

## 2022-11-07 NOTE — TELEPHONE ENCOUNTER
Tried giving pt results and recommendations. He stated that someone doesn't know what they are doing with his blood work, results, and medication. He stated that he will talk to Dr. Duvall on his next visit about these and that if he can not do better with these then he will have to find a new doctor.

## 2022-11-07 NOTE — TELEPHONE ENCOUNTER
----- Message from Dread Duvall MD sent at 11/4/2022 10:57 AM CDT -----  Please call pt    On CMP kidney function shows GFR at 24.9 from 23. This is considered stage 4 range and continue followup with his Nephrologist     Vitamin B12 high have pt take supplement every other week instead of daily    Uric acid at 7.4 and if pt is taking allopurinol 100 mg daily have it increased to 200 mg PO q daily give 30 pills and 3 refills.      Rest of labwork stable    Recheck on next visit thanks

## 2022-11-15 ENCOUNTER — TELEPHONE (OUTPATIENT)
Dept: CARDIOLOGY | Facility: CLINIC | Age: 47
End: 2022-11-15

## 2022-11-15 NOTE — TELEPHONE ENCOUNTER
Left message asking patient if he could come in at 1:30 on 11/22/22 for ICM interrogation. [employer] rep will be here on that date.

## 2022-11-22 ENCOUNTER — CLINICAL SUPPORT (OUTPATIENT)
Dept: CARDIOLOGY | Facility: CLINIC | Age: 47
End: 2022-11-22

## 2022-11-22 DIAGNOSIS — Z86.73 HISTORY OF CVA (CEREBROVASCULAR ACCIDENT): Primary | ICD-10-CM

## 2022-11-22 PROCEDURE — 93285 PRGRMG DEV EVAL SCRMS IP: CPT | Performed by: INTERNAL MEDICINE

## 2022-11-22 NOTE — PROGRESS NOTES
Loop Recorder 30 day Event Summary, OFFICE     Indications:   Syncope         Loop recorder interrogation shows 0 episodes of atrial fibrillation     COUNTER for last 30 days  Symptoms: 0  Tachy: 0  Pause: 0  Ha: 0  AT: 0  AF: 0  % of time in AF: 0  VHR: 61 lead noise      Changes made: none.      Observation Summary:     Diagnosis Plan   1. History of CVA (cerebrovascular accident)

## 2022-11-29 ENCOUNTER — TELEPHONE (OUTPATIENT)
Dept: FAMILY MEDICINE CLINIC | Facility: CLINIC | Age: 47
End: 2022-11-29

## 2022-11-29 NOTE — TELEPHONE ENCOUNTER
----- Message from Dread Duvall MD sent at 11/29/2022  9:12 AM CST -----  Regarding: carotid ultrasound  Please let pt know that recent carotid US on 11/23/22 was normal. Report stated no evidence of significant stenosis and there is normal flow    Recheck on next visit thanks

## 2022-12-01 DIAGNOSIS — R42 DIZZINESS: ICD-10-CM

## 2022-12-08 ENCOUNTER — TELEPHONE (OUTPATIENT)
Dept: FAMILY MEDICINE CLINIC | Facility: CLINIC | Age: 47
End: 2022-12-08

## 2022-12-16 DIAGNOSIS — E79.0 HYPERURICEMIA: ICD-10-CM

## 2022-12-16 DIAGNOSIS — I10 BENIGN ESSENTIAL HTN: ICD-10-CM

## 2022-12-16 DIAGNOSIS — N18.32 STAGE 3B CHRONIC KIDNEY DISEASE: ICD-10-CM

## 2022-12-16 RX ORDER — LOSARTAN POTASSIUM 100 MG/1
100 TABLET ORAL DAILY
Qty: 90 TABLET | Refills: 0 | Status: SHIPPED | OUTPATIENT
Start: 2022-12-16

## 2022-12-16 RX ORDER — ALLOPURINOL 100 MG/1
100 TABLET ORAL DAILY
Qty: 90 TABLET | Refills: 0 | Status: SHIPPED | OUTPATIENT
Start: 2022-12-16

## 2022-12-16 RX ORDER — CALCITRIOL 0.25 UG/1
0.25 CAPSULE, LIQUID FILLED ORAL DAILY
Qty: 30 CAPSULE | Refills: 0 | Status: SHIPPED | OUTPATIENT
Start: 2022-12-16 | End: 2023-03-02

## 2022-12-16 NOTE — TELEPHONE ENCOUNTER
Rx Refill Note  Requested Prescriptions     Pending Prescriptions Disp Refills   • losartan (COZAAR) 100 MG tablet [Pharmacy Med Name: LOSARTAN 100MG TABLETS] 90 tablet 0     Sig: TAKE 1 TABLET BY MOUTH DAILY   • allopurinol (ZYLOPRIM) 100 MG tablet [Pharmacy Med Name: ALLOPURINOL 100MG TABLETS] 90 tablet 0     Sig: TAKE 1 TABLET BY MOUTH DAILY   • calcitriol (ROCALTROL) 0.25 MCG capsule [Pharmacy Med Name: CALCITRIOL 0.25MCG CAPSULES] 30 capsule 0     Sig: TAKE 1 CAPSULE BY MOUTH DAILY      Last office visit with prescribing clinician: 11/3/2022   Last telemedicine visit with prescribing clinician: 12/21/2022   Next office visit with prescribing clinician: 12/21/2022   {TIP  Encounters     ARB Protocol Failed 12/16/2022 10:25 AM   Protocol Details  GFR > 30 in past year         Gout Agent Protocol Failed 12/16/2022 10:25 AM   Protocol Details  Creatinine < 1.3 in past 12 months            {TIP  Please add Last Relevant Lab Date if appropriate             {TIP  Is Refill Pharmacy correct? yes    Would you like a call back once the refill request has been completed: [] Yes [] No    If the office needs to give you a call back, can they leave a voicemail: [] Yes [] No    Reginald Pineda LPN  12/16/22, 12:05 CST

## 2023-01-11 NOTE — PROGRESS NOTES
Subjective:  Gregg Randle is a 47 y.o. male who presents for       Patient Active Problem List   Diagnosis   • Abdominal aortic aneurysm (AAA) without rupture (HCC)   • PVD (peripheral vascular disease) (HCC)   • Mixed hyperlipidemia   • Panlobular emphysema (HCC)   • Benign essential HTN   • CAD (coronary artery disease)   • Class 1 obesity with serious comorbidity and body mass index (BMI) of 32.0 to 32.9 in adult   • Vitamin D deficiency, unspecified    • History of MI (myocardial infarction)   • History of CVA (cerebrovascular accident)   • Abnormal finding of blood chemistry, unspecified    • Stage 3 chronic kidney disease (HCC)   • Former smoker   • Left hemiparesis (HCC)   • Elevated liver enzymes   • Hyperkalemia   • Chronic idiopathic constipation   • Dizziness   • Chronic bilateral low back pain with bilateral sciatica   • Encounter for drug screening   • Hypotension   • Essential hypertension   • Left leg pain   • Muscle spasm   • Encounter for screening for malignant neoplasm of colon   • Sleep apnea   • Hyperuricemia   • Hemorrhoids   • Balance problem   • Wound of skin   • Coronary artery disease involving native heart   • Chronic midline low back pain with bilateral sciatica   • Overweight   • PARRISH (acute kidney injury) (HCC)   • Acute renal failure (ARF) (HCC)   • Elevated troponin level not due myocardial infarction   • Acute renal failure superimposed on stage 3b chronic kidney disease (HCC)   • Dehydration   • History of heart artery stent   • Stage 4 chronic kidney disease (HCC)   • High serum vitamin B12   • Obesity with serious comorbidity   • Carotid stenosis, right   • Lumbar radiculopathy           Current Outpatient Medications:   •  albuterol (PROVENTIL HFA;VENTOLIN HFA) 108 (90 BASE) MCG/ACT inhaler, Inhale 2 puffs Every 4 (Four) Hours As Needed for Wheezing., Disp: , Rfl:   •  allopurinol (ZYLOPRIM) 100 MG tablet, TAKE 1 TABLET BY MOUTH DAILY, Disp: 90 tablet, Rfl: 0  •   atorvastatin (LIPITOR) 40 MG tablet, Take 1 tablet by mouth Every Night., Disp: 90 tablet, Rfl: 1  •  brimonidine (ALPHAGAN) 0.2 % ophthalmic solution, Administer 1 drop to the right eye 3 (Three) Times a Day., Disp: , Rfl:   •  calcitriol (ROCALTROL) 0.25 MCG capsule, TAKE 1 CAPSULE BY MOUTH DAILY, Disp: 30 capsule, Rfl: 0  •  clopidogrel (PLAVIX) 75 MG tablet, Take 1 tablet by mouth Daily., Disp: 90 tablet, Rfl: 0  •  cyclobenzaprine (FLEXERIL) 5 MG tablet, Take 1 tablet by mouth Every Night., Disp: 90 tablet, Rfl: 1  •  dilTIAZem CD (CARDIZEM CD) 180 MG 24 hr capsule, TAKE 1 CAPSULE BY MOUTH DAILY, Disp: 30 capsule, Rfl: 3  •  gabapentin (NEURONTIN) 600 MG tablet, Take 1 tablet by mouth 3 (Three) Times a Day., Disp: 90 tablet, Rfl: 1  •  guanFACINE (TENEX) 1 MG tablet, TAKE 1 TABLET EVERY NIGHT, Disp: 90 tablet, Rfl: 0  •  hydrALAZINE (APRESOLINE) 50 MG tablet, TAKE 1 TABLET BY MOUTH EVERY 8 HOURS, Disp: 270 tablet, Rfl: 0  •  labetalol (NORMODYNE) 200 MG tablet, Take 1 tablet by mouth 2 (Two) Times a Day., Disp: 180 tablet, Rfl: 0  •  Linzess 145 MCG capsule capsule, TAKE 1 CAPSULE BY MOUTH EVERY MORNING BEFORE BREAKFAST, Disp: 90 capsule, Rfl: 0  •  losartan (COZAAR) 100 MG tablet, TAKE 1 TABLET BY MOUTH DAILY, Disp: 90 tablet, Rfl: 0  •  Turmeric (QC Tumeric Complex) 500 MG capsule, Take 1 capsule by mouth Daily., Disp: 90 capsule, Rfl: 0  •  vitamin B-12 (CYANOCOBALAMIN) 500 MCG tablet, Take 1 tablet by mouth Daily., Disp: 90 tablet, Rfl: 1  •  vitamin D (ERGOCALCIFEROL) 1.25 MG (34185 UT) capsule capsule, TAKE 1 CAPSULE BY MOUTH EVERY 7 DAYS, Disp: 4 capsule, Rfl: 3    Pt is 48 yo male with management of obesity, CAD sp PCI , HTN, HLP, AAA  Sp repair , PVD with bilateral iliac stent , COPD (panlobar emphysema) former  tobacco user, dizziness, CKD stage 3 , claudication. Major depression, cholelithiasis, abnormal echocardiogram ( LVH grade 1 diastolic dysfunction)  History of CVA, former tobacco user ,  history of MI, left hemiparesis, chronic back pain (DDD lumbar spine)  elevated liver enzymes, right carotid stenosis, BRANDI      11/3/22 in office visit for recheck. Pt doing fair. He does report dizziness when walking . No chest pain. He states he takes neurontin 600 mg twice a day for his back pain.. he quit smoking earlier this year. He has been smoking for more than 20 years.  He continues to have pain in his left leg. . He reports it as a sharp pain with numbness  He does have back pain. He had x-ray of lumbar spine in March 2022 that showed DDD at L5-S1 with probable associated foraminalstenosis. MRI of lumbar spine and cervical spine. was ordered but not completed. In regards to dizziness. Every time he gets up he gets dizzy..  He used to see DR. Arshad       1/12/23 in office visit for recheck. Since last visit pt had carotid doppler on 11/23/22 that was normal . Pt had labwork done on 11/3/22 that showed vitamin D stable vitamin B12 high at 1092. Uric acid high at 7.4 lipid panel stable CMP showed GFR at 24.9 from 23.1 with Cr at 2.99  From 3.19.  BUN at 40 glucose at 103. CBC showed hemoglobin and WBC stable pt reschedule his appt with Cardiology to 1/26/23. Pt was referred to Neurology for his lower back/leg pain.  He was told there is nothing Neurologist can do.   He continues to have lower back pain radiating down his legsHis last x-ray on 3/8/22 showed DDD of lumbar spine along with probable foraminal stenosis.  His x-ray of thoracic spine was negative. He does reort feeling tired lately. His BP is up today but he did not take  BP Medications. Reports no chest pain. He states BP at home stable. He has daytime sleepiness. He averages about 3-4 hours of sleep each night. He states that he had sleep study years ago in [place]. He has a CPAP but  Does not use I consistently  He reports he quit smoking about a year ago. He does get short of breath.  He has been smoking for 15-20 years. Pt used to see  Pulmonologist in [facility]. Pt also reports right wrist pain. Denies any trauma       Fatigue  This is a chronic problem. The current episode started more than 1 year ago. The problem occurs constantly. The problem has been gradually worsening. Associated symptoms include arthralgias, numbness and weakness. Pertinent negatives include no chest pain, chills, congestion, coughing, diaphoresis, fatigue, fever, headaches, nausea, sore throat or vomiting. Nothing aggravates the symptoms. He has tried nothing for the symptoms. The treatment provided no relief.   Shortness of Breath  This is a chronic problem. The current episode started more than 1 year ago. The problem occurs constantly. The problem has been gradually worsening. Pertinent negatives include no chest pain, fever, headaches, rhinorrhea, sore throat, vomiting or wheezing. Risk factors include smoking. He has tried beta agonist inhalers for the symptoms. The treatment provided mild relief. His past medical history is significant for COPD. There is no history of allergies, aspirin allergies, asthma, bronchiolitis, CAD, chronic lung disease, DVT, a heart failure, PE, pneumonia or a recent surgery.   Back Pain  This is a chronic problem. The current episode started more than 1 year ago. The problem occurs constantly. The problem has been gradually worsening since onset. The pain is present in the gluteal and lumbar spine. The pain does not radiate. The pain is at a severity of 4/10. The pain is moderate. The pain is the same all the time. The symptoms are aggravated by bending, lying down and position. Stiffness is present all day. Associated symptoms include leg pain, numbness, paresis, paresthesias and weakness. Pertinent negatives include no abdominal pain, bladder incontinence, bowel incontinence, chest pain, dysuria, fever, headaches, pelvic pain, perianal numbness or tingling. He has tried muscle relaxant (neurontin) for the symptoms. The treatment  provided no relief.   Leg Pain   The incident occurred more than 1 week ago. The incident occurred at home. The pain is present in the left leg. The quality of the pain is described as aching. The pain is at a severity of 4/10. The pain is moderate. The pain has been worsening since onset. Associated symptoms include numbness. Pertinent negatives include no inability to bear weight, loss of motion or tingling. He reports no foreign bodies present. The symptoms are aggravated by movement and palpation. Treatments tried: neurontin. The treatment provided no relief.   Dizziness  This is a chronic problem. The current episode started more than 1 year ago. The problem occurs constantly. The problem has been improved  Pertinent negatives include no abdominal pain, anorexia, arthralgias, change in bowel habit, chest pain, chills, congestion, coughing, diaphoresis, fatigue, fever, headaches, joint swelling, myalgias, nausea, neck pain, numbness, rash, sore throat, swollen glands, urinary symptoms, vertigo, visual change, vomiting or weakness. Nothing aggravates the symptoms. He has tried nothing for the symptoms. The treatment provided no relief.   Hyperlipidemia  This is a chronic problem. The current episode started more than 1 year ago. The problem is controlled. Recent lipid tests were reviewed and are variable. Exacerbating diseases include chronic renal disease and obesity. He has no history of diabetes, hypothyroidism, liver disease or nephrotic syndrome. Pertinent negatives include no chest pain, focal sensory loss, focal weakness, leg pain, myalgias or shortness of breath. Current antihyperlipidemic treatment includes statins. Compliance problems include adherence to exercise.  Risk factors for coronary artery disease include diabetes mellitus, hypertension, male sex and obesity.   Chronic Kidney Disease  This is a chronic problem. The current episode started more than 1 year ago. The problem occurs constantly. The  problem has been gradually worsening. Pertinent negatives include no abdominal pain, anorexia, arthralgias, change in bowel habit, chest pain, chills, congestion, coughing, diaphoresis, fatigue, fever, headaches, joint swelling, myalgias, nausea, neck pain, numbness, rash, sore throat, swollen glands, urinary symptoms, vertigo, visual change, vomiting or weakness. Nothing aggravates the symptoms. He has tried nothing for the symptoms.   Coronary Artery Disease  Presents for follow-up visit. Pertinent negatives include no chest pain, chest pressure, chest tightness, dizziness, leg swelling, muscle weakness, palpitations or shortness of breath. Risk factors include hyperlipidemia and obesity. The symptoms have been stable. Compliance with diet is good. Compliance with exercise is good. Compliance with medications is good.   Hypertension   This is a chronic problem. The current episode started more than 1 year ago. The problem has been improving g since onset. The problem is controlled. Associated symptoms include shortness of breath. Pertinent negatives include no anxiety, blurred vision, chest pain, headaches, malaise/fatigue, neck pain, orthopnea, palpitations, peripheral edema, PND or sweats. Risk factors for coronary artery disease include dyslipidemia, obesity, male gender, sedentary lifestyle and smoking/tobacco exposure. Current antihypertension treatment includes calcium channel blockers, beta blockers and angiotensin blockers and diureitcs The current treatment provides moderate improvement. There are no compliance problems.  Hypertensive end-organ damage includes kidney disease, CAD/MI and CVA. There is no history of angina, heart failure, left ventricular hypertrophy, PVD or retinopathy. There is no history of chronic renal disease, coarctation of the aorta, hyperaldosteronism, hypercortisolism, hyperparathyroidism, a hypertension causing med, pheochromocytoma, renovascular disease, sleep apnea or a thyroid  problem.     Review of Systems  Review of Systems   Constitutional: Positive for activity change. Negative for appetite change, chills, diaphoresis, fatigue and fever.   HENT: Negative for congestion, postnasal drip, rhinorrhea, sinus pressure, sinus pain, sneezing, sore throat, trouble swallowing and voice change.    Respiratory: Negative for cough, choking, chest tightness, shortness of breath, wheezing and stridor.    Cardiovascular: Negative for chest pain.   Gastrointestinal: Negative for diarrhea, nausea and vomiting.   Musculoskeletal: Positive for arthralgias.   Neurological: Positive for dizziness, weakness, light-headedness and numbness. Negative for headaches.       Patient Active Problem List   Diagnosis   • Abdominal aortic aneurysm (AAA) without rupture (HCC)   • PVD (peripheral vascular disease) (HCC)   • Mixed hyperlipidemia   • Panlobular emphysema (HCC)   • Benign essential HTN   • CAD (coronary artery disease)   • Class 1 obesity with serious comorbidity and body mass index (BMI) of 32.0 to 32.9 in adult   • Vitamin D deficiency, unspecified    • History of MI (myocardial infarction)   • History of CVA (cerebrovascular accident)   • Abnormal finding of blood chemistry, unspecified    • Stage 3 chronic kidney disease (HCC)   • Former smoker   • Left hemiparesis (HCC)   • Elevated liver enzymes   • Hyperkalemia   • Chronic idiopathic constipation   • Dizziness   • Chronic bilateral low back pain with bilateral sciatica   • Encounter for drug screening   • Hypotension   • Essential hypertension   • Left leg pain   • Muscle spasm   • Encounter for screening for malignant neoplasm of colon   • Sleep apnea   • Hyperuricemia   • Hemorrhoids   • Balance problem   • Wound of skin   • Coronary artery disease involving native heart   • Chronic midline low back pain with bilateral sciatica   • Overweight   • PARRISH (acute kidney injury) (HCC)   • Acute renal failure (ARF) (HCC)   • Elevated troponin level not  due myocardial infarction   • Acute renal failure superimposed on stage 3b chronic kidney disease (HCC)   • Dehydration   • History of heart artery stent   • Stage 4 chronic kidney disease (HCC)   • High serum vitamin B12   • Obesity with serious comorbidity   • Carotid stenosis, right   • Lumbar radiculopathy     Past Surgical History:   Procedure Laterality Date   • COLONOSCOPY N/A 11/17/2020    Procedure: COLONOSCOPY;  Surgeon: Abi Miller MD;  Location: [facility] ENDOSCOPY;  Service: Gastroenterology;  Laterality: N/A;   • CORONARY ANGIOPLASTY WITH STENT PLACEMENT     • EAR TUBES Left    • AR AAA REPAIR,AORTO-AORTIC TUBE PROSTH N/A 8/11/2017    Procedure: ABDOMINAL AORTIC ANEURYSM REPAIR WITH ENDOGRAFT, REPAIR ILIAC ARTERY ANEURYSM, POSSIBLE OPEN ABDOMINAL AORTOGRAM    (CELL SAVER STAND-BY);  Surgeon: Aren Arshad MD;  Location: [facility] OR;  Service: Vascular     Social History     Socioeconomic History   • Marital status: Single   Tobacco Use   • Smoking status: Former     Packs/day: 1.00     Years: 20.00     Pack years: 20.00     Types: Cigarettes   • Smokeless tobacco: Never   Vaping Use   • Vaping Use: Never used   Substance and Sexual Activity   • Alcohol use: No   • Drug use: No   • Sexual activity: Defer     Family History   Problem Relation Age of Onset   • Stroke Mother    • Diabetes Mother    • Heart disease Father      Lab on 11/03/2022   Component Date Value Ref Range Status   • WBC 11/03/2022 5.69  3.40 - 10.80 10*3/mm3 Final   • RBC 11/03/2022 4.95  4.14 - 5.80 10*6/mm3 Final   • Hemoglobin 11/03/2022 13.1  13.0 - 17.7 g/dL Final   • Hematocrit 11/03/2022 41.0  37.5 - 51.0 % Final   • MCV 11/03/2022 82.8  79.0 - 97.0 fL Final   • MCH 11/03/2022 26.5 (L)  26.6 - 33.0 pg Final   • MCHC 11/03/2022 32.0  31.5 - 35.7 g/dL Final   • RDW 11/03/2022 13.0  12.3 - 15.4 % Final   • RDW-SD 11/03/2022 38.8  37.0 - 54.0 fl Final   • MPV 11/03/2022 10.3  6.0 - 12.0 fL Final   • Platelets 11/03/2022  316  140 - 450 10*3/mm3 Final   • Neutrophil % 11/03/2022 56.7  42.7 - 76.0 % Final   • Lymphocyte % 11/03/2022 25.5  19.6 - 45.3 % Final   • Monocyte % 11/03/2022 13.4 (H)  5.0 - 12.0 % Final   • Eosinophil % 11/03/2022 3.3  0.3 - 6.2 % Final   • Basophil % 11/03/2022 0.7  0.0 - 1.5 % Final   • Immature Grans % 11/03/2022 0.4  0.0 - 0.5 % Final   • Neutrophils, Absolute 11/03/2022 3.23  1.70 - 7.00 10*3/mm3 Final   • Lymphocytes, Absolute 11/03/2022 1.45  0.70 - 3.10 10*3/mm3 Final   • Monocytes, Absolute 11/03/2022 0.76  0.10 - 0.90 10*3/mm3 Final   • Eosinophils, Absolute 11/03/2022 0.19  0.00 - 0.40 10*3/mm3 Final   • Basophils, Absolute 11/03/2022 0.04  0.00 - 0.20 10*3/mm3 Final   • Immature Grans, Absolute 11/03/2022 0.02  0.00 - 0.05 10*3/mm3 Final   • nRBC 11/03/2022 0.0  0.0 - 0.2 /100 WBC Final   • Glucose 11/03/2022 103 (H)  65 - 99 mg/dL Final   • BUN 11/03/2022 40 (H)  6 - 20 mg/dL Final   • Creatinine 11/03/2022 2.99 (H)  0.76 - 1.27 mg/dL Final   • Sodium 11/03/2022 138  136 - 145 mmol/L Final   • Potassium 11/03/2022 4.7  3.5 - 5.2 mmol/L Final   • Chloride 11/03/2022 102  98 - 107 mmol/L Final   • CO2 11/03/2022 25.0  22.0 - 29.0 mmol/L Final   • Calcium 11/03/2022 10.2  8.6 - 10.5 mg/dL Final   • Total Protein 11/03/2022 7.6  6.0 - 8.5 g/dL Final   • Albumin 11/03/2022 4.70  3.50 - 5.20 g/dL Final   • ALT (SGPT) 11/03/2022 15  1 - 41 U/L Final   • AST (SGOT) 11/03/2022 22  1 - 40 U/L Final   • Alkaline Phosphatase 11/03/2022 87  39 - 117 U/L Final   • Total Bilirubin 11/03/2022 0.5  0.0 - 1.2 mg/dL Final   • Globulin 11/03/2022 2.9  gm/dL Final   • A/G Ratio 11/03/2022 1.6  g/dL Final   • BUN/Creatinine Ratio 11/03/2022 13.4  7.0 - 25.0 Final   • Anion Gap 11/03/2022 11.0  5.0 - 15.0 mmol/L Final   • eGFR 11/03/2022 24.9 (L)  >60.0 mL/min/1.73 Final    National Kidney Foundation and American Society of Nephrology (ASN) Task Force recommended calculation based on the Chronic Kidney Disease  Epidemiology Collaboration (CKD-EPI) equation refit without adjustment for race.   • Total Cholesterol 11/03/2022 129  0 - 200 mg/dL Final   • Triglycerides 11/03/2022 78  0 - 150 mg/dL Final   • HDL Cholesterol 11/03/2022 44  40 - 60 mg/dL Final   • LDL Cholesterol  11/03/2022 69  0 - 100 mg/dL Final   • VLDL Cholesterol 11/03/2022 16  5 - 40 mg/dL Final   • LDL/HDL Ratio 11/03/2022 1.58   Final   • 25 Hydroxy, Vitamin D 11/03/2022 57.1  30.0 - 100.0 ng/ml Final   • Vitamin B-12 11/03/2022 1,092 (H)  211 - 946 pg/mL Final   • Uric Acid 11/03/2022 7.4 (H)  3.4 - 7.0 mg/dL Final   Lab on 07/29/2022   Component Date Value Ref Range Status   • WBC 07/29/2022 7.45  3.40 - 10.80 10*3/mm3 Final   • RBC 07/29/2022 5.29  4.14 - 5.80 10*6/mm3 Final   • Hemoglobin 07/29/2022 14.0  13.0 - 17.7 g/dL Final   • Hematocrit 07/29/2022 43.5  37.5 - 51.0 % Final   • MCV 07/29/2022 82.2  79.0 - 97.0 fL Final   • MCH 07/29/2022 26.5 (L)  26.6 - 33.0 pg Final   • MCHC 07/29/2022 32.2  31.5 - 35.7 g/dL Final   • RDW 07/29/2022 12.9  12.3 - 15.4 % Final   • RDW-SD 07/29/2022 38.1  37.0 - 54.0 fl Final   • MPV 07/29/2022 10.5  6.0 - 12.0 fL Final   • Platelets 07/29/2022 329  140 - 450 10*3/mm3 Final   • Neutrophil % 07/29/2022 59.1  42.7 - 76.0 % Final   • Lymphocyte % 07/29/2022 23.8  19.6 - 45.3 % Final   • Monocyte % 07/29/2022 11.8  5.0 - 12.0 % Final   • Eosinophil % 07/29/2022 4.3  0.3 - 6.2 % Final   • Basophil % 07/29/2022 0.7  0.0 - 1.5 % Final   • Immature Grans % 07/29/2022 0.3  0.0 - 0.5 % Final   • Neutrophils, Absolute 07/29/2022 4.41  1.70 - 7.00 10*3/mm3 Final   • Lymphocytes, Absolute 07/29/2022 1.77  0.70 - 3.10 10*3/mm3 Final   • Monocytes, Absolute 07/29/2022 0.88  0.10 - 0.90 10*3/mm3 Final   • Eosinophils, Absolute 07/29/2022 0.32  0.00 - 0.40 10*3/mm3 Final   • Basophils, Absolute 07/29/2022 0.05  0.00 - 0.20 10*3/mm3 Final   • Immature Grans, Absolute 07/29/2022 0.02  0.00 - 0.05 10*3/mm3 Final   • nRBC 07/29/2022  0.1  0.0 - 0.2 /100 WBC Final   • Glucose 07/29/2022 101 (H)  65 - 99 mg/dL Final   • BUN 07/29/2022 49 (H)  6 - 20 mg/dL Final   • Creatinine 07/29/2022 3.19 (H)  0.76 - 1.27 mg/dL Final   • Sodium 07/29/2022 138  136 - 145 mmol/L Final   • Potassium 07/29/2022 5.4 (H)  3.5 - 5.2 mmol/L Final   • Chloride 07/29/2022 101  98 - 107 mmol/L Final   • CO2 07/29/2022 26.0  22.0 - 29.0 mmol/L Final   • Calcium 07/29/2022 9.7  8.6 - 10.5 mg/dL Final   • Total Protein 07/29/2022 7.9  6.0 - 8.5 g/dL Final   • Albumin 07/29/2022 4.50  3.50 - 5.20 g/dL Final   • ALT (SGPT) 07/29/2022 34  1 - 41 U/L Final   • AST (SGOT) 07/29/2022 29  1 - 40 U/L Final   • Alkaline Phosphatase 07/29/2022 108  39 - 117 U/L Final   • Total Bilirubin 07/29/2022 0.2  0.0 - 1.2 mg/dL Final   • Globulin 07/29/2022 3.4  gm/dL Final   • A/G Ratio 07/29/2022 1.3  g/dL Final   • BUN/Creatinine Ratio 07/29/2022 15.4  7.0 - 25.0 Final   • Anion Gap 07/29/2022 11.0  5.0 - 15.0 mmol/L Final   • eGFR 07/29/2022 23.1 (L)  >60.0 mL/min/1.73 Final    National Kidney Foundation and American Society of Nephrology (ASN) Task Force recommended calculation based on the Chronic Kidney Disease Epidemiology Collaboration (CKD-EPI) equation refit without adjustment for race.   • Hemoglobin A1C 07/29/2022 5.20  4.80 - 5.60 % Final   • Total Cholesterol 07/29/2022 123  0 - 200 mg/dL Final   • Triglycerides 07/29/2022 127  0 - 150 mg/dL Final   • HDL Cholesterol 07/29/2022 40  40 - 60 mg/dL Final   • LDL Cholesterol  07/29/2022 60  0 - 100 mg/dL Final   • VLDL Cholesterol 07/29/2022 23  5 - 40 mg/dL Final   • LDL/HDL Ratio 07/29/2022 1.44   Final   • TSH 07/29/2022 1.630  0.270 - 4.200 uIU/mL Final   • Free T4 07/29/2022 1.32  0.93 - 1.70 ng/dL Final   • 25 Hydroxy, Vitamin D 07/29/2022 51.5  30.0 - 100.0 ng/ml Final   • Vitamin B-12 07/29/2022 >2,000 (H)  211 - 946 pg/mL Final   No results displayed because visit has over 200 results.         Adult Transthoracic Echo  Complete w/ Color, Spectral and Contrast if necessary per protocol  · Left ventricular ejection fraction appears to be 56 - 60%. Left   ventricular systolic function is normal.  · Left ventricular wall thickness is consistent with moderate concentric   hypertrophy.  · Left ventricular diastolic function was normal.     US Renal Bilateral  Narrative: ULTRASOUND RENAL BILATERAL    INDICATION: acute renal failure, N17.9 Acute kidney failure,  unspecified E86.0 Dehydration I21.4 Non-ST elevation (NSTEMI)  myocardial infarction    COMPARISON: CT abdomen pelvis 7/15/2022    TECHNIQUE: Grayscale and color Doppler ultrasonographic images of  bilateral kidneys and bladder were obtained.    FINDINGS:  Right kidney: Measures 9.5 cm. There is no hydronephrosis. The  kidney itself is poorly visualized with ultrasound.  Bladder: Nondistended.  Left kidney: Measures 10.1 cm. There is no hydronephrosis. There  is a tiny simple renal cyst measuring 6 mm. There is a second  tiny simple renal cyst measuring 8 mm.  Impression: Two tiny simple left renal cysts. Otherwise unremarkable.    Electronically signed by:  Mireille Adhikari MD  7/22/2022 4:11 PM CDT  Workstation: [email or web address]    [email or web address]  Immunization History   Administered Date(s) Administered   • COVID-19 (MODERNA) BIVALENT BOOSTER 12+YRS 09/14/2022, 09/14/2022   • COVID-19 (MODERNA) BOOSTER 03/29/2022   • COVID-19 (PFIZER) PURPLE CAP 06/02/2021, 06/23/2021, 10/13/2021   • FluLaval/Fluzone >6mos 01/15/2020, 01/15/2020, 09/30/2020, 10/14/2021, 11/03/2022   • Influenza, Unspecified 11/03/2022   • Pneumococcal Polysaccharide (PPSV23) 09/30/2020   • Tdap 09/30/2020       The following portions of the patient's history were reviewed and updated as appropriate: allergies, current medications, past family history, past medical history, past social history, past surgical history and problem list.        Physical Exam  /98 (BP Location: Left arm, Patient Position: Sitting, Cuff  "Size: Adult)   Pulse 78   Temp 98.3 °F (36.8 °C)   Ht 185.4 cm (73\")   Wt 109 kg (240 lb 3.2 oz)   SpO2 99%   BMI 31.69 kg/m²     Physical Exam  Vitals and nursing note reviewed.   Constitutional:       Appearance: He is well-developed. He is not diaphoretic.   HENT:      Head: Normocephalic and atraumatic.      Right Ear: External ear normal.   Eyes:      Conjunctiva/sclera: Conjunctivae normal.      Pupils: Pupils are equal, round, and reactive to light.   Cardiovascular:      Rate and Rhythm: Normal rate and regular rhythm.      Heart sounds: Normal heart sounds. No murmur heard.  Pulmonary:      Effort: Pulmonary effort is normal. No respiratory distress.      Comments: Decreased breath sounds   Abdominal:      General: Bowel sounds are normal. There is no distension.      Palpations: Abdomen is soft.      Tenderness: There is no abdominal tenderness.   Musculoskeletal:         General: Tenderness present. No deformity.      Right wrist: Tenderness and bony tenderness present.      Cervical back: Normal range of motion and neck supple.      Lumbar back: Spasms, tenderness and bony tenderness present. Decreased range of motion.   Skin:     General: Skin is warm.      Coloration: Skin is not pale.      Findings: No erythema or rash.   Neurological:      Mental Status: He is alert and oriented to person, place, and time.      Cranial Nerves: No cranial nerve deficit.   Psychiatric:         Behavior: Behavior normal.         [unfilled]   Diagnosis Plan   1. Chronic obstructive pulmonary disease, unspecified COPD type ([facility])  Ambulatory Referral to Pulmonology      2. PVD (peripheral vascular disease) ([facility])  CBC Auto Differential    Comprehensive Metabolic Panel    Lipid Panel    TSH    Vitamin D,25-Hydroxy    Vitamin B12    Uric acid    Testosterone (Free & Total), LC / MS    Iron Profile    Ferritin      3. Stage 4 chronic kidney disease ([facility])  CBC Auto Differential    Comprehensive Metabolic Panel    " Lipid Panel    TSH    Vitamin D,25-Hydroxy    Vitamin B12    Uric acid    Testosterone (Free & Total), LC / MS    Iron Profile    Ferritin      4. Vitamin D deficiency, unspecified   CBC Auto Differential    Comprehensive Metabolic Panel    Lipid Panel    TSH    Vitamin D,25-Hydroxy    Vitamin B12    Uric acid    Testosterone (Free & Total), LC / MS    Iron Profile    Ferritin      5. Mixed hyperlipidemia  CBC Auto Differential    Comprehensive Metabolic Panel    Lipid Panel    TSH    Vitamin D,25-Hydroxy    Vitamin B12    Uric acid    Testosterone (Free & Total), LC / MS    Iron Profile    Ferritin      6. Hyperuricemia  CBC Auto Differential    Comprehensive Metabolic Panel    Lipid Panel    TSH    Vitamin D,25-Hydroxy    Vitamin B12    Uric acid    Testosterone (Free & Total), LC / MS    Iron Profile    Ferritin      7. History of heart artery stent  CBC Auto Differential    Comprehensive Metabolic Panel    Lipid Panel    TSH    Vitamin D,25-Hydroxy    Vitamin B12    Uric acid    Testosterone (Free & Total), LC / MS    Iron Profile    Ferritin      8. History of CVA (cerebrovascular accident)  CBC Auto Differential    Comprehensive Metabolic Panel    Lipid Panel    TSH    Vitamin D,25-Hydroxy    Vitamin B12    Uric acid    Testosterone (Free & Total), LC / MS    Iron Profile    Ferritin      9. Essential hypertension  CBC Auto Differential    Comprehensive Metabolic Panel    Lipid Panel    TSH    Vitamin D,25-Hydroxy    Vitamin B12    Uric acid    Testosterone (Free & Total), LC / MS    Iron Profile    Ferritin      10. Coronary artery disease involving native coronary artery of native heart without angina pectoris  CBC Auto Differential    Comprehensive Metabolic Panel    Lipid Panel    TSH    Vitamin D,25-Hydroxy    Vitamin B12    Uric acid    Testosterone (Free & Total), LC / MS    Iron Profile    Ferritin      11. Chronic bilateral low back pain with bilateral sciatica  CBC Auto Differential    Comprehensive  Metabolic Panel    Lipid Panel    TSH    Vitamin D,25-Hydroxy    Vitamin B12    Uric acid    Testosterone (Free & Total), LC / MS    Iron Profile    Ferritin    MRI Lumbar Spine Without Contrast      12. DDD (degenerative disc disease), lumbar  CBC Auto Differential    Comprehensive Metabolic Panel    Lipid Panel    TSH    Vitamin D,25-Hydroxy    Vitamin B12    Uric acid    Testosterone (Free & Total), LC / MS    Iron Profile    Ferritin    MRI Lumbar Spine Without Contrast      13. Arthritis, lumbar spine  CBC Auto Differential    Comprehensive Metabolic Panel    Lipid Panel    TSH    Vitamin D,25-Hydroxy    Vitamin B12    Uric acid    Testosterone (Free & Total), LC / MS    Iron Profile    Ferritin      14. Class 1 obesity with serious comorbidity and body mass index (BMI) of 31.0 to 31.9 in adult, unspecified obesity type  CBC Auto Differential    Comprehensive Metabolic Panel    Lipid Panel    TSH    Vitamin D,25-Hydroxy    Vitamin B12    Uric acid    Testosterone (Free & Total), LC / MS    Iron Profile    Ferritin      15. Other fatigue  CBC Auto Differential    Comprehensive Metabolic Panel    Lipid Panel    TSH    Vitamin D,25-Hydroxy    Vitamin B12    Uric acid    Testosterone (Free & Total), LC / MS    Iron Profile    Ferritin    Ambulatory Referral to Sleep Medicine      16. Sleep apnea, unspecified type  Ambulatory Referral to Sleep Medicine      17. BRANDI on CPAP  Ambulatory Referral to Sleep Medicine      18. Daytime sleepiness  Ambulatory Referral to Sleep Medicine      19. Dyspnea on exertion  Ambulatory Referral to Pulmonology              -went over labwork   -recommend COVID-19 vaccination   -wrist pain - will get x-ray of wrist   -chronic back pain/athritis of lumbar spine/DDD lumbar spine  - reviewed last x-ray of back. Pt has had lower back pain >1 year. ordred  MRI of lumbar spine. Also referred to Neurology for EMG study. Likely has lumbar radiculopathy and lower back pain with sciatica. Has  failed neurontin. Will get MRI of lumbar spine. Consider spine surgery or PM.    -dizziness/hypotension/ - stable on meclizine PRN. Carotid ultrasound normal .   -internal hemorrhoids - recommend high fiber diet. Next colonscopy in 2030   -BRANDI on CPAP/daytime sleepiness - will refer to sleep medicine with [facility]  -hyperuricemia - on allopurinol 100 mg daiy.   -CIC -  on linzess. Pt has failed Miralax.drug information provided   -CKD stage 4/hyperkalemia /dehydration - Nephrology following. Will check CMP. Advised pt to make appt with Nephrology soon   -COPD/emphysema. -will refer to Pulmonology for PFTS with [facility]  On albuterol inhaler  -HTN - on  losartan 100 mg daily. Labetalol 200 mg PO BID  on cardizem 120 mg daily.  On hydralazine 50 mg every 8 hours   -major depression-  On lexapro 20 mg daily.  -CAD sp stent/history of NSTEMI -Cardiology following, aspirin, lipitor, labetalol  200 mg TID, losartan , plavix 75 mg daily.  . Upcoming Cardiology appt soon   -PVD - aspirin plavix, lipitor - Vascular Surgery following  -obesity - counseled weight loss >5 minutes BMI at 31.69   -AAA sp repair  - Vascular Surgery following.   -advised pt to be safe and call with questions and concerns  -advised pt to go to ER or call 911 if symptoms worrisome or severe  -advised pt to followup with specialist and referrals  -advised pt be safe during COVID-19 pandemic   I spent 30  minutes caring for Gregg on this date of service. This time includes time spent by me in the following activities: preparing for the visit, reviewing tests, obtaining and/or reviewing a separately obtained history, performing a medically appropriate examination and/or evaluation, counseling and educating the patient/family/caregiver, ordering medications, tests, or procedures, referring and communicating with other health care professionals, documenting information in the medical record and independently interpreting results and communicating that  information with the patient/family/caregiver  -recheck in 6 weeks         This document has been electronically signed by Dread Duvall MD on January 12, 2023 10:06 CST

## 2023-01-12 ENCOUNTER — OFFICE VISIT (OUTPATIENT)
Dept: FAMILY MEDICINE CLINIC | Facility: CLINIC | Age: 48
End: 2023-01-12
Payer: MEDICARE

## 2023-01-12 VITALS
DIASTOLIC BLOOD PRESSURE: 98 MMHG | HEIGHT: 73 IN | OXYGEN SATURATION: 99 % | SYSTOLIC BLOOD PRESSURE: 158 MMHG | HEART RATE: 78 BPM | TEMPERATURE: 98.3 F | WEIGHT: 240.2 LBS | BODY MASS INDEX: 31.83 KG/M2

## 2023-01-12 DIAGNOSIS — G47.33 OSA ON CPAP: ICD-10-CM

## 2023-01-12 DIAGNOSIS — Z86.73 HISTORY OF CVA (CEREBROVASCULAR ACCIDENT): ICD-10-CM

## 2023-01-12 DIAGNOSIS — R06.09 DYSPNEA ON EXERTION: ICD-10-CM

## 2023-01-12 DIAGNOSIS — E55.9 VITAMIN D DEFICIENCY, UNSPECIFIED: ICD-10-CM

## 2023-01-12 DIAGNOSIS — R40.0 DAYTIME SLEEPINESS: ICD-10-CM

## 2023-01-12 DIAGNOSIS — Z95.5 HISTORY OF HEART ARTERY STENT: ICD-10-CM

## 2023-01-12 DIAGNOSIS — G47.30 SLEEP APNEA, UNSPECIFIED TYPE: ICD-10-CM

## 2023-01-12 DIAGNOSIS — M54.41 CHRONIC BILATERAL LOW BACK PAIN WITH BILATERAL SCIATICA: ICD-10-CM

## 2023-01-12 DIAGNOSIS — M47.816 ARTHRITIS, LUMBAR SPINE: ICD-10-CM

## 2023-01-12 DIAGNOSIS — R53.83 OTHER FATIGUE: ICD-10-CM

## 2023-01-12 DIAGNOSIS — M25.531 RIGHT WRIST PAIN: Primary | ICD-10-CM

## 2023-01-12 DIAGNOSIS — E78.2 MIXED HYPERLIPIDEMIA: ICD-10-CM

## 2023-01-12 DIAGNOSIS — Z99.89 OSA ON CPAP: ICD-10-CM

## 2023-01-12 DIAGNOSIS — I73.9 PVD (PERIPHERAL VASCULAR DISEASE): ICD-10-CM

## 2023-01-12 DIAGNOSIS — M54.42 CHRONIC BILATERAL LOW BACK PAIN WITH BILATERAL SCIATICA: ICD-10-CM

## 2023-01-12 DIAGNOSIS — E79.0 HYPERURICEMIA: ICD-10-CM

## 2023-01-12 DIAGNOSIS — N18.4 STAGE 4 CHRONIC KIDNEY DISEASE: ICD-10-CM

## 2023-01-12 DIAGNOSIS — M51.36 DDD (DEGENERATIVE DISC DISEASE), LUMBAR: ICD-10-CM

## 2023-01-12 DIAGNOSIS — J44.9 CHRONIC OBSTRUCTIVE PULMONARY DISEASE, UNSPECIFIED COPD TYPE: ICD-10-CM

## 2023-01-12 DIAGNOSIS — I10 ESSENTIAL HYPERTENSION: ICD-10-CM

## 2023-01-12 DIAGNOSIS — I25.10 CORONARY ARTERY DISEASE INVOLVING NATIVE CORONARY ARTERY OF NATIVE HEART WITHOUT ANGINA PECTORIS: ICD-10-CM

## 2023-01-12 DIAGNOSIS — G89.29 CHRONIC BILATERAL LOW BACK PAIN WITH BILATERAL SCIATICA: ICD-10-CM

## 2023-01-12 DIAGNOSIS — E66.9 CLASS 1 OBESITY WITH SERIOUS COMORBIDITY AND BODY MASS INDEX (BMI) OF 31.0 TO 31.9 IN ADULT, UNSPECIFIED OBESITY TYPE: ICD-10-CM

## 2023-01-12 PROCEDURE — 99214 OFFICE O/P EST MOD 30 MIN: CPT | Performed by: FAMILY MEDICINE

## 2023-01-12 RX ORDER — ATORVASTATIN CALCIUM 40 MG/1
40 TABLET, FILM COATED ORAL NIGHTLY
Qty: 90 TABLET | Refills: 1 | Status: SHIPPED | OUTPATIENT
Start: 2023-01-12 | End: 2023-03-06 | Stop reason: SDUPTHER

## 2023-01-12 NOTE — PATIENT INSTRUCTIONS
Bring blood pressure readings next visit on paper in morning and afternoon    Get labwork fasting will check iron, b12 vitmain D and testosterone levels    Will refer to sleep medicine JEFF Conley    Will refer to pulmology with JEFF Torres in 6 weeks

## 2023-01-18 DIAGNOSIS — R06.09 DOE (DYSPNEA ON EXERTION): Primary | ICD-10-CM

## 2023-01-18 DIAGNOSIS — J44.9 CHRONIC OBSTRUCTIVE PULMONARY DISEASE, UNSPECIFIED COPD TYPE: ICD-10-CM

## 2023-01-25 DIAGNOSIS — Z86.73 HISTORY OF CVA (CEREBROVASCULAR ACCIDENT): ICD-10-CM

## 2023-01-25 DIAGNOSIS — M54.42 CHRONIC MIDLINE LOW BACK PAIN WITH BILATERAL SCIATICA: ICD-10-CM

## 2023-01-25 DIAGNOSIS — M54.41 CHRONIC MIDLINE LOW BACK PAIN WITH BILATERAL SCIATICA: ICD-10-CM

## 2023-01-25 DIAGNOSIS — G89.29 CHRONIC MIDLINE LOW BACK PAIN WITH BILATERAL SCIATICA: ICD-10-CM

## 2023-01-25 RX ORDER — GABAPENTIN 600 MG/1
TABLET ORAL
Qty: 90 TABLET | Refills: 0 | Status: SHIPPED | OUTPATIENT
Start: 2023-01-25 | End: 2023-03-02

## 2023-01-26 ENCOUNTER — OFFICE VISIT (OUTPATIENT)
Dept: CARDIOLOGY | Facility: CLINIC | Age: 48
End: 2023-01-26
Payer: MEDICARE

## 2023-01-26 VITALS
DIASTOLIC BLOOD PRESSURE: 88 MMHG | HEART RATE: 76 BPM | HEIGHT: 73 IN | WEIGHT: 236.2 LBS | SYSTOLIC BLOOD PRESSURE: 138 MMHG | OXYGEN SATURATION: 97 % | BODY MASS INDEX: 31.3 KG/M2

## 2023-01-26 DIAGNOSIS — E78.2 HYPERLIPEMIA, MIXED: ICD-10-CM

## 2023-01-26 DIAGNOSIS — I10 HYPERTENSION, ESSENTIAL: Primary | ICD-10-CM

## 2023-01-26 DIAGNOSIS — I25.10 CORONARY ARTERY DISEASE DUE TO LIPID RICH PLAQUE: ICD-10-CM

## 2023-01-26 DIAGNOSIS — I25.83 CORONARY ARTERY DISEASE DUE TO LIPID RICH PLAQUE: ICD-10-CM

## 2023-01-26 PROCEDURE — 99214 OFFICE O/P EST MOD 30 MIN: CPT | Performed by: INTERNAL MEDICINE

## 2023-01-26 NOTE — PROGRESS NOTES
[facility] Cardiology  OFFICE NOTE    Cardiovascular Medicine  Elsi Adams M.D., [facility], [facility], [facility]         No referring provider defined for this encounter.    Thank you for asking me to see Gregg Randle for CAD.    Coronary Artery Disease      This is a 47 y.o. male with:    1.  Coronary artery disease status post PCI at [place], PCI of mid LAD in the setting of an NSTEMI in 2005.  2.  Hypertension  3.  Hyperlipidemia  4.  Peripheral vascular disease  5.  Abdominal aortic aneurysm.  6.  CKD     Gregg Randle is a 47 y.o. y.o. male who presents for consultation today.  Patient was here for establishment of care ans see in 2019.  He is complaining of shortness of breath on exertion however denying any chest pain.  Complaining of occasional numbness on the left side of his body.  Patient is denying any claudications.  Patient is not aware why he was on warfarin at some point however he stopped taking that.  Follow with nephrology for CKD.  Dr. Arshad for his AAA status post repair.  No other acute problems.    No acute issues since last visit.  Denies any chest pain or shortness of breath.  It appears that he had seen Dr. Guerrier in between and he has a loop recorder in place.  We will try to get records.    We were not able to get records from Dr. Dean's office for his loop recorder.    No acute issues since last visit.    01/26/2023:  No acute issues since last visit.  Denying chest pain or shortness of breath.    Review of Systems - ROS  Constitution: Negative for weakness, weight gain and weight loss.   HENT: Negative for congestion.    Eyes: Negative for blurred vision.   Cardiovascular: As mentioned above  Respiratory: Negative for cough and hemoptysis.    Endocrine: Negative for polydipsia and polyuria.   Hematologic/Lymphatic: Negative for bleeding problem. Does not bruise/bleed easily.   Skin: Negative for flushing.   Musculoskeletal: Negative for neck pain and  stiffness.   Gastrointestinal: Negative for abdominal pain, diarrhea, jaundice, melena, nausea and vomiting.   Genitourinary: Negative for dysuria and hematuria.   Neurological: Negative for dizziness, focal weakness and numbness.   Psychiatric/Behavioral: Negative for altered mental status and depression.     I reviewed the ROS as documented here and confirmed the accuracy of it with the patient today. 1/26/2023        All other systems were reviewed and were negative.    family history includes Diabetes in his mother; Heart disease in his father; Stroke in his mother.     reports that he has quit smoking. His smoking use included cigarettes. He has a 20.00 pack-year smoking history. He has never used smokeless tobacco. He reports that he does not drink alcohol and does not use drugs.    No Known Allergies      Current Outpatient Medications:   •  albuterol (PROVENTIL HFA;VENTOLIN HFA) 108 (90 BASE) MCG/ACT inhaler, Inhale 2 puffs Every 4 (Four) Hours As Needed for Wheezing., Disp: , Rfl:   •  allopurinol (ZYLOPRIM) 100 MG tablet, TAKE 1 TABLET BY MOUTH DAILY, Disp: 90 tablet, Rfl: 0  •  atorvastatin (LIPITOR) 40 MG tablet, Take 1 tablet by mouth Every Night., Disp: 90 tablet, Rfl: 1  •  brimonidine (ALPHAGAN) 0.2 % ophthalmic solution, Administer 1 drop to the right eye 3 (Three) Times a Day., Disp: , Rfl:   •  calcitriol (ROCALTROL) 0.25 MCG capsule, TAKE 1 CAPSULE BY MOUTH DAILY, Disp: 30 capsule, Rfl: 0  •  clopidogrel (PLAVIX) 75 MG tablet, Take 1 tablet by mouth Daily., Disp: 90 tablet, Rfl: 0  •  cyclobenzaprine (FLEXERIL) 5 MG tablet, Take 1 tablet by mouth Every Night., Disp: 90 tablet, Rfl: 1  •  dilTIAZem CD (CARDIZEM CD) 180 MG 24 hr capsule, TAKE 1 CAPSULE BY MOUTH DAILY, Disp: 30 capsule, Rfl: 3  •  gabapentin (NEURONTIN) 600 MG tablet, TAKE 1 TABLET BY MOUTH THREE TIMES DAILY, Disp: 90 tablet, Rfl: 0  •  hydrALAZINE (APRESOLINE) 50 MG tablet, TAKE 1 TABLET BY MOUTH EVERY 8 HOURS, Disp: 270 tablet,  "Rfl: 0  •  labetalol (NORMODYNE) 200 MG tablet, Take 1 tablet by mouth 2 (Two) Times a Day., Disp: 180 tablet, Rfl: 0  •  Linzess 145 MCG capsule capsule, TAKE 1 CAPSULE BY MOUTH EVERY MORNING BEFORE BREAKFAST, Disp: 90 capsule, Rfl: 0  •  losartan (COZAAR) 100 MG tablet, TAKE 1 TABLET BY MOUTH DAILY, Disp: 90 tablet, Rfl: 0  •  Turmeric (QC Tumeric Complex) 500 MG capsule, Take 1 capsule by mouth Daily., Disp: 90 capsule, Rfl: 0  •  vitamin B-12 (CYANOCOBALAMIN) 500 MCG tablet, Take 1 tablet by mouth Daily., Disp: 90 tablet, Rfl: 1  •  vitamin D (ERGOCALCIFEROL) 1.25 MG (25565 UT) capsule capsule, TAKE 1 CAPSULE BY MOUTH EVERY 7 DAYS, Disp: 4 capsule, Rfl: 3  •  guanFACINE (TENEX) 1 MG tablet, TAKE 1 TABLET EVERY NIGHT, Disp: 90 tablet, Rfl: 0    Physical Exam:  Vitals:    01/26/23 0900   BP: 138/88   BP Location: Left arm   Patient Position: Sitting   Cuff Size: Adult   Pulse: 76   SpO2: 97%   Weight: 107 kg (236 lb 3.2 oz)   Height: 185.4 cm (73\")   PainSc: 0-No pain   PainLoc: Chest     Current Pain Level: none  Pulse Ox: Normal  on room air  General: alert, appears stated age and cooperative     Body Habitus: well-nourished    HEENT: Head: Normocephalic, no lesions, without obvious abnormality. No arcus senilis, xanthelasma or xanthomas.    Neuro: alert, oriented x3  Pulses: 2+ and symmetric  JVP: Volume/Pulsation: Normal.  Normal waveforms.   Appropriate inspiratory decrease.  No Kussmaul's. No Renae's.   Carotid Exam: no bruit normal pulsation bilaterally   Carotid Volume: normal.     Respirations: no increased work of breathing   Chest:  Normal    Pulmonary:Normal   Precordium: Normal impulses. P2 is not palpable.  RV Heave: absent  LV Heave: absent  [place]:  normal size and placement  Palpable S4: absent.  Heart rate: normal    Heart Rhythm: regular     Heart Sounds: S1: normal  S2: normal  S3: absent   S4: absent  Opening Snap: absent    Pericardial Rub:  Absent: .    Abdomen:   Appearance: normal " .  Palpation: Soft, non-tender to palpation, bowel sounds positive in all four quadrants; no guarding or rebound tenderness  Extremity: no edema.   LE Skin: no rashes  LE Hair:  normal  LE Pulses: well perfused with normal pulses in the distal extremities  Pallor on elevation: Absent. Rubor on dependency: None    I have reexamined the patient and the results are consistent with the previously documented exam. Elsi Adams MD       DATA REVIEWED:     EKG. I personally reviewed and interpreted the EKG.  Normal sinus rhythm, inferior infarct.    ECG/EMG Results (all)     Procedure Component Value Units Date/Time    ECG 12 Lead [[id number]] Collected: 12/10/21 0809     Updated: 12/10/21 0811     QT Interval 374 ms      QTC Interval 415 ms     Narrative:      Test Reason : cad  Blood Pressure :   */*   mmHG  Vent. Rate :  74 BPM     Atrial Rate :  74 BPM     P-R Int : 178 ms          QRS Dur : 108 ms      QT Int : 374 ms       P-R-T Axes :  52  81  43 degrees     QTc Int : 415 ms    Normal sinus rhythm  Inferior infarct (cited on or before 07-AUG-2017)  Anterolateral infarct (cited on or before 07-AUG-2017)  Abnormal ECG  When compared with ECG of 26-NOV-2019 12:02,  No significant change was found    Referred By:            Confirmed By:         ---------------------------------------------------  TTE/NIVIA:  Results for orders placed during the hospital encounter of 07/22/22    Adult Transthoracic Echo Complete w/ Color, Spectral and Contrast if necessary per protocol    Interpretation Summary  · Left ventricular ejection fraction appears to be 56 - 60%. Left ventricular systolic function is normal.  · Left ventricular wall thickness is consistent with moderate concentric hypertrophy.  · Left ventricular diastolic function was normal.            --------------------------------------------------------------------------------------------------  LABS:     The CVD Risk score (D'Agostino, et al., 2008) failed to  calculate for the following reasons:    The patient has a prior MI, stroke, CHF, or peripheral vascular disease diagnosis         Lab Results   Component Value Date    GLUCOSE 103 (H) 11/03/2022    BUN 40 (H) 11/03/2022    CREATININE 2.99 (H) 11/03/2022    EGFRIFAFRI 40 (L) 10/27/2021    BCR 13.4 11/03/2022    K 4.7 11/03/2022    CO2 25.0 11/03/2022    CALCIUM 10.2 11/03/2022    ALBUMIN 4.70 11/03/2022    AST 22 11/03/2022    ALT 15 11/03/2022     Lab Results   Component Value Date    WBC 5.69 11/03/2022    HGB 13.1 11/03/2022    HCT 41.0 11/03/2022    MCV 82.8 11/03/2022     11/03/2022     Lab Results   Component Value Date    CHOL 129 11/03/2022    TRIG 78 11/03/2022    HDL 44 11/03/2022    LDL 69 11/03/2022     Lab Results   Component Value Date    TSH 1.630 07/29/2022     Lab Results   Component Value Date    CKTOTAL 1,006 (H) 07/23/2022    TROPONINT 0.040 (C) 07/22/2022     Lab Results   Component Value Date    HGBA1C 5.20 07/29/2022     No results found for: DDIMER  Lab Results   Component Value Date    ALT 15 11/03/2022     Lab Results   Component Value Date    HGBA1C 5.20 07/29/2022    HGBA1C 5.14 03/01/2021    HGBA1C 5.10 01/15/2020     Lab Results   Component Value Date    CREATININE 2.99 (H) 11/03/2022     No results found for: IRON, TIBC, FERRITIN  Lab Results   Component Value Date    INR 1.05 07/22/2022    INR 1.18 08/11/2017    PROTIME 13.5 07/22/2022    PROTIME 15.0 08/11/2017       Assessment/Plan     1. Coronary artery disease involving native coronary artery of native heart without angina pectoris  Was able to find records from [place] in 2005, he had presented with anterior STEMI and underwent PCI. Echo from 01/2020 showed EF of 46-50% with grade I DD.  Echocardiogram here in July 2022 showed preserved LV systolic function.  He had moderate LVH.  Continue aspirin Plavix  Continue high intensity statin.  Currently denying any chest pain we will hold off on ischemic evaluation.     2.   Hypertension:  Blood pressure is well controlled on current regimen of losartan 100mg mg, Cardizem 120 mg, he is on labetalol 200 mg as well.  On clonidine and chlorthalidone.  He is following with nephrology for CKD.     3.  Hyperlipidemia:  Continue Lipitor 20 mg  LDL was 72, will uptitrate to 40mg.  LDL is improved to 69.     4.  Peripheral vascular disease:  Status post abdominal aortic aneurysm repair, follows with vascular.  Has not been seen by Dr. Arshad looks like he missed his last CT appointment.  We will refer him to back for surveillance and follow-up.  Last CT was in July 2022 which showed stable aneurysm.    Loop recorder without evidence of A. fib.  Has 60% battery left.         Prevention:  Patient's Body mass index is 31.16 kg/m². indicating that he is obese (BMI >30). Obesity-related health conditions include the following: hypertension. Obesity is newly identified. BMI is is above average; BMI management plan is completed. We discussed portion control and increasing exercise..      Gregg Mcintosh Razia  reports that he has quit smoking. His smoking use included cigarettes. He has a 20.00 pack-year smoking history. He has never used smokeless tobacco.      This document has been electronically signed by Elsi Adams MD on January 26, 2023 09:09 CST

## 2023-01-27 ENCOUNTER — TELEPHONE (OUTPATIENT)
Dept: FAMILY MEDICINE CLINIC | Facility: CLINIC | Age: 48
End: 2023-01-27
Payer: MEDICARE

## 2023-01-27 NOTE — TELEPHONE ENCOUNTER
They are denying this mri due to he had one ordered in November by Jami Vincent . I tried calling patient to see if he ever had it done but had to leave messages, they the insurance said she or the patient will need to call them and with draw the mri if he didn't have it done due to it is still authorized. They wouldn't let me cause different doctor. Ill keep trying to get ahold of patient but we might have to wait a month or two to try to auth it if I cant get him to call them.

## 2023-02-02 RX ORDER — DILTIAZEM HYDROCHLORIDE 180 MG/1
180 CAPSULE, COATED, EXTENDED RELEASE ORAL DAILY
Qty: 30 CAPSULE | Refills: 0 | Status: SHIPPED | OUTPATIENT
Start: 2023-02-02

## 2023-02-02 NOTE — TELEPHONE ENCOUNTER
Rx Refill Note  Requested Prescriptions     Pending Prescriptions Disp Refills   • dilTIAZem CD (CARDIZEM CD) 180 MG 24 hr capsule 30 capsule 0     Sig: Take 1 capsule by mouth Daily.      Last office visit with prescribing clinician: 1/12/23  Last telemedicine visit with prescribing clinician: 2/17/2023   Next office visit with prescribing clinician: 2/17/23  {TIP  Encounters:       Calcium-Channel Blockers Protocol Failed 02/02/2023 10:37 AM   Protocol Details  GFR> 30 ml/min in past 12 months            {TIP  Please add Last Relevant Lab Date if appropriate             {TIP  Is Refill Pharmacy correct? yes    Would you like a call back once the refill request has been completed: [] Yes [] No    If the office needs to give you a call back, can they leave a voicemail: [] Yes [] No    Reginald Pineda LPN  02/02/23, 10:55 CST

## 2023-02-02 NOTE — TELEPHONE ENCOUNTER
Incoming Refill Request      Medication requested (name and dose):    dilTIAZem CD (CARDIZEM CD) 180 MG 24 hr capsule       Pharmacy where request should be sent:   [facility]     Additional details provided by patient: None    Best call back number: [phone]    Does the patient have less than a 3 day supply:  [x] Yes  [] No    Francy Kraft Rep  02/02/23, 10:36 CST

## 2023-02-15 ENCOUNTER — PROCEDURE VISIT (OUTPATIENT)
Dept: PULMONOLOGY | Facility: CLINIC | Age: 48
End: 2023-02-15
Payer: MEDICARE

## 2023-02-15 ENCOUNTER — OFFICE VISIT (OUTPATIENT)
Dept: PULMONOLOGY | Facility: CLINIC | Age: 48
End: 2023-02-15
Payer: MEDICARE

## 2023-02-15 VITALS
HEIGHT: 73 IN | BODY MASS INDEX: 31.28 KG/M2 | WEIGHT: 236 LBS | DIASTOLIC BLOOD PRESSURE: 80 MMHG | HEART RATE: 74 BPM | SYSTOLIC BLOOD PRESSURE: 112 MMHG | OXYGEN SATURATION: 98 %

## 2023-02-15 DIAGNOSIS — G47.33 OSA ON CPAP: ICD-10-CM

## 2023-02-15 DIAGNOSIS — R06.09 DOE (DYSPNEA ON EXERTION): ICD-10-CM

## 2023-02-15 DIAGNOSIS — Z87.891 HISTORY OF TOBACCO ABUSE: ICD-10-CM

## 2023-02-15 DIAGNOSIS — R06.09 DOE (DYSPNEA ON EXERTION): Primary | ICD-10-CM

## 2023-02-15 DIAGNOSIS — J44.9 CHRONIC OBSTRUCTIVE PULMONARY DISEASE, UNSPECIFIED COPD TYPE: ICD-10-CM

## 2023-02-15 DIAGNOSIS — Z99.89 OSA ON CPAP: ICD-10-CM

## 2023-02-15 PROBLEM — Z72.0 TOBACCO ABUSE: Status: ACTIVE | Noted: 2023-02-15

## 2023-02-15 PROBLEM — J43.1 PANLOBULAR EMPHYSEMA: Status: RESOLVED | Noted: 2017-05-15 | Resolved: 2023-02-15

## 2023-02-15 PROCEDURE — 94010 BREATHING CAPACITY TEST: CPT | Performed by: NURSE PRACTITIONER

## 2023-02-15 PROCEDURE — 94010 BREATHING CAPACITY TEST: CPT | Performed by: INTERNAL MEDICINE

## 2023-02-15 PROCEDURE — 94729 DIFFUSING CAPACITY: CPT | Performed by: NURSE PRACTITIONER

## 2023-02-15 PROCEDURE — 94729 DIFFUSING CAPACITY: CPT | Performed by: INTERNAL MEDICINE

## 2023-02-15 PROCEDURE — 99214 OFFICE O/P EST MOD 30 MIN: CPT | Performed by: NURSE PRACTITIONER

## 2023-02-15 RX ORDER — CHLORAL HYDRATE 500 MG
1000 CAPSULE ORAL
COMMUNITY

## 2023-02-15 NOTE — PROGRESS NOTES
BASIC SPIROMETRY WITH DLCO PERFORMED.     GOOD PATIENT EFFORT AND COOPERATION.     6 SECOND EXHALATION ON SPIROMETRY.     ORDERED BY JEFF SMITH; READ BY DR. JAMI PERDUE.

## 2023-02-15 NOTE — PROGRESS NOTES
Pulmonary Office Visit    Dread Duvall MD    Thank you for asking me to see Gregg Randle for   Chief Complaint   Patient presents with   • Shortness of Breath       History of Present Illness  Mr. Randle is a 47 year old patient who presents to [facility] Pulmonology referred from PCP for COPD evaluation. He has a 20 pack year smoking history, quitting around a year ago. He had seen Dr. Sanchez and had PFTs in 2018. These were normal without evidence of COPD, questionable loops, and mildly reduced DLCO common in smokers with cardiac history. He also has CKD IIIb/IV with HTN.   He has BRANDI and is mostly compliant with his CPAP. He has had this for 12 years. He will fall asleep watching TV in the couch and not wear it. Establishing with Sleep in March.     He has an albuterol inhaler at home. He uses this once or twice a week without any noticeable change.      He has some mild GAMA, but this is not concerning for him. He knows that he has gained a little bit of weight over the winter and has not been as active.   He is not wheezing. He does not cough. He does not have COPD. He has normal PFTs. I have no radiographical evidence of emphysema, but he could have mild findings due to smoking history.        Tobacco use history:  Social History     Socioeconomic History   • Marital status: Single   Tobacco Use   • Smoking status: Former     Packs/day: 1.00     Years: 20.00     Pack years: 20.00     Types: Cigarettes   • Smokeless tobacco: Never   Vaping Use   • Vaping Use: Never used   Substance and Sexual Activity   • Alcohol use: No   • Drug use: No   • Sexual activity: Defer         Review of Systems: History obtained from chart review and the patient.  Review of Systems   Respiratory: Negative for cough, shortness of breath and wheezing.        As described in the HPI. Otherwise, remainder of ROS (14 systems) were negative.    Patient Active Problem List   Diagnosis   • Abdominal aortic aneurysm (AAA)  without rupture (HCC)   • PVD (peripheral vascular disease) (HCC)   • Mixed hyperlipidemia   • Benign essential HTN   • CAD (coronary artery disease)   • Class 1 obesity with serious comorbidity and body mass index (BMI) of 32.0 to 32.9 in adult   • Vitamin D deficiency, unspecified    • History of MI (myocardial infarction)   • History of CVA (cerebrovascular accident)   • Abnormal finding of blood chemistry, unspecified    • Stage 3 chronic kidney disease (HCC)   • Former smoker   • Left hemiparesis (HCC)   • Elevated liver enzymes   • Hyperkalemia   • Chronic idiopathic constipation   • Dizziness   • Chronic bilateral low back pain with bilateral sciatica   • Encounter for drug screening   • Hypotension   • Essential hypertension   • Left leg pain   • Muscle spasm   • Encounter for screening for malignant neoplasm of colon   • BRANDI on CPAP   • Hyperuricemia   • Hemorrhoids   • Balance problem   • Wound of skin   • Coronary artery disease involving native heart   • Chronic midline low back pain with bilateral sciatica   • Overweight   • PARRISH (acute kidney injury) (HCC)   • Acute renal failure (ARF) (HCC)   • Elevated troponin level not due myocardial infarction   • Acute renal failure superimposed on stage 3b chronic kidney disease (HCC)   • Dehydration   • History of heart artery stent   • Stage 4 chronic kidney disease (HCC)   • High serum vitamin B12   • Obesity with serious comorbidity   • Carotid stenosis, right   • Lumbar radiculopathy   • Tobacco abuse   • GAMA (dyspnea on exertion)         Current Outpatient Medications:   •  albuterol (PROVENTIL HFA;VENTOLIN HFA) 108 (90 BASE) MCG/ACT inhaler, Inhale 2 puffs Every 4 (Four) Hours As Needed for Wheezing., Disp: , Rfl:   •  allopurinol (ZYLOPRIM) 100 MG tablet, TAKE 1 TABLET BY MOUTH DAILY, Disp: 90 tablet, Rfl: 0  •  atorvastatin (LIPITOR) 40 MG tablet, Take 1 tablet by mouth Every Night., Disp: 90 tablet, Rfl: 1  •  calcitriol (ROCALTROL) 0.25 MCG capsule,  TAKE 1 CAPSULE BY MOUTH DAILY, Disp: 30 capsule, Rfl: 0  •  clopidogrel (PLAVIX) 75 MG tablet, Take 1 tablet by mouth Daily., Disp: 90 tablet, Rfl: 0  •  cyclobenzaprine (FLEXERIL) 5 MG tablet, Take 1 tablet by mouth Every Night., Disp: 90 tablet, Rfl: 1  •  dilTIAZem CD (CARDIZEM CD) 180 MG 24 hr capsule, Take 1 capsule by mouth Daily., Disp: 30 capsule, Rfl: 0  •  gabapentin (NEURONTIN) 600 MG tablet, TAKE 1 TABLET BY MOUTH THREE TIMES DAILY, Disp: 90 tablet, Rfl: 0  •  guanFACINE (TENEX) 1 MG tablet, TAKE 1 TABLET EVERY NIGHT, Disp: 90 tablet, Rfl: 0  •  hydrALAZINE (APRESOLINE) 50 MG tablet, TAKE 1 TABLET BY MOUTH EVERY 8 HOURS, Disp: 270 tablet, Rfl: 0  •  labetalol (NORMODYNE) 200 MG tablet, Take 1 tablet by mouth 2 (Two) Times a Day., Disp: 180 tablet, Rfl: 0  •  Linzess 145 MCG capsule capsule, TAKE 1 CAPSULE BY MOUTH EVERY MORNING BEFORE BREAKFAST, Disp: 90 capsule, Rfl: 0  •  losartan (COZAAR) 100 MG tablet, TAKE 1 TABLET BY MOUTH DAILY, Disp: 90 tablet, Rfl: 0  •  Omega-3 Fatty Acids (fish oil) 1000 MG capsule capsule, Take 1,000 mg by mouth Daily With Breakfast., Disp: , Rfl:   •  Turmeric (QC Tumeric Complex) 500 MG capsule, Take 1 capsule by mouth Daily., Disp: 90 capsule, Rfl: 0  •  vitamin B-12 (CYANOCOBALAMIN) 500 MCG tablet, Take 1 tablet by mouth Daily., Disp: 90 tablet, Rfl: 1  •  vitamin D (ERGOCALCIFEROL) 1.25 MG (74871 UT) capsule capsule, TAKE 1 CAPSULE BY MOUTH EVERY 7 DAYS, Disp: 4 capsule, Rfl: 3    No Known Allergies    Past Medical History:   Diagnosis Date   • AAA (abdominal aortic aneurysm)    • Arthritis    • CAD (coronary artery disease)    • COPD (chronic obstructive pulmonary disease) ([facility])    • Depression    • Depression    • HTN (hypertension)    • Hyperlipidemia    • MI (myocardial infarction) ([facility])     x 2   • Renal insufficiency    • Sleep apnea     using c-pap   • Sleep apnea    • Stroke (HCC)     no residual except some intermittent vision changes   • Vision changes     post  "stroke, takes a minute to focus     Past Surgical History:   Procedure Laterality Date   • COLONOSCOPY N/A 11/17/2020    Procedure: COLONOSCOPY;  Surgeon: Abi Miller MD;  Location: [facility] ENDOSCOPY;  Service: Gastroenterology;  Laterality: N/A;   • CORONARY ANGIOPLASTY WITH STENT PLACEMENT     • EAR TUBES Left    • ID AAA REPAIR,AORTO-AORTIC TUBE PROSTH N/A 8/11/2017    Procedure: ABDOMINAL AORTIC ANEURYSM REPAIR WITH ENDOGRAFT, REPAIR ILIAC ARTERY ANEURYSM, POSSIBLE OPEN ABDOMINAL AORTOGRAM    (CELL SAVER STAND-BY);  Surgeon: Aren Arshad MD;  Location: [facility] OR;  Service: Vascular     Social History     Socioeconomic History   • Marital status: Single   Tobacco Use   • Smoking status: Former     Packs/day: 1.00     Years: 20.00     Pack years: 20.00     Types: Cigarettes   • Smokeless tobacco: Never   Vaping Use   • Vaping Use: Never used   Substance and Sexual Activity   • Alcohol use: No   • Drug use: No   • Sexual activity: Defer     Family History   Problem Relation Age of Onset   • Stroke Mother    • Diabetes Mother    • Heart disease Father        Advance Care Planning   ACP discussion was declined by the patient. Patient does not have an advance directive, declines further assistance.          Objective     /80   Pulse 74   Ht 185.4 cm (73\")   Wt 107 kg (236 lb)   SpO2 98%   BMI 31.14 kg/m²       BMI is >= 30 and <35. (Class 1 Obesity). The following options were offered after discussion;: exercise counseling/recommendations      Physical Exam  Cardiovascular:      Rate and Rhythm: Normal rate and regular rhythm.      Heart sounds: Normal heart sounds.   Pulmonary:      Effort: Pulmonary effort is normal.      Breath sounds: Normal breath sounds.   Neurological:      Mental Status: He is alert and oriented to person, place, and time.   Psychiatric:         Mood and Affect: Mood normal.         Behavior: Behavior normal.         Thought Content: Thought content normal.         " Judgment: Judgment normal.         PFTs  2018        PFTs: Done on: 2/15/23 (independently reviewed by myself, interpreted by physician)  Ratio 78    FEV1 109    DLCO 69    Normal PFT    Radiology (independently reviewed by me, interpreted by physcian)    No radiology results for the last 30 days.         Assessment       Discussion/ Recommendations:    Diagnosis Plan   1. GAMA (dyspnea on exertion)  Benign complaint. No further work up indicated. Normal PFTs with improvement since he quit smoking.         2. History of tobacco abuse  Congratulated him on smoking cessation and showed him the improvement in his PFTs.         3. BRANDI on CPAP  He is going to establish with [facility] in [place].   This is truly his largest issue. He will admit to have a great night and day when he wears his CPAP. He knows he needs it.   Improved compliance will only continue to improve his exercise tolerance.           Follow up: not indicated. Normal PFTs. No symptoms.       I spent 35 minutes caring for Gregg on this date of service. This time includes time spent by me in the following activities: preparing for the visit, reviewing tests, obtaining and/or reviewing a separately obtained history, performing a medically appropriate examination and/or evaluation, counseling and educating the patient/family/caregiver, ordering medications, tests, or procedures, referring and communicating with other health care professionals, documenting information in the medical record, independently interpreting results and communicating that information with the patient/family/caregiver and care coordination            This document has been electronically signed by JEFF Souza on February 15, 2023 10:37 CST

## 2023-02-16 ENCOUNTER — PATIENT ROUNDING (BHMG ONLY) (OUTPATIENT)
Dept: CARDIOLOGY | Facility: CLINIC | Age: 48
End: 2023-02-16
Payer: MEDICARE

## 2023-02-16 NOTE — PROGRESS NOTES
February 16, 2023    Hello, may I speak with Gregg Randle?    My name is SHIV Bingham      I am  with [facility] CARDIOLOGY [facility] CARDIOLOGY  [facility] [address] [place] [address] [phone].    Before we get started may I verify your date of birth? 1975    I am calling to officially welcome you to our practice and ask about your recent visit. Is this a good time to talk? yes    Tell me about your visit with us. What things went well?  Everything went well       We're always looking for ways to make our patients' experiences even better. Do you have recommendations on ways we may improve?  no    Overall were you satisfied with your first visit to our practice? yes       I appreciate you taking the time to speak with me today. Is there anything else I can do for you? no      Thank you, and have a great day.

## 2023-02-16 NOTE — PROCEDURES
Pulmonary Function Test  Performed by: Reyna Romero APRN  Authorized by: Reyna Romero APRN      Pre Drug % Predicted    FVC: 110%   FEV1: 109%   FEF 25-75%: 103%   FEV1/FVC: 78%   DLCO: 69%   D/VAsb: 78%    Interpretation   Spirometry   Spirometry shows normal results.   Diffusion Capacity  The patient's diffusion capacity is mildly reduced.  Diffusion capacity is mildly reduced when corrected for alveolar volume.   Overall comments: The patient's spirometry is within normal limits.  There is a mild diffusion impairment which when corrected for alveolar volume is still a very mild diffusion impairment.

## 2023-02-17 ENCOUNTER — OFFICE VISIT (OUTPATIENT)
Dept: FAMILY MEDICINE CLINIC | Facility: CLINIC | Age: 48
End: 2023-02-17
Payer: MEDICARE

## 2023-02-17 VITALS
OXYGEN SATURATION: 97 % | HEIGHT: 73 IN | SYSTOLIC BLOOD PRESSURE: 122 MMHG | DIASTOLIC BLOOD PRESSURE: 84 MMHG | BODY MASS INDEX: 32.23 KG/M2 | TEMPERATURE: 97.5 F | WEIGHT: 243.2 LBS | HEART RATE: 80 BPM

## 2023-02-17 DIAGNOSIS — Z00.00 MEDICARE ANNUAL WELLNESS VISIT, SUBSEQUENT: Primary | ICD-10-CM

## 2023-02-17 PROCEDURE — 1159F MED LIST DOCD IN RCRD: CPT | Performed by: FAMILY MEDICINE

## 2023-02-17 PROCEDURE — 1125F AMNT PAIN NOTED PAIN PRSNT: CPT | Performed by: FAMILY MEDICINE

## 2023-02-17 PROCEDURE — 1170F FXNL STATUS ASSESSED: CPT | Performed by: FAMILY MEDICINE

## 2023-02-17 PROCEDURE — G0439 PPPS, SUBSEQ VISIT: HCPCS | Performed by: FAMILY MEDICINE

## 2023-02-17 NOTE — PROGRESS NOTES
The ABCs of the Annual Wellness Visit  Subsequent Medicare Wellness Visit    Subjective    Gregg Randle is a 47 y.o. male who presents for a Subsequent Medicare Wellness Visit.    The following portions of the patient's history were reviewed and   updated as appropriate: allergies, current medications, past family history, past medical history, past social history, past surgical history and problem list.    Compared to one year ago, the patient feels his physical   health is worse.    Compared to one year ago, the patient feels his mental   health is the same.    Recent Hospitalizations:  This patient has had a [facility] admission record on file within the last 365 days.    Current Medical Providers:  Patient Care Team:  Dread Duvall MD as PCP - General (Family Medicine)    Outpatient Medications Prior to Visit   Medication Sig Dispense Refill   • albuterol (PROVENTIL HFA;VENTOLIN HFA) 108 (90 BASE) MCG/ACT inhaler Inhale 2 puffs Every 4 (Four) Hours As Needed for Wheezing.     • allopurinol (ZYLOPRIM) 100 MG tablet TAKE 1 TABLET BY MOUTH DAILY 90 tablet 0   • atorvastatin (LIPITOR) 40 MG tablet Take 1 tablet by mouth Every Night. 90 tablet 1   • calcitriol (ROCALTROL) 0.25 MCG capsule TAKE 1 CAPSULE BY MOUTH DAILY 30 capsule 0   • clopidogrel (PLAVIX) 75 MG tablet Take 1 tablet by mouth Daily. 90 tablet 0   • cyclobenzaprine (FLEXERIL) 5 MG tablet Take 1 tablet by mouth Every Night. 90 tablet 1   • dilTIAZem CD (CARDIZEM CD) 180 MG 24 hr capsule Take 1 capsule by mouth Daily. 30 capsule 0   • gabapentin (NEURONTIN) 600 MG tablet TAKE 1 TABLET BY MOUTH THREE TIMES DAILY 90 tablet 0   • guanFACINE (TENEX) 1 MG tablet TAKE 1 TABLET EVERY NIGHT 90 tablet 0   • hydrALAZINE (APRESOLINE) 50 MG tablet TAKE 1 TABLET BY MOUTH EVERY 8 HOURS 270 tablet 0   • labetalol (NORMODYNE) 200 MG tablet Take 1 tablet by mouth 2 (Two) Times a Day. 180 tablet 0   • Linzess 145 MCG capsule capsule TAKE 1 CAPSULE BY  MOUTH EVERY MORNING BEFORE BREAKFAST 90 capsule 0   • losartan (COZAAR) 100 MG tablet TAKE 1 TABLET BY MOUTH DAILY 90 tablet 0   • Omega-3 Fatty Acids (fish oil) 1000 MG capsule capsule Take 1,000 mg by mouth Daily With Breakfast.     • Turmeric (QC Tumeric Complex) 500 MG capsule Take 1 capsule by mouth Daily. 90 capsule 0   • vitamin B-12 (CYANOCOBALAMIN) 500 MCG tablet Take 1 tablet by mouth Daily. 90 tablet 1   • vitamin D (ERGOCALCIFEROL) 1.25 MG (09556 UT) capsule capsule TAKE 1 CAPSULE BY MOUTH EVERY 7 DAYS 4 capsule 3     No facility-administered medications prior to visit.       No opioid medication identified on active medication list. I have reviewed chart for other potential  high risk medication/s and harmful drug interactions in the elderly.          Aspirin is not on active medication list.  Aspirin use is contraindicated for this patient due to: history of bleeding.  .    Patient Active Problem List   Diagnosis   • Abdominal aortic aneurysm (AAA) without rupture (HCC)   • PVD (peripheral vascular disease) (HCC)   • Mixed hyperlipidemia   • Benign essential HTN   • CAD (coronary artery disease)   • Class 1 obesity with serious comorbidity and body mass index (BMI) of 32.0 to 32.9 in adult   • Vitamin D deficiency, unspecified    • History of MI (myocardial infarction)   • History of CVA (cerebrovascular accident)   • Abnormal finding of blood chemistry, unspecified    • Stage 3 chronic kidney disease (HCC)   • Former smoker   • Left hemiparesis (HCC)   • Elevated liver enzymes   • Hyperkalemia   • Chronic idiopathic constipation   • Dizziness   • Chronic bilateral low back pain with bilateral sciatica   • Encounter for drug screening   • Hypotension   • Essential hypertension   • Left leg pain   • Muscle spasm   • Encounter for screening for malignant neoplasm of colon   • BRANDI on CPAP   • Hyperuricemia   • Hemorrhoids   • Balance problem   • Wound of skin   • Coronary artery disease involving native  "heart   • Chronic midline low back pain with bilateral sciatica   • Overweight   • PARRISH (acute kidney injury) (HCC)   • Acute renal failure (ARF) (HCC)   • Elevated troponin level not due myocardial infarction   • Acute renal failure superimposed on stage 3b chronic kidney disease (HCC)   • Dehydration   • History of heart artery stent   • Stage 4 chronic kidney disease (HCC)   • High serum vitamin B12   • Obesity with serious comorbidity   • Carotid stenosis, right   • Lumbar radiculopathy   • Tobacco abuse   • GAMA (dyspnea on exertion)     Advance Care Planning  Advance Directive is not on file.  ACP discussion was held with the patient during this visit. Patient does not have an advance directive, information provided.     Objective    Vitals:    02/17/23 1024   BP: 122/84   BP Location: Right arm   Patient Position: Sitting   Cuff Size: Adult   Pulse: 80   Temp: 97.5 °F (36.4 °C)   SpO2: 97%   Weight: 110 kg (243 lb 3.2 oz)   Height: 185.4 cm (73\")   PainSc:   6   PainLoc: Shoulder     Estimated body mass index is 32.09 kg/m² as calculated from the following:    Height as of this encounter: 185.4 cm (73\").    Weight as of this encounter: 110 kg (243 lb 3.2 oz).    BMI is >= 30 and <35. (Class 1 Obesity). The following options were offered after discussion;: weight loss educational material (shared in after visit summary), exercise counseling/recommendations and nutrition counseling/recommendations      Does the patient have evidence of cognitive impairment? No          HEALTH RISK ASSESSMENT    Smoking Status:  Social History     Tobacco Use   Smoking Status Former   • Packs/day: 1.00   • Years: 20.00   • Pack years: 20.00   • Types: Cigarettes   Smokeless Tobacco Never     Alcohol Consumption:  Social History     Substance and Sexual Activity   Alcohol Use No     Fall Risk Screen:    STEADI Fall Risk Assessment was completed, and patient is at LOW risk for falls.Assessment completed on:2/17/2023    Depression " Screening:  PHQ-2/PHQ-9 Depression Screening 2/17/2023   Little Interest or Pleasure in Doing Things 0-->not at all   Feeling Down, Depressed or Hopeless 0-->not at all   PHQ-9: Brief Depression Severity Measure Score 0       Health Habits and Functional and Cognitive Screening:  Functional & Cognitive Status 2/17/2023   Do you have difficulty preparing food and eating? Yes   Do you have difficulty bathing yourself, getting dressed or grooming yourself? No   Do you have difficulty using the toilet? No   Do you have difficulty moving around from place to place? No   Do you have trouble with steps or getting out of a bed or a chair? No   Current Diet Well Balanced Diet   Dental Exam Not up to date   Eye Exam Not up to date   Exercise (times per week) 6 times per week   Current Exercises Include Walking   Current Exercise Activities Include -   Do you need help using the phone?  No   Are you deaf or do you have serious difficulty hearing?  Yes   Do you need help with transportation? No   Do you need help shopping? No   Do you need help preparing meals?  No   Do you need help with housework?  No   Do you need help with laundry? No   Do you need help taking your medications? No   Do you need help managing money? No   Do you ever drive or ride in a car without wearing a seat belt? No   Have you felt unusual stress, anger or loneliness in the last month? No   Who do you live with? Alone   If you need help, do you have trouble finding someone available to you? No   Have you been bothered in the last four weeks by sexual problems? No   Do you have difficulty concentrating, remembering or making decisions? Yes       Age-appropriate Screening Schedule:  Refer to the list below for future screening recommendations based on patient's age, sex and/or medical conditions. Orders for these recommended tests are listed in the plan section. The patient has been provided with a written plan.    Health Maintenance   Topic Date Due   •  LIPID PANEL  11/03/2023   • TDAP/TD VACCINES (2 - Td or Tdap) 09/30/2030   • INFLUENZA VACCINE  Completed                CMS Preventative Services Quick Reference  Risk Factors Identified During Encounter  Alcohol Misuse: Patient encouraged to limit alcohol use to no more than 1 standard alcoholic beverage per day. (12 ounce beer, 6 ounce wine, one shot liquor)  Chronic Pain: Natural history and expected course discussed. Questions answered.  Depression/Dysphoria: Prescribed new antidepressant medication treatment. Follow up visit planned.  Drug Use/Abuse Identified or Suspected: Behavioral Health Specialty Referral Ordered  Fall Risk-High or Moderate: Discussed Fall Prevention in the home  Glaucoma or Family History of Glaucoma:  Ophthalmology Appointment Recommended  Hearing Problem: Referral to ENT ordered  Immunizations Discussed/Encouraged:   Inadequate Social Support, Isolation, Loneliness, Lack of Transportation, Financial Difficulties, or Caregiver Stress: {  Inactivity/Sedentary: Patient was advised to exercise at least 150 minutes a week per CDC recommendations.  Polypharmacy: Medication List reviewed  High Risk Sexual Behavior Identified:  Tobacco Use/Dependance Risk (use dotphrase .tobaccocessation for documentation)  Urinary Incontinence: Kegel exercises discussed  Dental Screening Recommended  Vision Screening Recommended  The above risks/problems have been discussed with the patient.  Pertinent information has been shared with the patient in the After Visit Summary.  An After Visit Summary and PPPS were made available to the patient.    Follow Up:   Next Medicare Wellness visit to be scheduled in 1 year.       Additional E&M Note during same encounter follows:  Patient has multiple medical problems which are significant and separately identifiable that require additional work above and beyond the Medicare Wellness Visit.      Chief Complaint  Annual Exam (Subsequent Medicare Wellness)    Subjective   "      HPI  Gregg Randle is also being seen today for Medicare Welllness Exam          Objective   Vital Signs:  /84 (BP Location: Right arm, Patient Position: Sitting, Cuff Size: Adult)   Pulse 80   Temp 97.5 °F (36.4 °C)   Ht 185.4 cm (73\")   Wt 110 kg (243 lb 3.2 oz)   SpO2 97%   BMI 32.09 kg/m²     Physical Exam                    Assessment and Plan   Diagnoses and all orders for this visit:    1. Medicare annual wellness visit, subsequent (Primary)    -Action plan discussed please see scanned documents  -repeat  1 year        This document has been electronically signed by Dread Duvall MD on February 17, 2023 10:45 CST             Follow Up   Return in about 1 year (around 2/17/2024) for Medicare Wellness.  Patient was given instructions and counseling regarding his condition or for health maintenance advice. Please see specific information pulled into the AVS if appropriate.       "

## 2023-02-17 NOTE — PATIENT INSTRUCTIONS
Medicare Wellness  Personal Prevention Plan of Service     Date of Office Visit:    Encounter Provider:  Dread Duvall MD  Place of Service:  [facility]  Patient Name: Gregg Randle  :  1975    As part of the Medicare Wellness portion of your visit today, we are providing you with this personalized preventive plan of services (PPPS). This plan is based upon recommendations of the [place] Preventive Services Task Force (USPSTF) and the Advisory Committee on Immunization Practices (ACIP).    This lists the preventive care services that should be considered, and provides dates of when you are due. Items listed as completed are up-to-date and do not require any further intervention.    Health Maintenance   Topic Date Due    Pneumococcal Vaccine 0-64 (2 - PCV) 2023 (Originally 2021)    LIPID PANEL  2023    ANNUAL WELLNESS VISIT  2024    TDAP/TD VACCINES (2 - Td or Tdap) 2030    COLORECTAL CANCER SCREENING  2030    HEPATITIS C SCREENING  Completed    COVID-19 Vaccine  Completed    INFLUENZA VACCINE  Completed       No orders of the defined types were placed in this encounter.      Return in about 1 year (around 2024) for Medicare Wellness.

## 2023-02-23 ENCOUNTER — LAB (OUTPATIENT)
Dept: LAB | Facility: HOSPITAL | Age: 48
End: 2023-02-23
Payer: MEDICARE

## 2023-02-23 ENCOUNTER — OFFICE VISIT (OUTPATIENT)
Dept: FAMILY MEDICINE CLINIC | Facility: CLINIC | Age: 48
End: 2023-02-23
Payer: MEDICARE

## 2023-02-23 VITALS
HEIGHT: 73 IN | WEIGHT: 231.6 LBS | OXYGEN SATURATION: 96 % | TEMPERATURE: 97.3 F | DIASTOLIC BLOOD PRESSURE: 76 MMHG | BODY MASS INDEX: 30.69 KG/M2 | SYSTOLIC BLOOD PRESSURE: 120 MMHG | HEART RATE: 80 BPM

## 2023-02-23 DIAGNOSIS — E78.2 MIXED HYPERLIPIDEMIA: ICD-10-CM

## 2023-02-23 DIAGNOSIS — E66.9 CLASS 1 OBESITY WITH SERIOUS COMORBIDITY AND BODY MASS INDEX (BMI) OF 31.0 TO 31.9 IN ADULT, UNSPECIFIED OBESITY TYPE: ICD-10-CM

## 2023-02-23 DIAGNOSIS — I73.9 PVD (PERIPHERAL VASCULAR DISEASE): ICD-10-CM

## 2023-02-23 DIAGNOSIS — Z98.890 S/P AAA REPAIR: ICD-10-CM

## 2023-02-23 DIAGNOSIS — E55.9 VITAMIN D DEFICIENCY, UNSPECIFIED: ICD-10-CM

## 2023-02-23 DIAGNOSIS — M54.6 MIDLINE THORACIC BACK PAIN, UNSPECIFIED CHRONICITY: ICD-10-CM

## 2023-02-23 DIAGNOSIS — I10 ESSENTIAL HYPERTENSION: ICD-10-CM

## 2023-02-23 DIAGNOSIS — Z95.5 HISTORY OF HEART ARTERY STENT: ICD-10-CM

## 2023-02-23 DIAGNOSIS — M51.36 DDD (DEGENERATIVE DISC DISEASE), LUMBAR: ICD-10-CM

## 2023-02-23 DIAGNOSIS — M47.816 ARTHRITIS, LUMBAR SPINE: ICD-10-CM

## 2023-02-23 DIAGNOSIS — R42 DIZZINESS: Primary | ICD-10-CM

## 2023-02-23 DIAGNOSIS — R79.89 HIGH SERUM VITAMIN B12: ICD-10-CM

## 2023-02-23 DIAGNOSIS — G89.29 CHRONIC MIDLINE LOW BACK PAIN WITH BILATERAL SCIATICA: ICD-10-CM

## 2023-02-23 DIAGNOSIS — G89.29 CHRONIC BILATERAL LOW BACK PAIN WITH BILATERAL SCIATICA: ICD-10-CM

## 2023-02-23 DIAGNOSIS — I25.10 CORONARY ARTERY DISEASE INVOLVING NATIVE CORONARY ARTERY OF NATIVE HEART WITHOUT ANGINA PECTORIS: ICD-10-CM

## 2023-02-23 DIAGNOSIS — I25.10 CORONARY ARTERY DISEASE INVOLVING NATIVE HEART, UNSPECIFIED VESSEL OR LESION TYPE, UNSPECIFIED WHETHER ANGINA PRESENT: ICD-10-CM

## 2023-02-23 DIAGNOSIS — M54.41 CHRONIC BILATERAL LOW BACK PAIN WITH BILATERAL SCIATICA: ICD-10-CM

## 2023-02-23 DIAGNOSIS — N18.4 STAGE 4 CHRONIC KIDNEY DISEASE: ICD-10-CM

## 2023-02-23 DIAGNOSIS — M54.41 CHRONIC MIDLINE LOW BACK PAIN WITH BILATERAL SCIATICA: ICD-10-CM

## 2023-02-23 DIAGNOSIS — E79.0 HYPERURICEMIA: ICD-10-CM

## 2023-02-23 DIAGNOSIS — M54.42 CHRONIC BILATERAL LOW BACK PAIN WITH BILATERAL SCIATICA: ICD-10-CM

## 2023-02-23 DIAGNOSIS — R53.83 OTHER FATIGUE: ICD-10-CM

## 2023-02-23 DIAGNOSIS — Z86.73 HISTORY OF CVA (CEREBROVASCULAR ACCIDENT): ICD-10-CM

## 2023-02-23 DIAGNOSIS — E66.9 CLASS 1 OBESITY WITH SERIOUS COMORBIDITY AND BODY MASS INDEX (BMI) OF 30.0 TO 30.9 IN ADULT, UNSPECIFIED OBESITY TYPE: ICD-10-CM

## 2023-02-23 DIAGNOSIS — M54.42 CHRONIC MIDLINE LOW BACK PAIN WITH BILATERAL SCIATICA: ICD-10-CM

## 2023-02-23 DIAGNOSIS — R42 VERTIGO: ICD-10-CM

## 2023-02-23 DIAGNOSIS — Z86.79 S/P AAA REPAIR: ICD-10-CM

## 2023-02-23 LAB
25(OH)D3 SERPL-MCNC: 77 NG/ML (ref 30–100)
ALBUMIN SERPL-MCNC: 4.8 G/DL (ref 3.5–5.2)
ALBUMIN/GLOB SERPL: 1.5 G/DL
ALP SERPL-CCNC: 114 U/L (ref 39–117)
ALT SERPL W P-5'-P-CCNC: 18 U/L (ref 1–41)
ANION GAP SERPL CALCULATED.3IONS-SCNC: 12 MMOL/L (ref 5–15)
AST SERPL-CCNC: 25 U/L (ref 1–40)
BASOPHILS # BLD AUTO: 0.06 10*3/MM3 (ref 0–0.2)
BASOPHILS NFR BLD AUTO: 1.1 % (ref 0–1.5)
BILIRUB SERPL-MCNC: 0.6 MG/DL (ref 0–1.2)
BUN SERPL-MCNC: 33 MG/DL (ref 6–20)
BUN/CREAT SERPL: 11.2 (ref 7–25)
CALCIUM SPEC-SCNC: 10.4 MG/DL (ref 8.6–10.5)
CHLORIDE SERPL-SCNC: 98 MMOL/L (ref 98–107)
CHOLEST SERPL-MCNC: 130 MG/DL (ref 0–200)
CO2 SERPL-SCNC: 27 MMOL/L (ref 22–29)
CREAT SERPL-MCNC: 2.94 MG/DL (ref 0.76–1.27)
DEPRECATED RDW RBC AUTO: 38.4 FL (ref 37–54)
EGFRCR SERPLBLD CKD-EPI 2021: 25.5 ML/MIN/1.73
EOSINOPHIL # BLD AUTO: 0.21 10*3/MM3 (ref 0–0.4)
EOSINOPHIL NFR BLD AUTO: 3.9 % (ref 0.3–6.2)
ERYTHROCYTE [DISTWIDTH] IN BLOOD BY AUTOMATED COUNT: 12.9 % (ref 12.3–15.4)
FERRITIN SERPL-MCNC: 197 NG/ML (ref 30–400)
GLOBULIN UR ELPH-MCNC: 3.2 GM/DL
GLUCOSE SERPL-MCNC: 104 MG/DL (ref 65–99)
HCT VFR BLD AUTO: 42.9 % (ref 37.5–51)
HDLC SERPL-MCNC: 48 MG/DL (ref 40–60)
HGB BLD-MCNC: 13.9 G/DL (ref 13–17.7)
IMM GRANULOCYTES # BLD AUTO: 0.01 10*3/MM3 (ref 0–0.05)
IMM GRANULOCYTES NFR BLD AUTO: 0.2 % (ref 0–0.5)
IRON 24H UR-MRATE: 71 MCG/DL (ref 59–158)
IRON SATN MFR SERPL: 19 % (ref 20–50)
LDLC SERPL CALC-MCNC: 63 MG/DL (ref 0–100)
LDLC/HDLC SERPL: 1.28 {RATIO}
LYMPHOCYTES # BLD AUTO: 1.31 10*3/MM3 (ref 0.7–3.1)
LYMPHOCYTES NFR BLD AUTO: 24.5 % (ref 19.6–45.3)
MCH RBC QN AUTO: 26.5 PG (ref 26.6–33)
MCHC RBC AUTO-ENTMCNC: 32.4 G/DL (ref 31.5–35.7)
MCV RBC AUTO: 81.7 FL (ref 79–97)
MONOCYTES # BLD AUTO: 0.67 10*3/MM3 (ref 0.1–0.9)
MONOCYTES NFR BLD AUTO: 12.5 % (ref 5–12)
NEUTROPHILS NFR BLD AUTO: 3.08 10*3/MM3 (ref 1.7–7)
NEUTROPHILS NFR BLD AUTO: 57.8 % (ref 42.7–76)
NRBC BLD AUTO-RTO: 0 /100 WBC (ref 0–0.2)
PLATELET # BLD AUTO: 329 10*3/MM3 (ref 140–450)
PMV BLD AUTO: 10.6 FL (ref 6–12)
POTASSIUM SERPL-SCNC: 4.1 MMOL/L (ref 3.5–5.2)
PROT SERPL-MCNC: 8 G/DL (ref 6–8.5)
RBC # BLD AUTO: 5.25 10*6/MM3 (ref 4.14–5.8)
SODIUM SERPL-SCNC: 137 MMOL/L (ref 136–145)
TIBC SERPL-MCNC: 367 MCG/DL (ref 298–536)
TRANSFERRIN SERPL-MCNC: 246 MG/DL (ref 200–360)
TRIGL SERPL-MCNC: 104 MG/DL (ref 0–150)
TSH SERPL DL<=0.05 MIU/L-ACNC: 1.71 UIU/ML (ref 0.27–4.2)
URATE SERPL-MCNC: 6.9 MG/DL (ref 3.4–7)
VIT B12 BLD-MCNC: 1593 PG/ML (ref 211–946)
VLDLC SERPL-MCNC: 19 MG/DL (ref 5–40)
WBC NRBC COR # BLD: 5.34 10*3/MM3 (ref 3.4–10.8)

## 2023-02-23 PROCEDURE — 82728 ASSAY OF FERRITIN: CPT

## 2023-02-23 PROCEDURE — 83540 ASSAY OF IRON: CPT

## 2023-02-23 PROCEDURE — 84402 ASSAY OF FREE TESTOSTERONE: CPT

## 2023-02-23 PROCEDURE — 99214 OFFICE O/P EST MOD 30 MIN: CPT | Performed by: FAMILY MEDICINE

## 2023-02-23 PROCEDURE — 84466 ASSAY OF TRANSFERRIN: CPT

## 2023-02-23 PROCEDURE — 84403 ASSAY OF TOTAL TESTOSTERONE: CPT

## 2023-02-23 PROCEDURE — 84550 ASSAY OF BLOOD/URIC ACID: CPT

## 2023-02-23 PROCEDURE — 82607 VITAMIN B-12: CPT

## 2023-02-23 PROCEDURE — 85025 COMPLETE CBC W/AUTO DIFF WBC: CPT

## 2023-02-23 PROCEDURE — 80053 COMPREHEN METABOLIC PANEL: CPT

## 2023-02-23 PROCEDURE — 80061 LIPID PANEL: CPT

## 2023-02-23 PROCEDURE — 84443 ASSAY THYROID STIM HORMONE: CPT

## 2023-02-23 PROCEDURE — 82306 VITAMIN D 25 HYDROXY: CPT

## 2023-02-23 NOTE — PATIENT INSTRUCTIONS
Referred back to Vascular Surgery for aortic aneurysm repair    Get labwork fasting    Will refer to DR. Payton ENT with dizziness vertigo    Get thoracic x-ray today    Continue neurontin     Recheck in 2 months     Recheck in 2 months

## 2023-03-02 DIAGNOSIS — M54.41 CHRONIC MIDLINE LOW BACK PAIN WITH BILATERAL SCIATICA: ICD-10-CM

## 2023-03-02 DIAGNOSIS — K59.09 CHRONIC CONSTIPATION: ICD-10-CM

## 2023-03-02 DIAGNOSIS — N18.32 STAGE 3B CHRONIC KIDNEY DISEASE: ICD-10-CM

## 2023-03-02 DIAGNOSIS — M54.42 CHRONIC MIDLINE LOW BACK PAIN WITH BILATERAL SCIATICA: ICD-10-CM

## 2023-03-02 DIAGNOSIS — Z86.73 HISTORY OF CVA (CEREBROVASCULAR ACCIDENT): ICD-10-CM

## 2023-03-02 DIAGNOSIS — G89.29 CHRONIC MIDLINE LOW BACK PAIN WITH BILATERAL SCIATICA: ICD-10-CM

## 2023-03-02 RX ORDER — ERGOCALCIFEROL 1.25 MG/1
CAPSULE ORAL
Qty: 4 CAPSULE | Refills: 3 | Status: SHIPPED | OUTPATIENT
Start: 2023-03-02

## 2023-03-02 RX ORDER — CALCITRIOL 0.25 UG/1
0.25 CAPSULE, LIQUID FILLED ORAL DAILY
Qty: 30 CAPSULE | Refills: 0 | Status: SHIPPED | OUTPATIENT
Start: 2023-03-02

## 2023-03-02 RX ORDER — GABAPENTIN 600 MG/1
TABLET ORAL
Qty: 90 TABLET | Refills: 0 | Status: SHIPPED | OUTPATIENT
Start: 2023-03-02 | End: 2023-04-03

## 2023-03-02 RX ORDER — HYDRALAZINE HYDROCHLORIDE 50 MG/1
TABLET, FILM COATED ORAL
Qty: 270 TABLET | Refills: 0 | Status: SHIPPED | OUTPATIENT
Start: 2023-03-02

## 2023-03-03 LAB
TESTOST FREE SERPL-MCNC: 9.1 PG/ML (ref 6.8–21.5)
TESTOST SERPL-MCNC: 654.7 NG/DL (ref 264–916)

## 2023-03-06 ENCOUNTER — OFFICE VISIT (OUTPATIENT)
Dept: SLEEP MEDICINE | Facility: CLINIC | Age: 48
End: 2023-03-06
Payer: MEDICARE

## 2023-03-06 VITALS
WEIGHT: 240 LBS | DIASTOLIC BLOOD PRESSURE: 80 MMHG | HEART RATE: 91 BPM | SYSTOLIC BLOOD PRESSURE: 130 MMHG | HEIGHT: 73 IN | BODY MASS INDEX: 31.81 KG/M2 | OXYGEN SATURATION: 94 %

## 2023-03-06 DIAGNOSIS — G47.33 OBSTRUCTIVE SLEEP APNEA: Primary | ICD-10-CM

## 2023-03-06 DIAGNOSIS — G47.31 PRIMARY CENTRAL SLEEP APNEA: ICD-10-CM

## 2023-03-06 PROCEDURE — 99214 OFFICE O/P EST MOD 30 MIN: CPT | Performed by: NURSE PRACTITIONER

## 2023-03-06 RX ORDER — ATORVASTATIN CALCIUM 40 MG/1
40 TABLET, FILM COATED ORAL NIGHTLY
Qty: 90 TABLET | Refills: 1 | Status: SHIPPED | OUTPATIENT
Start: 2023-03-06

## 2023-03-06 NOTE — PROGRESS NOTES
New Patient Sleep Medicine Consultation    Encounter Date: 3/6/2023         Patient's Primary Care Provider: Dread Duvall MD  Referring Provider: Dread Duvall MD  Reason for consultation/chief complaint: known BRANDI on CPAP, establishing care    Gregg Randle is a 47 y.o. male who admits to snoring, unrestful sleep, gasping during sleep, excessive daytime sleepiness, morning headaches, frequent unexplained arousals, irritability, disturbed or restless sleep, memory loss, up to bathroom at night, sleepy driving, night sweats, restless legs at night, difficulty falling asleep and difficulty staying asleep.     He denies cataplexy, sleep paralysis, or hypnagogic hallucinations. His bedtime is ~ 1900. He  falls asleep after 60 minutes, and is up 4-5 times per night. He wakes up ~          2300, goes back to sleep. He endorses 6 hours of sleep. He drinks 0 cups of coffee, 0 teas, and 0 sodas per day. He drinks 0 alcoholic beverages per week. He is not a current smoker. He does not take sedatives or hypnotics. He has rare sleepiness with driving. He occasionally naps in the afternoons.     Patient seems confused as to why he is here in office today. He thought he was coming to get x-rays. I have advised him that it does appear as though he has x-rays ordered from his PCP and he will need to go to Radiology to have those completed.     He currently has a BiPAP ASV machine. He admits that he has only been using it maybe 2-3 nights per week. He states that he sleeps very poorly without it and feels very tired the following day. I have advised him to adhere to PAP therapy nightly. His data card was unable to be downloaded today because of a card error. I have advised him to get to his DME provider and get a new data card, and they can likely send me a copy of the report. As of now, I am uncertain if his current pressure settings are correct or sufficient for him. We are obtaining previous sleep study  records.  He does report that he had a machine ~, got another machine a few years later, and it stopped working, so he was given his current machine as a loner possibly 4 years ago.    [place] - 8  [place] Sleepiness Scale  Sitting and reading: Would never doze  Watching TV: Moderate chance of dozing  Sitting, inactive in a public place (e.g. a theatre or a meeting): Would never doze  As a passenger in a car for an hour without a break: Slight chance of dozing  Lying down to rest in the afternoon when circumstances permit: High chance of dozing  Sitting and talking to someone: Slight chance of dozing  Sitting quietly after a lunch without alcohol: Slight chance of dozing  In a car, while stopped for a few minutes in traffic: Would never doze  Total score: 8    Prior Sleep Testin. Unavailable - obtaining records  2. Currently on BiPAP ASV max pressure 25, EPAP 9-14, PS 5-12 cm H2O, auto breath rate (pulled settings from physical machine)    Addendum 03/10/2023 1211:  Previous sleep studies received-  PSG on 2011, RDI of 108.8 (all central events)  PAP titration on 2011, recommended BiPAP ASV max pressure 25, EPAP 9-4, PS 5-12, rate auto  Currently on same    Past Medical History:   Diagnosis Date   • AAA (abdominal aortic aneurysm)    • Arthritis    • CAD (coronary artery disease)    • COPD (chronic obstructive pulmonary disease) ([facility])    • Depression    • Depression    • HTN (hypertension)    • Hyperlipidemia    • MI (myocardial infarction) ([facility])     x 2   • Renal insufficiency    • Sleep apnea     using c-pap   • Sleep apnea    • Stroke ([facility])     no residual except some intermittent vision changes   • Vision changes     post stroke, takes a minute to focus     Social History     Socioeconomic History   • Marital status: Single   Tobacco Use   • Smoking status: Former     Packs/day: 1.00     Years: 20.00     Pack years: 20.00     Types: Cigarettes   • Smokeless tobacco: Never   Vaping Use   •  "Vaping Use: Never used   Substance and Sexual Activity   • Alcohol use: No   • Drug use: No   • Sexual activity: Defer     Family History   Problem Relation Age of Onset   • Stroke Mother    • Diabetes Mother    • Heart disease Father      Prior T&A, UPPP, maxillofacial, or bariatric surgery: None  Family history of sleep disorders: None  Other family history + for: As above  Occupation: Disabled  Marital status: Unmarried  Children: 2  Has 2 brothers and 2 sisters  Smoking history: smoked 1.5 ppds from age 19 until 46      Review of Systems:  Constitutional: positive for fatigue  Ears, nose, mouth, throat, and face: negative  Respiratory: negative  Cardiovascular: negative  Gastrointestinal: negative  Genitourinary:negative  Musculoskeletal:negative  Neurological: negative  Behavioral/Psych: positive for sleep disturbance  Patient advised to discuss any positive ROS with PCP.      Vitals:    03/06/23 1046   BP: 130/80   Pulse: 91   SpO2: 94%           03/06/23  1046   Weight: 109 kg (240 lb)       Body mass index is 31.67 kg/m². BMI is >= 30 and <35. (Class 1 Obesity). The following options were offered after discussion;: referral to primary care    Tobacco Use: Medium Risk   • Smoking Tobacco Use: Former   • Smokeless Tobacco Use: Never   • Passive Exposure: Not on file       Physical Exam:        General: Alert. Cooperative. Well developed. No acute distress.   Head/Neck:  Normocephalic. Symmetrical. Atraumatic.     Neck circumference: 16\"             Eyes: Sclera clear. No icterus. PERRLA. Normal EOM.             Ears: No deformities. Normal hearing.             Nose: No septal deviation. No drainage.          Throat: No oral lesions. No thrush. Moist mucous membranes. Trachea midline.    Tongue is normal    Chest Wall:  Normal shape. Symmetric expansion with respiration. No tenderness.          Lungs:  Clear to auscultation bilaterally. No wheezes. No rhonchi. No rales. Respirations regular, even and " unlabored.            Heart:  Regular rhythm and normal rate. Normal S1 and S2.      Abdomen:  Soft, non-tender and non-distended. Normal bowel sounds. No masses.  Extremities:  Moves all extremities well. No edema.               Skin: Dry. Intact. No bleeding. No rash.           Neuro: Moves all 4 extremities and cranial nerves grossly intact.  Psychiatric: Normal mood and affect.      Current Outpatient Medications:   •  albuterol (PROVENTIL HFA;VENTOLIN HFA) 108 (90 BASE) MCG/ACT inhaler, Inhale 2 puffs Every 4 (Four) Hours As Needed for Wheezing., Disp: , Rfl:   •  allopurinol (ZYLOPRIM) 100 MG tablet, TAKE 1 TABLET BY MOUTH DAILY, Disp: 90 tablet, Rfl: 0  •  atorvastatin (LIPITOR) 40 MG tablet, Take 1 tablet by mouth Every Night., Disp: 90 tablet, Rfl: 1  •  calcitriol (ROCALTROL) 0.25 MCG capsule, TAKE 1 CAPSULE BY MOUTH DAILY, Disp: 30 capsule, Rfl: 0  •  clopidogrel (PLAVIX) 75 MG tablet, Take 1 tablet by mouth Daily., Disp: 90 tablet, Rfl: 0  •  cyclobenzaprine (FLEXERIL) 5 MG tablet, Take 1 tablet by mouth Every Night., Disp: 90 tablet, Rfl: 1  •  dilTIAZem CD (CARDIZEM CD) 180 MG 24 hr capsule, Take 1 capsule by mouth Daily., Disp: 30 capsule, Rfl: 0  •  gabapentin (NEURONTIN) 600 MG tablet, TAKE 1 TABLET BY MOUTH THREE TIMES DAILY, Disp: 90 tablet, Rfl: 0  •  guanFACINE (TENEX) 1 MG tablet, TAKE 1 TABLET EVERY NIGHT, Disp: 90 tablet, Rfl: 0  •  hydrALAZINE (APRESOLINE) 50 MG tablet, TAKE 1 TABLET BY MOUTH EVERY 8 HOURS, Disp: 270 tablet, Rfl: 0  •  labetalol (NORMODYNE) 200 MG tablet, Take 1 tablet by mouth 2 (Two) Times a Day., Disp: 180 tablet, Rfl: 0  •  linaclotide (Linzess) 145 MCG capsule capsule, Take 1 capsule by mouth Every Morning Before Breakfast., Disp: 90 capsule, Rfl: 0  •  losartan (COZAAR) 100 MG tablet, TAKE 1 TABLET BY MOUTH DAILY, Disp: 90 tablet, Rfl: 0  •  Omega-3 Fatty Acids (fish oil) 1000 MG capsule capsule, Take 1 capsule by mouth Daily With Breakfast., Disp: , Rfl:   •  Turmeric  (QC Tumeric Complex) 500 MG capsule, Take 1 capsule by mouth Daily., Disp: 90 capsule, Rfl: 0  •  vitamin B-12 (CYANOCOBALAMIN) 500 MCG tablet, Take 1 tablet by mouth Daily., Disp: 90 tablet, Rfl: 1  •  vitamin D (ERGOCALCIFEROL) 1.25 MG (33953 UT) capsule capsule, TAKE 1 CAPSULE BY MOUTH EVERY 7 DAYS, Disp: 4 capsule, Rfl: 3    WBC   Date Value Ref Range Status   02/23/2023 5.34 3.40 - 10.80 10*3/mm3 Final     RBC   Date Value Ref Range Status   02/23/2023 5.25 4.14 - 5.80 10*6/mm3 Final     Hemoglobin   Date Value Ref Range Status   02/23/2023 13.9 13.0 - 17.7 g/dL Final     Hematocrit   Date Value Ref Range Status   02/23/2023 42.9 37.5 - 51.0 % Final     MCV   Date Value Ref Range Status   02/23/2023 81.7 79.0 - 97.0 fL Final     MCH   Date Value Ref Range Status   02/23/2023 26.5 (L) 26.6 - 33.0 pg Final     MCHC   Date Value Ref Range Status   02/23/2023 32.4 31.5 - 35.7 g/dL Final     RDW   Date Value Ref Range Status   02/23/2023 12.9 12.3 - 15.4 % Final     RDW-SD   Date Value Ref Range Status   02/23/2023 38.4 37.0 - 54.0 fl Final     MPV   Date Value Ref Range Status   02/23/2023 10.6 6.0 - 12.0 fL Final     Platelets   Date Value Ref Range Status   02/23/2023 329 140 - 450 10*3/mm3 Final     Neutrophil %   Date Value Ref Range Status   02/23/2023 57.8 42.7 - 76.0 % Final     Lymphocyte %   Date Value Ref Range Status   02/23/2023 24.5 19.6 - 45.3 % Final     Monocyte %   Date Value Ref Range Status   02/23/2023 12.5 (H) 5.0 - 12.0 % Final     Eosinophil %   Date Value Ref Range Status   02/23/2023 3.9 0.3 - 6.2 % Final     Basophil %   Date Value Ref Range Status   02/23/2023 1.1 0.0 - 1.5 % Final     Immature Grans %   Date Value Ref Range Status   02/23/2023 0.2 0.0 - 0.5 % Final     Neutrophils, Absolute   Date Value Ref Range Status   02/23/2023 3.08 1.70 - 7.00 10*3/mm3 Final     Lymphocytes, Absolute   Date Value Ref Range Status   02/23/2023 1.31 0.70 - 3.10 10*3/mm3 Final     Monocytes, Absolute    Date Value Ref Range Status   02/23/2023 0.67 0.10 - 0.90 10*3/mm3 Final     Eosinophils, Absolute   Date Value Ref Range Status   02/23/2023 0.21 0.00 - 0.40 10*3/mm3 Final     Basophils, Absolute   Date Value Ref Range Status   02/23/2023 0.06 0.00 - 0.20 10*3/mm3 Final     Immature Grans, Absolute   Date Value Ref Range Status   02/23/2023 0.01 0.00 - 0.05 10*3/mm3 Final     nRBC   Date Value Ref Range Status   02/23/2023 0.0 0.0 - 0.2 /100 WBC Final     Iron   Date Value Ref Range Status   02/23/2023 71 59 - 158 mcg/dL Final     Ferritin   Date Value Ref Range Status   02/23/2023 197.00 30.00 - 400.00 ng/mL Final     Lab Results   Component Value Date    GLUCOSE 104 (H) 02/23/2023    BUN 33 (H) 02/23/2023    CREATININE 2.94 (H) 02/23/2023    EGFR 25.5 (L) 02/23/2023    BCR 11.2 02/23/2023    K 4.1 02/23/2023    CO2 27.0 02/23/2023    CALCIUM 10.4 02/23/2023    ALBUMIN 4.8 02/23/2023    AST 25 02/23/2023    ALT 18 02/23/2023       ASSESSMENT:  1. Primary central sleep apnea - New (to me), no additional work-up planned (3)  1. Sub-optimally compliant with PAP therapy - encouraged nightly adherence  2. Obtain outside records from previous sleep studies  3. Obtain compliance download from DME company  4. Script for PAP supplies  5. Follow up within 1 year. If compliance report shows any areas of concern, will call patient to arrange closer follow up.  6. Pertinent labs were reviewed as listed above      I spent 30 minutes caring for Gregg on this date of service. This time includes time spent by me in the following activities: preparing for the visit, reviewing tests, obtaining and/or reviewing a separately obtained history, performing a medically appropriate examination and/or evaluation , counseling and educating the patient/family/caregiver, documenting information in the medical record and care coordination; discussing PAP therapy, PAP compliance and PAP maintenance    RTC in 12 months. Patient agrees to  return sooner if changes in symptoms.         This document has been electronically signed by JEFF Lees on March 6, 2023 10:50 CST          CC: Dread Duvall MD Jamora, David C, MD

## 2023-03-08 ENCOUNTER — TELEPHONE (OUTPATIENT)
Dept: FAMILY MEDICINE CLINIC | Facility: CLINIC | Age: 48
End: 2023-03-08
Payer: MEDICARE

## 2023-03-08 NOTE — TELEPHONE ENCOUNTER
----- Message from Dread Duvall MD sent at 3/8/2023  3:26 PM CST -----  Pt has negative x-ray on thoracic spine. If pt would like to try physical therapy for his back pain I will order. Let me know what pt decides

## 2023-03-08 NOTE — TELEPHONE ENCOUNTER
----- Message from Dread Duvall MD sent at 3/8/2023  3:27 PM CST -----  Pt has mild degenerative changes of right wrist. His options include PT/OT downstairs and/or Orthopedic referral    Let me know what pt decides thanks

## 2023-03-10 ENCOUNTER — TELEPHONE (OUTPATIENT)
Dept: SLEEP MEDICINE | Facility: HOSPITAL | Age: 48
End: 2023-03-10
Payer: MEDICARE

## 2023-03-10 PROBLEM — G47.31 PRIMARY CENTRAL SLEEP APNEA: Status: ACTIVE | Noted: 2023-03-10

## 2023-03-10 NOTE — TELEPHONE ENCOUNTER
----- Message from JEFF Lees sent at 3/10/2023 12:17 PM CST -----  We need to get him back in for a follow up sooner. After going over his study, I think I need to talk to him more about being compliant with his BiPAP and see about getting him a new machine, but I am going to have to talk to him a lot about compliance.     If we don't have an opening for next time we're in [place], see if he can come here. If not, next [place] opening will have to work.

## 2023-03-16 ENCOUNTER — TELEPHONE (OUTPATIENT)
Dept: FAMILY MEDICINE CLINIC | Facility: CLINIC | Age: 48
End: 2023-03-16
Payer: MEDICARE

## 2023-03-16 NOTE — TELEPHONE ENCOUNTER
Pt. Called asking if a  CPAP Machine has been ordered.       You can call back at [phone]

## 2023-03-23 DIAGNOSIS — I10 BENIGN ESSENTIAL HTN: ICD-10-CM

## 2023-03-23 RX ORDER — GUANFACINE 1 MG/1
TABLET ORAL
Qty: 90 TABLET | Refills: 0 | Status: SHIPPED | OUTPATIENT
Start: 2023-03-23

## 2023-03-24 ENCOUNTER — OFFICE VISIT (OUTPATIENT)
Dept: OTOLARYNGOLOGY | Facility: CLINIC | Age: 48
End: 2023-03-24
Payer: MEDICARE

## 2023-03-24 VITALS
DIASTOLIC BLOOD PRESSURE: 74 MMHG | BODY MASS INDEX: 31.69 KG/M2 | HEIGHT: 72 IN | WEIGHT: 234 LBS | SYSTOLIC BLOOD PRESSURE: 104 MMHG

## 2023-03-24 DIAGNOSIS — H81.11 BPPV (BENIGN PAROXYSMAL POSITIONAL VERTIGO), RIGHT: Primary | ICD-10-CM

## 2023-03-24 PROCEDURE — 99203 OFFICE O/P NEW LOW 30 MIN: CPT | Performed by: OTOLARYNGOLOGY

## 2023-03-24 PROCEDURE — 1160F RVW MEDS BY RX/DR IN RCRD: CPT | Performed by: OTOLARYNGOLOGY

## 2023-03-24 PROCEDURE — 3074F SYST BP LT 130 MM HG: CPT | Performed by: OTOLARYNGOLOGY

## 2023-03-24 PROCEDURE — 1159F MED LIST DOCD IN RCRD: CPT | Performed by: OTOLARYNGOLOGY

## 2023-03-24 PROCEDURE — 3078F DIAST BP <80 MM HG: CPT | Performed by: OTOLARYNGOLOGY

## 2023-03-24 NOTE — PROGRESS NOTES
Subjective   Gregg Randle is a 47 y.o. male.       History of Present Illness   Patient reports a 2-month history of dizziness and a longer history of imbalance.  Has spinning dizziness when he lies down.  He sleeps with his head to the left.  Has no symptoms while he sitting still.  Is imbalanced when he walks but has a history of stroke and says he has trouble with his vision in his left eye and numbness in his left leg and foot      The following portions of the patient's history were reviewed and updated as appropriate: allergies, current medications, past family history, past medical history, past social history, past surgical history and problem list.     reports that he has quit smoking. His smoking use included cigarettes. He has a 20.00 pack-year smoking history. He has never used smokeless tobacco. He reports that he does not drink alcohol and does not use drugs.   Patient is not a tobacco user and has not been counseled for use of tobacco products      Review of Systems        Objective   Physical Exam  Ears: External ears no deformity, canals no discharge, tympanic membranes intact clear and mobile bilaterally.  Nares no discharge  Oral cavity no lesions  Pharynx no erythema or exudate  Neck no adenopathy or mass  [place]-Hallpike maneuvers produce positive response with the head to the right.         Assessment and Plan        Assessment: BPPV right as well as imbalance due to his previous stroke    Plan: Strongly urged using a walking stick when ambulating and maintaining adequate lighting in the home.  Explained the nature of BPPV to the patient and how to perform [place] exercises.  He is to return in 6 weeks for reevaluation.  Call for problems.

## 2023-03-28 NOTE — PROGRESS NOTES
Subjective:  Gregg Randle is a 47 y.o. male who presents for       Patient Active Problem List   Diagnosis   • Abdominal aortic aneurysm (AAA) without rupture (HCC)   • PVD (peripheral vascular disease) (HCC)   • Mixed hyperlipidemia   • Benign essential HTN   • CAD (coronary artery disease)   • Class 1 obesity with serious comorbidity and body mass index (BMI) of 32.0 to 32.9 in adult   • Vitamin D deficiency, unspecified    • History of MI (myocardial infarction)   • History of CVA (cerebrovascular accident)   • Abnormal finding of blood chemistry, unspecified    • Stage 3 chronic kidney disease   • Former smoker   • Left hemiparesis   • Elevated liver enzymes   • Hyperkalemia   • Chronic idiopathic constipation   • Dizziness   • Chronic bilateral low back pain with bilateral sciatica   • Encounter for drug screening   • Hypotension   • Essential hypertension   • Left leg pain   • Muscle spasm   • Encounter for screening for malignant neoplasm of colon   • BRANDI on CPAP   • Hyperuricemia   • Hemorrhoids   • Balance problem   • Wound of skin   • Coronary artery disease involving native heart   • Chronic midline low back pain with bilateral sciatica   • Overweight   • PARRISH (acute kidney injury)   • Acute renal failure (ARF)   • Elevated troponin level not due myocardial infarction   • Acute renal failure superimposed on stage 3b chronic kidney disease   • Dehydration   • History of heart artery stent   • Stage 4 chronic kidney disease   • High serum vitamin B12   • Obesity with serious comorbidity   • Carotid stenosis, right   • Lumbar radiculopathy   • Tobacco abuse   • GAMA (dyspnea on exertion)   • Primary central sleep apnea   • Benign paroxysmal positional vertigo   • Class 1 obesity with serious comorbidity and body mass index (BMI) of 31.0 to 31.9 in adult           Current Outpatient Medications:   •  albuterol (PROVENTIL HFA;VENTOLIN HFA) 108 (90 BASE) MCG/ACT inhaler, Inhale 2 puffs Every 4 (Four)  Hours As Needed for Wheezing., Disp: , Rfl:   •  allopurinol (ZYLOPRIM) 100 MG tablet, TAKE 1 TABLET BY MOUTH DAILY, Disp: 90 tablet, Rfl: 0  •  atorvastatin (LIPITOR) 40 MG tablet, Take 1 tablet by mouth Every Night., Disp: 90 tablet, Rfl: 1  •  calcitriol (ROCALTROL) 0.25 MCG capsule, Take 1 capsule by mouth Daily., Disp: 90 capsule, Rfl: 0  •  clopidogrel (PLAVIX) 75 MG tablet, Take 1 tablet by mouth Daily., Disp: 90 tablet, Rfl: 0  •  cyclobenzaprine (FLEXERIL) 5 MG tablet, TAKE 1 TABLET BY MOUTH EVERY NIGHT AT BEDTIME, Disp: 90 tablet, Rfl: 1  •  dilTIAZem CD (CARDIZEM CD) 180 MG 24 hr capsule, Take 1 capsule by mouth Daily., Disp: 90 capsule, Rfl: 0  •  gabapentin (NEURONTIN) 600 MG tablet, Take 1 tablet by mouth 3 (Three) Times a Day., Disp: 270 tablet, Rfl: 0  •  guanFACINE (TENEX) 1 MG tablet, TAKE 1 TABLET BY MOUTH EVERY NIGHT, Disp: 90 tablet, Rfl: 0  •  hydrALAZINE (APRESOLINE) 50 MG tablet, TAKE 1 TABLET BY MOUTH EVERY 8 HOURS, Disp: 270 tablet, Rfl: 0  •  labetalol (NORMODYNE) 200 MG tablet, Take 1 tablet by mouth 2 (Two) Times a Day., Disp: 180 tablet, Rfl: 0  •  linaclotide (Linzess) 145 MCG capsule capsule, Take 1 capsule by mouth Every Morning Before Breakfast., Disp: 90 capsule, Rfl: 0  •  losartan (COZAAR) 100 MG tablet, TAKE 1 TABLET BY MOUTH DAILY, Disp: 90 tablet, Rfl: 0  •  Omega-3 Fatty Acids (fish oil) 1000 MG capsule capsule, Take 1 capsule by mouth Daily With Breakfast., Disp: , Rfl:   •  Turmeric (QC Tumeric Complex) 500 MG capsule, Take 1 capsule by mouth Daily., Disp: 90 capsule, Rfl: 0  •  vitamin B-12 (CYANOCOBALAMIN) 500 MCG tablet, Take 1 tablet by mouth Daily., Disp: 90 tablet, Rfl: 1  •  vitamin D (ERGOCALCIFEROL) 1.25 MG (55142 UT) capsule capsule, Take 1 capsule by mouth Every 7 (Seven) Days., Disp: 12 capsule, Rfl: 0    Pt is 48 yo male with management of obesity, CAD sp PCI , HTN, HLP, AAA  Sp repair , PVD with bilateral iliac stent , COPD (panlobar emphysema) former  tobacco  user, dizziness, CKD stage 3 , claudication. Major depression, cholelithiasis, abnormal echocardiogram ( LVH grade 1 diastolic dysfunction)  History of CVA, former tobacco user , history of MI, left hemiparesis, chronic back pain (DDD lumbar spine)  elevated liver enzymes, right carotid stenosis, BRANDI, BPPV        1/12/23 in office visit for recheck. Since last visit pt had carotid doppler on 11/23/22 that was normal . Pt had labwork done on 11/3/22 that showed vitamin D stable vitamin B12 high at 1092. Uric acid high at 7.4 lipid panel stable CMP showed GFR at 24.9 from 23.1 with Cr at 2.99  From 3.19.  BUN at 40 glucose at 103. CBC showed hemoglobin and WBC stable pt reschedule his appt with Cardiology to 1/26/23. Pt was referred to Neurology for his lower back/leg pain.  He was told there is nothing Neurologist can do.   He continues to have lower back pain radiating down his legsHis last x-ray on 3/8/22 showed DDD of lumbar spine along with probable foraminal stenosis.  His x-ray of thoracic spine was negative. He does reort feeling tired lately. His BP is up today but he did not take  BP Medications. Reports no chest pain. He states BP at home stable. He has daytime sleepiness. He averages about 3-4 hours of sleep each night. He states that he had sleep study years ago in [place]. He has a CPAP but  Does not use I consistently  He reports he quit smoking about a year ago. He does get short of breath.  He has been smoking for 15-20 years. Pt used to see Pulmonologist in [facility]. Pt also reports right wrist pain. Denies any trauma     2/23/23 in office visit for recheck. Pt saw Pulmonology with [facility] and had normal PFTs. . Pt has yet to get labwork ordered on 1/12/23. Pt saw Cardiology  on 1/26/23 for his CAD and is to continue aspirin plavix.  Pt lost 9 lbs since his last visit. He has yet to followup with Vascular Surgery for his AAA repair. He does have middle back pain today. No chest pain. He  continues to have intermittent dizziness. He does suffer from vertigo. He states his dizziness gets worse with walking   He also continues to take neurontin 600 mg PO BID for his back pain.  No syncope . His last carotid ultrasound was normal.       4/25/23 in office visit fo r recheck. Pt saw ENT on 3/24/23 and was found to have BPPV on the right side and encouraged to use a walking stick along with [place] exercises. Pt had labwork on 2/23/23 that showed ferritin normal iron profile showed saturation at 19. Testosterone was at 654.7. uric acid normal vitamin b12 high at 1593.  Vitamin D and TSH normal lipid panel stable CMP showed CR AT 2.94 from 2.99 with BUN at 33 glucose at 104 GFR at 25.5. CBC showed normal hemoglobin and WBC. BP and HR stable today.he has occasional diziness. No chest pain no shortness of breath. He continues to have middle back pain on left side. His thoracic x-ray was normal.        Fatigue  This is a chronic problem. The current episode started more than 1 year ago. The problem occurs constantly. The problem has been gradually worsening. Associated symptoms include arthralgias, numbness and weakness. Pertinent negatives include no abdominal pain, anorexia, change in bowel habit, chest pain, chills, congestion, coughing, diaphoresis, fatigue, fever, headaches, joint swelling, myalgias, nausea, neck pain, swollen glands, urinary symptoms, vertigo, visual change or vomiting. Nothing aggravates the symptoms. He has tried nothing for the symptoms. The treatment provided no relief.   Shortness of Breath  This is a chronic problem. The current episode started more than 1 year ago. The problem occurs constantly. The problem has been gradually worsening. Pertinent negatives include no abdominal pain, chest pain, fever, headaches, neck pain, rhinorrhea, swollen glands, vomiting or wheezing. Risk factors include smoking. He has tried beta agonist inhalers for the symptoms. The treatment provided  mild relief. His past medical history is significant for COPD. There is no history of allergies, aspirin allergies, asthma, bronchiolitis, CAD, chronic lung disease, DVT, a heart failure, PE, pneumonia or a recent surgery.   Dizziness  This is a chronic problem. The current episode started more than 1 year ago. The problem occurs constantly. The problem has been gradually worsening. Associated symptoms include arthralgias, numbness and weakness. Pertinent negatives include no abdominal pain, anorexia, change in bowel habit, chest pain, chills, congestion, coughing, diaphoresis, fatigue, fever, headaches, joint swelling, myalgias, nausea, neck pain, swollen glands, urinary symptoms, vertigo, visual change or vomiting. The symptoms are aggravated by bending and standing. He has tried nothing for the symptoms. The treatment provided no relief.   Back Pain  This is a chronic problem. The current episode started more than 1 year ago. The problem occurs constantly. The problem has been gradually worsening since onset. The pain is present in the gluteal and lumbar spine. The pain does not radiate. The pain is at a severity of 4/10. The pain is moderate. The pain is the same all the time. The symptoms are aggravated by bending, lying down and position. Stiffness is present all day. Associated symptoms include leg pain, numbness, paresis, paresthesias and weakness. Pertinent negatives include no abdominal pain, bladder incontinence, bowel incontinence, chest pain, dysuria, fever, headaches, pelvic pain, perianal numbness or tingling. He has tried muscle relaxant (neurontin) for the symptoms. The treatment provided no relief.   Leg Pain   The incident occurred more than 1 week ago. The incident occurred at home. The pain is present in the left leg. The quality of the pain is described as aching. The pain is at a severity of 4/10. The pain is moderate. The pain has been worsening since onset. Associated symptoms include  numbness. Pertinent negatives include no inability to bear weight, loss of motion or tingling. He reports no foreign bodies present. The symptoms are aggravated by movement and palpation. Treatments tried: neurontin. The treatment provided no relief.   Dizziness  This is a chronic problem. The current episode started more than 1 year ago. The problem occurs constantly. The problem has been improved  Pertinent negatives include no abdominal pain, anorexia, arthralgias, change in bowel habit, chest pain, chills, congestion, coughing, diaphoresis, fatigue, fever, headaches, joint swelling, myalgias, nausea, neck pain, numbness, rash, sore throat, swollen glands, urinary symptoms, vertigo, visual change, vomiting or weakness. Nothing aggravates the symptoms. He has tried nothing for the symptoms. The treatment provided no relief.   Hyperlipidemia  This is a chronic problem. The current episode started more than 1 year ago. The problem is controlled. Recent lipid tests were reviewed and are variable. Exacerbating diseases include chronic renal disease and obesity. He has no history of diabetes, hypothyroidism, liver disease or nephrotic syndrome. Pertinent negatives include no chest pain, focal sensory loss, focal weakness, leg pain, myalgias or shortness of breath. Current antihyperlipidemic treatment includes statins. Compliance problems include adherence to exercise.  Risk factors for coronary artery disease include diabetes mellitus, hypertension, male sex and obesity.   Chronic Kidney Disease  This is a chronic problem. The current episode started more than 1 year ago. The problem occurs constantly. The problem has been gradually worsening. Pertinent negatives include no abdominal pain, anorexia, arthralgias, change in bowel habit, chest pain, chills, congestion, coughing, diaphoresis, fatigue, fever, headaches, joint swelling, myalgias, nausea, neck pain, numbness, rash, sore throat, swollen glands, urinary  symptoms, vertigo, visual change, vomiting or weakness. Nothing aggravates the symptoms. He has tried nothing for the symptoms.   Coronary Artery Disease  Presents for follow-up visit. Pertinent negatives include no chest pain, chest pressure, chest tightness, dizziness, leg swelling, muscle weakness, palpitations or shortness of breath. Risk factors include hyperlipidemia and obesity. The symptoms have been stable. Compliance with diet is good. Compliance with exercise is good. Compliance with medications is good.   Hypertension   This is a chronic problem. The current episode started more than 1 year ago. The problem has been improving g since onset. The problem is controlled. Associated symptoms include shortness of breath. Pertinent negatives include no anxiety, blurred vision, chest pain, headaches, malaise/fatigue, neck pain, orthopnea, palpitations, peripheral edema, PND or sweats. Risk factors for coronary artery disease include dyslipidemia, obesity, male gender, sedentary lifestyle and smoking/tobacco exposure. Current antihypertension treatment includes calcium channel blockers, beta blockers and angiotensin blockers and diureitcs The current treatment provides moderate improvement. There are no compliance problems.  Hypertensive end-organ damage includes kidney disease, CAD/MI and CVA. There is no history of angina, heart failure, left ventricular hypertrophy, PVD or retinopathy. There is no history of chronic renal disease, coarctation of the aorta, hyperaldosteronism, hypercortisolism, hyperparathyroidism, a hypertension causing med, pheochromocytoma, renovascular disease, sleep apnea or a thyroid problem.     Review of Systems  Review of Systems   Constitutional: Positive for activity change. Negative for appetite change, chills, diaphoresis, fatigue and fever.   HENT: Negative for congestion, postnasal drip, rhinorrhea, sinus pressure, sinus pain, sneezing, trouble swallowing and voice change.     Respiratory: Negative for cough, choking, chest tightness, shortness of breath, wheezing and stridor.    Cardiovascular: Negative for chest pain.   Gastrointestinal: Negative for abdominal pain, anorexia, change in bowel habit, diarrhea, nausea and vomiting.   Musculoskeletal: Positive for arthralgias. Negative for joint swelling, myalgias and neck pain.   Neurological: Positive for dizziness, weakness, light-headedness and numbness. Negative for vertigo and headaches.       Patient Active Problem List   Diagnosis   • Abdominal aortic aneurysm (AAA) without rupture (HCC)   • PVD (peripheral vascular disease) (HCC)   • Mixed hyperlipidemia   • Benign essential HTN   • CAD (coronary artery disease)   • Class 1 obesity with serious comorbidity and body mass index (BMI) of 32.0 to 32.9 in adult   • Vitamin D deficiency, unspecified    • History of MI (myocardial infarction)   • History of CVA (cerebrovascular accident)   • Abnormal finding of blood chemistry, unspecified    • Stage 3 chronic kidney disease   • Former smoker   • Left hemiparesis   • Elevated liver enzymes   • Hyperkalemia   • Chronic idiopathic constipation   • Dizziness   • Chronic bilateral low back pain with bilateral sciatica   • Encounter for drug screening   • Hypotension   • Essential hypertension   • Left leg pain   • Muscle spasm   • Encounter for screening for malignant neoplasm of colon   • BRANDI on CPAP   • Hyperuricemia   • Hemorrhoids   • Balance problem   • Wound of skin   • Coronary artery disease involving native heart   • Chronic midline low back pain with bilateral sciatica   • Overweight   • PARRISH (acute kidney injury)   • Acute renal failure (ARF)   • Elevated troponin level not due myocardial infarction   • Acute renal failure superimposed on stage 3b chronic kidney disease   • Dehydration   • History of heart artery stent   • Stage 4 chronic kidney disease   • High serum vitamin B12   • Obesity with serious comorbidity   • Carotid  stenosis, right   • Lumbar radiculopathy   • Tobacco abuse   • GAMA (dyspnea on exertion)   • Primary central sleep apnea   • Benign paroxysmal positional vertigo   • Class 1 obesity with serious comorbidity and body mass index (BMI) of 31.0 to 31.9 in adult     Past Surgical History:   Procedure Laterality Date   • COLONOSCOPY N/A 11/17/2020    Procedure: COLONOSCOPY;  Surgeon: Abi Miller MD;  Location: [facility] ENDOSCOPY;  Service: Gastroenterology;  Laterality: N/A;   • CORONARY ANGIOPLASTY WITH STENT PLACEMENT     • EAR TUBES Left    • RI AAA REPAIR,AORTO-AORTIC TUBE PROSTH N/A 8/11/2017    Procedure: ABDOMINAL AORTIC ANEURYSM REPAIR WITH ENDOGRAFT, REPAIR ILIAC ARTERY ANEURYSM, POSSIBLE OPEN ABDOMINAL AORTOGRAM    (CELL SAVER STAND-BY);  Surgeon: Aren Arshad MD;  Location: [facility] OR;  Service: Vascular     Social History     Socioeconomic History   • Marital status: Single   Tobacco Use   • Smoking status: Former     Packs/day: 1.00     Years: 20.00     Pack years: 20.00     Types: Cigarettes   • Smokeless tobacco: Never   Vaping Use   • Vaping Use: Never used   Substance and Sexual Activity   • Alcohol use: No   • Drug use: No   • Sexual activity: Defer     Family History   Problem Relation Age of Onset   • Stroke Mother    • Diabetes Mother    • Heart disease Father      Lab on 02/23/2023   Component Date Value Ref Range Status   • WBC 02/23/2023 5.34  3.40 - 10.80 10*3/mm3 Final   • RBC 02/23/2023 5.25  4.14 - 5.80 10*6/mm3 Final   • Hemoglobin 02/23/2023 13.9  13.0 - 17.7 g/dL Final   • Hematocrit 02/23/2023 42.9  37.5 - 51.0 % Final   • MCV 02/23/2023 81.7  79.0 - 97.0 fL Final   • MCH 02/23/2023 26.5 (L)  26.6 - 33.0 pg Final   • MCHC 02/23/2023 32.4  31.5 - 35.7 g/dL Final   • RDW 02/23/2023 12.9  12.3 - 15.4 % Final   • RDW-SD 02/23/2023 38.4  37.0 - 54.0 fl Final   • MPV 02/23/2023 10.6  6.0 - 12.0 fL Final   • Platelets 02/23/2023 329  140 - 450 10*3/mm3 Final   • Neutrophil %  02/23/2023 57.8  42.7 - 76.0 % Final   • Lymphocyte % 02/23/2023 24.5  19.6 - 45.3 % Final   • Monocyte % 02/23/2023 12.5 (H)  5.0 - 12.0 % Final   • Eosinophil % 02/23/2023 3.9  0.3 - 6.2 % Final   • Basophil % 02/23/2023 1.1  0.0 - 1.5 % Final   • Immature Grans % 02/23/2023 0.2  0.0 - 0.5 % Final   • Neutrophils, Absolute 02/23/2023 3.08  1.70 - 7.00 10*3/mm3 Final   • Lymphocytes, Absolute 02/23/2023 1.31  0.70 - 3.10 10*3/mm3 Final   • Monocytes, Absolute 02/23/2023 0.67  0.10 - 0.90 10*3/mm3 Final   • Eosinophils, Absolute 02/23/2023 0.21  0.00 - 0.40 10*3/mm3 Final   • Basophils, Absolute 02/23/2023 0.06  0.00 - 0.20 10*3/mm3 Final   • Immature Grans, Absolute 02/23/2023 0.01  0.00 - 0.05 10*3/mm3 Final   • nRBC 02/23/2023 0.0  0.0 - 0.2 /100 WBC Final   • Glucose 02/23/2023 104 (H)  65 - 99 mg/dL Final   • BUN 02/23/2023 33 (H)  6 - 20 mg/dL Final   • Creatinine 02/23/2023 2.94 (H)  0.76 - 1.27 mg/dL Final   • Sodium 02/23/2023 137  136 - 145 mmol/L Final   • Potassium 02/23/2023 4.1  3.5 - 5.2 mmol/L Final   • Chloride 02/23/2023 98  98 - 107 mmol/L Final   • CO2 02/23/2023 27.0  22.0 - 29.0 mmol/L Final   • Calcium 02/23/2023 10.4  8.6 - 10.5 mg/dL Final   • Total Protein 02/23/2023 8.0  6.0 - 8.5 g/dL Final   • Albumin 02/23/2023 4.8  3.5 - 5.2 g/dL Final   • ALT (SGPT) 02/23/2023 18  1 - 41 U/L Final   • AST (SGOT) 02/23/2023 25  1 - 40 U/L Final   • Alkaline Phosphatase 02/23/2023 114  39 - 117 U/L Final   • Total Bilirubin 02/23/2023 0.6  0.0 - 1.2 mg/dL Final   • Globulin 02/23/2023 3.2  gm/dL Final   • A/G Ratio 02/23/2023 1.5  g/dL Final   • BUN/Creatinine Ratio 02/23/2023 11.2  7.0 - 25.0 Final   • Anion Gap 02/23/2023 12.0  5.0 - 15.0 mmol/L Final   • eGFR 02/23/2023 25.5 (L)  >60.0 mL/min/1.73 Final   • Total Cholesterol 02/23/2023 130  0 - 200 mg/dL Final   • Triglycerides 02/23/2023 104  0 - 150 mg/dL Final   • HDL Cholesterol 02/23/2023 48  40 - 60 mg/dL Final   • LDL Cholesterol  02/23/2023  63  0 - 100 mg/dL Final   • VLDL Cholesterol 02/23/2023 19  5 - 40 mg/dL Final   • LDL/HDL Ratio 02/23/2023 1.28   Final   • TSH 02/23/2023 1.710  0.270 - 4.200 uIU/mL Final   • 25 Hydroxy, Vitamin D 02/23/2023 77.0  30.0 - 100.0 ng/ml Final   • Vitamin B-12 02/23/2023 1,593 (H)  211 - 946 pg/mL Final   • Uric Acid 02/23/2023 6.9  3.4 - 7.0 mg/dL Final   • Testosterone, Total 02/23/2023 654.7  264.0 - 916.0 ng/dL Final    This [facility] LC/MS-MS method is currently certified by the CDC  Hormone Standardization Program (HoSt). Adult male reference  interval is based on a population of healthy nonobese males  (BMI <30) between 19 and 39 years old. Bhumika et.al. JCEM  2017,102;3475-8595. PMID: 88179520.   • Testosterone, Free 02/23/2023 9.1  6.8 - 21.5 pg/mL Final   • Iron 02/23/2023 71  59 - 158 mcg/dL Final   • Iron Saturation 02/23/2023 19 (L)  20 - 50 % Final   • Transferrin 02/23/2023 246  200 - 360 mg/dL Final   • TIBC 02/23/2023 367  298 - 536 mcg/dL Final   • Ferritin 02/23/2023 197.00  30.00 - 400.00 ng/mL Final   Lab on 11/03/2022   Component Date Value Ref Range Status   • WBC 11/03/2022 5.69  3.40 - 10.80 10*3/mm3 Final   • RBC 11/03/2022 4.95  4.14 - 5.80 10*6/mm3 Final   • Hemoglobin 11/03/2022 13.1  13.0 - 17.7 g/dL Final   • Hematocrit 11/03/2022 41.0  37.5 - 51.0 % Final   • MCV 11/03/2022 82.8  79.0 - 97.0 fL Final   • MCH 11/03/2022 26.5 (L)  26.6 - 33.0 pg Final   • MCHC 11/03/2022 32.0  31.5 - 35.7 g/dL Final   • RDW 11/03/2022 13.0  12.3 - 15.4 % Final   • RDW-SD 11/03/2022 38.8  37.0 - 54.0 fl Final   • MPV 11/03/2022 10.3  6.0 - 12.0 fL Final   • Platelets 11/03/2022 316  140 - 450 10*3/mm3 Final   • Neutrophil % 11/03/2022 56.7  42.7 - 76.0 % Final   • Lymphocyte % 11/03/2022 25.5  19.6 - 45.3 % Final   • Monocyte % 11/03/2022 13.4 (H)  5.0 - 12.0 % Final   • Eosinophil % 11/03/2022 3.3  0.3 - 6.2 % Final   • Basophil % 11/03/2022 0.7  0.0 - 1.5 % Final   • Immature Grans % 11/03/2022 0.4  0.0  - 0.5 % Final   • Neutrophils, Absolute 11/03/2022 3.23  1.70 - 7.00 10*3/mm3 Final   • Lymphocytes, Absolute 11/03/2022 1.45  0.70 - 3.10 10*3/mm3 Final   • Monocytes, Absolute 11/03/2022 0.76  0.10 - 0.90 10*3/mm3 Final   • Eosinophils, Absolute 11/03/2022 0.19  0.00 - 0.40 10*3/mm3 Final   • Basophils, Absolute 11/03/2022 0.04  0.00 - 0.20 10*3/mm3 Final   • Immature Grans, Absolute 11/03/2022 0.02  0.00 - 0.05 10*3/mm3 Final   • nRBC 11/03/2022 0.0  0.0 - 0.2 /100 WBC Final   • Glucose 11/03/2022 103 (H)  65 - 99 mg/dL Final   • BUN 11/03/2022 40 (H)  6 - 20 mg/dL Final   • Creatinine 11/03/2022 2.99 (H)  0.76 - 1.27 mg/dL Final   • Sodium 11/03/2022 138  136 - 145 mmol/L Final   • Potassium 11/03/2022 4.7  3.5 - 5.2 mmol/L Final   • Chloride 11/03/2022 102  98 - 107 mmol/L Final   • CO2 11/03/2022 25.0  22.0 - 29.0 mmol/L Final   • Calcium 11/03/2022 10.2  8.6 - 10.5 mg/dL Final   • Total Protein 11/03/2022 7.6  6.0 - 8.5 g/dL Final   • Albumin 11/03/2022 4.70  3.50 - 5.20 g/dL Final   • ALT (SGPT) 11/03/2022 15  1 - 41 U/L Final   • AST (SGOT) 11/03/2022 22  1 - 40 U/L Final   • Alkaline Phosphatase 11/03/2022 87  39 - 117 U/L Final   • Total Bilirubin 11/03/2022 0.5  0.0 - 1.2 mg/dL Final   • Globulin 11/03/2022 2.9  gm/dL Final   • A/G Ratio 11/03/2022 1.6  g/dL Final   • BUN/Creatinine Ratio 11/03/2022 13.4  7.0 - 25.0 Final   • Anion Gap 11/03/2022 11.0  5.0 - 15.0 mmol/L Final   • eGFR 11/03/2022 24.9 (L)  >60.0 mL/min/1.73 Final    National Kidney Foundation and American Society of Nephrology (ASN) Task Force recommended calculation based on the Chronic Kidney Disease Epidemiology Collaboration (CKD-EPI) equation refit without adjustment for race.   • Total Cholesterol 11/03/2022 129  0 - 200 mg/dL Final   • Triglycerides 11/03/2022 78  0 - 150 mg/dL Final   • HDL Cholesterol 11/03/2022 44  40 - 60 mg/dL Final   • LDL Cholesterol  11/03/2022 69  0 - 100 mg/dL Final   • VLDL Cholesterol 11/03/2022 16  5 -  40 mg/dL Final   • LDL/HDL Ratio 11/03/2022 1.58   Final   • 25 Hydroxy, Vitamin D 11/03/2022 57.1  30.0 - 100.0 ng/ml Final   • Vitamin B-12 11/03/2022 1,092 (H)  211 - 946 pg/mL Final   • Uric Acid 11/03/2022 7.4 (H)  3.4 - 7.0 mg/dL Final      XR Wrist 3+ View Right  Narrative: PROCEDURE: XR WRIST 3+ VW RIGHT    VIEWS: 3    INDICATION: Pain    COMPARISON: None    FINDINGS:     - fracture: None    - alignment: Within normal limits    - misc: Very mild degenerative changes first carpometacarpal  joint. There is also mild degenerative change of the first  metatarsophalangeal joint. No significant soft tissue  abnormality. Tiny cyst in the head of the fifth metacarpal  Impression: Mild degenerative changes as above. No acute osseous  abnormality.    Note:  if pain or symptoms persist beyond reasonable expectations    and follow-up imaging is anticipated,  cross sectional imaging   (CT and/or MRI) is suggested, as is deemed clinically   appropriate.    Electronically signed by:  Glory Gamez MD  3/8/2023 2:58 PM CST  Workstation: [id number]  XR Spine Thoracic 3 View  Narrative: PROCEDURE: XR SPINE THORACIC 3 VW    VIEWS:  3    INDICATION:  Pain    COMPARISON:  3/8/2020    FINDINGS:       - Fracture(s): None    - Alignment:  Within normal limits      - Disc space heights: Within normal limits for age      - Focal osteolytic/blastic: None      - Bone density:  Grossly within normal limits for age    - Misc.:  Facets are normally aligned  Impression: Negative examination for age.     If pain or symptoms persist beyond reasonable expectations,   an MRI examination is suggested as is deemed clinically  appropriate.    Electronically signed by:  Glory Gamez MD  3/8/2023 2:55 PM CST  Workstation: [id number]    [unfilled]  Immunization History   Administered Date(s) Administered   • COVID-19 (MODERNA) BIVALENT 12+YRS 09/14/2022, 09/14/2022   • COVID-19 (MODERNA) Monovalent Original Booster 03/29/2022   • COVID-19  "(PFIZER) Purple Cap Monovalent 06/02/2021, 06/23/2021, 10/13/2021   • FluLaval/Fluzone >6mos 01/15/2020, 01/15/2020, 09/30/2020, 10/14/2021, 11/03/2022   • Influenza, Unspecified 11/03/2022   • Pneumococcal Polysaccharide (PPSV23) 09/30/2020   • Tdap 09/30/2020       The following portions of the patient's history were reviewed and updated as appropriate: allergies, current medications, past family history, past medical history, past social history, past surgical history and problem list.        Physical Exam  /52 (BP Location: Right arm, Patient Position: Sitting, Cuff Size: Adult)   Pulse 80   Temp 98.4 °F (36.9 °C)   Ht 182.9 cm (72\")   Wt 106 kg (233 lb 6.4 oz)   SpO2 95%   BMI 31.65 kg/m²     /52 (BP Location: Right arm, Patient Position: Sitting, Cuff Size: Adult)   Pulse 80   Temp 98.4 °F (36.9 °C)   Ht 182.9 cm (72\")   Wt 106 kg (233 lb 6.4 oz)   SpO2 95%   BMI 31.65 kg/m²       Physical Exam  Vitals and nursing note reviewed.   Constitutional:       Appearance: He is well-developed. He is not diaphoretic.   HENT:      Head: Normocephalic and atraumatic.      Right Ear: External ear normal.   Eyes:      Conjunctiva/sclera: Conjunctivae normal.      Pupils: Pupils are equal, round, and reactive to light.   Cardiovascular:      Rate and Rhythm: Normal rate and regular rhythm.      Heart sounds: Normal heart sounds. No murmur heard.  Pulmonary:      Effort: Pulmonary effort is normal. No respiratory distress.      Comments: Decreased breath sounds   Abdominal:      General: Bowel sounds are normal. There is no distension.      Palpations: Abdomen is soft.      Tenderness: There is no abdominal tenderness.   Musculoskeletal:         General: Tenderness present. No deformity.      Right wrist: Tenderness and bony tenderness present.      Cervical back: Neck supple. No spasms, tenderness or bony tenderness. Normal range of motion.      Thoracic back: Spasms, tenderness and bony tenderness " present.      Lumbar back: No spasms, tenderness or bony tenderness. Normal range of motion.   Skin:     General: Skin is warm.      Coloration: Skin is not pale.      Findings: No erythema or rash.   Neurological:      Mental Status: He is alert and oriented to person, place, and time.      Cranial Nerves: No cranial nerve deficit.   Psychiatric:         Behavior: Behavior normal.         [unfilled]   Diagnosis Plan   1. Essential hypertension  CBC Auto Differential    Comprehensive Metabolic Panel    Hemoglobin A1c    Lipid Panel    Vitamin B12    Uric acid    Iron Profile      2. Stage 4 chronic kidney disease  CBC Auto Differential    Comprehensive Metabolic Panel    Hemoglobin A1c    Lipid Panel    Vitamin B12    Uric acid    Iron Profile      3. PVD (peripheral vascular disease) (HCC)  CBC Auto Differential    Comprehensive Metabolic Panel    Hemoglobin A1c    Lipid Panel    Vitamin B12    Uric acid    Iron Profile      4. Mixed hyperlipidemia  CBC Auto Differential    Comprehensive Metabolic Panel    Hemoglobin A1c    Lipid Panel    Vitamin B12    Uric acid    Iron Profile      5. BRANDI on CPAP  CBC Auto Differential    Comprehensive Metabolic Panel    Hemoglobin A1c    Lipid Panel    Vitamin B12    Uric acid    Iron Profile      6. Hyperuricemia  CBC Auto Differential    Comprehensive Metabolic Panel    Hemoglobin A1c    Lipid Panel    Vitamin B12    Uric acid    Iron Profile      7. History of heart artery stent  CBC Auto Differential    Comprehensive Metabolic Panel    Hemoglobin A1c    Lipid Panel    Vitamin B12    Uric acid    Iron Profile      8. Coronary artery disease involving native heart, unspecified vessel or lesion type, unspecified whether angina present  CBC Auto Differential    Comprehensive Metabolic Panel    Hemoglobin A1c    Lipid Panel    Vitamin B12    Uric acid    Iron Profile      9. History of CVA (cerebrovascular accident)  CBC Auto Differential    Comprehensive Metabolic Panel     Hemoglobin A1c    Lipid Panel    Vitamin B12    Uric acid    Iron Profile      10. Benign paroxysmal positional vertigo, unspecified laterality  CBC Auto Differential    Comprehensive Metabolic Panel    Hemoglobin A1c    Lipid Panel    Vitamin B12    Uric acid    Iron Profile      11. High serum vitamin B12  CBC Auto Differential    Comprehensive Metabolic Panel    Hemoglobin A1c    Lipid Panel    Vitamin B12    Uric acid    Iron Profile      12. Class 1 obesity with serious comorbidity and body mass index (BMI) of 31.0 to 31.9 in adult, unspecified obesity type                -went over labwork   -arthritis of right wrist -reviewed x-ray of wrist. Consider PT/OT   -chronic back pain/athritis of lumbar spine/DDD lumbar spine/thoracic back pain  - reviewed last x-ray of back. Pt has had lower back pain >1 year.Also referred to Neurology for EMG study. Likely has lumbar radiculopathy and lower back pain with sciatica. Has failed neurontin. Ordered  MRI of lumbar spine. Consider spine surgery or PM.  Reviewed  thoracic x-ray. Continue flexeril and neurontin. Recommend PT/OT but pt declined for now. Consider MRI of thoracic spine   -dizziness/ BPPV - stable on meclizine PRN. Carotid ultrasound normal . ENT following he was recommended exercises at home. Pt was also referred to Vascular Surgery. BPPV vs vascular vs medication use.  Pt was recommend exercise at home and walking stick.recommend PT/OT downstairs but pt declines for now.    -internal hemorrhoids - recommend high fiber diet. Next colonscopy in 2030   -BRANDI on CPAP/daytime sleepiness - will refer to sleep medicine with [facility]  -hyperuricemia - on allopurinol 100 mg daiy.   -CIC -  on linzess. Pt has failed Miralax.drug information provided   -CKD stage 4/hyperkalemia /dehydration - Nephrology following. Will check CMP. Advised pt to make appt with Nephrology soon   -dyspnea on exertion. Pt seen Pulmonology.  Had normal PFTS On albuterol inhaler  -HTN -  on  losartan 100 mg daily. Labetalol 200 mg PO BID  on cardizem 120 mg daily.  On hydralazine 50 mg every 8 hours   -major depression-  On lexapro 20 mg daily.  -CAD sp stent/history of NSTEMI -Cardiology following, aspirin, lipitor, labetalol  200 mg TID, losartan , plavix 75 mg daily.  .  -PVD - aspirin plavix, lipitor - Vascular Surgery following  -obesity - counseled weight loss >5 minutes BMI at 31.65   -AAA sp repair  - Vascular Surgery following. will refer back to Vascular Surgery. Pt is due for new CT. Gave pt number for Vascular surgery at [facility] for followup   -advised pt to be safe and call with questions and concerns  -advised pt to go to ER or call 911 if symptoms worrisome or severe  -advised pt to followup with specialist and referrals  -advised pt be safe during COVID-19 pandemic   I spent  35 minutes caring for Gregg on this date of service. This time includes time spent by me in the following activities: preparing for the visit, reviewing tests, obtaining and/or reviewing a separately obtained history, performing a medically appropriate examination and/or evaluation, counseling and educating the patient/family/caregiver, ordering medications, tests, or procedures, referring and communicating with other health care professionals, documenting information in the medical record, independently interpreting results and communicating that information with the patient/family/caregiver and care coordination.   -recheck in 2 months         This document has been electronically signed by Dread Duvall MD on April 25, 2023 09:29 CDT

## 2023-04-03 DIAGNOSIS — Z86.73 HISTORY OF CVA (CEREBROVASCULAR ACCIDENT): ICD-10-CM

## 2023-04-03 DIAGNOSIS — M54.41 CHRONIC MIDLINE LOW BACK PAIN WITH BILATERAL SCIATICA: ICD-10-CM

## 2023-04-03 DIAGNOSIS — G89.29 CHRONIC MIDLINE LOW BACK PAIN WITH BILATERAL SCIATICA: ICD-10-CM

## 2023-04-03 DIAGNOSIS — M54.42 CHRONIC MIDLINE LOW BACK PAIN WITH BILATERAL SCIATICA: ICD-10-CM

## 2023-04-03 RX ORDER — GABAPENTIN 600 MG/1
TABLET ORAL
Qty: 90 TABLET | Refills: 0 | Status: SHIPPED | OUTPATIENT
Start: 2023-04-03 | End: 2023-04-12 | Stop reason: SDUPTHER

## 2023-04-12 DIAGNOSIS — M54.42 CHRONIC BILATERAL LOW BACK PAIN WITH BILATERAL SCIATICA: ICD-10-CM

## 2023-04-12 DIAGNOSIS — M54.42 CHRONIC MIDLINE LOW BACK PAIN WITH BILATERAL SCIATICA: ICD-10-CM

## 2023-04-12 DIAGNOSIS — N18.32 STAGE 3B CHRONIC KIDNEY DISEASE: ICD-10-CM

## 2023-04-12 DIAGNOSIS — M54.41 CHRONIC MIDLINE LOW BACK PAIN WITH BILATERAL SCIATICA: ICD-10-CM

## 2023-04-12 DIAGNOSIS — G89.29 CHRONIC BILATERAL LOW BACK PAIN WITH BILATERAL SCIATICA: ICD-10-CM

## 2023-04-12 DIAGNOSIS — M54.41 CHRONIC BILATERAL LOW BACK PAIN WITH BILATERAL SCIATICA: ICD-10-CM

## 2023-04-12 DIAGNOSIS — G89.29 CHRONIC MIDLINE LOW BACK PAIN WITH BILATERAL SCIATICA: ICD-10-CM

## 2023-04-12 DIAGNOSIS — Z86.73 HISTORY OF CVA (CEREBROVASCULAR ACCIDENT): ICD-10-CM

## 2023-04-12 RX ORDER — ERGOCALCIFEROL 1.25 MG/1
50000 CAPSULE ORAL
Qty: 12 CAPSULE | Refills: 0 | Status: SHIPPED | OUTPATIENT
Start: 2023-04-12

## 2023-04-12 RX ORDER — DILTIAZEM HYDROCHLORIDE 180 MG/1
180 CAPSULE, COATED, EXTENDED RELEASE ORAL DAILY
Qty: 90 CAPSULE | Refills: 0 | Status: SHIPPED | OUTPATIENT
Start: 2023-04-12

## 2023-04-12 RX ORDER — GABAPENTIN 600 MG/1
600 TABLET ORAL 3 TIMES DAILY
Qty: 270 TABLET | Refills: 0 | Status: SHIPPED | OUTPATIENT
Start: 2023-04-12

## 2023-04-12 RX ORDER — CYCLOBENZAPRINE HCL 5 MG
TABLET ORAL
Qty: 90 TABLET | Refills: 1 | Status: SHIPPED | OUTPATIENT
Start: 2023-04-12

## 2023-04-12 RX ORDER — CALCITRIOL 0.25 UG/1
0.25 CAPSULE, LIQUID FILLED ORAL DAILY
Qty: 90 CAPSULE | Refills: 0 | Status: SHIPPED | OUTPATIENT
Start: 2023-04-12

## 2023-04-18 PROBLEM — H81.10 BENIGN PAROXYSMAL POSITIONAL VERTIGO: Status: ACTIVE | Noted: 2023-04-18

## 2023-04-25 ENCOUNTER — CLINICAL SUPPORT (OUTPATIENT)
Dept: CARDIOLOGY | Facility: CLINIC | Age: 48
End: 2023-04-25
Payer: MEDICARE

## 2023-04-25 ENCOUNTER — OFFICE VISIT (OUTPATIENT)
Dept: FAMILY MEDICINE CLINIC | Facility: CLINIC | Age: 48
End: 2023-04-25
Payer: MEDICARE

## 2023-04-25 VITALS
TEMPERATURE: 98.4 F | HEART RATE: 80 BPM | HEIGHT: 72 IN | DIASTOLIC BLOOD PRESSURE: 52 MMHG | WEIGHT: 233.4 LBS | BODY MASS INDEX: 31.61 KG/M2 | OXYGEN SATURATION: 95 % | SYSTOLIC BLOOD PRESSURE: 110 MMHG

## 2023-04-25 DIAGNOSIS — I73.9 PVD (PERIPHERAL VASCULAR DISEASE): ICD-10-CM

## 2023-04-25 DIAGNOSIS — Z86.73 HISTORY OF CVA (CEREBROVASCULAR ACCIDENT): ICD-10-CM

## 2023-04-25 DIAGNOSIS — I25.10 CORONARY ARTERY DISEASE INVOLVING NATIVE HEART, UNSPECIFIED VESSEL OR LESION TYPE, UNSPECIFIED WHETHER ANGINA PRESENT: ICD-10-CM

## 2023-04-25 DIAGNOSIS — Z99.89 OSA ON CPAP: ICD-10-CM

## 2023-04-25 DIAGNOSIS — E79.0 HYPERURICEMIA: ICD-10-CM

## 2023-04-25 DIAGNOSIS — I10 ESSENTIAL HYPERTENSION: Primary | ICD-10-CM

## 2023-04-25 DIAGNOSIS — N18.4 STAGE 4 CHRONIC KIDNEY DISEASE: ICD-10-CM

## 2023-04-25 DIAGNOSIS — H81.10 BENIGN PAROXYSMAL POSITIONAL VERTIGO, UNSPECIFIED LATERALITY: ICD-10-CM

## 2023-04-25 DIAGNOSIS — Z95.5 HISTORY OF HEART ARTERY STENT: ICD-10-CM

## 2023-04-25 DIAGNOSIS — G47.33 OSA ON CPAP: ICD-10-CM

## 2023-04-25 DIAGNOSIS — E66.9 CLASS 1 OBESITY WITH SERIOUS COMORBIDITY AND BODY MASS INDEX (BMI) OF 31.0 TO 31.9 IN ADULT, UNSPECIFIED OBESITY TYPE: ICD-10-CM

## 2023-04-25 DIAGNOSIS — R79.89 HIGH SERUM VITAMIN B12: ICD-10-CM

## 2023-04-25 DIAGNOSIS — E78.2 MIXED HYPERLIPIDEMIA: ICD-10-CM

## 2023-04-25 DIAGNOSIS — H81.10 BENIGN PAROXYSMAL POSITIONAL VERTIGO, UNSPECIFIED LATERALITY: Primary | ICD-10-CM

## 2023-04-25 NOTE — PATIENT INSTRUCTIONS
Get labwork fasting before next visit     Call Vascular Surgery regarding your PVD and aneurysm in abdomen     Recheck in 2 months

## 2023-04-25 NOTE — PROGRESS NOTES
Loop Recorder  Summary, OFFICE    April 25, 2023     Indications:   Syncope         Loop recorder interrogation shows 0 episodes of atrial fibrillation     COUNTER for last 30 days  Symptoms: 0  Tachy: 0  Pause: 0  Ha: 0  AT: 0  AF: 0  % of time in AF: 0  VHR: 170 lead noise      Changes made: none.      Observation Summary:     Diagnosis Plan   1. Benign paroxysmal positional vertigo, unspecified laterality        2. Coronary artery disease involving native heart, unspecified vessel or lesion type, unspecified whether angina present

## 2023-05-12 DIAGNOSIS — I10 BENIGN ESSENTIAL HTN: ICD-10-CM

## 2023-05-15 RX ORDER — LOSARTAN POTASSIUM 100 MG/1
100 TABLET ORAL DAILY
Qty: 90 TABLET | Refills: 0 | Status: SHIPPED | OUTPATIENT
Start: 2023-05-15

## 2023-05-26 ENCOUNTER — OFFICE VISIT (OUTPATIENT)
Dept: OTOLARYNGOLOGY | Facility: CLINIC | Age: 48
End: 2023-05-26
Payer: MEDICARE

## 2023-05-26 VITALS — BODY MASS INDEX: 31.69 KG/M2 | HEART RATE: 93 BPM | HEIGHT: 72 IN | WEIGHT: 234 LBS

## 2023-05-26 DIAGNOSIS — R26.89 IMBALANCE: Primary | ICD-10-CM

## 2023-05-26 PROCEDURE — 1160F RVW MEDS BY RX/DR IN RCRD: CPT | Performed by: OTOLARYNGOLOGY

## 2023-05-26 PROCEDURE — 99213 OFFICE O/P EST LOW 20 MIN: CPT | Performed by: OTOLARYNGOLOGY

## 2023-05-26 PROCEDURE — 1159F MED LIST DOCD IN RCRD: CPT | Performed by: OTOLARYNGOLOGY

## 2023-05-26 NOTE — PROGRESS NOTES
Subjective   Gregg Randle is a 47 y.o. male.       History of Present Illness   Patient was seen previously with right-sided BPPV as well as imbalance when walking thought to be multifactorial including a history of previous stroke.  Returns today stating he is not any better and that he has been doing the [place] exercises but in talking to him further he no longer experiences spinning vertigo when he does the [place] exercises he just remains off balance when he walks.  Has a walking stick but does not use it regularly.      The following portions of the patient's history were reviewed and updated as appropriate: allergies, current medications, past family history, past medical history, past social history, past surgical history and problem list.     reports that he has quit smoking. His smoking use included cigarettes. He has a 20.00 pack-year smoking history. He has never used smokeless tobacco. He reports that he does not drink alcohol and does not use drugs.   Patient is not a tobacco user and has not been counseled for use of tobacco products      Review of Systems        Objective   Physical Exam  Ears: External ears no deformity, canals no discharge, tympanic membranes intact clear and mobile bilaterally.  Stephenson-Hallpike maneuvers produce no vertigo no nystagmus         Assessment and Plan   Diagnoses and all orders for this visit:    1. Imbalance (Primary)             Plan: Explained to the patient that the BPPV has resolved and he does not need to do the C[place]thorne exercises anymore.  Spent a good bit of time explaining that his problem is imbalance and is multifactorial including his previous stroke, neuropathy, and impaired vision.  Encouraged him to use his walking stick anytime he is ambulating in a place where he cannot reach out and touch furniture or the walls of his house to steady himself.  [place].  Encouraged  him to maintain physical activity while taking precautions to be safe and prevent falls.  May follow-up with me as needed.

## 2023-06-05 DIAGNOSIS — M54.41 CHRONIC MIDLINE LOW BACK PAIN WITH BILATERAL SCIATICA: ICD-10-CM

## 2023-06-05 DIAGNOSIS — G89.29 CHRONIC MIDLINE LOW BACK PAIN WITH BILATERAL SCIATICA: ICD-10-CM

## 2023-06-05 DIAGNOSIS — M54.42 CHRONIC MIDLINE LOW BACK PAIN WITH BILATERAL SCIATICA: ICD-10-CM

## 2023-06-05 DIAGNOSIS — Z86.73 HISTORY OF CVA (CEREBROVASCULAR ACCIDENT): ICD-10-CM

## 2023-06-05 RX ORDER — GABAPENTIN 600 MG/1
TABLET ORAL
Qty: 270 TABLET | Refills: 0 | Status: SHIPPED | OUTPATIENT
Start: 2023-06-05

## 2023-06-07 NOTE — TELEPHONE ENCOUNTER
Left VM for pt to return call to clinic to inform what he specifically needs in the letter requested.     [Normal Breath Sounds] : Normal breath sounds [Normal Rate and Rhythm] : normal rate and rhythm [Murmur] : murmur was appreciated  [2+] : left 2+ [Alert] : alert [Oriented to Person] : oriented to person [Oriented to Place] : oriented to place [Oriented to Time] : oriented to time [JVD] : no jugular venous distention  [Carotid Bruits] : no carotid bruits [Ankle Swelling (On Exam)] : not present [Varicose Veins Of Lower Extremities] : not present [] : not present [de-identified] : FREIDA CASTILLO in NAD [de-identified] : Mild exopthalmus, PERRLA, EOMI [de-identified] : L SC incision without erythema or drainage; no hematoma [FreeTextEntry1] : palpable L RA pulse [de-identified] : Face symmetrical; tongue midline; SCHREIBER X 4 =

## 2023-06-09 DIAGNOSIS — I10 BENIGN ESSENTIAL HTN: ICD-10-CM

## 2023-06-09 DIAGNOSIS — K59.09 CHRONIC CONSTIPATION: ICD-10-CM

## 2023-06-09 RX ORDER — GUANFACINE 1 MG/1
1 TABLET ORAL NIGHTLY
Qty: 90 TABLET | Refills: 0 | Status: SHIPPED | OUTPATIENT
Start: 2023-06-09

## 2023-06-27 PROBLEM — G89.29 CHRONIC MIDLINE LOW BACK PAIN: Status: ACTIVE | Noted: 2023-06-27

## 2023-06-27 PROBLEM — M54.50 CHRONIC MIDLINE LOW BACK PAIN: Status: ACTIVE | Noted: 2023-06-27

## 2023-07-24 ENCOUNTER — OFFICE VISIT (OUTPATIENT)
Dept: CARDIAC SURGERY | Facility: CLINIC | Age: 48
End: 2023-07-24
Payer: MEDICARE

## 2023-07-24 VITALS
DIASTOLIC BLOOD PRESSURE: 84 MMHG | BODY MASS INDEX: 31.81 KG/M2 | SYSTOLIC BLOOD PRESSURE: 129 MMHG | OXYGEN SATURATION: 96 % | WEIGHT: 240 LBS | HEIGHT: 73 IN | HEART RATE: 91 BPM

## 2023-07-24 DIAGNOSIS — I10 BENIGN ESSENTIAL HTN: ICD-10-CM

## 2023-07-24 DIAGNOSIS — G47.33 OSA ON CPAP: ICD-10-CM

## 2023-07-24 DIAGNOSIS — E66.09 CLASS 1 OBESITY DUE TO EXCESS CALORIES WITH SERIOUS COMORBIDITY AND BODY MASS INDEX (BMI) OF 31.0 TO 31.9 IN ADULT: ICD-10-CM

## 2023-07-24 DIAGNOSIS — M54.41 CHRONIC BILATERAL LOW BACK PAIN WITH BILATERAL SCIATICA: ICD-10-CM

## 2023-07-24 DIAGNOSIS — G89.29 CHRONIC BILATERAL LOW BACK PAIN WITH BILATERAL SCIATICA: ICD-10-CM

## 2023-07-24 DIAGNOSIS — E78.2 MIXED HYPERLIPIDEMIA: ICD-10-CM

## 2023-07-24 DIAGNOSIS — I10 ESSENTIAL HYPERTENSION: ICD-10-CM

## 2023-07-24 DIAGNOSIS — N18.32 STAGE 3B CHRONIC KIDNEY DISEASE: ICD-10-CM

## 2023-07-24 DIAGNOSIS — I25.10 CORONARY ARTERY DISEASE INVOLVING NATIVE CORONARY ARTERY OF NATIVE HEART WITHOUT ANGINA PECTORIS: ICD-10-CM

## 2023-07-24 DIAGNOSIS — M54.42 CHRONIC BILATERAL LOW BACK PAIN WITH BILATERAL SCIATICA: ICD-10-CM

## 2023-07-24 DIAGNOSIS — Z86.73 HISTORY OF CVA (CEREBROVASCULAR ACCIDENT): ICD-10-CM

## 2023-07-24 DIAGNOSIS — I71.43 INFRARENAL ABDOMINAL AORTIC ANEURYSM (AAA) WITHOUT RUPTURE: Primary | ICD-10-CM

## 2023-07-24 DIAGNOSIS — I73.9 PVD (PERIPHERAL VASCULAR DISEASE): ICD-10-CM

## 2023-07-24 PROCEDURE — 1160F RVW MEDS BY RX/DR IN RCRD: CPT | Performed by: THORACIC SURGERY (CARDIOTHORACIC VASCULAR SURGERY)

## 2023-07-24 PROCEDURE — 3079F DIAST BP 80-89 MM HG: CPT | Performed by: THORACIC SURGERY (CARDIOTHORACIC VASCULAR SURGERY)

## 2023-07-24 PROCEDURE — 3074F SYST BP LT 130 MM HG: CPT | Performed by: THORACIC SURGERY (CARDIOTHORACIC VASCULAR SURGERY)

## 2023-07-24 PROCEDURE — 1159F MED LIST DOCD IN RCRD: CPT | Performed by: THORACIC SURGERY (CARDIOTHORACIC VASCULAR SURGERY)

## 2023-07-24 PROCEDURE — 99214 OFFICE O/P EST MOD 30 MIN: CPT | Performed by: THORACIC SURGERY (CARDIOTHORACIC VASCULAR SURGERY)

## 2023-07-26 ENCOUNTER — OFFICE VISIT (OUTPATIENT)
Dept: CARDIOLOGY | Facility: CLINIC | Age: 48
End: 2023-07-26
Payer: MEDICARE

## 2023-07-26 VITALS
HEIGHT: 73 IN | HEART RATE: 75 BPM | BODY MASS INDEX: 31.14 KG/M2 | OXYGEN SATURATION: 98 % | SYSTOLIC BLOOD PRESSURE: 128 MMHG | WEIGHT: 235 LBS | DIASTOLIC BLOOD PRESSURE: 88 MMHG

## 2023-07-26 DIAGNOSIS — I25.10 CORONARY ARTERY DISEASE INVOLVING NATIVE CORONARY ARTERY OF NATIVE HEART, UNSPECIFIED WHETHER ANGINA PRESENT: Primary | ICD-10-CM

## 2023-07-26 DIAGNOSIS — I10 BENIGN ESSENTIAL HTN: ICD-10-CM

## 2023-07-26 PROCEDURE — 1159F MED LIST DOCD IN RCRD: CPT | Performed by: INTERNAL MEDICINE

## 2023-07-26 PROCEDURE — 93000 ELECTROCARDIOGRAM COMPLETE: CPT | Performed by: INTERNAL MEDICINE

## 2023-07-26 PROCEDURE — 3079F DIAST BP 80-89 MM HG: CPT | Performed by: INTERNAL MEDICINE

## 2023-07-26 PROCEDURE — 99214 OFFICE O/P EST MOD 30 MIN: CPT | Performed by: INTERNAL MEDICINE

## 2023-07-26 PROCEDURE — 3074F SYST BP LT 130 MM HG: CPT | Performed by: INTERNAL MEDICINE

## 2023-07-26 PROCEDURE — 1160F RVW MEDS BY RX/DR IN RCRD: CPT | Performed by: INTERNAL MEDICINE

## 2023-07-26 RX ORDER — CHLORTHALIDONE 25 MG/1
TABLET ORAL
COMMUNITY

## 2023-07-26 RX ORDER — BACITRACIN 500 [USP'U]/G
OINTMENT OPHTHALMIC
COMMUNITY

## 2023-07-26 RX ORDER — CLONIDINE HYDROCHLORIDE 0.3 MG/1
TABLET ORAL
COMMUNITY

## 2023-07-26 RX ORDER — ESCITALOPRAM OXALATE 20 MG/1
TABLET ORAL
COMMUNITY

## 2023-07-26 NOTE — TELEPHONE ENCOUNTER
Antiadrenergic Antihypertensives Protocol [id number]/2023 12:19 PM   Protocol Details Normal creatinine in past 12 months

## 2023-07-26 NOTE — PROGRESS NOTES
[facility] Cardiology  OFFICE NOTE    Cardiovascular Medicine  Elsi Adams M.D., [facility], FSCAI, RPVI         No referring provider defined for this encounter.    Thank you for asking me to see Gregg Randle for CAD.    Coronary Artery Disease    This is a 48 y.o. male with:    1.  Coronary artery disease status post PCI at [place], PCI of mid LAD in the setting of an NSTEMI in 2005.  2.  Hypertension  3.  Hyperlipidemia  4.  Peripheral vascular disease  5.  Abdominal aortic aneurysm.  6.  CKD     Gregg Randle is a 48 y.o. y.o. male who presents for consultation today.  Patient was here for establishment of care ans see in 2019.  He is complaining of shortness of breath on exertion however denying any chest pain.  Complaining of occasional numbness on the left side of his body.  Patient is denying any claudications.  Patient is not aware why he was on warfarin at some point however he stopped taking that.  Follow with nephrology for CKD.  Dr. Arshad for his AAA status post repair.  No other acute problems.    No acute issues since last visit.  Denies any chest pain or shortness of breath.  It appears that he had seen Dr. Guerrier in between and he has a loop recorder in place.  We will try to get records.  We were not able to get records from Dr. Dean's office for his loop recorder.  No acute issues since last visit.      07/26/2023:  No acute issues since last visit.  Any chest pain or shortness of breath.    01/26/2023:  No acute issues since last visit.  Denying chest pain or shortness of breath.    Review of Systems - ROS  Constitution: Negative for weakness, weight gain and weight loss.   HENT: Negative for congestion.    Eyes: Negative for blurred vision.   Cardiovascular: As mentioned above  Respiratory: Negative for cough and hemoptysis.    Endocrine: Negative for polydipsia and polyuria.   Hematologic/Lymphatic: Negative for bleeding problem. Does not bruise/bleed  easily.   Skin: Negative for flushing.   Musculoskeletal: Negative for neck pain and stiffness.   Gastrointestinal: Negative for abdominal pain, diarrhea, jaundice, melena, nausea and vomiting.   Genitourinary: Negative for dysuria and hematuria.   Neurological: Negative for dizziness, focal weakness and numbness.   Psychiatric/Behavioral: Negative for altered mental status and depression.     I reviewed the ROS as documented here and confirmed the accuracy of it with the patient today. 7/26/2023        All other systems were reviewed and were negative.    family history includes Diabetes in his mother; Heart disease in his father; Stroke in his mother.     reports that he has quit smoking. His smoking use included cigarettes. He has a 20.00 pack-year smoking history. He has been exposed to tobacco smoke. He has never used smokeless tobacco. He reports that he does not drink alcohol and does not use drugs.    No Known Allergies      Current Outpatient Medications:     albuterol (PROVENTIL HFA;VENTOLIN HFA) 108 (90 BASE) MCG/ACT inhaler, Inhale 2 puffs Every 4 (Four) Hours As Needed for Wheezing., Disp: , Rfl:     allopurinol (ZYLOPRIM) 100 MG tablet, TAKE 1 TABLET BY MOUTH DAILY, Disp: 90 tablet, Rfl: 0    atorvastatin (LIPITOR) 40 MG tablet, Take 1 tablet by mouth Every Night., Disp: 90 tablet, Rfl: 1    calcitriol (ROCALTROL) 0.25 MCG capsule, Take 1 capsule by mouth Daily., Disp: 90 capsule, Rfl: 0    clopidogrel (PLAVIX) 75 MG tablet, Take 1 tablet by mouth Daily., Disp: 90 tablet, Rfl: 0    cyclobenzaprine (FLEXERIL) 5 MG tablet, TAKE 1 TABLET BY MOUTH EVERY NIGHT AT BEDTIME, Disp: 90 tablet, Rfl: 1    gabapentin (NEURONTIN) 600 MG tablet, TAKE 1 TABLET BY MOUTH THREE TIMES DAILY, Disp: 270 tablet, Rfl: 0    guanFACINE (TENEX) 1 MG tablet, Take 1 tablet by mouth Every Night., Disp: 90 tablet, Rfl: 1    hydrALAZINE (APRESOLINE) 50 MG tablet, TAKE 1 TABLET BY MOUTH EVERY 8 HOURS, Disp: 270 tablet, Rfl: 0     "labetalol (NORMODYNE) 200 MG tablet, Take 1 tablet by mouth 2 (Two) Times a Day., Disp: 180 tablet, Rfl: 0    linaclotide (Linzess) 145 MCG capsule capsule, Take 1 capsule by mouth Every Morning Before Breakfast., Disp: 90 capsule, Rfl: 0    losartan (COZAAR) 100 MG tablet, TAKE 1 TABLET BY MOUTH DAILY, Disp: 90 tablet, Rfl: 0    Omega-3 Fatty Acids (fish oil) 1000 MG capsule capsule, Take 1 capsule by mouth Daily With Breakfast., Disp: , Rfl:     vitamin B-12 (CYANOCOBALAMIN) 500 MCG tablet, Take 1 tablet by mouth Daily., Disp: 90 tablet, Rfl: 1    vitamin D (ERGOCALCIFEROL) 1.25 MG (78091 UT) capsule capsule, Take 1 capsule by mouth Every 7 (Seven) Days., Disp: 12 capsule, Rfl: 0    bacitracin 500 UNIT/GM ophthalmic ointment, , Disp: , Rfl:     chlorthalidone (HYGROTON) 25 MG tablet, Does not know if he is taking, Disp: , Rfl:     cloNIDine (CATAPRES) 0.3 MG tablet, Uncertain of mg, Disp: , Rfl:     dilTIAZem CD (CARDIZEM CD) 180 MG 24 hr capsule, Take 1 capsule by mouth Daily., Disp: 90 capsule, Rfl: 0    escitalopram (LEXAPRO) 20 MG tablet, Uncertain if he  is taking, Disp: , Rfl:     Physical Exam:  Vitals:    07/26/23 0924   BP: 128/88   BP Location: Left arm   Patient Position: Sitting   Cuff Size: Adult   Pulse: 75   SpO2: 98%   Weight: 107 kg (235 lb)   Height: 185.4 cm (73\")   PainSc: 0-No pain   PainLoc: Chest     Current Pain Level: none  Pulse Ox: Normal  on room air  General: alert, appears stated age and cooperative     Body Habitus: well-nourished    HEENT: Head: Normocephalic, no lesions, without obvious abnormality. No arcus senilis, xanthelasma or xanthomas.    Neuro: alert, oriented x3  Pulses: 2+ and symmetric  JVP: Volume/Pulsation: Normal.  Normal waveforms.   Appropriate inspiratory decrease.  No Kussmaul's. No Renae's.   Carotid Exam: no bruit normal pulsation bilaterally   Carotid Volume: normal.     Respirations: no increased work of breathing   Chest:  Normal  "   Pulmonary:Normal   Precordium: Normal impulses. P2 is not palpable.  RV Heave: absent  LV Heave: absent  [place]:  normal size and placement  Palpable S4: absent.  Heart rate: normal    Heart Rhythm: regular     Heart Sounds: S1: normal  S2: normal  S3: absent   S4: absent  Opening Snap: absent    Pericardial Rub:  Absent: .    Abdomen:   Appearance: normal .  Palpation: Soft, non-tender to palpation, bowel sounds positive in all four quadrants; no guarding or rebound tenderness  Extremity: no edema.   LE Skin: no rashes  LE Hair:  normal  LE Pulses: well perfused with normal pulses in the distal extremities  Pallor on elevation: Absent. Rubor on dependency: None    I have reexamined the patient and the results are consistent with the previously documented exam. Elsi Adams MD       DATA REVIEWED:     EKG. I personally reviewed and interpreted the EKG.  Normal sinus rhythm, inferior infarct.    ECG/EMG Results (all)       Procedure Component Value Units Date/Time    ECG 12 Lead [[id number]] Collected: 12/10/21 0809     Updated: 12/10/21 0811     QT Interval 374 ms      QTC Interval 415 ms     Narrative:      Test Reason : cad  Blood Pressure :   */*   mmHG  Vent. Rate :  74 BPM     Atrial Rate :  74 BPM     P-R Int : 178 ms          QRS Dur : 108 ms      QT Int : 374 ms       P-R-T Axes :  52  81  43 degrees     QTc Int : 415 ms    Normal sinus rhythm  Inferior infarct (cited on or before 07-AUG-2017)  Anterolateral infarct (cited on or before 07-AUG-2017)  Abnormal ECG  When compared with ECG of 26-NOV-2019 12:02,  No significant change was found    Referred By:            Confirmed By:           ---------------------------------------------------  TTE/NIVIA:  Results for orders placed during the hospital encounter of 07/22/22    Adult Transthoracic Echo Complete w/ Color, Spectral and Contrast if necessary per protocol    Interpretation Summary  · Left ventricular ejection fraction appears to be 56 - 60%. Left  ventricular systolic function is normal.  · Left ventricular wall thickness is consistent with moderate concentric hypertrophy.  · Left ventricular diastolic function was normal.            --------------------------------------------------------------------------------------------------  LABS:     The CVD Risk score (Amber et al., 2008) failed to calculate for the following reasons:    The patient has a prior MI, stroke, CHF, or peripheral vascular disease diagnosis         Lab Results   Component Value Date    GLUCOSE 107 (H) 06/27/2023    BUN 37 (H) 06/27/2023    CREATININE 2.66 (H) 06/27/2023    EGFRIFAFRI 40 (L) 10/27/2021    BCR 13.9 06/27/2023    K 4.4 06/27/2023    CO2 29.0 06/27/2023    CALCIUM 10.4 06/27/2023    ALBUMIN 4.9 06/27/2023    AST 17 06/27/2023    ALT 15 06/27/2023     Lab Results   Component Value Date    WBC 5.74 06/27/2023    HGB 14.5 06/27/2023    HCT 44.9 06/27/2023    MCV 81.2 06/27/2023     06/27/2023     Lab Results   Component Value Date    CHOL 126 06/27/2023    TRIG 82 06/27/2023    HDL 45 06/27/2023    LDL 65 06/27/2023     Lab Results   Component Value Date    TSH 1.710 02/23/2023     Lab Results   Component Value Date    CKTOTAL 1,006 (H) 07/23/2022    TROPONINT 0.040 (C) 07/22/2022     Lab Results   Component Value Date    HGBA1C 4.70 (L) 06/27/2023     No results found for: DDIMER  Lab Results   Component Value Date    ALT 15 06/27/2023     Lab Results   Component Value Date    HGBA1C 4.70 (L) 06/27/2023    HGBA1C 5.20 07/29/2022    HGBA1C 5.14 03/01/2021     Lab Results   Component Value Date    CREATININE 2.66 (H) 06/27/2023     Lab Results   Component Value Date    IRON 90 06/27/2023    TIBC 359 06/27/2023    FERRITIN 197.00 02/23/2023     Lab Results   Component Value Date    INR 1.05 07/22/2022    INR 1.18 08/11/2017    PROTIME 13.5 07/22/2022    PROTIME 15.0 08/11/2017       Assessment/Plan     1. Coronary artery disease involving native coronary artery of  native heart without angina pectoris  Was able to find records from [place] in 2005, he had presented with anterior STEMI and underwent PCI. Echo from 01/2020 showed EF of 46-50% with grade I DD.  Echocardiogram here in July 2022 showed preserved LV systolic function.  He had moderate LVH.  Continue aspirin Plavix  Continue high intensity statin.  Currently denying any chest pain we will hold off on ischemic evaluation.     2.  Hypertension:  Blood pressure is well controlled on current regimen of losartan 100mg mg, Cardizem 120 mg, he is on labetalol 200 mg as well.  On clonidine and chlorthalidone.  He is following with nephrology for CKD.     3.  Hyperlipidemia:  Continue Lipitor 20 mg  LDL was 72, will uptitrate to 40mg.  LDL is improved to 69.     4.  Peripheral vascular disease:  Status post abdominal aortic aneurysm repair, follows with vascular.  Has not been seen by Dr. Arshad looks like he missed his last CT appointment.  We will refer him to back for surveillance and follow-up.  Last CT was in July 2022 which showed stable aneurysm.    Loop recorder without evidence of A. fib.  Has 60% battery left.         Prevention:  Patient's Body mass index is 31 kg/m². indicating that he is obese (BMI >30). Obesity-related health conditions include the following: hypertension. Obesity is newly identified. BMI is is above average; BMI management plan is completed. We discussed portion control and increasing exercise..      Gregg Vinsonlock  reports that he has quit smoking. His smoking use included cigarettes. He has a 20.00 pack-year smoking history. He has been exposed to tobacco smoke. He has never used smokeless tobacco.      This document has been electronically signed by Elsi Adams MD on July 26, 2023 09:40 CDT

## 2023-07-26 NOTE — TELEPHONE ENCOUNTER
Pt states pharmacy told him he picked this medication already but pt can not find it. And doesn;t believe he picked this medication up. Pt says he needs meds asap. He has not taken this medication in a month. He would like a call back. [phone]

## 2023-07-27 LAB
QT INTERVAL: 390 MS
QTC INTERVAL: 435 MS

## 2023-07-27 RX ORDER — GUANFACINE 1 MG/1
1 TABLET ORAL NIGHTLY
Qty: 90 TABLET | Refills: 1 | Status: SHIPPED | OUTPATIENT
Start: 2023-07-27

## 2023-07-31 ENCOUNTER — TELEPHONE (OUTPATIENT)
Dept: FAMILY MEDICINE CLINIC | Facility: CLINIC | Age: 48
End: 2023-07-31

## 2023-07-31 NOTE — TELEPHONE ENCOUNTER
Pt has loop recorder and could not get MRI at this facility. [facility] can not complete MRI. Pt will need a new referral  for this. Shawna suggest it be  included pt has loop recorder on next referral as pt was irritated he could not get his MRI due to this [phone]

## 2023-08-04 ENCOUNTER — TELEPHONE (OUTPATIENT)
Dept: FAMILY MEDICINE CLINIC | Facility: CLINIC | Age: 48
End: 2023-08-04

## 2023-08-04 NOTE — TELEPHONE ENCOUNTER
Pt states pcp ordered MRI for back but pt could not do that since he has a lupitator in his chest. Pt also states since Wednesday he has felt woozy and gets tired quickly. Pt does not want to go to ER. Pt would like a call back. [phone]

## 2023-08-08 DIAGNOSIS — M51.36 DDD (DEGENERATIVE DISC DISEASE), LUMBAR: ICD-10-CM

## 2023-08-08 DIAGNOSIS — M47.816 ARTHRITIS, LUMBAR SPINE: ICD-10-CM

## 2023-08-08 DIAGNOSIS — M54.40 CHRONIC MIDLINE LOW BACK PAIN WITH SCIATICA, SCIATICA LATERALITY UNSPECIFIED: Primary | ICD-10-CM

## 2023-08-08 DIAGNOSIS — G89.29 CHRONIC MIDLINE LOW BACK PAIN WITH SCIATICA, SCIATICA LATERALITY UNSPECIFIED: Primary | ICD-10-CM

## 2023-08-08 NOTE — TELEPHONE ENCOUNTER
Lisinopril refilled per protocol with message to PSR[facility] to schedule an appointment .ac   Pt called to find out what to do now about the MRI.

## 2023-08-08 NOTE — PROGRESS NOTES
Subjective:  Gregg Randle is a 48 y.o. male who presents for       Patient Active Problem List   Diagnosis    Abdominal aortic aneurysm (AAA) without rupture    PVD (peripheral vascular disease)    Mixed hyperlipidemia    Benign essential HTN    CAD (coronary artery disease)    Class 1 obesity with serious comorbidity and body mass index (BMI) of 32.0 to 32.9 in adult    Vitamin D deficiency, unspecified     History of MI (myocardial infarction)    History of CVA (cerebrovascular accident)    Abnormal finding of blood chemistry, unspecified     Stage 3 chronic kidney disease    Former smoker    Left hemiparesis    Elevated liver enzymes    Hyperkalemia    Chronic idiopathic constipation    Dizziness    Chronic bilateral low back pain with bilateral sciatica    Encounter for drug screening    Hypotension    Essential hypertension    Left leg pain    Muscle spasm    Encounter for screening for malignant neoplasm of colon    BRANDI on CPAP    Hyperuricemia    Hemorrhoids    Balance problem    Wound of skin    Coronary artery disease involving native heart    Chronic midline low back pain with bilateral sciatica    Overweight    PARRISH (acute kidney injury)    Acute renal failure (ARF)    Elevated troponin level not due myocardial infarction    Acute renal failure superimposed on stage 3b chronic kidney disease    Dehydration    History of heart artery stent    Stage 4 chronic kidney disease    High serum vitamin B12    Obesity with serious comorbidity    Carotid stenosis, right    Lumbar radiculopathy    Tobacco abuse    GAMA (dyspnea on exertion)    Primary central sleep apnea    Benign paroxysmal positional vertigo    Class 1 obesity with serious comorbidity and body mass index (BMI) of 31.0 to 31.9 in adult    Chronic midline low back pain           Current Outpatient Medications:     albuterol (PROVENTIL HFA;VENTOLIN HFA) 108 (90 BASE) MCG/ACT inhaler, Inhale 2 puffs Every 4 (Four) Hours As Needed for  Wheezing., Disp: , Rfl:     allopurinol (ZYLOPRIM) 100 MG tablet, TAKE 1 TABLET BY MOUTH EVERY DAY, Disp: 90 tablet, Rfl: 0    atorvastatin (LIPITOR) 40 MG tablet, Take 1 tablet by mouth Every Night., Disp: 90 tablet, Rfl: 1    bacitracin 500 UNIT/GM ophthalmic ointment, , Disp: , Rfl:     calcitriol (ROCALTROL) 0.25 MCG capsule, Take 1 capsule by mouth Daily., Disp: 90 capsule, Rfl: 0    chlorthalidone (HYGROTON) 25 MG tablet, Does not know if he is taking, Disp: , Rfl:     cloNIDine (CATAPRES) 0.3 MG tablet, Uncertain of mg, Disp: , Rfl:     clopidogrel (PLAVIX) 75 MG tablet, Take 1 tablet by mouth Daily., Disp: 90 tablet, Rfl: 0    cyclobenzaprine (FLEXERIL) 5 MG tablet, Take 1 tablet by mouth every night at bedtime., Disp: 90 tablet, Rfl: 1    dilTIAZem CD (CARDIZEM CD) 180 MG 24 hr capsule, Take 1 capsule by mouth Daily., Disp: 90 capsule, Rfl: 0    escitalopram (LEXAPRO) 20 MG tablet, Uncertain if he  is taking, Disp: , Rfl:     gabapentin (NEURONTIN) 600 MG tablet, Take 1 tablet by mouth 3 (Three) Times a Day., Disp: 90 tablet, Rfl: 2    guanFACINE (TENEX) 1 MG tablet, Take 1 tablet by mouth Every Night., Disp: 90 tablet, Rfl: 1    hydrALAZINE (APRESOLINE) 50 MG tablet, Take 1 tablet by mouth Every 8 (Eight) Hours., Disp: 270 tablet, Rfl: 3    labetalol (NORMODYNE) 200 MG tablet, TAKE 1 TABLET BY MOUTH TWICE DAILY, Disp: 180 tablet, Rfl: 0    linaclotide (Linzess) 145 MCG capsule capsule, Take 1 capsule by mouth Every Morning Before Breakfast., Disp: 90 capsule, Rfl: 0    Omega-3 Fatty Acids (fish oil) 1000 MG capsule capsule, Take 1 capsule by mouth Daily With Breakfast., Disp: , Rfl:     vitamin B-12 (CYANOCOBALAMIN) 500 MCG tablet, Take 1 tablet by mouth Daily., Disp: 90 tablet, Rfl: 1    vitamin D (ERGOCALCIFEROL) 1.25 MG (33649 UT) capsule capsule, Take 1 capsule by mouth Every 7 (Seven) Days., Disp: 12 capsule, Rfl: 0    losartan (Cozaar) 50 MG tablet, Take 1 tablet by mouth Daily., Disp: 90 tablet,  Rfl: 1    Pt is 46 yo male with management of obesity, CAD sp PCI , HTN, HLP, AAA  Sp repair , PVD with bilateral iliac stent , COPD (panlobar emphysema) former  tobacco user, dizziness, CKD stage 3 , claudication. Major depression, cholelithiasis, abnormal echocardiogram ( LVH grade 1 diastolic dysfunction)  History of CVA, former tobacco user , history of MI, left hemiparesis, chronic back pain (DDD lumbar spine)  elevated liver enzymes, right carotid stenosis, BRANDI, BPPV     4/25/23 in office visit fo r recheck. Pt saw ENT on 3/24/23 and was found to have BPPV on the right side and encouraged to use a walking stick along with [place] exercises. Pt had labwork on 2/23/23 that showed ferritin normal iron profile showed saturation at 19. Testosterone was at 654.7. uric acid normal vitamin b12 high at 1593.  Vitamin D and TSH normal lipid panel stable CMP showed CR AT 2.94 from 2.99 with BUN at 33 glucose at 104 GFR at 25.5. CBC showed normal hemoglobin and WBC. BP and HR stable today.he has occasional diziness. No chest pain no shortness of breath. He continues to have middle back pain on left side. His thoracic x-ray was normal.      6/27/23 in office visit for recheck. Pt saw ENT on 5/26/23 for his imbalance issues and that BPPV has resolved.  He is to continue with [place] exercise.  Pt has yet to get labowrk ordered on 4/25/23.  Pt reports no chest pain no dizziness. Breathing stable.  BP stable.  He continues to have back pain. He was ordered MRI of lumbar spine. His last x-ray of lumbar spine was in 2022 that showed DDD and arthritis. It is hard for pt to try PT/OT due to living far away     8/30/23  in office visit for recheck. Pt saw Cardiology on 7/26/23 for his CAD, HTN, HLP, PVD and is to continue aspirin, plavix and statin, pt had labwork on 6/27/23 that showed Pt's labwork stable Continues to have CK stage 4. Pt had CT of lumbar spine on 8/11/23 that showed multilevel degenerative changes his BP is  on lower side today. He still has dizziness. No syncope     Fatigue  This is a chronic problem. The current episode started more than 1 year ago. The problem occurs constantly. The problem has been gradually worsening. Associated symptoms include arthralgias, numbness and weakness. Pertinent negatives include no abdominal pain, anorexia, change in bowel habit, chest pain, chills, congestion, coughing, diaphoresis, fatigue, fever, headaches, joint swelling, myalgias, nausea, neck pain, swollen glands, urinary symptoms, vertigo, visual change or vomiting. Nothing aggravates the symptoms. He has tried nothing for the symptoms. The treatment provided no relief.   Shortness of Breath  This is a chronic problem. The current episode started more than 1 year ago. The problem occurs constantly. The problem has been gradually worsening. Pertinent negatives include no abdominal pain, chest pain, fever, headaches, neck pain, rhinorrhea, swollen glands, vomiting or wheezing. Risk factors include smoking. He has tried beta agonist inhalers for the symptoms. The treatment provided mild relief. His past medical history is significant for COPD. There is no history of allergies, aspirin allergies, asthma, bronchiolitis, CAD, chronic lung disease, DVT, a heart failure, PE, pneumonia or a recent surgery.   Dizziness  This is a chronic problem. The current episode started more than 1 year ago. The problem occurs constantly. The problem has been gradually worsening. Associated symptoms include arthralgias, numbness and weakness. Pertinent negatives include no abdominal pain, anorexia, change in bowel habit, chest pain, chills, congestion, coughing, diaphoresis, fatigue, fever, headaches, joint swelling, myalgias, nausea, neck pain, swollen glands, urinary symptoms, vertigo, visual change or vomiting. The symptoms are aggravated by bending and standing. He has tried nothing for the symptoms. The treatment provided no relief.   Back  Pain  This is a chronic problem. The current episode started more than 1 year ago. The problem occurs constantly. The problem has been gradually worsening since onset. The pain is present in the gluteal and lumbar spine. The pain does not radiate. The pain is at a severity of 4/10. The pain is moderate. The pain is the same all the time. The symptoms are aggravated by bending, lying down and position. Stiffness is present all day. Associated symptoms include leg pain, numbness, paresis, paresthesias and weakness. Pertinent negatives include no abdominal pain, bladder incontinence, bowel incontinence, chest pain, dysuria, fever, headaches, pelvic pain, perianal numbness or tingling. He has tried muscle relaxant (neurontin) for the symptoms. The treatment provided no relief.   Leg Pain   The incident occurred more than 1 week ago. The incident occurred at home. The pain is present in the left leg. The quality of the pain is described as aching. The pain is at a severity of 4/10. The pain is moderate. The pain has been worsening since onset. Associated symptoms include numbness. Pertinent negatives include no inability to bear weight, loss of motion or tingling. He reports no foreign bodies present. The symptoms are aggravated by movement and palpation. Treatments tried: neurontin. The treatment provided no relief.   Dizziness  This is a chronic problem. The current episode started more than 1 year ago. The problem occurs constantly. The problem has been improved  Pertinent negatives include no abdominal pain, anorexia, arthralgias, change in bowel habit, chest pain, chills, congestion, coughing, diaphoresis, fatigue, fever, headaches, joint swelling, myalgias, nausea, neck pain, numbness, rash, sore throat, swollen glands, urinary symptoms, vertigo, visual change, vomiting or weakness. Nothing aggravates the symptoms. He has tried nothing for the symptoms. The treatment provided no relief.   Hyperlipidemia  This is a  chronic problem. The current episode started more than 1 year ago. The problem is controlled. Recent lipid tests were reviewed and are variable. Exacerbating diseases include chronic renal disease and obesity. He has no history of diabetes, hypothyroidism, liver disease or nephrotic syndrome. Pertinent negatives include no chest pain, focal sensory loss, focal weakness, leg pain, myalgias or shortness of breath. Current antihyperlipidemic treatment includes statins. Compliance problems include adherence to exercise.  Risk factors for coronary artery disease include diabetes mellitus, hypertension, male sex and obesity.   Chronic Kidney Disease  This is a chronic problem. The current episode started more than 1 year ago. The problem occurs constantly. The problem has been gradually worsening. Pertinent negatives include no abdominal pain, anorexia, arthralgias, change in bowel habit, chest pain, chills, congestion, coughing, diaphoresis, fatigue, fever, headaches, joint swelling, myalgias, nausea, neck pain, numbness, rash, sore throat, swollen glands, urinary symptoms, vertigo, visual change, vomiting or weakness. Nothing aggravates the symptoms. He has tried nothing for the symptoms.   Coronary Artery Disease  Presents for follow-up visit. Pertinent negatives include no chest pain, chest pressure, chest tightness, dizziness, leg swelling, muscle weakness, palpitations or shortness of breath. Risk factors include hyperlipidemia and obesity. The symptoms have been stable. Compliance with diet is good. Compliance with exercise is good. Compliance with medications is good.   Hypertension   This is a chronic problem. The current episode started more than 1 year ago. The problem has been improving g since onset. The problem is controlled. Associated symptoms include shortness of breath. Pertinent negatives include no anxiety, blurred vision, chest pain, headaches, malaise/fatigue, neck pain, orthopnea, palpitations,  peripheral edema, PND or sweats. Risk factors for coronary artery disease include dyslipidemia, obesity, male gender, sedentary lifestyle and smoking/tobacco exposure. Current antihypertension treatment includes calcium channel blockers, beta blockers and angiotensin blockers and diureitcs The current treatment provides moderate improvement. There are no compliance problems.  Hypertensive end-organ damage includes kidney disease, CAD/MI and CVA. There is no history of angina, heart failure, left ventricular hypertrophy, PVD or retinopathy. There is no history of chronic renal disease, coarctation of the aorta, hyperaldosteronism, hypercortisolism, hyperparathyroidism, a hypertension causing med, pheochromocytoma, renovascular disease, sleep apnea or a thyroid problem.     Review of Systems  Review of Systems   Constitutional:  Positive for activity change. Negative for appetite change, chills, diaphoresis, fatigue and fever.   HENT:  Negative for congestion, postnasal drip, rhinorrhea, sinus pressure, sinus pain, sneezing, trouble swallowing and voice change.    Respiratory:  Negative for cough, choking, chest tightness, shortness of breath, wheezing and stridor.    Cardiovascular:  Negative for chest pain.   Gastrointestinal:  Negative for abdominal pain, anorexia, change in bowel habit, diarrhea, nausea and vomiting.   Musculoskeletal:  Positive for arthralgias. Negative for joint swelling, myalgias and neck pain.   Neurological:  Positive for dizziness, weakness, light-headedness and numbness. Negative for vertigo and headaches.     Patient Active Problem List   Diagnosis    Abdominal aortic aneurysm (AAA) without rupture    PVD (peripheral vascular disease)    Mixed hyperlipidemia    Benign essential HTN    CAD (coronary artery disease)    Class 1 obesity with serious comorbidity and body mass index (BMI) of 32.0 to 32.9 in adult    Vitamin D deficiency, unspecified     History of MI (myocardial infarction)     History of CVA (cerebrovascular accident)    Abnormal finding of blood chemistry, unspecified     Stage 3 chronic kidney disease    Former smoker    Left hemiparesis    Elevated liver enzymes    Hyperkalemia    Chronic idiopathic constipation    Dizziness    Chronic bilateral low back pain with bilateral sciatica    Encounter for drug screening    Hypotension    Essential hypertension    Left leg pain    Muscle spasm    Encounter for screening for malignant neoplasm of colon    BRANDI on CPAP    Hyperuricemia    Hemorrhoids    Balance problem    Wound of skin    Coronary artery disease involving native heart    Chronic midline low back pain with bilateral sciatica    Overweight    PARRISH (acute kidney injury)    Acute renal failure (ARF)    Elevated troponin level not due myocardial infarction    Acute renal failure superimposed on stage 3b chronic kidney disease    Dehydration    History of heart artery stent    Stage 4 chronic kidney disease    High serum vitamin B12    Obesity with serious comorbidity    Carotid stenosis, right    Lumbar radiculopathy    Tobacco abuse    GAMA (dyspnea on exertion)    Primary central sleep apnea    Benign paroxysmal positional vertigo    Class 1 obesity with serious comorbidity and body mass index (BMI) of 31.0 to 31.9 in adult    Chronic midline low back pain     Past Surgical History:   Procedure Laterality Date    COLONOSCOPY N/A 11/17/2020    Procedure: COLONOSCOPY;  Surgeon: Abi Miller MD;  Location: [facility] ENDOSCOPY;  Service: Gastroenterology;  Laterality: N/A;    CORONARY ANGIOPLASTY WITH STENT PLACEMENT      EAR TUBES Left     MD AAA REPAIR,AORTO-AORTIC TUBE PROSTH N/A 8/11/2017    Procedure: ABDOMINAL AORTIC ANEURYSM REPAIR WITH ENDOGRAFT, REPAIR ILIAC ARTERY ANEURYSM, POSSIBLE OPEN ABDOMINAL AORTOGRAM    (CELL SAVER STAND-BY);  Surgeon: Aren Arshad MD;  Location: [facility] OR;  Service: Vascular     Social History     Socioeconomic History    Marital status:  Single   Tobacco Use    Smoking status: Former     Packs/day: 1.00     Years: 20.00     Pack years: 20.00     Types: Cigarettes     Passive exposure: Past    Smokeless tobacco: Never   Vaping Use    Vaping Use: Never used   Substance and Sexual Activity    Alcohol use: No    Drug use: No    Sexual activity: Defer     Family History   Problem Relation Age of Onset    Stroke Mother     Diabetes Mother     Heart disease Father      Office Visit on 07/26/2023   Component Date Value Ref Range Status    QT Interval 07/26/2023 390  ms Final    QTC Interval 07/26/2023 435  ms Final   Lab on 06/27/2023   Component Date Value Ref Range Status    WBC 06/27/2023 5.74  3.40 - 10.80 10*3/mm3 Final    RBC 06/27/2023 5.53  4.14 - 5.80 10*6/mm3 Final    Hemoglobin 06/27/2023 14.5  13.0 - 17.7 g/dL Final    Hematocrit 06/27/2023 44.9  37.5 - 51.0 % Final    MCV 06/27/2023 81.2  79.0 - 97.0 fL Final    MCH 06/27/2023 26.2 (L)  26.6 - 33.0 pg Final    MCHC 06/27/2023 32.3  31.5 - 35.7 g/dL Final    RDW 06/27/2023 12.8  12.3 - 15.4 % Final    RDW-SD 06/27/2023 37.1  37.0 - 54.0 fl Final    MPV 06/27/2023 10.5  6.0 - 12.0 fL Final    Platelets 06/27/2023 290  140 - 450 10*3/mm3 Final    Neutrophil % 06/27/2023 60.1  42.7 - 76.0 % Final    Lymphocyte % 06/27/2023 24.2  19.6 - 45.3 % Final    Monocyte % 06/27/2023 11.1  5.0 - 12.0 % Final    Eosinophil % 06/27/2023 3.7  0.3 - 6.2 % Final    Basophil % 06/27/2023 0.7  0.0 - 1.5 % Final    Immature Grans % 06/27/2023 0.2  0.0 - 0.5 % Final    Neutrophils, Absolute 06/27/2023 3.45  1.70 - 7.00 10*3/mm3 Final    Lymphocytes, Absolute 06/27/2023 1.39  0.70 - 3.10 10*3/mm3 Final    Monocytes, Absolute 06/27/2023 0.64  0.10 - 0.90 10*3/mm3 Final    Eosinophils, Absolute 06/27/2023 0.21  0.00 - 0.40 10*3/mm3 Final    Basophils, Absolute 06/27/2023 0.04  0.00 - 0.20 10*3/mm3 Final    Immature Grans, Absolute 06/27/2023 0.01  0.00 - 0.05 10*3/mm3 Final    nRBC 06/27/2023 0.0  0.0 - 0.2 /100 WBC  Final    Glucose 06/27/2023 107 (H)  65 - 99 mg/dL Final    BUN 06/27/2023 37 (H)  6 - 20 mg/dL Final    Creatinine 06/27/2023 2.66 (H)  0.76 - 1.27 mg/dL Final    Sodium 06/27/2023 139  136 - 145 mmol/L Final    Potassium 06/27/2023 4.4  3.5 - 5.2 mmol/L Final    Chloride 06/27/2023 101  98 - 107 mmol/L Final    CO2 06/27/2023 29.0  22.0 - 29.0 mmol/L Final    Calcium 06/27/2023 10.4  8.6 - 10.5 mg/dL Final    Total Protein 06/27/2023 8.1  6.0 - 8.5 g/dL Final    Albumin 06/27/2023 4.9  3.5 - 5.2 g/dL Final    ALT (SGPT) 06/27/2023 15  1 - 41 U/L Final    AST (SGOT) 06/27/2023 17  1 - 40 U/L Final    Alkaline Phosphatase 06/27/2023 108  39 - 117 U/L Final    Total Bilirubin 06/27/2023 0.5  0.0 - 1.2 mg/dL Final    Globulin 06/27/2023 3.2  gm/dL Final    A/G Ratio 06/27/2023 1.5  g/dL Final    BUN/Creatinine Ratio 06/27/2023 13.9  7.0 - 25.0 Final    Anion Gap 06/27/2023 9.0  5.0 - 15.0 mmol/L Final    eGFR 06/27/2023 28.5 (L)  >60.0 mL/min/1.73 Final    Hemoglobin A1C 06/27/2023 4.70 (L)  4.80 - 5.60 % Final    Total Cholesterol 06/27/2023 126  0 - 200 mg/dL Final    Triglycerides 06/27/2023 82  0 - 150 mg/dL Final    HDL Cholesterol 06/27/2023 45  40 - 60 mg/dL Final    LDL Cholesterol  06/27/2023 65  0 - 100 mg/dL Final    VLDL Cholesterol 06/27/2023 16  5 - 40 mg/dL Final    LDL/HDL Ratio 06/27/2023 1.44   Final    Vitamin B-12 06/27/2023 1,389 (H)  211 - 946 pg/mL Final    Uric Acid 06/27/2023 6.4  3.4 - 7.0 mg/dL Final    Iron 06/27/2023 90  59 - 158 mcg/dL Final    Iron Saturation (TSAT) 06/27/2023 25  20 - 50 % Final    Transferrin 06/27/2023 241  200 - 360 mg/dL Final    TIBC 06/27/2023 359  298 - 536 mcg/dL Final      CT Abdomen Pelvis Without Contrast  Narrative: HISTORY:  Follow-up abdominal aortic aneurysm repair.    COMPARISON:  7/15/2022    TECHNIQUE:  Helical imaging from the lung bases through the pubic symphysis without  administration of intravenous Omnipaque 350.  Axial, coronal and  "sagittal  reconstructed 2-D images were provided.  Oral contrast was not administered.    FINDINGS:  The included lung bases are clear.    Given the limitations of a noncontrast examination, the liver, pancreas, spleen,  and adrenal glands are unremarkable.  Gallstones again noted.    No urinary tract calculus.  No hydronephrosis.  Bladder is unremarkable.    Bowel is normal in caliber.  No ascites is seen.    Aortic stent graft is present.  The native aneurysm sac measures 4.5 x 4.4 cm on  image 53 of series 3, unchanged from prior.  Right common iliac artery aneurysm  measures 3.8 cm, unchanged.    Alignment of the lumbar spine is anatomic.  No acute fracture or focal osseous  lesion.  Impression: No acute intra-abdominal process.  No change in appearance of the aortic stent  graft, native aneurysm sac size, and right common iliac artery aneurysm.    [unfilled]  Immunization History   Administered Date(s) Administered    COVID-19 (MODERNA) BIVALENT 12+YRS 09/14/2022, 09/14/2022    COVID-19 (MODERNA) Monovalent Original Booster 03/29/2022    COVID-19 (PFIZER) Purple Cap Monovalent 06/02/2021, 06/23/2021, 10/13/2021    Fluzone >6mos 01/15/2020, 01/15/2020, 09/30/2020, 10/14/2021, 11/03/2022    Influenza, Unspecified 11/03/2022    Pneumococcal Polysaccharide (PPSV23) 09/30/2020    Tdap 09/30/2020       The following portions of the patient's history were reviewed and updated as appropriate: allergies, current medications, past family history, past medical history, past social history, past surgical history and problem list.        Physical Exam  /70 (BP Location: Right arm, Patient Position: Sitting, Cuff Size: Adult)   Pulse 78   Temp 98.5 øF (36.9 øC)   Ht 185.4 cm (73\")   Wt 104 kg (230 lb 3.2 oz)   SpO2 96%   BMI 30.37 kg/mý       Physical Exam  Vitals and nursing note reviewed.   Constitutional:       Appearance: He is well-developed. He is not diaphoretic.   HENT:      Head: Normocephalic and " atraumatic.      Right Ear: External ear normal.   Eyes:      Conjunctiva/sclera: Conjunctivae normal.      Pupils: Pupils are equal, round, and reactive to light.   Cardiovascular:      Rate and Rhythm: Normal rate and regular rhythm.      Heart sounds: Normal heart sounds. No murmur heard.  Pulmonary:      Effort: Pulmonary effort is normal. No respiratory distress.      Comments: Decreased breath sounds   Abdominal:      General: Bowel sounds are normal. There is no distension.      Palpations: Abdomen is soft.      Tenderness: There is no abdominal tenderness.   Musculoskeletal:         General: Tenderness present. No deformity.      Right wrist: Tenderness and bony tenderness present.      Cervical back: Neck supple. No spasms, tenderness or bony tenderness. Normal range of motion.      Thoracic back: Spasms, tenderness and bony tenderness present.      Lumbar back: Spasms, tenderness and bony tenderness present. Decreased range of motion.   Skin:     General: Skin is warm.      Coloration: Skin is not pale.      Findings: No erythema or rash.   Neurological:      Mental Status: He is alert and oriented to person, place, and time.      Cranial Nerves: No cranial nerve deficit.   Psychiatric:         Behavior: Behavior normal.       [unfilled]   Diagnosis Plan   1. Chronic midline low back pain with bilateral sciatica  gabapentin (NEURONTIN) 600 MG tablet    CBC Auto Differential    Comprehensive Metabolic Panel    Hemoglobin A1c    Lipid Panel    Vitamin B12      2. Coronary artery disease involving native coronary artery of native heart without angina pectoris  clopidogrel (PLAVIX) 75 MG tablet    CBC Auto Differential    Comprehensive Metabolic Panel    Hemoglobin A1c    Lipid Panel    Vitamin B12      3. History of heart artery stent  CBC Auto Differential    Comprehensive Metabolic Panel    Hemoglobin A1c    Lipid Panel    Vitamin B12      4. Mixed hyperlipidemia  CBC Auto Differential     Comprehensive Metabolic Panel    Hemoglobin A1c    Lipid Panel    Vitamin B12      5. PVD (peripheral vascular disease)  CBC Auto Differential    Comprehensive Metabolic Panel    Hemoglobin A1c    Lipid Panel    Vitamin B12      6. Stage 4 chronic kidney disease  CBC Auto Differential    Comprehensive Metabolic Panel    Hemoglobin A1c    Lipid Panel    Vitamin B12      7. Hyperuricemia  CBC Auto Differential    Comprehensive Metabolic Panel    Hemoglobin A1c    Lipid Panel    Vitamin B12      8. Chronic midline low back pain, unspecified whether sciatica present  CBC Auto Differential    Comprehensive Metabolic Panel    Hemoglobin A1c    Lipid Panel    Vitamin B12      9. Arthritis, lumbar spine  CBC Auto Differential    Comprehensive Metabolic Panel    Hemoglobin A1c    Lipid Panel    Vitamin B12      10. Stage 3b chronic kidney disease  calcitriol (ROCALTROL) 0.25 MCG capsule    CBC Auto Differential    Comprehensive Metabolic Panel    Hemoglobin A1c    Lipid Panel    Vitamin B12      11. Chronic bilateral low back pain with bilateral sciatica  cyclobenzaprine (FLEXERIL) 5 MG tablet    CBC Auto Differential    Comprehensive Metabolic Panel    Hemoglobin A1c    Lipid Panel    Vitamin B12      12. History of CVA (cerebrovascular accident)  gabapentin (NEURONTIN) 600 MG tablet    CBC Auto Differential    Comprehensive Metabolic Panel    Hemoglobin A1c    Lipid Panel    Vitamin B12      13. Benign essential HTN  guanFACINE (TENEX) 1 MG tablet    CBC Auto Differential    Comprehensive Metabolic Panel    Hemoglobin A1c    Lipid Panel    Vitamin B12      14. Chronic constipation  linaclotide (Linzess) 145 MCG capsule capsule    CBC Auto Differential    Comprehensive Metabolic Panel    Hemoglobin A1c    Lipid Panel    Vitamin B12      15. Numbness and tingling of left arm and leg  vitamin B-12 (CYANOCOBALAMIN) 500 MCG tablet    CBC Auto Differential    Comprehensive Metabolic Panel    Hemoglobin A1c    Lipid Panel     Vitamin B12      16. Class 1 obesity due to excess calories with serious comorbidity and body mass index (BMI) of 30.0 to 30.9 in adult        17. Hypotension, unspecified hypotension type             -recommend labowrk   -chronic back pain/athritis of lumbar spine/DDD lumbar spine/thoracic back pain  - reviewed last x-ray of back. Pt has had lower back pain >1 year.Also referred to Neurology for EMG study. Likely has lumbar radiculopathy and lower back pain with sciatica.. Consider spine surgery or PM.  Reviewed  thoracic x-ray. Continue flexeril and neurontin 600 mg PO TID  Recommend PT/OT but pt declined for now. Reviewed CT of lumbar spine. Recommend Pain Management. Will hold for now.     -dizziness/ BPPV - stable on meclizine PRN. Carotid ultrasound normal . ENT following he was recommended exercises at home. Pt was also referred to Vascular Surgery. BPPV vs vascular vs medication use.  Pt was recommend exercise at home and walking stick.recommend PT/OT downstairs but pt declines for now.    -internal hemorrhoids - recommend high fiber diet. Next colonscopy in 2030   -BRANDI on CPAP/daytime sleepiness - will refer to sleep medicine with [facility]  -hyperuricemia - on allopurinol 100 mg daiy.   -CIC -  on linzess. Pt has failed Miralax.drug information provided   -CKD stage 4/hyperkalemia /dehydration - Nephrology following. Will check CMP. Advised pt to make appt with Nephrology soon   -dyspnea on exertion. Pt seen Pulmonology.  Had normal PFTS On albuterol inhaler  -HTN - on  losartan 100 mg daily. Labetalol 200 mg PO BID  on cardizem 120 mg daily.  On hydralazine 50 mg every 8 hours cut back on losartan from  100 to 50 mg daily.   -major depression-  On lexapro 20 mg daily.  -CAD sp stent/history of NSTEMI -Cardiology following, aspirin, lipitor, labetalol  200 mg TID, losartan , plavix 75 mg daily.  .  -PVD - aspirin plavix, lipitor - Vascular Surgery following  -obesity - counseled weight loss >5 minutes BMI at  30.37   -AAA sp repair  - Vascular Surgery following. will refer back to Vascular Surgery. Pt is due for new CT. Gave pt number for Vascular surgery at [facility] for followup   -advised pt to be safe and call with questions and concerns  -advised pt to go to ER or call 911 if symptoms worrisome or severe  -advised pt to followup with specialist and referrals  -advised pt be safe during COVID-19 pandemic   I spent 34 minutes caring for Gregg on this date of service. This time includes time spent by me in the following activities: preparing for the visit, reviewing tests, obtaining and/or reviewing a separately obtained history, performing a medically appropriate examination and/or evaluation, counseling and educating the patient/family/caregiver, ordering medications, tests, or procedures, referring and communicating with other health care professionals, documenting information in the medical record, independently interpreting results and communicating that information with the patient/family/caregiver, and care coordination   -recheck in 2 months         This document has been electronically signed by Dread Duvall MD on August 30, 2023 08:54 CDT

## 2023-08-12 DIAGNOSIS — E79.0 HYPERURICEMIA: ICD-10-CM

## 2023-08-14 RX ORDER — ALLOPURINOL 100 MG/1
TABLET ORAL
Qty: 90 TABLET | Refills: 0 | Status: SHIPPED | OUTPATIENT
Start: 2023-08-14

## 2023-08-14 NOTE — TELEPHONE ENCOUNTER
Gout Agent Protocol [id number]/12/2023 02:52 PM   Protocol Details Most recent eGFR is above 30 in the past 12 months.

## 2023-08-15 DIAGNOSIS — I10 BENIGN ESSENTIAL HTN: ICD-10-CM

## 2023-08-15 RX ORDER — LABETALOL 200 MG/1
TABLET, FILM COATED ORAL
Qty: 180 TABLET | Refills: 0 | Status: SHIPPED | OUTPATIENT
Start: 2023-08-15

## 2023-08-22 DIAGNOSIS — G89.29 CHRONIC MIDLINE LOW BACK PAIN WITH SCIATICA, SCIATICA LATERALITY UNSPECIFIED: ICD-10-CM

## 2023-08-22 DIAGNOSIS — M47.816 ARTHRITIS, LUMBAR SPINE: ICD-10-CM

## 2023-08-22 DIAGNOSIS — M51.36 DDD (DEGENERATIVE DISC DISEASE), LUMBAR: ICD-10-CM

## 2023-08-22 DIAGNOSIS — M54.40 CHRONIC MIDLINE LOW BACK PAIN WITH SCIATICA, SCIATICA LATERALITY UNSPECIFIED: ICD-10-CM

## 2023-08-30 ENCOUNTER — OFFICE VISIT (OUTPATIENT)
Dept: FAMILY MEDICINE CLINIC | Facility: CLINIC | Age: 48
End: 2023-08-30
Payer: MEDICARE

## 2023-08-30 VITALS
DIASTOLIC BLOOD PRESSURE: 70 MMHG | HEART RATE: 78 BPM | BODY MASS INDEX: 30.51 KG/M2 | OXYGEN SATURATION: 96 % | SYSTOLIC BLOOD PRESSURE: 102 MMHG | HEIGHT: 73 IN | TEMPERATURE: 98.5 F | WEIGHT: 230.2 LBS

## 2023-08-30 DIAGNOSIS — R20.0 NUMBNESS AND TINGLING OF LEFT ARM AND LEG: ICD-10-CM

## 2023-08-30 DIAGNOSIS — E79.0 HYPERURICEMIA: ICD-10-CM

## 2023-08-30 DIAGNOSIS — Z95.5 HISTORY OF HEART ARTERY STENT: ICD-10-CM

## 2023-08-30 DIAGNOSIS — E66.09 CLASS 1 OBESITY DUE TO EXCESS CALORIES WITH SERIOUS COMORBIDITY AND BODY MASS INDEX (BMI) OF 30.0 TO 30.9 IN ADULT: ICD-10-CM

## 2023-08-30 DIAGNOSIS — I10 BENIGN ESSENTIAL HTN: ICD-10-CM

## 2023-08-30 DIAGNOSIS — M54.42 CHRONIC MIDLINE LOW BACK PAIN WITH BILATERAL SCIATICA: Primary | ICD-10-CM

## 2023-08-30 DIAGNOSIS — N18.4 STAGE 4 CHRONIC KIDNEY DISEASE: ICD-10-CM

## 2023-08-30 DIAGNOSIS — M54.42 CHRONIC BILATERAL LOW BACK PAIN WITH BILATERAL SCIATICA: ICD-10-CM

## 2023-08-30 DIAGNOSIS — M54.41 CHRONIC BILATERAL LOW BACK PAIN WITH BILATERAL SCIATICA: ICD-10-CM

## 2023-08-30 DIAGNOSIS — Z86.73 HISTORY OF CVA (CEREBROVASCULAR ACCIDENT): ICD-10-CM

## 2023-08-30 DIAGNOSIS — G89.29 CHRONIC MIDLINE LOW BACK PAIN, UNSPECIFIED WHETHER SCIATICA PRESENT: ICD-10-CM

## 2023-08-30 DIAGNOSIS — R20.2 NUMBNESS AND TINGLING OF LEFT ARM AND LEG: ICD-10-CM

## 2023-08-30 DIAGNOSIS — G89.29 CHRONIC MIDLINE LOW BACK PAIN WITH BILATERAL SCIATICA: Primary | ICD-10-CM

## 2023-08-30 DIAGNOSIS — K59.09 CHRONIC CONSTIPATION: ICD-10-CM

## 2023-08-30 DIAGNOSIS — I73.9 PVD (PERIPHERAL VASCULAR DISEASE): ICD-10-CM

## 2023-08-30 DIAGNOSIS — G89.29 CHRONIC BILATERAL LOW BACK PAIN WITH BILATERAL SCIATICA: ICD-10-CM

## 2023-08-30 DIAGNOSIS — N18.32 STAGE 3B CHRONIC KIDNEY DISEASE: ICD-10-CM

## 2023-08-30 DIAGNOSIS — M54.50 CHRONIC MIDLINE LOW BACK PAIN, UNSPECIFIED WHETHER SCIATICA PRESENT: ICD-10-CM

## 2023-08-30 DIAGNOSIS — M47.816 ARTHRITIS, LUMBAR SPINE: ICD-10-CM

## 2023-08-30 DIAGNOSIS — M54.41 CHRONIC MIDLINE LOW BACK PAIN WITH BILATERAL SCIATICA: Primary | ICD-10-CM

## 2023-08-30 DIAGNOSIS — I95.9 HYPOTENSION, UNSPECIFIED HYPOTENSION TYPE: ICD-10-CM

## 2023-08-30 DIAGNOSIS — E78.2 MIXED HYPERLIPIDEMIA: ICD-10-CM

## 2023-08-30 DIAGNOSIS — I25.10 CORONARY ARTERY DISEASE INVOLVING NATIVE CORONARY ARTERY OF NATIVE HEART WITHOUT ANGINA PECTORIS: ICD-10-CM

## 2023-08-30 RX ORDER — ERGOCALCIFEROL 1.25 MG/1
50000 CAPSULE ORAL
Qty: 12 CAPSULE | Refills: 0 | Status: SHIPPED | OUTPATIENT
Start: 2023-08-30

## 2023-08-30 RX ORDER — GABAPENTIN 600 MG/1
600 TABLET ORAL 3 TIMES DAILY
Qty: 90 TABLET | Refills: 2 | Status: SHIPPED | OUTPATIENT
Start: 2023-08-30

## 2023-08-30 RX ORDER — ATORVASTATIN CALCIUM 40 MG/1
40 TABLET, FILM COATED ORAL NIGHTLY
Qty: 90 TABLET | Refills: 1 | Status: SHIPPED | OUTPATIENT
Start: 2023-08-30

## 2023-08-30 RX ORDER — HYDRALAZINE HYDROCHLORIDE 50 MG/1
50 TABLET, FILM COATED ORAL EVERY 8 HOURS
Qty: 270 TABLET | Refills: 3 | Status: SHIPPED | OUTPATIENT
Start: 2023-08-30

## 2023-08-30 RX ORDER — CHOLECALCIFEROL (VITAMIN D3) 125 MCG
500 CAPSULE ORAL DAILY
Qty: 90 TABLET | Refills: 1 | Status: SHIPPED | OUTPATIENT
Start: 2023-08-30

## 2023-08-30 RX ORDER — DILTIAZEM HYDROCHLORIDE 180 MG/1
180 CAPSULE, COATED, EXTENDED RELEASE ORAL DAILY
Qty: 90 CAPSULE | Refills: 0 | Status: SHIPPED | OUTPATIENT
Start: 2023-08-30

## 2023-08-30 RX ORDER — LOSARTAN POTASSIUM 50 MG/1
50 TABLET ORAL DAILY
Qty: 90 TABLET | Refills: 1 | Status: SHIPPED | OUTPATIENT
Start: 2023-08-30

## 2023-08-30 RX ORDER — CYCLOBENZAPRINE HCL 5 MG
5 TABLET ORAL
Qty: 90 TABLET | Refills: 1 | Status: SHIPPED | OUTPATIENT
Start: 2023-08-30

## 2023-08-30 RX ORDER — GUANFACINE 1 MG/1
1 TABLET ORAL NIGHTLY
Qty: 90 TABLET | Refills: 1 | Status: SHIPPED | OUTPATIENT
Start: 2023-08-30

## 2023-08-30 RX ORDER — CLOPIDOGREL BISULFATE 75 MG/1
75 TABLET ORAL DAILY
Qty: 90 TABLET | Refills: 0 | Status: SHIPPED | OUTPATIENT
Start: 2023-08-30

## 2023-08-30 RX ORDER — CALCITRIOL 0.25 UG/1
0.25 CAPSULE, LIQUID FILLED ORAL DAILY
Qty: 90 CAPSULE | Refills: 0 | Status: SHIPPED | OUTPATIENT
Start: 2023-08-30

## 2023-08-30 RX ORDER — LOSARTAN POTASSIUM 100 MG/1
100 TABLET ORAL DAILY
Qty: 90 TABLET | Refills: 0 | Status: SHIPPED | OUTPATIENT
Start: 2023-08-30 | End: 2023-08-30

## 2023-08-30 NOTE — PATIENT INSTRUCTIONS
Cut back on losartan from 100 to 50 mg daily    Monitor blood pressure at home  needs to around 130/80    Get labwork in 1 month. Recheck in 2 months     Recheck in 2 months

## 2023-09-25 NOTE — TELEPHONE ENCOUNTER
Calcium-Channel Blockers Protocol [id number]/25/2023 12:43 PM   Protocol Details GFR> 30 ml/min in past 12 months

## 2023-09-27 RX ORDER — DILTIAZEM HYDROCHLORIDE 180 MG/1
180 CAPSULE, COATED, EXTENDED RELEASE ORAL DAILY
Qty: 90 CAPSULE | Refills: 0 | Status: SHIPPED | OUTPATIENT
Start: 2023-09-27

## 2023-09-28 NOTE — PROGRESS NOTES
7/24/2023    Gregg Randle  1975    Chief Complaint:    Chief Complaint   Patient presents with    Aortic Aneurysm       HPI:      PCP:  Dread Duvall MD  PCP:  Dr Jiménez  Nephrology:  Dr Jones     48y.o. male with HTN(stable, increased risk stroke, rupture), Hyperlipidemia(stable, increased risk cardiovascular events), Obesity(uncontrolled, increased risk cardiovascular events), COPD(stable, increased risk pulmonary complications) and Chronic Kidney Disease(stable, increased risk renal failure) AAA(stable, increase risk rupture),   former smoker.  Asymptomatic AAA.  endoAAA repair 2017. Mild to moderate numbness tingling LEFT arm, leg.  LEFT leg occasionally gives out.  Abdominal ultrasound done for kidney evaluation shows AAA.  On Coumadin  .  No other associated signs, symptoms or modifying factors.     3/2017 US Retroperitoneal, Renal  ([facility]):  Medical renal disease.  52mm infrarenal AAA, iliac R 27mm, L 19mm.  6/2017 CT Abdomen Pelvis without contrast:  Descending thoracic aorta 27mm, renals 23mm, infrarenal 52mm, iliacs R 29mm, L 21mm, femorals R 12mm, L 13mm.  8/2017: Endo AAA  9/2019 CT Abdomen Pelvis:  Descending thoracic aorta 25mm, abdominal 45mm, iliac R 33mm, L 20mm, femoral R 11mm, L 10mm.  endoAAA stent graft in place.  10/2020 CT Abdomen Pelvis:  Descending thoracic aorta 27mm, abdominal 44mm, iliac R 32mm, L 21mm, femoral R 11mm, L 10mm.  endoAAA stent graft in place.  7/2022  CT Abdomen Pelvis:  Descending thoracic aorta 27mm, abdominal 49mm, iliac R 38mm, L 24mm, femoral R 12mm, L 12mm.  endoAAA stent graft in place. No endoleak  7/2023 CT Abdomen Pelvis:  Descending thoracic aorta 26mm, abdominal 45mm, iliac R 39mm, L 25mm, femoral R 12mm, L 12mm.  endoAAA stent graft in place. No endoleak     5/2017 VIKASH:  RIGHT 1.1 triphasic.  LEFT 1.1 triphasic.    The following portions of the patient's history were reviewed and updated as appropriate: allergies, current medications, past  family history, past medical history, past social history, past surgical history and problem list.  Recent images independently reviewed.  Available laboratory values reviewed.    PMH:  Past Medical History:   Diagnosis Date    AAA (abdominal aortic aneurysm)     Arthritis     CAD (coronary artery disease)     COPD (chronic obstructive pulmonary disease)     Depression     Depression     HTN (hypertension)     Hyperlipidemia     MI (myocardial infarction)     x 2    Renal insufficiency     Sleep apnea     using c-pap    Sleep apnea     Stroke     no residual except some intermittent vision changes    Vision changes     post stroke, takes a minute to focus     Past Surgical History:   Procedure Laterality Date    COLONOSCOPY N/A 11/17/2020    Procedure: COLONOSCOPY;  Surgeon: Abi Miller MD;  Location: [facility] ENDOSCOPY;  Service: Gastroenterology;  Laterality: N/A;    CORONARY ANGIOPLASTY WITH STENT PLACEMENT      EAR TUBES Left     RI AAA REPAIR,AORTO-AORTIC TUBE PROSTH N/A 8/11/2017    Procedure: ABDOMINAL AORTIC ANEURYSM REPAIR WITH ENDOGRAFT, REPAIR ILIAC ARTERY ANEURYSM, POSSIBLE OPEN ABDOMINAL AORTOGRAM    (CELL SAVER STAND-BY);  Surgeon: Aren Arshad MD;  Location: [facility] OR;  Service: Vascular     Family History   Problem Relation Age of Onset    Stroke Mother     Diabetes Mother     Heart disease Father      Social History     Tobacco Use    Smoking status: Former     Packs/day: 1.00     Years: 20.00     Pack years: 20.00     Types: Cigarettes     Passive exposure: Past    Smokeless tobacco: Never   Vaping Use    Vaping Use: Never used   Substance Use Topics    Alcohol use: No    Drug use: No       ALLERGIES:  No Known Allergies      MEDICATIONS:    Current Outpatient Medications:     albuterol (PROVENTIL HFA;VENTOLIN HFA) 108 (90 BASE) MCG/ACT inhaler, Inhale 2 puffs Every 4 (Four) Hours As Needed for Wheezing., Disp: , Rfl:     atorvastatin (LIPITOR) 40 MG tablet, Take 1 tablet by mouth  Every Night., Disp: 90 tablet, Rfl: 1    Omega-3 Fatty Acids (fish oil) 1000 MG capsule capsule, Take 1 capsule by mouth Daily With Breakfast., Disp: , Rfl:     allopurinol (ZYLOPRIM) 100 MG tablet, TAKE 1 TABLET BY MOUTH EVERY DAY, Disp: 90 tablet, Rfl: 0    bacitracin 500 UNIT/GM ophthalmic ointment, , Disp: , Rfl:     calcitriol (ROCALTROL) 0.25 MCG capsule, Take 1 capsule by mouth Daily., Disp: 90 capsule, Rfl: 0    chlorthalidone (HYGROTON) 25 MG tablet, Does not know if he is taking, Disp: , Rfl:     cloNIDine (CATAPRES) 0.3 MG tablet, Uncertain of mg, Disp: , Rfl:     clopidogrel (PLAVIX) 75 MG tablet, Take 1 tablet by mouth Daily., Disp: 90 tablet, Rfl: 0    cyclobenzaprine (FLEXERIL) 5 MG tablet, Take 1 tablet by mouth every night at bedtime., Disp: 90 tablet, Rfl: 1    dilTIAZem CD (CARDIZEM CD) 180 MG 24 hr capsule, Take 1 capsule by mouth Daily., Disp: 90 capsule, Rfl: 0    dilTIAZem CD (CARDIZEM CD) 180 MG 24 hr capsule, Take 1 capsule by mouth Daily., Disp: 90 capsule, Rfl: 0    escitalopram (LEXAPRO) 20 MG tablet, Uncertain if he  is taking, Disp: , Rfl:     gabapentin (NEURONTIN) 600 MG tablet, Take 1 tablet by mouth 3 (Three) Times a Day., Disp: 90 tablet, Rfl: 2    guanFACINE (TENEX) 1 MG tablet, Take 1 tablet by mouth Every Night., Disp: 90 tablet, Rfl: 1    hydrALAZINE (APRESOLINE) 50 MG tablet, Take 1 tablet by mouth Every 8 (Eight) Hours., Disp: 270 tablet, Rfl: 3    labetalol (NORMODYNE) 200 MG tablet, TAKE 1 TABLET BY MOUTH TWICE DAILY, Disp: 180 tablet, Rfl: 0    linaclotide (Linzess) 145 MCG capsule capsule, Take 1 capsule by mouth Every Morning Before Breakfast., Disp: 90 capsule, Rfl: 0    losartan (Cozaar) 50 MG tablet, Take 1 tablet by mouth Daily., Disp: 90 tablet, Rfl: 1    vitamin B-12 (CYANOCOBALAMIN) 500 MCG tablet, Take 1 tablet by mouth Daily., Disp: 90 tablet, Rfl: 1    vitamin D (ERGOCALCIFEROL) 1.25 MG (33032 UT) capsule capsule, Take 1 capsule by mouth Every 7 (Seven) Days.,  "Disp: 12 capsule, Rfl: 0    Review of Systems   Review of Systems   Constitutional: Positive for malaise/fatigue. Negative for weight loss.   HENT:  Positive for hearing loss.    Eyes:  Positive for blurred vision.   Cardiovascular:  Negative for chest pain, claudication and dyspnea on exertion.   Respiratory:  Negative for cough and shortness of breath.    Skin:  Negative for color change and poor wound healing.   Musculoskeletal:  Positive for arthritis, back pain and neck pain.   Neurological:  Positive for dizziness, focal weakness, numbness and paresthesias. Negative for weakness.     Physical Exam   Vitals:    07/24/23 1035   BP: 129/84   BP Location: Left arm   Pulse: 91   SpO2: 96%   Weight: 109 kg (240 lb)   Height: 185.4 cm (73\")     Body surface area is 2.33 meters squared.  Body mass index is 31.66 kg/m².  Physical Exam  Constitutional:       General: He is not in acute distress.     Appearance: He is not ill-appearing.   HENT:      Right Ear: Hearing normal.      Left Ear: Hearing normal.      Nose: No nasal deformity.      Mouth/Throat:      Dentition: Normal dentition. Does not have dentures.   Cardiovascular:      Rate and Rhythm: Normal rate and regular rhythm.      Pulses:           Carotid pulses are 2+ on the right side and 2+ on the left side.       Radial pulses are 2+ on the right side and 2+ on the left side.        Posterior tibial pulses are 2+ on the right side and 2+ on the left side.      Heart sounds: No murmur heard.  Pulmonary:      Effort: Pulmonary effort is normal.      Breath sounds: Normal breath sounds.   Abdominal:      General: There is no distension.      Palpations: Abdomen is soft. There is no mass.      Tenderness: There is no abdominal tenderness.   Musculoskeletal:         General: No deformity.      Comments: Gait normal.    Skin:     General: Skin is warm and dry.      Coloration: Skin is not pale.      Findings: No erythema.      Comments: No venous staining "   Neurological:      Mental Status: He is alert and oriented to person, place, and time.   Psychiatric:         Speech: Speech normal.         Behavior: Behavior is cooperative.         Thought Content: Thought content normal.         Judgment: Judgment normal.       BUN   Date Value Ref Range Status   06/27/2023 37 (H) 6 - 20 mg/dL Final     Creatinine   Date Value Ref Range Status   06/27/2023 2.66 (H) 0.76 - 1.27 mg/dL Final     eGFR   Amer   Date Value Ref Range Status   10/27/2021 40 (L) >60 mL/min/1.73 Final       ASSESSMENT:  Diagnoses and all orders for this visit:    1. Infrarenal abdominal aortic aneurysm (AAA) without rupture (Primary)  -     CT Abdomen Pelvis Without Contrast; Future    2. PVD (peripheral vascular disease)    3. Mixed hyperlipidemia    4. Benign essential HTN    5. Coronary artery disease involving native coronary artery of native heart without angina pectoris    6. History of CVA (cerebrovascular accident)    7. Stage 3b chronic kidney disease    8. Chronic bilateral low back pain with bilateral sciatica    9. Essential hypertension    10. BRANDI on CPAP    11. Class 1 obesity due to excess calories with serious comorbidity and body mass index (BMI) of 31.0 to 31.9 in adult    PLAN:  Detailed discussion with Gregg Randle regarding situation and options.  Aneurysm abdominal aorta.  Surgical intervention not indicated at this time.  Will plan to follow with interval imaging.  Smoking cessation, lipid management, BP control in 120 range advised.  Discussed symptoms of rupture, details of surgical repair including endovascular and open repair.  Risks, benefits discussed.  Understands and wishes to proceed with plan    Return in 1 year with CT Abdomen Pelvis without contrast    Return after above studies complete  Obesity Class  1. Increased risk cardiovascular events, sleep and breathing disorders, joint issues, type 2 diabetes mellitus. Options for weight management, heart  healthy diet, exercise programs, and associated health risks of obesity discussed.    Recommended regular physical activity, progressive walking program.  Continue current medications as directed.  Advance Care Planning   ACP discussion was declined by the patient. Patient does not have an advance directive, declines further assistance.     Thank you for the opportunity to participate in this patient's care.    Copy to primary care provider.

## (undated) DEVICE — GEL ULTRASND AQUASONIC 100 20GM STRL

## (undated) DEVICE — DECANTER: Brand: UNBRANDED

## (undated) DEVICE — RADIFOCUS GLIDECATH: Brand: GLIDECATH

## (undated) DEVICE — PINNACLE INTRODUCER SHEATH: Brand: PINNACLE

## (undated) DEVICE — TBG HI PRESSURE 500PSI 20IN

## (undated) DEVICE — GLIDESHEATH SLENDER STAINLESS STEEL KIT: Brand: GLIDESHEATH SLENDER

## (undated) DEVICE — GW LUNDRQ STR .035 4X260CM

## (undated) DEVICE — GLV SURG SENSICARE GREEN W/ALOE PF LF 7 STRL

## (undated) DEVICE — GLV SURG SENSICARE ALOE LF PF SZ7.5 GRN

## (undated) DEVICE — STPLR SKIN VISISTAT WD 35CT

## (undated) DEVICE — ANGIOGRAPHIC CATHETER: Brand: IMAGER™ II

## (undated) DEVICE — DEV TORQ GW SEADRAGON .018 TO .038IN

## (undated) DEVICE — KT INTRO MINISTICK MAX W/GW PALLADIUM ECHO 4F 21G 7CM

## (undated) DEVICE — SYR LUERLOK 30CC

## (undated) DEVICE — SYR LUERLOK 20CC

## (undated) DEVICE — GLV SURG TRIUMPH NATURAL W/ALOE PF LTX 8 STRL

## (undated) DEVICE — GLV SURG SENSICARE GREEN W/ALOE PF LF 6 STRL

## (undated) DEVICE — PERCLOSE PROGLIDE™ SUTURE-MEDIATED CLOSURE SYSTEM: Brand: PERCLOSE PROGLIDE™

## (undated) DEVICE — SPNG LAP 18X18IN LF STRL PK/5

## (undated) DEVICE — 3M™ STERI-STRIP™ REINFORCED ADHESIVE SKIN CLOSURES, R1547, 1/2 IN X 4 IN (12 MM X 100 MM), 6 STRIPS/ENVELOPE: Brand: 3M™ STERI-STRIP™

## (undated) DEVICE — SHEATH INTRO AFX SYS 17F

## (undated) DEVICE — SNAR VASC RETRV ENSNARE STD SYS 7F18X30MM 120CM

## (undated) DEVICE — PART NUMBER 108260, VTI 8 MHZ DISPOSABLE DOPPLER PROBE, STRAIGHT, BOX: Brand: VTI 8 MHZ DISPOSABLE DOPPLER PROBE, STRAIGHT, BOX

## (undated) DEVICE — PK ANGIO LF 60

## (undated) DEVICE — GLV SURG SENSICARE GREEN W/ALOE PF LF 8 STRL

## (undated) DEVICE — BALN OCCL CODA2 .035 9F 32MM 120CM

## (undated) DEVICE — PACK,UNIVERSAL,NO GOWNS: Brand: MEDLINE

## (undated) DEVICE — TOWEL,OR,DSP,ST,BLUE,DLX,4/PK,20PK/CS: Brand: MEDLINE

## (undated) DEVICE — PK VASC LF 60

## (undated) DEVICE — STERILE POLYISOPRENE POWDER-FREE SURGICAL GLOVES WITH EMOLLIENT COATING: Brand: PROTEXIS

## (undated) DEVICE — TOWEL,OR,DSP,ST,BLUE,DLX,8/PK,10PK/CS: Brand: MEDLINE

## (undated) DEVICE — ADHS LIQ MASTISOL 2/3ML

## (undated) DEVICE — CANN SMPL SOFTECH BIFLO ETCO2 A/M 7FT

## (undated) DEVICE — ADAPT VLV HEMO ENSNARE POLY 8F 2.7MM BLU

## (undated) DEVICE — GLV RAD REDUC ESP SZ8

## (undated) DEVICE — NDL HYPO PRECISIONGLIDE/REG 18G 1IN PNK

## (undated) DEVICE — RADIFOCUS GLIDEWIRE: Brand: GLIDEWIRE

## (undated) DEVICE — TOTAL TRAY, 16FR 10ML SIL FOLEY, URN: Brand: MEDLINE

## (undated) DEVICE — FOGARTY - HYDRAGRIP SURGICAL - CLAMP INSERTS: Brand: FOGARTY SOFTJAW

## (undated) DEVICE — SHEET,DRAPE,53X77,STERILE: Brand: MEDLINE

## (undated) DEVICE — GOWN,PREVENTION PLUS,XLNG/XXLARGE,STRL: Brand: MEDLINE

## (undated) DEVICE — GOWN,NON-REINFORCED,4XL: Brand: MEDLINE

## (undated) DEVICE — DRAPE,SPLIT,BILATERAL,STERILE: Brand: MEDLINE

## (undated) DEVICE — SYR 10ML